# Patient Record
Sex: MALE | Race: WHITE | NOT HISPANIC OR LATINO | Employment: FULL TIME | URBAN - METROPOLITAN AREA
[De-identification: names, ages, dates, MRNs, and addresses within clinical notes are randomized per-mention and may not be internally consistent; named-entity substitution may affect disease eponyms.]

---

## 2017-02-14 ENCOUNTER — HOSPITAL ENCOUNTER (OUTPATIENT)
Dept: LAB | Facility: MEDICAL CENTER | Age: 52
End: 2017-02-14
Attending: INTERNAL MEDICINE
Payer: COMMERCIAL

## 2017-02-14 LAB
BUN SERPL-MCNC: 20 MG/DL (ref 8–22)
CREAT SERPL-MCNC: 1.06 MG/DL (ref 0.5–1.4)

## 2017-02-14 PROCEDURE — 36415 COLL VENOUS BLD VENIPUNCTURE: CPT

## 2017-02-14 PROCEDURE — 82565 ASSAY OF CREATININE: CPT

## 2017-02-14 PROCEDURE — 84520 ASSAY OF UREA NITROGEN: CPT

## 2017-02-15 ENCOUNTER — HOSPITAL ENCOUNTER (OUTPATIENT)
Dept: RADIOLOGY | Facility: MEDICAL CENTER | Age: 52
End: 2017-02-15
Attending: INTERNAL MEDICINE
Payer: COMMERCIAL

## 2017-02-15 DIAGNOSIS — C64.9 MALIGNANT NEOPLASM OF KIDNEY, EXCEPT PELVIS: ICD-10-CM

## 2017-02-15 PROCEDURE — 71260 CT THORAX DX C+: CPT

## 2017-02-15 PROCEDURE — 700117 HCHG RX CONTRAST REV CODE 255: Performed by: INTERNAL MEDICINE

## 2017-02-15 RX ADMIN — IOHEXOL 100 ML: 350 INJECTION, SOLUTION INTRAVENOUS at 10:27

## 2017-06-19 ENCOUNTER — OFFICE VISIT (OUTPATIENT)
Dept: URGENT CARE | Facility: PHYSICIAN GROUP | Age: 52
End: 2017-06-19
Payer: COMMERCIAL

## 2017-06-19 VITALS
SYSTOLIC BLOOD PRESSURE: 130 MMHG | HEART RATE: 68 BPM | OXYGEN SATURATION: 99 % | WEIGHT: 225 LBS | TEMPERATURE: 98.2 F | BODY MASS INDEX: 30.51 KG/M2 | RESPIRATION RATE: 16 BRPM | DIASTOLIC BLOOD PRESSURE: 72 MMHG

## 2017-06-19 DIAGNOSIS — R07.9 CHEST PAIN, UNSPECIFIED TYPE: ICD-10-CM

## 2017-06-19 PROCEDURE — 99204 OFFICE O/P NEW MOD 45 MIN: CPT | Performed by: PHYSICIAN ASSISTANT

## 2017-06-19 RX ORDER — ASPIRIN 81 MG/1
243 TABLET, CHEWABLE ORAL ONCE
Status: COMPLETED | OUTPATIENT
Start: 2017-06-19 | End: 2017-06-19

## 2017-06-19 RX ADMIN — ASPIRIN 243 MG: 81 TABLET, CHEWABLE ORAL at 11:54

## 2017-06-19 ASSESSMENT — ENCOUNTER SYMPTOMS
SPUTUM PRODUCTION: 0
BACK PAIN: 0
HEADACHES: 1
ORTHOPNEA: 0
LEG PAIN: 0
HEMOPTYSIS: 0
COUGH: 0
SYNCOPE: 0
ABDOMINAL PAIN: 1
SORE THROAT: 0
IRREGULAR HEARTBEAT: 0
NAUSEA: 0
DIZZINESS: 1
EXERTIONAL CHEST PRESSURE: 0
NEAR-SYNCOPE: 0
DIAPHORESIS: 0
PALPITATIONS: 0
NUMBNESS: 0
LOWER EXTREMITY EDEMA: 0
SHORTNESS OF BREATH: 1
PND: 0
CLAUDICATION: 0
FEVER: 0

## 2017-06-19 NOTE — MR AVS SNAPSHOT
Panchito Moura   2017 11:00 AM   Office Visit   MRN: [id number]    Department:  [facility]   Dept Phone:  [phone]    Description:  Male : 1965   Provider:  Evelia Carmen PA-C           Reason for Visit     Chest Pressure chest pressure started at 1am ,has migraine      Allergies as of 2017     No Known Allergies      You were diagnosed with     Chest pain, unspecified type   [[id number]]         Vital Signs     Blood Pressure Pulse Temperature Respirations Weight Oxygen Saturation    130/72 mmHg 68 36.8 °C (98.2 °F) 16 102.059 kg (225 lb) 99%    Smoking Status                   Current Some Day Smoker           Basic Information     Date Of Birth Sex Race Ethnicity Preferred Language    1965 Male White Non- English      Health Maintenance        Date Due Completion Dates    COLONOSCOPY 2014 ---    IMM DTaP/Tdap/Td Vaccine (2 - Td) 2026            Current Immunizations     Tdap Vaccine 2016  7:18 AM      Below and/or attached are the medications your provider expects you to take. Review all of your home medications and newly ordered medications with your provider and/or pharmacist. Follow medication instructions as directed by your provider and/or pharmacist. Please keep your medication list with you and share with your provider. Update the information when medications are discontinued, doses are changed, or new medications (including over-the-counter products) are added; and carry medication information at all times in the event of emergency situations     Allergies:  No Known Allergies          Medications  Valid as of: 2017 -  6:31 PM    Generic Name Brand Name Tablet Size Instructions for use    Hydrocodone-Acetaminophen (Tab) NORCO 5-325 MG Take 1-2 Tabs by mouth every 6 hours as needed.        Pravastatin Sodium   Take  by mouth.        .                 Medicines prescribed today were sent to:     [facility] [id number] -  [place], [place] - [address] [facility] [address] [facility] [address]    [address] [place] [address]    Phone: [phone] Fax: [phone]    Open 24 Hours?: No      Medication refill instructions:       If your prescription bottle indicates you have medication refills left, it is not necessary to call your provider’s office. Please contact your pharmacy and they will refill your medication.    If your prescription bottle indicates you do not have any refills left, you may request refills at any time through one of the following ways: The online iWOPI system (except Urgent Care), by calling your provider’s office, or by asking your pharmacy to contact your provider’s office with a refill request. Medication refills are processed only during regular business hours and may not be available until the next business day. Your provider may request additional information or to have a follow-up visit with you prior to refilling your medication.   *Please Note: Medication refills are assigned a new Rx number when refilled electronically. Your pharmacy may indicate that no refills were authorized even though a new prescription for the same medication is available at the pharmacy. Please request the medicine by name with the pharmacy before contacting your provider for a refill.           iWOPI Access Code: [email or web address]  Expires: 6/25/2017  4:15 AM    iWOPI  A secure, online tool to manage your health information     [employer]’s iWOPI® is a secure, online tool that connects you to your personalized health information from the privacy of your home -- day or night - making it very easy for you to manage your healthcare. Once the activation process is completed, you can even access your medical information using the iWOPI mireille, which is available for free in the Apple Mireille store or Google Play store.     iWOPI provides the following levels of access (as shown below):   My Chart Features   [facility] Primary Care  Doctor [facility]  Specialists [facility]  Urgent  Care Non-[facility]  Primary Care  Doctor   Email your healthcare team securely and privately 24/7 X X X    Manage appointments: schedule your next appointment; view details of past/upcoming appointments X      Request prescription refills. X      View recent personal medical records, including lab and immunizations X X X X   View health record, including health history, allergies, medications X X X X   Read reports about your outpatient visits, procedures, consult and ER notes X X X X   See your discharge summary, which is a recap of your hospital and/or ER visit that includes your diagnosis, lab results, and care plan. X X       How to register for Enecsys:  1. Go to  [email or web address].  2. Click on the Sign Up Now box, which takes you to the New Member Sign Up page. You will need to provide the following information:  a. Enter your Enecsys Access Code exactly as it appears at the top of this page. (You will not need to use this code after you’ve completed the sign-up process. If you do not sign up before the expiration date, you must request a new code.)   b. Enter your date of birth.   c. Enter your home email address.   d. Click Submit, and follow the next screen’s instructions.  3. Create a Enecsys ID. This will be your Enecsys login ID and cannot be changed, so think of one that is secure and easy to remember.  4. Create a Enecsys password. You can change your password at any time.  5. Enter your Password Reset Question and Answer. This can be used at a later time if you forget your password.   6. Enter your e-mail address. This allows you to receive e-mail notifications when new information is available in Enecsys.  7. Click Sign Up. You can now view your health information.    For assistance activating your Enecsys account, call [phone]        Quit Tobacco Information     Do you want to quit using tobacco?    Quitting tobacco decreases risks of cancer,  heart and lung disease, increases life expectancy, improves sense of taste and smell, and increases spending money, among other benefits.    If you are thinking about quitting, we can help.  • [facility] Quit Tobacco Program: [phone]  o Program occurs weekly for four weeks and includes pharmacist consultation on products to support quitting smoking or chewing tobacco. A provider referral is needed for pharmacist consultation.  • Tobacco Users Help Hotline: 6-800-QUIT-NOW (330-9727) or https://[place].quitlogix.org/  o Free, confidential telephone and online coaching for [place] residents. Sessions are designed on a schedule that is convenient for you. Eligible clients receive free nicotine replacement therapy.  • Nationally: www.smokefree.gov  o Information and professional assistance to support both immediate and long-term needs as you become, and remain, a non-smoker. Smokefree.gov allows you to choose the help that best fits your needs.

## 2017-06-19 NOTE — PROGRESS NOTES
Subjective:      Panchito Moura is a 51 y.o. male who presents with Chest Pressure    PMH:  has a past medical history of Cancer (CMS-HCC) and Cancer (CMS-HCC).  MEDS:   Current outpatient prescriptions:   •  hydrocodone-acetaminophen (NORCO) 5-325 MG Tab per tablet, Take 1-2 Tabs by mouth every 6 hours as needed., Disp: 15 Tab, Rfl: 0  •  Pravastatin Sodium (PRAVACHOL PO), Take  by mouth., Disp: , Rfl:     Current facility-administered medications:   •  aspirin (ASA) chewable tab 243 mg, 243 mg, Oral, Once, Evelia Carmen PA-C  ALLERGIES: No Known Allergies  SURGHX:   Past Surgical History   Procedure Laterality Date   • Nephrectomy partial       right   • Nerve repair  6/18/08     Performed by SKYE RAY at [facility]   • Laceration repair  6/18/08     Performed by SKYE RAY at [facility] ORS   • Other       tonsillectomy   • Nephrectomy partial Right      SOCHX:  reports that he has been smoking.  He does not have any smokeless tobacco history on file. He reports that he does not drink alcohol or use illicit drugs.  FH: family history is not on file. Reviewed with patient/family. Not pertinent to this complaint.            HPI Comments: Patient presents with:  Chest Pressure: chest pressure started at 1am ,has migraine,dizzy and mildly sob on exertion.   PT states he woke up with chest pain/pressure and indigestion.  Drank some milk without relief.         Chest Pain   This is a new problem. The current episode started today. The onset quality is gradual. The problem occurs constantly. The problem has been unchanged. The pain is present in the substernal region and epigastric region. The pain is at a severity of 5/10. The quality of the pain is described as dull, heavy and pressure. The pain does not radiate. Associated symptoms include abdominal pain, dizziness, headaches and shortness of breath. Pertinent negatives include no back pain, claudication, cough, diaphoresis,  exertional chest pressure, fever, hemoptysis, irregular heartbeat, leg pain, lower extremity edema, malaise/fatigue, nausea, near-syncope, numbness, orthopnea, palpitations, PND, sputum production or syncope. Treatments tried: milk. The treatment provided no relief. Risk factors include smoking/tobacco exposure, male gender, obesity and lack of exercise.   His past medical history is significant for cancer and hyperlipidemia.   Pertinent negatives for past medical history include no MI and no PE.       Review of Systems   Constitutional: Negative for fever, malaise/fatigue and diaphoresis.   HENT: Negative for sore throat.    Respiratory: Positive for shortness of breath. Negative for cough, hemoptysis and sputum production.    Cardiovascular: Positive for chest pain. Negative for palpitations, orthopnea, claudication, leg swelling, syncope, PND and near-syncope.   Gastrointestinal: Positive for abdominal pain. Negative for nausea.   Musculoskeletal: Negative for back pain.   Neurological: Positive for dizziness and headaches. Negative for numbness.   All other systems reviewed and are negative.         Objective:     /72 mmHg  Pulse 68  Temp(Src) 36.8 °C (98.2 °F)  Resp 16  Wt 102.059 kg (225 lb)  SpO2 99%     Physical Exam   Constitutional: He is oriented to person, place, and time. He appears well-developed and well-nourished. No distress.   HENT:   Head: Normocephalic.   Nose: Nose normal.   Mouth/Throat: Oropharynx is clear and moist.   Eyes: Conjunctivae and EOM are normal. Pupils are equal, round, and reactive to light.   Neck: Normal range of motion. Neck supple. No JVD present.   Cardiovascular: Normal rate, regular rhythm, normal heart sounds and intact distal pulses.    Pulmonary/Chest: Effort normal and breath sounds normal.   Abdominal: Soft.   Musculoskeletal: Normal range of motion.   Neurological: He is alert and oriented to person, place, and time. He has normal reflexes.   Skin: Skin is  "warm and dry. There is pallor.   Psychiatric: He has a normal mood and affect.   Nursing note and vitals reviewed.         EKG Interpretation   Interpreted by me   Rhythm: normal sinus   Rate: normal   Axis: normal   Ectopy: none   Conduction: normal   ST Segments: no acute change , possible ST elevation vs early repol  V2-V3   T Waves: no acute change   Q Waves: none      Assessment/Plan:     1. Chest pain, unspecified type  UC AMA/Refusal of Treatment    aspirin (ASA) chewable tab 243 mg    EKG - Clinic Performed   PT requires evaluation and treatment at a facility that can provide a higher level of care due to acuity of illness/complaint.   I spoke with AMANDA at [facility] who is aware of pt CC, HPI, VS, EKG, ASA , [facility] refusal  and transport mode (POV).      PT refused [facility] transport stating \"Im not paying $1000.00 for an ambulance ride up the hill.\"        "

## 2018-12-12 ENCOUNTER — HOSPITAL ENCOUNTER (OUTPATIENT)
Dept: RADIOLOGY | Facility: MEDICAL CENTER | Age: 53
End: 2018-12-12
Attending: PHYSICIAN ASSISTANT
Payer: COMMERCIAL

## 2018-12-12 ENCOUNTER — OFFICE VISIT (OUTPATIENT)
Dept: URGENT CARE | Facility: PHYSICIAN GROUP | Age: 53
End: 2018-12-12
Payer: COMMERCIAL

## 2018-12-12 VITALS
SYSTOLIC BLOOD PRESSURE: 130 MMHG | OXYGEN SATURATION: 94 % | TEMPERATURE: 97.5 F | BODY MASS INDEX: 31.15 KG/M2 | HEART RATE: 64 BPM | HEIGHT: 72 IN | WEIGHT: 230 LBS | RESPIRATION RATE: 20 BRPM | DIASTOLIC BLOOD PRESSURE: 62 MMHG

## 2018-12-12 DIAGNOSIS — R10.9 FLANK PAIN: ICD-10-CM

## 2018-12-12 DIAGNOSIS — R05.9 COUGH: ICD-10-CM

## 2018-12-12 DIAGNOSIS — R07.1 INSPIRATORY PAIN: ICD-10-CM

## 2018-12-12 DIAGNOSIS — R31.9 HEMATURIA, UNSPECIFIED TYPE: ICD-10-CM

## 2018-12-12 DIAGNOSIS — R07.81 RIB PAIN ON RIGHT SIDE: ICD-10-CM

## 2018-12-12 DIAGNOSIS — Z85.528 H/O RENAL CELL CANCER: ICD-10-CM

## 2018-12-12 PROCEDURE — 71101 X-RAY EXAM UNILAT RIBS/CHEST: CPT | Mod: RT

## 2018-12-12 PROCEDURE — 99214 OFFICE O/P EST MOD 30 MIN: CPT | Performed by: PHYSICIAN ASSISTANT

## 2018-12-12 ASSESSMENT — ENCOUNTER SYMPTOMS
BLOOD IN STOOL: 0
VOMITING: 0
COUGH: 1
FLANK PAIN: 0
SPUTUM PRODUCTION: 0
DIARRHEA: 0
NAUSEA: 1
BACK PAIN: 0
PALPITATIONS: 0
WHEEZING: 0
FEVER: 0
SHORTNESS OF BREATH: 0
ABDOMINAL PAIN: 0
CHILLS: 0
MYALGIAS: 0
NECK PAIN: 0
CONSTIPATION: 0

## 2018-12-12 ASSESSMENT — PAIN SCALES - GENERAL: PAINLEVEL: 8=MODERATE-SEVERE PAIN

## 2018-12-12 NOTE — PROGRESS NOTES
Subjective:   Panchito Moura is a 53 y.o. male who presents for Rib Pain (x3days sob)        Notes last one week of cough, bad coughing, notes last two days of pain to right anterior lower rib 2/2 coughing, denies crepitus, denies noting moment of injury, denies fever/chills, c/o mild ST, denies ear pain, denies bronchospasm, notes palpable rib pain to anterior rib, denies nausea/vomiting/abdpain/diarrhea/rash, denies PMH of asthma/pneumonia, denies PMH of bronchitis, does have seasonal allerg, tried using theraflu/nyquil. Denies left sided chest pain, denies palpitations. Denies swelling to legs.  Pain with inspiration as well as pain line back improved leaning forward. Pt admits to PMH of renal CA w/ partial right nephrectomy.       Review of Systems   Constitutional: Negative for chills and fever.   Respiratory: Positive for cough. Negative for sputum production, shortness of breath and wheezing.    Cardiovascular: Negative for chest pain, palpitations and leg swelling.        C/o right rib pain   Gastrointestinal: Positive for nausea. Negative for abdominal pain, blood in stool, constipation, diarrhea, melena and vomiting.   Genitourinary: Negative for dysuria, flank pain, frequency, hematuria and urgency.   Musculoskeletal: Negative for back pain, myalgias and neck pain.        POS for 'right rib pain'   Skin: Negative for rash.     No Known Allergies   Objective:   /62   Pulse 64   Temp 36.4 °C (97.5 °F) (Temporal)   Resp 20   Ht 1.829 m (6')   Wt 104.3 kg (230 lb)   SpO2 94%   BMI 31.19 kg/m²   Physical Exam   Constitutional: He is oriented to person, place, and time. He appears well-developed and well-nourished. No distress.   HENT:   Head: Normocephalic and atraumatic.   Right Ear: External ear normal.   Left Ear: External ear normal.   Nose: Nose normal.   Eyes: Conjunctivae are normal. Right eye exhibits no discharge. Left eye exhibits no discharge. No scleral icterus.   Neck: Neck supple.    Pulmonary/Chest: Effort normal. No respiratory distress. He has no wheezes. He has no rales. He exhibits no tenderness ( unable to reproduce pain w/ palp).   Pain w/ inspiration   Abdominal: Soft. Bowel sounds are normal. He exhibits no distension. There is no tenderness. There is no guarding.   Barely able to lie back 2/2 pain   Musculoskeletal: Normal range of motion.   Neurological: He is alert and oriented to person, place, and time. Coordination normal.   Skin: Skin is warm and dry. He is not diaphoretic. No pallor.   Psychiatric: He has a normal mood and affect.   Nursing note and vitals reviewed.  POCT UA - hematuria  Dx rib/CXR - Impression       1.  No acute fracture or bone erosion identified.    2.  Increased in linear and interstitial opacifications in each lung could be due to inflammation or edema.   Reading Provider Reading Date   Vaughn Thomas M.D. Dec 12, 2018   Signing Provider Signing Date Signing Time   Vaughn Thomas M.D. Dec 12, 2018      CT renal colic - CANCELLED      Assessment/Plan:   1. Inspiratory pain  - UC AMA/Refusal of Treatment    2. Flank pain  - POCT Urinalysis  - [id number] (WITH 1-VIEW CXR) RIGHT; Future  - UC AMA/Refusal of Treatment    3. Cough  - POCT Urinalysis  - [id number] (WITH 1-VIEW CXR) RIGHT; Future    4. Hematuria, unspecified type  - UC AMA/Refusal of Treatment    5. H/O renal cell cancer  - UC AMA/Refusal of Treatment    Based on degree of pain, PMH of renal CA and similar s/sx then, as well as now suspected fluid seen on CXR - pt to ER    Patient has been directed to [facility] ER for further management/work up, I have reiterated to patient that although a provider to provider transfer was made this will not necessarily expedite the ER process, I have spoken to [facility] regarding patient en route by private vehicle    Wife driving pt to ER; completes AMA declining EMS transport  Differential diagnosis, natural history, supportive care, and  indications for immediate follow-up discussed.

## 2020-07-21 ENCOUNTER — OFFICE VISIT (OUTPATIENT)
Dept: URGENT CARE | Facility: PHYSICIAN GROUP | Age: 55
End: 2020-07-21
Payer: COMMERCIAL

## 2020-07-21 ENCOUNTER — HOSPITAL ENCOUNTER (OUTPATIENT)
Facility: MEDICAL CENTER | Age: 55
End: 2020-07-21
Attending: PHYSICIAN ASSISTANT
Payer: COMMERCIAL

## 2020-07-21 VITALS
RESPIRATION RATE: 16 BRPM | WEIGHT: 230 LBS | TEMPERATURE: 97.6 F | DIASTOLIC BLOOD PRESSURE: 82 MMHG | OXYGEN SATURATION: 96 % | SYSTOLIC BLOOD PRESSURE: 126 MMHG | BODY MASS INDEX: 31.19 KG/M2 | HEART RATE: 74 BPM

## 2020-07-21 DIAGNOSIS — S81.802A OPEN WOUND OF LEFT LOWER EXTREMITY, INITIAL ENCOUNTER: Primary | ICD-10-CM

## 2020-07-21 DIAGNOSIS — L03.116 CELLULITIS OF LEFT LOWER EXTREMITY: ICD-10-CM

## 2020-07-21 PROCEDURE — 87205 SMEAR GRAM STAIN: CPT

## 2020-07-21 PROCEDURE — 99214 OFFICE O/P EST MOD 30 MIN: CPT | Performed by: PHYSICIAN ASSISTANT

## 2020-07-21 PROCEDURE — 87075 CULTR BACTERIA EXCEPT BLOOD: CPT

## 2020-07-21 PROCEDURE — 87070 CULTURE OTHR SPECIMN AEROBIC: CPT

## 2020-07-21 RX ORDER — ALBUTEROL SULFATE 90 UG/1
AEROSOL, METERED RESPIRATORY (INHALATION)
COMMUNITY
Start: 2020-06-06 | End: 2020-10-07

## 2020-07-21 RX ORDER — DOXYCYCLINE 100 MG/1
100 CAPSULE ORAL 2 TIMES DAILY
Qty: 20 CAP | Refills: 0 | Status: SHIPPED | OUTPATIENT
Start: 2020-07-21 | End: 2020-07-30

## 2020-07-21 RX ORDER — RIVAROXABAN 20 MG/1
TABLET, FILM COATED ORAL
Status: ON HOLD | COMMUNITY
Start: 2020-07-05 | End: 2020-10-27

## 2020-07-21 RX ORDER — ZOLPIDEM TARTRATE 10 MG/1
TABLET ORAL
COMMUNITY
Start: 2020-06-09 | End: 2020-12-30

## 2020-07-21 ASSESSMENT — ENCOUNTER SYMPTOMS
VOMITING: 0
FALLS: 0
HEADACHES: 0
FEVER: 0

## 2020-07-22 LAB
GRAM STN SPEC: NORMAL
SIGNIFICANT IND 70042: NORMAL
SITE SITE: NORMAL
SOURCE SOURCE: NORMAL

## 2020-07-22 NOTE — PROGRESS NOTES
"Subjective:      Panchito Moura is a 54 y.o. male who presents with Wound Infection (L leg open wound infection )            Patient is a 54-year-old male who presents to urgent care with left lower extremity \"wound \"that is not healing well.  He reports he was recently hospitalized earlier this month (hospitalized for pneumonia )and discharged on the 10th of which he reports he noticed the swelling and crusting shortly thereafter.  Over the last few days he reports worsening pain and believes that it may have grown in size.  He readily admits that he is unable to see the area but reports that the \"scab \"is getting larger.  He denies notable drainage that he is aware of.  He does report history of Xarelto usage as he has history of DVT with bilateral PE.  He denies any fevers, chills, streaking that he is aware of.  He also denies history of MRSA.    Wound Infection   This is a new problem. The current episode started 1 to 4 weeks ago. The problem occurs constantly. The problem has been gradually worsening. Pertinent negatives include no fever, headaches, rash or vomiting. Exacerbated by: Pressure over the area.  Treatments tried: Neosporin.        Review of Systems   Constitutional: Negative for fever.   Gastrointestinal: Negative for vomiting.   Musculoskeletal: Negative for falls and joint pain.        Left lower extremity wound   Skin: Negative for rash.        He does report prior itching to the region however is uncertain if it was caused by him scratching or not.   Neurological: Negative for headaches.   All other systems reviewed and are negative.         Objective:     /82   Pulse 74   Temp 36.4 °C (97.6 °F) (Temporal)   Resp 16   Wt 104.3 kg (230 lb)   SpO2 96%   BMI 31.19 kg/m²      Physical Exam  Vitals signs reviewed.   Constitutional:       General: He is not in acute distress.     Appearance: He is well-developed.   HENT:      Head: Normocephalic and atraumatic.   Eyes:      " Conjunctiva/sclera: Conjunctivae normal.      Pupils: Pupils are equal, round, and reactive to light.   Neck:      Musculoskeletal: Normal range of motion and neck supple.      Trachea: No tracheal deviation.   Cardiovascular:      Rate and Rhythm: Normal rate.   Pulmonary:      Effort: Pulmonary effort is normal.   Skin:     General: Skin is warm.      Findings: Erythema present.             Comments: Left posterior lower extremity: Large scab approximately 6 cm in diameter noted with surrounding erythema.  Scab was removed with sterile Q-tip to note superficial open wound with purulent mucoid and bloody discharge-wound was debrided.  Wound also was irrigated with copious amount of sterile saline.  Area of erythema was marked with marker.  Xeroform, nonstick, and further dressing was applied.   Neurological:      Mental Status: He is alert and oriented to person, place, and time.      Coordination: Coordination normal.   Psychiatric:         Behavior: Behavior normal.         Thought Content: Thought content normal.         Judgment: Judgment normal.                 Assessment/Plan:       1. Open wound of left lower extremity, initial encounter  - ANAEROBIC/AEROBIC/GRAM STAIN  - doxycycline (MONODOX) 100 MG capsule; Take 1 Cap by mouth 2 times a day for 10 days.  Dispense: 20 Cap; Refill: 0    2. Cellulitis of left lower extremity  - doxycycline (MONODOX) 100 MG capsule; Take 1 Cap by mouth 2 times a day for 10 days.  Dispense: 20 Cap; Refill: 0    Wound culture further sent.  Patient does have history of renal cell carcinoma and is status post nephrectomy-per history patient reports that his kidney function is stable.  Will utilize doxycycline at this time as I do not need to renally dose.  Patient is to return to clinic in 2 days for dressing change-she was given supplies to change his dressing tomorrow as well.  If minimal improvement over wound care patient may need to follow-up with wound clinic in the  future-he prefers to avoid that at this time.  Patient and/or guardian given precautionary s/sx that mandate immediate follow up and evaluation in the ED. Advised of risks of not doing so.  Side effects of the above medications discussed.   DDX, Supportive care, and indications for immediate follow-up discussed with patient.    Instructed to return to clinic or nearest emergency department if we are not available for any change in condition, further concerns, or worsening of symptoms.    The patient and/or guardian demonstrated a good understanding and agreed with the treatment plan.    Please note that this dictation was created using voice recognition software. I have made every reasonable attempt to correct obvious errors, but I expect that there are errors of grammar and possibly content that I did not discover before finalizing the note.

## 2020-07-23 ENCOUNTER — HOSPITAL ENCOUNTER (OUTPATIENT)
Dept: RADIOLOGY | Facility: MEDICAL CENTER | Age: 55
End: 2020-07-23
Attending: PHYSICIAN ASSISTANT | Admitting: PHYSICIAN ASSISTANT
Payer: COMMERCIAL

## 2020-07-23 ENCOUNTER — OFFICE VISIT (OUTPATIENT)
Dept: URGENT CARE | Facility: PHYSICIAN GROUP | Age: 55
End: 2020-07-23
Payer: COMMERCIAL

## 2020-07-23 VITALS
BODY MASS INDEX: 31.15 KG/M2 | SYSTOLIC BLOOD PRESSURE: 140 MMHG | RESPIRATION RATE: 20 BRPM | HEART RATE: 75 BPM | OXYGEN SATURATION: 96 % | DIASTOLIC BLOOD PRESSURE: 80 MMHG | HEIGHT: 72 IN | WEIGHT: 230 LBS | TEMPERATURE: 97.9 F

## 2020-07-23 DIAGNOSIS — M79.89 PAIN AND SWELLING OF LEFT LOWER LEG: ICD-10-CM

## 2020-07-23 DIAGNOSIS — S81.802A OPEN WOUND OF LEFT LOWER LEG, INITIAL ENCOUNTER: ICD-10-CM

## 2020-07-23 DIAGNOSIS — M79.662 PAIN AND SWELLING OF LEFT LOWER LEG: ICD-10-CM

## 2020-07-23 PROCEDURE — 99213 OFFICE O/P EST LOW 20 MIN: CPT | Performed by: PHYSICIAN ASSISTANT

## 2020-07-23 PROCEDURE — 93971 EXTREMITY STUDY: CPT | Mod: LT

## 2020-07-23 ASSESSMENT — ENCOUNTER SYMPTOMS
SORE THROAT: 0
NAUSEA: 0
FEVER: 0
SENSORY CHANGE: 0
VOMITING: 0
CHILLS: 0
PALPITATIONS: 0
BLURRED VISION: 0
SHORTNESS OF BREATH: 0
TINGLING: 0

## 2020-07-23 ASSESSMENT — PAIN SCALES - GENERAL: PAINLEVEL: 8=MODERATE-SEVERE PAIN

## 2020-07-24 NOTE — PROGRESS NOTES
Subjective:      Panchito Moura is a 54 y.o. male who presents with Wound Check (back of L of leg, states its still very painful)    HPI:  Patient was seen in the urgent care on 7/21/2020 for evaluation of an open wound in his left lower extremity.  States that he developed a wound approximately 1 month prior after he was hospitalized for pneumonia.  At previous visit wound was debrided and cleaned.  The area of erythema was outlined with marker.  Wound cultures were obtained which are still pending.  Patient was started on a 10-day course of doxycycline.  He has had 4 doses of the doxycycline.  Comes in today for reevaluation.  States that the pain actually has increased a little bit.  Has no fever or chills.  No numbness or tingling.  Has not noticed any discharge from the wound but he does admit that it is difficult to see.      Review of Systems   Constitutional: Negative for chills and fever.   HENT: Negative for sore throat.    Eyes: Negative for blurred vision.   Respiratory: Negative for shortness of breath.    Cardiovascular: Positive for leg swelling (Left lower leg). Negative for chest pain and palpitations.   Gastrointestinal: Negative for nausea and vomiting.   Musculoskeletal: Negative for joint pain.   Skin:        wound of left lower extremity   Neurological: Negative for tingling and sensory change.       PMH:  has a past medical history of Cancer (HCC) and Cancer (HCC).  MEDS:   Current Outpatient Medications:   •  XARELTO 20 MG Tab tablet, TK 1 T PO QPM, Disp: , Rfl:   •  albuterol 108 (90 Base) MCG/ACT Aero Soln inhalation aerosol, INL 1 PUFF PO Q 4 H PRF WHZ, Disp: , Rfl:   •  zolpidem (AMBIEN) 10 MG Tab, TK 1 T PO HS PRF SLP  GM 47.00, Disp: , Rfl:   •  doxycycline (MONODOX) 100 MG capsule, Take 1 Cap by mouth 2 times a day for 10 days., Disp: 20 Cap, Rfl: 0  •  Pravastatin Sodium (PRAVACHOL PO), Take  by mouth., Disp: , Rfl:   ALLERGIES: No Known Allergies  SURGHX:   Past Surgical History:    Procedure Laterality Date   • NERVE REPAIR  6/18/08    Performed by SKYE RAY at SURGERY [facility] ORS   • LACERATION REPAIR  6/18/08    Performed by SKYE RAY at [facility] ORS   • NEPHRECTOMY PARTIAL      right   • NEPHRECTOMY PARTIAL Right    • OTHER      tonsillectomy     SOCHX:  reports that he has been smoking cigarettes. He has never used smokeless tobacco. He reports that he does not drink alcohol or use drugs.  FH: Family history was reviewed, no pertinent findings to report     Objective:     /80   Pulse 75   Temp 36.6 °C (97.9 °F) (Temporal)   Resp 20   Ht 1.829 m (6')   Wt 104.3 kg (230 lb)   SpO2 96%   BMI 31.19 kg/m²      Physical Exam  Constitutional:       Appearance: He is well-developed.   HENT:      Head: Normocephalic and atraumatic.      Right Ear: External ear normal.      Left Ear: External ear normal.   Eyes:      Conjunctiva/sclera: Conjunctivae normal.      Pupils: Pupils are equal, round, and reactive to light.   Cardiovascular:      Rate and Rhythm: Normal rate and regular rhythm.      Heart sounds: Normal heart sounds. No murmur.   Pulmonary:      Effort: Pulmonary effort is normal.      Breath sounds: Normal breath sounds. No wheezing.   Skin:     General: Skin is warm and dry.      Capillary Refill: Capillary refill takes less than 2 seconds.      Comments: Left calf exhibits a large scab approximately 6 x 4 cm in diameter with surrounding erythema.  The erythema is approximately 1 cm inside of the line demarcated with marker from previous visit.  There is very mild clear/yellow discharge noted from the central portion of the wound but no purulent drainage.  The entire left lower extremity from the knee down exhibits swelling in the entirety of the calf is tender to palpation.  No fluctuance noted.  There is mild induration in the area just adjacent to the wound.  No streaking.  Distal N/V intact.   Neurological:      Mental Status: He is  alert and oriented to person, place, and time.   Psychiatric:         Behavior: Behavior normal.         Judgment: Judgment normal.         US-EXTREMITY VENOUS LOWER UNILAT LEFT   IMPRESSION:     No evidence of left lower extremity deep venous thrombosis.  Assessment/Plan:       1. Open wound of left lower leg, initial encounter  - US-EXTREMITY VENOUS LOWER UNILAT LEFT; Future    2. Pain and swelling of left lower leg  - US-EXTREMITY VENOUS LOWER UNILAT LEFT; Future      Due to the extensive amount of swelling in the left lower extremity and patient's history of DVT ultrasound was ordered.  There is no evidence of DVT seen.  Patient is only had 4 doses of doxycycline.  Wound culture still pending.  He was advised to keep the extremity elevated as much as possible above the level of his heart and to apply ice to the affected extremity.  Continue antibiotics as prescribed.  If the swelling/pain does not improve at all by tomorrow afternoon he should go to the emergency department.  ER precautions also discussed for worsening of the swelling or redness, streaking from the wound, or development of fever.  If patient does not end up going to the emergency department for any of the previously mentioned items he should return to urgent care on Saturday morning for wound check.

## 2020-07-25 ENCOUNTER — OFFICE VISIT (OUTPATIENT)
Dept: URGENT CARE | Facility: PHYSICIAN GROUP | Age: 55
End: 2020-07-25
Payer: COMMERCIAL

## 2020-07-25 VITALS
SYSTOLIC BLOOD PRESSURE: 120 MMHG | RESPIRATION RATE: 12 BRPM | HEART RATE: 57 BPM | DIASTOLIC BLOOD PRESSURE: 70 MMHG | HEIGHT: 72 IN | WEIGHT: 230 LBS | OXYGEN SATURATION: 97 % | BODY MASS INDEX: 31.15 KG/M2 | TEMPERATURE: 97 F

## 2020-07-25 DIAGNOSIS — S81.802D OPEN WOUND OF LEFT LOWER LEG, SUBSEQUENT ENCOUNTER: ICD-10-CM

## 2020-07-25 LAB
BACTERIA WND AEROBE CULT: NORMAL
GRAM STN SPEC: NORMAL
SIGNIFICANT IND 70042: NORMAL
SITE SITE: NORMAL
SOURCE SOURCE: NORMAL

## 2020-07-25 PROCEDURE — 99213 OFFICE O/P EST LOW 20 MIN: CPT | Performed by: NURSE PRACTITIONER

## 2020-07-25 ASSESSMENT — ENCOUNTER SYMPTOMS
TINGLING: 0
FEVER: 0
CHILLS: 0
MYALGIAS: 0
SENSORY CHANGE: 0

## 2020-07-25 NOTE — PROGRESS NOTES
Subjective:      Panchito Moura is a 54 y.o. male who presents with Follow-Up (L leg, wound)            HPI Recurrent. 54 year old male with open wound with subsequent infection to posterior calf of left leg. He is here for follow up. Has been on doxycycline and tolerating this. He denies fever, chills, myalgia, nausea. He notices significant improvement today. Swelling is down as is erythema.  Patient has no known allergies.  Current Outpatient Medications on File Prior to Visit   Medication Sig Dispense Refill   • XARELTO 20 MG Tab tablet TK 1 T PO QPM     • albuterol 108 (90 Base) MCG/ACT Aero Soln inhalation aerosol INL 1 PUFF PO Q 4 H PRF WHZ     • zolpidem (AMBIEN) 10 MG Tab TK 1 T PO HS PRF SLP  GM 47.00     • doxycycline (MONODOX) 100 MG capsule Take 1 Cap by mouth 2 times a day for 10 days. 20 Cap 0   • Pravastatin Sodium (PRAVACHOL PO) Take  by mouth.       No current facility-administered medications on file prior to visit.      Social History     Socioeconomic History   • Marital status: Single     Spouse name: Not on file   • Number of children: Not on file   • Years of education: Not on file   • Highest education level: Not on file   Occupational History   • Not on file   Social Needs   • Financial resource strain: Not on file   • Food insecurity     Worry: Not on file     Inability: Not on file   • Transportation needs     Medical: Not on file     Non-medical: Not on file   Tobacco Use   • Smoking status: Current Some Day Smoker     Types: Cigarettes   • Smokeless tobacco: Never Used   Substance and Sexual Activity   • Alcohol use: No   • Drug use: No   • Sexual activity: Not on file   Lifestyle   • Physical activity     Days per week: Not on file     Minutes per session: Not on file   • Stress: Not on file   Relationships   • Social connections     Talks on phone: Not on file     Gets together: Not on file     Attends Shinto service: Not on file     Active member of club or organization: Not on  file     Attends meetings of clubs or organizations: Not on file     Relationship status: Not on file   • Intimate partner violence     Fear of current or ex partner: Not on file     Emotionally abused: Not on file     Physically abused: Not on file     Forced sexual activity: Not on file   Other Topics Concern   • Not on file   Social History Narrative    ** Merged History Encounter **          Breast Cancer-related family history is not on file.      Review of Systems   Constitutional: Negative for chills and fever.   Musculoskeletal: Negative for myalgias.   Skin:        +open wound.   Neurological: Negative for tingling and sensory change.          Objective:     /70   Pulse (!) 57   Temp 36.1 °C (97 °F) (Temporal)   Resp 12   Ht 1.829 m (6')   Wt 104.3 kg (230 lb)   SpO2 97%   BMI 31.19 kg/m²      Physical Exam  Vitals signs and nursing note reviewed.   Constitutional:       Appearance: Normal appearance. He is not ill-appearing.   Musculoskeletal: Normal range of motion.   Skin:     General: Skin is warm and dry.      Findings: Erythema present.      Comments: Large scabbed wound to posterior left lower extremity. No induration palpated, minimal swelling noted and decrease in erythema.    Neurological:      General: No focal deficit present.      Mental Status: He is alert and oriented to person, place, and time.                 Assessment/Plan:       1. Open wound of left lower leg, subsequent encounter       Doing much better. I would like him to follow up Tuesday or Wednesday. Again he is given strict ED precautions. I have also gone over home care as far as twice daily cleaning of area with warm water and soap.

## 2020-07-26 ENCOUNTER — APPOINTMENT (OUTPATIENT)
Dept: RADIOLOGY | Facility: MEDICAL CENTER | Age: 55
End: 2020-07-26
Attending: EMERGENCY MEDICINE
Payer: COMMERCIAL

## 2020-07-26 ENCOUNTER — HOSPITAL ENCOUNTER (EMERGENCY)
Facility: MEDICAL CENTER | Age: 55
End: 2020-07-26
Attending: EMERGENCY MEDICINE
Payer: COMMERCIAL

## 2020-07-26 VITALS
SYSTOLIC BLOOD PRESSURE: 130 MMHG | WEIGHT: 237.44 LBS | HEART RATE: 65 BPM | OXYGEN SATURATION: 95 % | DIASTOLIC BLOOD PRESSURE: 70 MMHG | RESPIRATION RATE: 18 BRPM | TEMPERATURE: 97.6 F | HEIGHT: 72 IN | BODY MASS INDEX: 32.16 KG/M2

## 2020-07-26 DIAGNOSIS — N50.819 TESTICLE PAIN: ICD-10-CM

## 2020-07-26 LAB
ANION GAP SERPL CALC-SCNC: 13 MMOL/L (ref 7–16)
APPEARANCE UR: CLEAR
BACTERIA SPEC ANAEROBE CULT: NORMAL
BASOPHILS # BLD AUTO: 0.5 % (ref 0–1.8)
BASOPHILS # BLD: 0.06 K/UL (ref 0–0.12)
BILIRUB UR QL STRIP.AUTO: NEGATIVE
BUN SERPL-MCNC: 24 MG/DL (ref 8–22)
CALCIUM SERPL-MCNC: 9.4 MG/DL (ref 8.5–10.5)
CHLORIDE SERPL-SCNC: 101 MMOL/L (ref 96–112)
CO2 SERPL-SCNC: 21 MMOL/L (ref 20–33)
COLOR UR: YELLOW
CREAT SERPL-MCNC: 1.07 MG/DL (ref 0.5–1.4)
EOSINOPHIL # BLD AUTO: 0.09 K/UL (ref 0–0.51)
EOSINOPHIL NFR BLD: 0.7 % (ref 0–6.9)
ERYTHROCYTE [DISTWIDTH] IN BLOOD BY AUTOMATED COUNT: 46.6 FL (ref 35.9–50)
GLUCOSE SERPL-MCNC: 110 MG/DL (ref 65–99)
GLUCOSE UR STRIP.AUTO-MCNC: NEGATIVE MG/DL
HCT VFR BLD AUTO: 43.5 % (ref 42–52)
HGB BLD-MCNC: 14.4 G/DL (ref 14–18)
IMM GRANULOCYTES # BLD AUTO: 0.06 K/UL (ref 0–0.11)
IMM GRANULOCYTES NFR BLD AUTO: 0.5 % (ref 0–0.9)
KETONES UR STRIP.AUTO-MCNC: NEGATIVE MG/DL
LEUKOCYTE ESTERASE UR QL STRIP.AUTO: NEGATIVE
LYMPHOCYTES # BLD AUTO: 0.94 K/UL (ref 1–4.8)
LYMPHOCYTES NFR BLD: 7.7 % (ref 22–41)
MCH RBC QN AUTO: 30.1 PG (ref 27–33)
MCHC RBC AUTO-ENTMCNC: 33.1 G/DL (ref 33.7–35.3)
MCV RBC AUTO: 91 FL (ref 81.4–97.8)
MICRO URNS: NORMAL
MONOCYTES # BLD AUTO: 0.81 K/UL (ref 0–0.85)
MONOCYTES NFR BLD AUTO: 6.6 % (ref 0–13.4)
NEUTROPHILS # BLD AUTO: 10.31 K/UL (ref 1.82–7.42)
NEUTROPHILS NFR BLD: 84 % (ref 44–72)
NITRITE UR QL STRIP.AUTO: NEGATIVE
NRBC # BLD AUTO: 0 K/UL
NRBC BLD-RTO: 0 /100 WBC
PH UR STRIP.AUTO: 5.5 [PH] (ref 5–8)
PLATELET # BLD AUTO: 124 K/UL (ref 164–446)
PMV BLD AUTO: 9.9 FL (ref 9–12.9)
POTASSIUM SERPL-SCNC: 4.3 MMOL/L (ref 3.6–5.5)
PROT UR QL STRIP: NEGATIVE MG/DL
RBC # BLD AUTO: 4.78 M/UL (ref 4.7–6.1)
RBC UR QL AUTO: NEGATIVE
SIGNIFICANT IND 70042: NORMAL
SITE SITE: NORMAL
SODIUM SERPL-SCNC: 135 MMOL/L (ref 135–145)
SOURCE SOURCE: NORMAL
SP GR UR STRIP.AUTO: 1.02
UROBILINOGEN UR STRIP.AUTO-MCNC: 0.2 MG/DL
WBC # BLD AUTO: 12.3 K/UL (ref 4.8–10.8)

## 2020-07-26 PROCEDURE — A9270 NON-COVERED ITEM OR SERVICE: HCPCS | Performed by: EMERGENCY MEDICINE

## 2020-07-26 PROCEDURE — 700101 HCHG RX REV CODE 250: Performed by: EMERGENCY MEDICINE

## 2020-07-26 PROCEDURE — 700102 HCHG RX REV CODE 250 W/ 637 OVERRIDE(OP): Performed by: EMERGENCY MEDICINE

## 2020-07-26 PROCEDURE — 36415 COLL VENOUS BLD VENIPUNCTURE: CPT

## 2020-07-26 PROCEDURE — 96372 THER/PROPH/DIAG INJ SC/IM: CPT

## 2020-07-26 PROCEDURE — 700111 HCHG RX REV CODE 636 W/ 250 OVERRIDE (IP): Performed by: EMERGENCY MEDICINE

## 2020-07-26 PROCEDURE — 80048 BASIC METABOLIC PNL TOTAL CA: CPT

## 2020-07-26 PROCEDURE — 81003 URINALYSIS AUTO W/O SCOPE: CPT

## 2020-07-26 PROCEDURE — 85025 COMPLETE CBC W/AUTO DIFF WBC: CPT

## 2020-07-26 PROCEDURE — 99284 EMERGENCY DEPT VISIT MOD MDM: CPT

## 2020-07-26 PROCEDURE — 87591 N.GONORRHOEAE DNA AMP PROB: CPT

## 2020-07-26 PROCEDURE — 76870 US EXAM SCROTUM: CPT

## 2020-07-26 PROCEDURE — 87491 CHLMYD TRACH DNA AMP PROBE: CPT

## 2020-07-26 RX ORDER — DOXYCYCLINE 100 MG/1
100 CAPSULE ORAL 2 TIMES DAILY
Qty: 28 CAP | Refills: 0 | Status: SHIPPED | OUTPATIENT
Start: 2020-07-26 | End: 2020-08-09

## 2020-07-26 RX ORDER — HYDROCODONE BITARTRATE AND ACETAMINOPHEN 5; 325 MG/1; MG/1
1 TABLET ORAL ONCE
Status: COMPLETED | OUTPATIENT
Start: 2020-07-26 | End: 2020-07-26

## 2020-07-26 RX ORDER — NAPROXEN 500 MG/1
500 TABLET ORAL 2 TIMES DAILY PRN
Qty: 20 TAB | Refills: 0 | Status: SHIPPED | OUTPATIENT
Start: 2020-07-26 | End: 2020-10-07

## 2020-07-26 RX ORDER — CEFTRIAXONE SODIUM 250 MG/1
250 INJECTION, POWDER, FOR SOLUTION INTRAMUSCULAR; INTRAVENOUS ONCE
Status: COMPLETED | OUTPATIENT
Start: 2020-07-26 | End: 2020-07-26

## 2020-07-26 RX ADMIN — HYDROCODONE BITARTRATE AND ACETAMINOPHEN 1 TABLET: 5; 325 TABLET ORAL at 09:37

## 2020-07-26 RX ADMIN — CEFTRIAXONE SODIUM 250 MG: 250 INJECTION, POWDER, FOR SOLUTION INTRAMUSCULAR; INTRAVENOUS at 11:11

## 2020-07-26 RX ADMIN — LIDOCAINE HYDROCHLORIDE 0.9 ML: 10 INJECTION, SOLUTION INFILTRATION; PERINEURAL at 11:11

## 2020-07-26 ASSESSMENT — LIFESTYLE VARIABLES: DO YOU DRINK ALCOHOL: NO

## 2020-07-26 NOTE — ED TRIAGE NOTES
.  Chief Complaint   Patient presents with   • Testicle Pain     left testicle pain since yesterday   Pt reports no heavy lifting, no trauma.  Pt currently be treated at urgent care Tuesday, Thursday and Saturday for LLE open wound.  + minor erythema, discharge noted.  New dressing applied.  No fever/chills.  Right testicle pain gradually increasing since yesterday.  No dysuria or hematuria reported.  Pt taking doxycycline.

## 2020-07-26 NOTE — ED PROVIDER NOTES
CHIEF COMPLAINT  Chief Complaint   Patient presents with   • Testicle Pain     left testicle pain since yesterday       HPI  Panchito Moura is a 54 y.o. male who presents with pain in his right testicle that started yesterday.  It was not sudden in onset.  He has had no redness or rash in the area.  He denies any history of inguinal hernia.  He states that when he was younger he had a cyst on that side.  Note that in the triage note it states his testicle pain is on the left.  Otherwise he notes no recent sexual exposures, no discharge from his urethral meatus, no dysuria or hematuria.  No fevers.  Additionally, the patient appears to have a subacute wound to his left leg that is being seen 3 days a week-currently he is on doxycycline for this.    REVIEW OF SYSTEMS  No fevers body aches sore throat or cough    PAST MEDICAL HISTORY  Past Medical History:   Diagnosis Date   • Cancer (HCC)     kidney   • Cancer (HCC)     renal       FAMILY HISTORY  No family history on file.    SOCIAL HISTORY  Social History     Socioeconomic History   • Marital status: Single     Spouse name: Not on file   • Number of children: Not on file   • Years of education: Not on file   • Highest education level: Not on file   Occupational History   • Not on file   Social Needs   • Financial resource strain: Not on file   • Food insecurity     Worry: Not on file     Inability: Not on file   • Transportation needs     Medical: Not on file     Non-medical: Not on file   Tobacco Use   • Smoking status: Current Some Day Smoker     Types: Cigarettes   • Smokeless tobacco: Never Used   Substance and Sexual Activity   • Alcohol use: No   • Drug use: No   • Sexual activity: Not on file   Lifestyle   • Physical activity     Days per week: Not on file     Minutes per session: Not on file   • Stress: Not on file   Relationships   • Social connections     Talks on phone: Not on file     Gets together: Not on file     Attends Episcopal service: Not on file      Active member of club or organization: Not on file     Attends meetings of clubs or organizations: Not on file     Relationship status: Not on file   • Intimate partner violence     Fear of current or ex partner: Not on file     Emotionally abused: Not on file     Physically abused: Not on file     Forced sexual activity: Not on file   Other Topics Concern   • Not on file   Social History Narrative    ** Merged History Encounter **            SURGICAL HISTORY  Past Surgical History:   Procedure Laterality Date   • NERVE REPAIR  6/18/08    Performed by SKYE RAY at [facility] ORS   • LACERATION REPAIR  6/18/08    Performed by SKYE RAY at [facility] ORS   • NEPHRECTOMY PARTIAL      right   • NEPHRECTOMY PARTIAL Right    • OTHER      tonsillectomy       CURRENT MEDICATIONS  Home Medications    **Home medications have not yet been reviewed for this encounter**         ALLERGIES  No Known Allergies    PHYSICAL EXAM  VITAL SIGNS: /78   Pulse 73   Temp 36.6 °C (97.9 °F) (Oral)   Resp 20   Ht 1.829 m (6')   Wt 107.7 kg (237 lb 7 oz)   SpO2 92%   BMI 32.20 kg/m²      Constitutional: Well developed, Well nourished, No acute distress, Non-toxic appearance.   HENT: Normocephalic, Atraumatic  Cardiovascular: Regular pulse  Lungs: No respiratory distress  Abdomen: Nontender throughout,  : The right testicle is asymmetrically enlarged compared to the left, cremasterics appear to be intact bilaterally, the epididymis is tender as well as the testicle on the right, there is no significant erythema and no rash, there is no discharge from the urethral meatus and there is no swelling or redness to the glans or the foreskin  Skin: Warm, Dry, no rash  Extremities: No edema, the patient has a wound that is about a couple inches in diameter to the left posterior leg that has some mild surrounding erythema, no active discharge  Neurologic: Alert, appropriate, follows  commands  Psychiatric: Affect normal    RADIOLOGY/PROCEDURES  [id number]   Final Result      1.  Elongated echogenic structure within the LEFT testicle, scar versus calcification.   2.  No intratesticular mass or evidence for torsion.   3.  Minimal bilateral hydrocele.   4.  Probable small RIGHT varicocele.           Results for orders placed or performed during the hospital encounter of 07/26/20   CBC WITH DIFFERENTIAL   Result Value Ref Range    WBC 12.3 (H) 4.8 - 10.8 K/uL    RBC 4.78 4.70 - 6.10 M/uL    Hemoglobin 14.4 14.0 - 18.0 g/dL    Hematocrit 43.5 42.0 - 52.0 %    MCV 91.0 81.4 - 97.8 fL    MCH 30.1 27.0 - 33.0 pg    MCHC 33.1 (L) 33.7 - 35.3 g/dL    RDW 46.6 35.9 - 50.0 fL    Platelet Count 124 (L) 164 - 446 K/uL    MPV 9.9 9.0 - 12.9 fL    Neutrophils-Polys 84.00 (H) 44.00 - 72.00 %    Lymphocytes 7.70 (L) 22.00 - 41.00 %    Monocytes 6.60 0.00 - 13.40 %    Eosinophils 0.70 0.00 - 6.90 %    Basophils 0.50 0.00 - 1.80 %    Immature Granulocytes 0.50 0.00 - 0.90 %    Nucleated RBC 0.00 /100 WBC    Neutrophils (Absolute) 10.31 (H) 1.82 - 7.42 K/uL    Lymphs (Absolute) 0.94 (L) 1.00 - 4.80 K/uL    Monos (Absolute) 0.81 0.00 - 0.85 K/uL    Eos (Absolute) 0.09 0.00 - 0.51 K/uL    Baso (Absolute) 0.06 0.00 - 0.12 K/uL    Immature Granulocytes (abs) 0.06 0.00 - 0.11 K/uL    NRBC (Absolute) 0.00 K/uL   BASIC METABOLIC PANEL   Result Value Ref Range    Sodium 135 135 - 145 mmol/L    Potassium 4.3 3.6 - 5.5 mmol/L    Chloride 101 96 - 112 mmol/L    Co2 21 20 - 33 mmol/L    Glucose 110 (H) 65 - 99 mg/dL    Bun 24 (H) 8 - 22 mg/dL    Creatinine 1.07 0.50 - 1.40 mg/dL    Calcium 9.4 8.5 - 10.5 mg/dL    Anion Gap 13.0 7.0 - 16.0   CHLAMYDIA & GC BY PCR    Specimen: Genital; Urine   Result Value Ref Range    Source Urine    URINALYSIS (UA)    Specimen: Urine   Result Value Ref Range    Color Yellow     Character Clear     Specific Gravity 1.021 <1.035    Ph 5.5 5.0 - 8.0    Glucose Negative Negative mg/dL     Ketones Negative Negative mg/dL    Protein Negative Negative mg/dL    Bilirubin Negative Negative    Urobilinogen, Urine 0.2 Negative    Nitrite Negative Negative    Leukocyte Esterase Negative Negative    Occult Blood Negative Negative    Micro Urine Req see below    ESTIMATED GFR   Result Value Ref Range    GFR If African American >60 >60 mL/min/1.73 m 2    GFR If Non African American >60 >60 mL/min/1.73 m 2         COURSE & MEDICAL DECISION MAKING  Pertinent Labs & Imaging studies reviewed. (See chart for details)  This is a 54-year-old male who presents with testicular pain-he has no evidence of torsion or definite epididymitis orchitis.  There is no evidence of inguinal hernia.  Patient's labs are for the most part reassuring.  White count is minimally elevated.  The patient will be covered for epididymitis/orchitis pending GC and Chlamydia studies.  At this point he has no intra-abdominal discomfort or tenderness to palpation to suggest appendicitis.    10:50 AM-patient's abdomen reexamined and there is no tenderness throughout the abdominal exam including none at McBurney's point    FINAL IMPRESSION  1.  Testicle pain  2.   3.         Electronically signed by: Tr Jules M.D., 7/26/2020 8:10 AM

## 2020-07-26 NOTE — ED TRIAGE NOTES
Ambulates to triage  Chief Complaint   Patient presents with   • Testicle Pain     left testicle pain since yesterday     Pt said the pain started yesterday, denies any swelling.

## 2020-07-26 NOTE — DISCHARGE INSTRUCTIONS
You have what appears to be calcification and scarring in the right testicle.  However at this time there is no definite evidence of inflammation/infection or torsion of your testicle.  Please follow-up with urology.  Your gonorrhea and Chlamydia are pending.  Return for any abdominal pain any vomiting fever or other concerns.

## 2020-07-26 NOTE — ED NOTES
All lines and monitors disconnected.  Discharge instructions were reviewed, questions answered.  Pt provided with prescriptions X 2.  Pt instructed not to drive after taking pain meds - Pt verbalizes understanding.  Pt states all belongings in possession.  Pt transported to the lobby via wheelchair, escorted by ED tech.

## 2020-07-27 LAB
C TRACH DNA SPEC QL NAA+PROBE: NEGATIVE
N GONORRHOEA DNA SPEC QL NAA+PROBE: NEGATIVE
SPECIMEN SOURCE: NORMAL

## 2020-07-30 ENCOUNTER — OFFICE VISIT (OUTPATIENT)
Dept: URGENT CARE | Facility: PHYSICIAN GROUP | Age: 55
End: 2020-07-30
Payer: COMMERCIAL

## 2020-07-30 VITALS
WEIGHT: 230 LBS | HEIGHT: 72 IN | RESPIRATION RATE: 16 BRPM | HEART RATE: 63 BPM | DIASTOLIC BLOOD PRESSURE: 88 MMHG | BODY MASS INDEX: 31.15 KG/M2 | TEMPERATURE: 97.6 F | OXYGEN SATURATION: 96 % | SYSTOLIC BLOOD PRESSURE: 128 MMHG

## 2020-07-30 DIAGNOSIS — T14.8XXA WOUND OF SKIN: ICD-10-CM

## 2020-07-30 PROCEDURE — 99213 OFFICE O/P EST LOW 20 MIN: CPT | Performed by: FAMILY MEDICINE

## 2020-07-30 NOTE — PROGRESS NOTES
Subjective:      Panchito Moura is a 54 y.o. male who presents with Wound Check (L calf )      - This is a pleasant and non toxic appearing 54 y.o. male with c/o here for recheck of LLE wound. Feels it is improving.             ALLERGIES:  Patient has no known allergies.     PMH:  Past Medical History:   Diagnosis Date   • Cancer (HCC)     kidney   • Cancer (HCC)     renal        PSH:  Past Surgical History:   Procedure Laterality Date   • NERVE REPAIR  6/18/08    Performed by SKYE RAY at SURGERY [facility] ORS   • LACERATION REPAIR  6/18/08    Performed by SKYE RAY at [facility] ORS   • NEPHRECTOMY PARTIAL      right   • NEPHRECTOMY PARTIAL Right    • OTHER      tonsillectomy       MEDS:    Current Outpatient Medications:   •  mupirocin (BACTROBAN) 2 % Ointment, Apply 1 Application to affected area(s) 2 times a day for 7 days., Disp: 30 g, Rfl: 1  •  naproxen (NAPROSYN) 500 MG Tab, Take 1 Tab by mouth 2 times a day as needed (pain)., Disp: 20 Tab, Rfl: 0  •  doxycycline (MONODOX) 100 MG capsule, Take 1 Cap by mouth 2 times a day for 14 days., Disp: 28 Cap, Rfl: 0  •  XARELTO 20 MG Tab tablet, TK 1 T PO QPM, Disp: , Rfl:   •  albuterol 108 (90 Base) MCG/ACT Aero Soln inhalation aerosol, INL 1 PUFF PO Q 4 H PRF WHZ, Disp: , Rfl:   •  zolpidem (AMBIEN) 10 MG Tab, TK 1 T PO HS PRF SLP  GM 47.00, Disp: , Rfl:   •  Pravastatin Sodium (PRAVACHOL PO), Take  by mouth., Disp: , Rfl:     ** I have documented what I find to be significant in regards to past medical, social, family and surgical history  in my HPI or under PMH/PSH/FH review section, otherwise it is contributory **           HPI    Review of Systems   Skin:        Recheck wound   All other systems reviewed and are negative.         Objective:     /88 (BP Location: Left arm, Patient Position: Sitting, BP Cuff Size: Adult)   Pulse 63   Temp 36.4 °C (97.6 °F) (Temporal)   Resp 16   Ht 1.829 m (6')   Wt 104.3 kg (230 lb)    SpO2 96%   BMI 31.19 kg/m²      Physical Exam  Vitals signs and nursing note reviewed.   Constitutional:       General: He is not in acute distress.     Appearance: He is well-developed. He is not diaphoretic.   HENT:      Head: Normocephalic and atraumatic.   Eyes:      General: No scleral icterus.     Conjunctiva/sclera: Conjunctivae normal.   Cardiovascular:      Heart sounds: Normal heart sounds. No murmur.   Pulmonary:      Effort: Pulmonary effort is normal. No respiratory distress.   Skin:     Coloration: Skin is not pale.      Findings: No rash.      Comments: Well healing wound LLE   Neurological:      Mental Status: He is alert.      Motor: No abnormal muscle tone.   Psychiatric:         Mood and Affect: Mood normal.         Behavior: Behavior normal.         Judgment: Judgment normal.                 Assessment/Plan:           1. Wound of skin  mupirocin (BACTROBAN) 2 % Ointment       - dressing change done, wound care discussed  - f/u prn  - E.R. precautions discussed

## 2020-09-14 ENCOUNTER — OFFICE VISIT (OUTPATIENT)
Dept: URGENT CARE | Facility: PHYSICIAN GROUP | Age: 55
End: 2020-09-14
Payer: COMMERCIAL

## 2020-09-14 ENCOUNTER — HOSPITAL ENCOUNTER (OUTPATIENT)
Facility: MEDICAL CENTER | Age: 55
End: 2020-09-14
Attending: PHYSICIAN ASSISTANT
Payer: COMMERCIAL

## 2020-09-14 VITALS
HEIGHT: 72 IN | RESPIRATION RATE: 18 BRPM | TEMPERATURE: 97.4 F | SYSTOLIC BLOOD PRESSURE: 142 MMHG | HEART RATE: 78 BPM | DIASTOLIC BLOOD PRESSURE: 86 MMHG | OXYGEN SATURATION: 96 % | BODY MASS INDEX: 30.48 KG/M2 | WEIGHT: 225 LBS

## 2020-09-14 DIAGNOSIS — S81.802A OPEN WOUND OF LEFT LOWER LEG, INITIAL ENCOUNTER: ICD-10-CM

## 2020-09-14 DIAGNOSIS — L03.116 CELLULITIS OF LEFT LOWER EXTREMITY: ICD-10-CM

## 2020-09-14 PROCEDURE — 87205 SMEAR GRAM STAIN: CPT

## 2020-09-14 PROCEDURE — 99214 OFFICE O/P EST MOD 30 MIN: CPT | Performed by: PHYSICIAN ASSISTANT

## 2020-09-14 PROCEDURE — 87070 CULTURE OTHR SPECIMN AEROBIC: CPT

## 2020-09-14 RX ORDER — DOXYCYCLINE 100 MG/1
100 CAPSULE ORAL 2 TIMES DAILY
Qty: 14 CAP | Refills: 0 | Status: SHIPPED | OUTPATIENT
Start: 2020-09-14 | End: 2020-09-21

## 2020-09-14 RX ORDER — AMOXICILLIN 500 MG/1
500 CAPSULE ORAL 3 TIMES DAILY
Qty: 21 CAP | Refills: 0 | Status: SHIPPED | OUTPATIENT
Start: 2020-09-14 | End: 2020-09-21

## 2020-09-14 ASSESSMENT — ENCOUNTER SYMPTOMS
DIAPHORESIS: 0
NAUSEA: 0
DIARRHEA: 0
WEIGHT LOSS: 0
PALPITATIONS: 0
SINUS PAIN: 0
SORE THROAT: 0
SHORTNESS OF BREATH: 0
FEVER: 0
ABDOMINAL PAIN: 0
COUGH: 0
MYALGIAS: 0
HEADACHES: 0
WHEEZING: 0
CHILLS: 0
VOMITING: 0
BLURRED VISION: 0
TINGLING: 0
DIZZINESS: 0

## 2020-09-14 NOTE — PROGRESS NOTES
Subjective:   Panchito Moura is a 55 y.o. male who presents for Leg Pain (L lower [id number] )      HPI:  This is a very pleasant 55-year-old male presenting to the clinic with recurrent left lower leg pain and open wound.  Patient states his pain restarted approximately 3 days ago.  He was seen back in July for the same complaint.  At that time he had an open wound with surrounding cellulitis to the left calf.  He was treated with doxycycline and followed in clinic for subsequent visits.  Culture revealed gram-positive cocci.  He informs me his wound never fully closed from his initial visits.  Currently his wound is becoming increasingly more painful.  He has redness that has grown over the last 3 days.  He has also had some drainage which is purulent and bloody.  He denies any fevers, chills, paresthesias of the limbs, nausea or vomiting.    Review of Systems   Constitutional: Negative for chills, diaphoresis, fever, malaise/fatigue and weight loss.   HENT: Negative for congestion, sinus pain and sore throat.    Eyes: Negative for blurred vision.   Respiratory: Negative for cough, shortness of breath and wheezing.    Cardiovascular: Negative for chest pain and palpitations.   Gastrointestinal: Negative for abdominal pain, diarrhea, nausea and vomiting.   Musculoskeletal: Negative for myalgias.   Skin:        Open painful wound on the left calf.  Has some purulent and bloody drainage.  He was seen for the same complaint in July.  He was treated with doxycycline for 7 days.  His wound never fully healed and has become increasingly more painful over the last 3 days.   Neurological: Negative for dizziness, tingling and headaches.       Medications:    • albuterol Aers  • naproxen Tabs  • PRAVACHOL PO  • Xarelto Tabs  • zolpidem Tabs    Allergies: Patient has no known allergies.    Problem List: Panchito Moura does not have a problem list on file.    Surgical History:  Past Surgical History:   Procedure Laterality  Date   • NERVE REPAIR  6/18/08    Performed by SKYE RAY at SURGERY [facility] ORS   • LACERATION REPAIR  6/18/08    Performed by SKYE RAY at SURGERY [facility] ORS   • NEPHRECTOMY PARTIAL      right   • NEPHRECTOMY PARTIAL Right    • OTHER      tonsillectomy       Past Social Hx: Panchito Moura  reports that he has been smoking cigarettes. He has been smoking about 0.50 packs per day. He has never used smokeless tobacco. He reports that he does not drink alcohol or use drugs.     Past Family Hx:  Panchito Moura family history is not on file.     Problem list, medications, and allergies reviewed by myself today in Epic.     Objective:     /86   Pulse 78   Temp 36.3 °C (97.4 °F) (Temporal)   Resp 18   Ht 1.829 m (6')   Wt 102.1 kg (225 lb)   SpO2 96%   BMI 30.52 kg/m²     Physical Exam  Constitutional:       General: He is not in acute distress.     Appearance: Normal appearance. He is not ill-appearing, toxic-appearing or diaphoretic.   HENT:      Head: Normocephalic and atraumatic.      Right Ear: Tympanic membrane, ear canal and external ear normal.      Left Ear: Tympanic membrane, ear canal and external ear normal.      Nose: Nose normal. No congestion or rhinorrhea.      Mouth/Throat:      Mouth: Mucous membranes are moist.      Pharynx: No oropharyngeal exudate or posterior oropharyngeal erythema.   Eyes:      Conjunctiva/sclera: Conjunctivae normal.   Neck:      Musculoskeletal: Normal range of motion. No muscular tenderness.   Cardiovascular:      Rate and Rhythm: Normal rate and regular rhythm.      Pulses: Normal pulses.      Heart sounds: Normal heart sounds.   Pulmonary:      Effort: Pulmonary effort is normal.      Breath sounds: Normal breath sounds. No wheezing.   Abdominal:      Palpations: Abdomen is soft.      Tenderness: There is no abdominal tenderness.   Lymphadenopathy:      Cervical: No cervical adenopathy.   Skin:     Capillary Refill: Capillary refill  takes less than 2 seconds.      Comments: See scanned in image.   Neurological:      Mental Status: He is alert.   Psychiatric:         Mood and Affect: Mood normal.         Thought Content: Thought content normal.         Consent was obtained prior to taking this image.    Assessment/Plan:     Diagnosis and associated orders:   1. Open wound of left lower leg, initial encounter    - amoxicillin (AMOXIL) 500 MG Cap; Take 1 Cap by mouth 3 times a day for 7 days.  Dispense: 21 Cap; Refill: 0  - doxycycline (MONODOX) 100 MG capsule; Take 1 Cap by mouth 2 times a day for 7 days.  Dispense: 14 Cap; Refill: 0  - REFERRAL TO WOUND CLINIC  - CULTURE WOUND W/ GRAM STAIN; Future    2. Cellulitis of left lower extremity    - amoxicillin (AMOXIL) 500 MG Cap; Take 1 Cap by mouth 3 times a day for 7 days.  Dispense: 21 Cap; Refill: 0  - doxycycline (MONODOX) 100 MG capsule; Take 1 Cap by mouth 2 times a day for 7 days.  Dispense: 14 Cap; Refill: 0  - REFERRAL TO WOUND CLINIC       Comments/MDM:       • Patient was seen and treated in clinic in July.  He was treated with a 7-day course of doxycycline.  He states his symptoms improved while he was on antibiotic treatment however his wound never fully healed.  Over the last 3 days his wound has been progressively more painful and had increasing redness.  He has had some purulent and bloody drainage.  A wound culture was obtained during this visit.  He was started on doxycycline and amoxicillin.  He was also referred to the wound clinic at this time.  I gave the patient strict ER precautions for any red flag symptoms in which we discussed in clinic.           Differential diagnosis, natural history, supportive care, and indications for immediate follow-up discussed.    Advised the patient to follow-up with the primary care physician for recheck, reevaluation, and consideration of further management.    Please note that this dictation was created using voice recognition software. I  have made reasonable attempt to correct obvious errors, but I expect that there are errors of grammar and possibly content that I did not discover before finalizing the note.    This note was electronically signed by ROLAND Yoo PA-C

## 2020-09-15 DIAGNOSIS — S81.802A OPEN WOUND OF LEFT LOWER LEG, INITIAL ENCOUNTER: ICD-10-CM

## 2020-09-16 LAB
GRAM STN SPEC: NORMAL
SIGNIFICANT IND 70042: NORMAL
SITE SITE: NORMAL
SOURCE SOURCE: NORMAL

## 2020-10-07 ENCOUNTER — NON-PROVIDER VISIT (OUTPATIENT)
Dept: WOUND CARE | Facility: MEDICAL CENTER | Age: 55
End: 2020-10-07
Attending: PHYSICIAN ASSISTANT
Payer: COMMERCIAL

## 2020-10-07 PROCEDURE — 99211 OFF/OP EST MAY X REQ PHY/QHP: CPT

## 2020-10-07 PROCEDURE — 97598 DBRDMT OPN WND ADDL 20CM/<: CPT

## 2020-10-07 PROCEDURE — 97597 DBRDMT OPN WND 1ST 20 CM/<: CPT

## 2020-10-07 NOTE — PROGRESS NOTES
Wound supply order faxed to [facility] at fax #[phone].    Wound 10/07/20 Full Thickness Wound Leg Posterior Left -Left Posterior LE (Active)   Wound Image   10/07/20 1410   Site Assessment Red;Yellow;Black 10/07/20 1410   Periwound Assessment Painful;Edema;Blanchable erythema 10/07/20 1410   Margins Attached edges 10/07/20 1410   Drainage Amount Moderate 10/07/20 1410   Drainage Description Serosanguineous 10/07/20 1410   Treatments Cleansed;Topical Lidocaine;CSWD - Conservative Sharp Wound Debridement 10/07/20 1410   Wound Cleansing Normal Saline Irrigation 10/07/20 1410   Periwound Protectant Skin Protectant Wipes to Periwound 10/07/20 1410   Dressing Options Honey Colloid;Other (Comments);Tubigrip 10/07/20 1410   Non-staged Wound Description Full thickness 10/07/20 1410   Post-Procedure Length (cm) 6.4 cm 10/07/20 1410   Post-Procedure Width (cm) 7 cm 10/07/20 1410   Post-Procedure Depth (cm) 0.3 cm 10/07/20 1410   Post-Procedure Surface Area (cm^2) 44.8 cm^2 10/07/20 1410   Post-Procedure Volume (cm^3) 13.44 cm^3 10/07/20 1410   Wound Odor None 10/07/20 1410   Pulses DP;PT;2+;Doppler 10/07/20 1410   Exposed Structures KRISTEN 10/07/20 1410

## 2020-10-07 NOTE — CERTIFICATION
Non Provider Encounter- Full Thickness wound    HISTORY OF PRESENT ILLNESS  Wound History:  START OF CARE IN CLINIC: 10/7/2020  REFERRING PROVIDER: Caleb Yoo P.A.-C.  WOUND- Full Thickness Wound  LOCATION: Left Posterior Lower Extremity    HISTORY:  Patient is a 55 year old male with a left posterior leg wound. He currently smokes 1/2 pack per day. He has a history of DVT and is on Xarelto. He states that in June or July 2020, he noticed blood on his pants, and that is when he noticed the wound. He is not sure what caused the wound. He states that he has been to [facility] and ER multiple times for wound treatment. He states that he is usually given antibiotics and sent home. He was most recently seen at [facility] on 9/14/2020. He was prescribed Amoxicillin and Doxycycline and referred to [facility] clinic. Patient has been dressing the wound with Neosporin and bandages. He lives alone and changes his own bandages.    Pertinent Labs and Diagnostics:    Labs:     A1c: No results found for: HBA1C       IMAGING:     VASCULAR STUDIES:   venous ultrasound 7/23/2020--  FINDINGS:     REAL-TIME GRAY-SCALE IMAGING:  Real-time gray-scale imaging reveals no evidence of focal wall thickening.     COLOR AND DUPLEX DOPPLER IMAGING:  There is no evidence of luminal thrombus.  There is normal augmentation to flow and respiratory variation.     IMPRESSION:     No evidence of left lower extremity deep venous thrombosis.    LAST  WOUND CULTURE:    DATE : 9/14/2020--   Significant Indicator NEG    Source WND    Site Left Lower Leg    Culture Result Heavy growth mixed skin yesenia.    Gram Stain Result Many Gram positive cocci.               FALL RISK ASSESSMENT: Completed 10/7/2020, not a fall risk.   65 years or older     Fall within the last 2 years   Uses ambulatory devices  Loss of protective sensation in feet   Use of prostethic/orthotic    Presence of lower extremity/foot/toe  amputation   Taking medication that increases risk (per facility policy)        PAST MEDICAL HISTORY:   Past Medical History:   Diagnosis Date   • Cancer (HCC)     kidney   • Cancer (HCC)     renal       PAST SURGICAL HISTORY:   Past Surgical History:   Procedure Laterality Date   • NERVE REPAIR  6/18/08    Performed by SKYE RAY at SURGERY [facility] ORS   • LACERATION REPAIR  6/18/08    Performed by SKYE RAY at [facility] ORS   • NEPHRECTOMY PARTIAL      right   • NEPHRECTOMY PARTIAL Right    • OTHER      tonsillectomy        MEDICATIONS:   Current Outpatient Medications   Medication   • ALBUTEROL INH   • XARELTO 20 MG Tab tablet   • zolpidem (AMBIEN) 10 MG Tab     No current facility-administered medications for this visit.        ALLERGIES:  No Known Allergies      SOCIAL HISTORY:   Social History     Socioeconomic History   • Marital status: Single     Spouse name: Not on file   • Number of children: Not on file   • Years of education: Not on file   • Highest education level: Not on file   Occupational History   • Not on file   Social Needs   • Financial resource strain: Not on file   • Food insecurity     Worry: Not on file     Inability: Not on file   • Transportation needs     Medical: Not on file     Non-medical: Not on file   Tobacco Use   • Smoking status: Current Every Day Smoker     Packs/day: 0.50     Types: Cigarettes   • Smokeless tobacco: Never Used   Substance and Sexual Activity   • Alcohol use: No   • Drug use: No   • Sexual activity: Not on file   Lifestyle   • Physical activity     Days per week: Not on file     Minutes per session: Not on file   • Stress: Not on file   Relationships   • Social connections     Talks on phone: Not on file     Gets together: Not on file     Attends [employer] service: Not on file     Active member of club or organization: Not on file     Attends meetings of clubs or organizations: Not on file     Relationship status: Not on file   •  Intimate partner violence     Fear of current or ex partner: Not on file     Emotionally abused: Not on file     Physically abused: Not on file     Forced sexual activity: Not on file   Other Topics Concern   • Not on file   Social History Narrative    ** Merged History Encounter **            FAMILY HISTORY: No family history on file.    Wound Assessment:  Wound 10/07/20 Full Thickness Wound Leg Posterior Left -Left Posterior LE (Active)   Wound Image   10/07/20 1410   Site Assessment Red;Yellow;Black 10/07/20 1410   Periwound Assessment Painful;Edema;Blanchable erythema 10/07/20 1410   Margins Attached edges 10/07/20 1410   Drainage Amount Moderate 10/07/20 1410   Drainage Description Serosanguineous 10/07/20 1410   Treatments Cleansed;Topical Lidocaine;CSWD - Conservative Sharp Wound Debridement 10/07/20 1410   Wound Cleansing Normal Saline Irrigation 10/07/20 1410   Periwound Protectant Skin Protectant Wipes to Periwound 10/07/20 1410   Dressing Options Honey Colloid;Other (Comments);Tubigrip 10/07/20 1410   Non-staged Wound Description Full thickness 10/07/20 1410   Post-Procedure Length (cm) 6.4 cm 10/07/20 1410   Post-Procedure Width (cm) 7 cm 10/07/20 1410   Post-Procedure Depth (cm) 0.3 cm 10/07/20 1410   Post-Procedure Surface Area (cm^2) 44.8 cm^2 10/07/20 1410   Post-Procedure Volume (cm^3) 13.44 cm^3 10/07/20 1410   Wound Odor None 10/07/20 1410   Pulses DP;PT;2+;Doppler 10/07/20 1410   Exposed Structures KRISTEN 10/07/20 1410       Procedures:    -2% viscous lidocaine applied topically to wound bed for approximately 5 minutes prior to debridement  -Curette used to debride wound bed and periwound callus. Entire surface of wound, ~44cm2 debrided.    -Refer to flowsheet for wound care details.       Post-debridement Photo          PATIENT EDUCATION  -Patient states that he has been unable to sleep due to pain from the wound. He is taking prescribed Ambien. He is requesting medication to help him sleep.  Advised patient to discuss this request with his primary care provider.  -Patient unable to tolerate extensive debridement today due to wound pain. Discussed use of injectible lidocaine next week to facilitate more thorough wound debridement.  -Advised to go to ER for any increased redness, swelling, drainage or odor, or if patient develops fever, chills, nausea or vomiting.  -Importance of adequate nutrition for wound healing  -Increase protein intake (unless contraindicated by renal status)

## 2020-10-07 NOTE — PATIENT INSTRUCTIONS
-Keep dressings clean, dry and covered while bathing. Only change dressings if they become over saturated, soiled or fall off.     -Avoid prolonged standing or sitting without elevating your legs.    -Remove your compression garments if you have severe pain, severe swelling, numbness, color change, or temperature change in your toes. If you need to remove your compression garments, do so by unrolling them. Do not cut the compression garments off, this is to prevent cutting yourself on accident.    -Should you experience any significant changes in your wound(s), such as infection (redness, swelling, localized heat, increased pain, fever > 101 F, chills) or have any questions regarding your home care instructions, please contact the wound center at [phone]. If after hours, contact your primary care physician or go to the hospital emergency room.

## 2020-10-11 ENCOUNTER — APPOINTMENT (OUTPATIENT)
Dept: RADIOLOGY | Facility: MEDICAL CENTER | Age: 55
End: 2020-10-11
Attending: EMERGENCY MEDICINE
Payer: COMMERCIAL

## 2020-10-11 ENCOUNTER — HOSPITAL ENCOUNTER (EMERGENCY)
Facility: MEDICAL CENTER | Age: 55
End: 2020-10-11
Attending: EMERGENCY MEDICINE
Payer: COMMERCIAL

## 2020-10-11 VITALS
OXYGEN SATURATION: 97 % | DIASTOLIC BLOOD PRESSURE: 79 MMHG | WEIGHT: 238.1 LBS | SYSTOLIC BLOOD PRESSURE: 143 MMHG | RESPIRATION RATE: 20 BRPM | BODY MASS INDEX: 31.56 KG/M2 | HEIGHT: 73 IN | TEMPERATURE: 97.8 F | HEART RATE: 50 BPM

## 2020-10-11 DIAGNOSIS — L97.921 SKIN ULCER OF LEFT LOWER LEG, LIMITED TO BREAKDOWN OF SKIN (HCC): ICD-10-CM

## 2020-10-11 PROCEDURE — 93971 EXTREMITY STUDY: CPT | Mod: LT

## 2020-10-11 PROCEDURE — A9270 NON-COVERED ITEM OR SERVICE: HCPCS | Performed by: EMERGENCY MEDICINE

## 2020-10-11 PROCEDURE — 700102 HCHG RX REV CODE 250 W/ 637 OVERRIDE(OP): Performed by: EMERGENCY MEDICINE

## 2020-10-11 PROCEDURE — 99284 EMERGENCY DEPT VISIT MOD MDM: CPT

## 2020-10-11 PROCEDURE — 700111 HCHG RX REV CODE 636 W/ 250 OVERRIDE (IP): Performed by: EMERGENCY MEDICINE

## 2020-10-11 RX ORDER — TRAMADOL HYDROCHLORIDE 50 MG/1
50 TABLET ORAL EVERY 6 HOURS PRN
Qty: 12 TAB | Refills: 0 | Status: SHIPPED | OUTPATIENT
Start: 2020-10-11 | End: 2020-10-16

## 2020-10-11 RX ORDER — CLINDAMYCIN HYDROCHLORIDE 150 MG/1
150 CAPSULE ORAL 3 TIMES DAILY
Qty: 30 CAP | Refills: 0 | Status: SHIPPED
Start: 2020-10-11 | End: 2020-10-20

## 2020-10-11 RX ORDER — OXYCODONE HYDROCHLORIDE AND ACETAMINOPHEN 5; 325 MG/1; MG/1
2 TABLET ORAL ONCE
Status: COMPLETED | OUTPATIENT
Start: 2020-10-11 | End: 2020-10-11

## 2020-10-11 RX ORDER — ONDANSETRON 4 MG/1
4 TABLET, ORALLY DISINTEGRATING ORAL ONCE
Status: COMPLETED | OUTPATIENT
Start: 2020-10-11 | End: 2020-10-11

## 2020-10-11 RX ADMIN — OXYCODONE HYDROCHLORIDE AND ACETAMINOPHEN 2 TABLET: 5; 325 TABLET ORAL at 09:39

## 2020-10-11 RX ADMIN — ONDANSETRON 4 MG: 4 TABLET, ORALLY DISINTEGRATING ORAL at 09:40

## 2020-10-11 ASSESSMENT — LIFESTYLE VARIABLES
DOES PATIENT WANT TO STOP DRINKING: NO
DO YOU DRINK ALCOHOL: NO

## 2020-10-11 NOTE — ED NOTES
Pt given discharge Rx and instructions will follow up with wound care for tx of his LLE. Pt ambulatory upon discharge.

## 2020-10-11 NOTE — ED PROVIDER NOTES
"ED Provider Note    CHIEF COMPLAINT  Chief Complaint   Patient presents with   • Wound Check     ambulates in triage c/o wound in posterior left lower extremity x 6 months. worse last night. noted moderate redness/swelling around the wound and draining w/red discharge.    • Leg Pain     in left leg       HPI  Panchito Moura is a 55 y.o. male here for evaluation of left lower leg wound.  Patient states he has had this wound for a few months, has been seen and evaluated, and set up with wound care.  He has his first wound care appointment last week.  Patient had some increasing pain around the wound last night, and states that he \"always slept 1 hour.\"  He states that he is compliant with his medications which include Xarelto, but states he does have a lot of pain in the calf that is not normal for him.  He has no chest pain, shortness breath, or vomiting.  He has no fever or chills.  Patient has no other medical concerns at this time.  He states that the wound looks about the same as it always does, he is not on any current antibiotics.  He does have some pain with walking, and some alleviation of this pain when remaining still.  Describes the pain is aching and sharp in the area of the ulcer.    ROS;  Please see HPI  O/W negative     PAST MEDICAL HISTORY   has a past medical history of Cancer (HCC) and Cancer (HCC).    SOCIAL HISTORY  Social History     Tobacco Use   • Smoking status: Current Every Day Smoker     Packs/day: 0.50     Types: Cigarettes   • Smokeless tobacco: Never Used   Substance and Sexual Activity   • Alcohol use: No   • Drug use: No   • Sexual activity: Not on file       SURGICAL HISTORY   has a past surgical history that includes nephrectomy partial; nerve repair (6/18/08); laceration repair (6/18/08); other; and nephrectomy partial (Right).    CURRENT MEDICATIONS  Home Medications     Reviewed by Vicky Delgado R.N. (Registered Nurse) on 10/11/20 at 0819  Med List Status: Complete   Medication " "Last Dose Status   ALBUTEROL INH not taking Active   XARELTO 20 MG Tab tablet taking Active   zolpidem (AMBIEN) 10 MG Tab taking Active                ALLERGIES  No Known Allergies    REVIEW OF SYSTEMS  See HPI for further details. Review of systems as above, otherwise all other systems are negative.     PHYSICAL EXAM  VITAL SIGNS: /79   Pulse 67   Temp 36.8 °C (98.2 °F) (Temporal)   Resp 20   Ht 1.854 m (6' 1\")   Wt 108 kg (238 lb 1.6 oz)   SpO2 99%   BMI 31.41 kg/m²     Constitutional: Well developed, well nourished. No acute distress.  HEENT: Normocephalic, atraumatic. MMM  Neck: Supple, Full range of motion   Chest/Pulmonary:  No respiratory distress.  Equal expansion   Musculoskeletal: No deformity, no edema, neurovascular intact.  Left lower extremity; 6 x 5 area of ulceration, grade 1, very mild surrounding erythema, no purulence.  Mild tenderness palpation of the proximal and distal areas of the calf.  Neurovascular tact distally.  Neuro: Clear speech, appropriate, cooperative, cranial nerves II-XII grossly intact.  Psych: Normal mood and affect        US-EXTREMITY VENOUS LOWER UNILAT LEFT         negative for DVT/ rad        PROCEDURES     MEDICAL RECORD  I have reviewed patient's medical record and pertinent results are listed.    COURSE & MEDICAL DECISION MAKING  I have reviewed any medical record information, laboratory studies and radiographic results as noted above.    10:44 AM  The pt has a negative acute u/s for dvt.  I will order some abx for his mild surrounding cellulitis, and provide pain medications for his discomfort. He will keep his wound care follow up, and will return here for any other issues or concerns.     I you have had any blood pressure issues while here in the emergency department, please see your doctor for a further evaluation or work up.    Differential diagnoses include but not limited to: cellulitis, dvt,    This patient presents with left leg ulcercation .  At " this time, I have counseled the patient/family regarding their medications, pain control, and follow up.  They will continue their medications, if any, as prescribed.  They will return immediately for any worsening symptoms and/or any other medical concerns.  They will see their doctor, or contact the doctor provided, in 1-2 days for follow up.       FINAL IMPRESSION  Left leg ulceration       Electronically signed by: Claudio Li D.O., 10/11/2020 9:44 AM

## 2020-10-11 NOTE — ED TRIAGE NOTES
Chief Complaint   Patient presents with   • Wound Check     ambulates in triage c/o wound in posterior left lower extremity x 6 months. worse last night. noted moderate redness/swelling around the wound and draining w/red discharge.      Noted in obvious discomfort. Started going to wound clinic last week but no improvement on his wound, worse last night. Denies hx of diabetes/denies trauma. Educated on triage process. Instructed to notify staff for any worsening symptoms. Denies any recent travel. Denies exposure to known covid positive patients. Denies any respiratory symptoms.

## 2020-10-11 NOTE — DISCHARGE INSTRUCTIONS
Please keep your appointment with wound care.  Take all medications as prescribed.  Return here for any other issues or concerns.

## 2020-10-13 ENCOUNTER — OFFICE VISIT (OUTPATIENT)
Dept: WOUND CARE | Facility: MEDICAL CENTER | Age: 55
End: 2020-10-13
Attending: PHYSICIAN ASSISTANT
Payer: COMMERCIAL

## 2020-10-13 VITALS
OXYGEN SATURATION: 93 % | DIASTOLIC BLOOD PRESSURE: 86 MMHG | SYSTOLIC BLOOD PRESSURE: 130 MMHG | RESPIRATION RATE: 16 BRPM | TEMPERATURE: 98.1 F | HEART RATE: 70 BPM

## 2020-10-13 DIAGNOSIS — T14.8XXA NON-HEALING NON-SURGICAL WOUND: ICD-10-CM

## 2020-10-13 PROCEDURE — 99214 OFFICE O/P EST MOD 30 MIN: CPT | Mod: 25 | Performed by: NURSE PRACTITIONER

## 2020-10-13 PROCEDURE — 11104 PUNCH BX SKIN SINGLE LESION: CPT | Performed by: NURSE PRACTITIONER

## 2020-10-13 PROCEDURE — 11105 PUNCH BX SKIN EA SEP/ADDL: CPT | Performed by: NURSE PRACTITIONER

## 2020-10-13 PROCEDURE — 11104 PUNCH BX SKIN SINGLE LESION: CPT

## 2020-10-13 PROCEDURE — 99214 OFFICE O/P EST MOD 30 MIN: CPT

## 2020-10-13 PROCEDURE — 11105 PUNCH BX SKIN EA SEP/ADDL: CPT

## 2020-10-13 ASSESSMENT — PAIN SCALES - GENERAL: PAINLEVEL: 4=SLIGHT-MODERATE PAIN

## 2020-10-13 ASSESSMENT — ENCOUNTER SYMPTOMS
SHORTNESS OF BREATH: 0
DIZZINESS: 0
HEADACHES: 0
DEPRESSION: 0
NAUSEA: 0
COUGH: 0
FEVER: 0
ABDOMINAL PAIN: 0
MYALGIAS: 0
CHILLS: 0
VOMITING: 0

## 2020-10-13 NOTE — PROGRESS NOTES
I was asked to see patient at the request of BART Richey.  Patient present today for  evaluation of a left posterior lower leg wound which is worsened despite several courses of antibiotics, and advanced wound care.  Patient states this wound started approximately 6 months ago and has deteriorated progressively since then.      DESCRIPTION OF PROCEDURE:  Punch biopsy x2 using local anesthesia    The procedure, rationale for doing so, and the possible risks- including infection, pain and bleeding- have been discussed with the patient.  The patient  verbalized understanding and is agreeable with proceeding.  Consent signed    ANESTHESIA:  12 ml 2% lidocaine with epinephrine    PROCEDURE:   -A formal timeout was performed to confirm patient's correct name, correct site, correct procedure and correct laterality.  -Skin prepped with Chlorahexidine.    -Lidocaine with epinephrine injected subcutaneously into the lateral/superior margin of wound, and also to the medial/inferior margin of wound  -After allowing time for lidocaine to infiltrate.  A 5 mm biopsy punch was used to obtain specimen from lateral/superior margin  -A second 5 mm biopsy punch was then used to obtain specimen from medial/distal margin of wound  -Bleeding controlled with manual pressure.    -Wound care completed by wound RN, refer to flowsheet.    -Patient tolerated the procedure well, without c/o pain or discomfort.

## 2020-10-14 ENCOUNTER — HOSPITAL ENCOUNTER (OUTPATIENT)
Facility: MEDICAL CENTER | Age: 55
End: 2020-10-14
Attending: NURSE PRACTITIONER
Payer: COMMERCIAL

## 2020-10-14 LAB — PATHOLOGY CONSULT NOTE: NORMAL

## 2020-10-14 PROCEDURE — 88305 TISSUE EXAM BY PATHOLOGIST: CPT

## 2020-10-14 PROCEDURE — 88313 SPECIAL STAINS GROUP 2: CPT

## 2020-10-14 PROCEDURE — 88342 IMHCHEM/IMCYTCHM 1ST ANTB: CPT

## 2020-10-14 NOTE — PROGRESS NOTES
Provider Encounter- Full Thickness wound    HISTORY OF PRESENT ILLNESS  Wound History:    START OF CARE IN CLINIC: 10/7/2020   REFERRING PROVIDER: Caleb Yoo P.A.-C.   WOUND- Full Thickness Wound   LOCATION: Left Posterior Lower Extremity     HISTORY:  Patient is a 55 year old male with a left posterior leg wound. He currently smokes 1/2 pack per day. He has a history of DVT and is on Xarelto. He states that in June or July 2020, he noticed blood on his pants, and that is when he noticed the wound. He is not sure what caused the wound, but thinks it was a bug bite. He states that he has been to [facility] and ER multiple times for wound treatment and has been treated with numerous antibiotics. He was  recently seen at [facility] on 9/14/2020. He was prescribed Amoxicillin and Doxycycline and referred to [facility] clinic. He continued to report worsening wound appearance, pain, and swelling and presented to the ER on 10/11/20 and started on Clindamycin.  Patient has been dressing the wound with Neosporin and bandages. He lives alone and changes his own bandages.    Pertinent Medical History: DVT on Xarelto, Renal Cancer s/p partial nephrectomy 18 years ago.    TOBACCO USE: + tobacco use 1/2 PPD    Patient's problem list, allergies, and current medications reviewed and updated in Epic    Interval History:  10/13/2020: Initial provider clinic visit with BART Richey, YUMIKO-BC. Patient reports feeling well. Denies wound drainage, odor. Patient reports significant erythema, swelling, pain.  He reports taking the clindamycin as prescribed without adverse effect.  He does mention that despite recent amoxicillin, doxycycline, clindamycin he does not feel he is having any significant improvement in the pain redness and swelling and in fact feels like the wound appearance and size is worsening.  He is not a diabetic.  He is on Xarelto for history of DVT     REVIEW OF SYSTEMS:   Review  of Systems   Constitutional: Negative for chills and fever.   Respiratory: Negative for cough and shortness of breath.    Cardiovascular: Positive for leg swelling. Negative for chest pain.        Left lower extremity   Gastrointestinal: Negative for abdominal pain, nausea and vomiting.   Musculoskeletal: Negative for joint pain and myalgias.   Skin: Negative for itching and rash.   Neurological: Negative for dizziness and headaches.   Psychiatric/Behavioral: Negative for depression.       PHYSICAL EXAMINATION:   /86   Pulse 70   Temp 36.7 °C (98.1 °F)   Resp 16   SpO2 93%     Physical Exam   Constitutional: He is oriented to person, place, and time and well-developed, well-nourished, and in no distress. No distress.   HENT:   Head: Normocephalic and atraumatic.   Eyes: Pupils are equal, round, and reactive to light. Conjunctivae and EOM are normal.   Neck: Normal range of motion. Neck supple.   Cardiovascular: Normal rate and intact distal pulses.   2+ pedal and post-tibial pulses   Pulmonary/Chest: Effort normal. No respiratory distress.   Musculoskeletal: Normal range of motion.   Neurological: He is alert and oriented to person, place, and time.   Skin: Skin is warm and dry. There is erythema.   Left lower extremity posterior erythema surrounding wound   Psychiatric: Affect and judgment normal.       WOUND ASSESSMENT       Wound 10/07/20 Full Thickness Wound Leg Posterior Left -Left Posterior LE (Active)   Wound Image    10/13/20 1400   Site Assessment Red;Yellow;Black 10/13/20 1400   Periwound Assessment Painful;Edema;Blanchable erythema 10/13/20 1400   Margins Attached edges 10/13/20 1400   Drainage Amount Moderate 10/13/20 1400   Drainage Description Serosanguineous 10/13/20 1400   Treatments Cleansed;Topical Lidocaine;CSWD - Conservative Sharp Wound Debridement 10/07/20 1410   Wound Cleansing Normal Saline Irrigation 10/13/20 1400   Periwound Protectant Skin Protectant Wipes to Periwound 10/13/20  1400   Dressing Options Hydrofiber Silver;Hypafix Tape;Nonadhesive Foam 10/13/20 1400   Non-staged Wound Description Full thickness 10/13/20 1400   Wound Length (cm) 8.7 cm 10/13/20 1400   Wound Width (cm) 8 cm 10/13/20 1400   Wound Surface Area (cm^2) 69.6 cm^2 10/13/20 1400   Post-Procedure Length (cm) 8.7 cm 10/13/20 1400   Post-Procedure Width (cm) 8 cm 10/13/20 1400   Post-Procedure Depth (cm) 0.3 cm 10/07/20 1410   Post-Procedure Surface Area (cm^2) 69.6 cm^2 10/13/20 1400   Post-Procedure Volume (cm^3) 13.44 cm^3 10/07/20 1410   Wound Bed Eschar (%) 70 % 10/13/20 1400   Wound Odor None 10/13/20 1400   Pulses Left;2+;DP 10/13/20 1400   Exposed Structures KRISTEN 10/07/20 1410       PROCEDURE:   2 concerning history and patient presentation punch biopsy performed in clinic today. please see received procedure note written by BART Lorenzo      Pertinent Labs and Diagnostics:      VASCULAR STUDIES: Left lower extremity venous duplex 10/11/2020   PROCEDURES:   Left lower extremity venous duplex imaging.    The following venous structures were evaluated: common femoral, profunda    femoral,  femoral, popliteal , peroneal and posterior tibial veins.    Serial compression, augmentation maneuvers,  color and spectral Doppler    flow evaluations were performed.   CONCLUSIONS   No evidence of  venous thrombosis.     LAST  WOUND CULTURE:  DATE : 9/14/2020 negative          ASSESSMENT AND PLAN:     1. Non-healing non-surgical wound  Comments: Patient stating that this wound started in June or July 2020 following what he felt was an insect bite however he is not sure.  He reports that it is progressively getting worse since that time despite multiple antibiotics.  He has been evaluated various times since the summer and both the urgent care and emergency department.  He has a history of a DVT and is on Xarelto.  Ultrasound was completed on 10/11/2020 to rule out a DVT    Due to concerning history and appearance of wound  punch biopsies done today in clinic.  No debridement at this time until pathology results reviewed    Wound care: Silver Hydrofiber to manage exudate and bioburden, foam cover dressing, Hypafix tape      PATIENT EDUCATION  - Importance of adequate nutrition for wound healing  -Advised to go to ER for any increased redness, swelling, drainage, or odor, or if patient develops fever, chills, nausea or vomiting.     25 min spent face to face with patient, >50% of time spent counseling, coordinating care, reviewing records, discussing POC, educating patient regarding wound healing and progression.  This time was spent in excess to procedure time.       Please note that this note may have been created using voice recognition software. I have worked with technical experts from [facility] to optimize the interface.  I have made every reasonable attempt to correct obvious errors, but there may be errors of grammar and possibly content that I did not discover before finalizing the note.    Pathology specimen checked 10/15/2020 1800 prior to closing encounter and results are still pending.  BART Lorenzo will follow up on this pathology report  N

## 2020-10-19 PROCEDURE — 80053 COMPREHEN METABOLIC PANEL: CPT

## 2020-10-19 PROCEDURE — 85652 RBC SED RATE AUTOMATED: CPT

## 2020-10-19 PROCEDURE — 87040 BLOOD CULTURE FOR BACTERIA: CPT

## 2020-10-19 PROCEDURE — 86140 C-REACTIVE PROTEIN: CPT

## 2020-10-19 PROCEDURE — 85025 COMPLETE CBC W/AUTO DIFF WBC: CPT

## 2020-10-19 PROCEDURE — 99285 EMERGENCY DEPT VISIT HI MDM: CPT

## 2020-10-20 ENCOUNTER — APPOINTMENT (OUTPATIENT)
Dept: RADIOLOGY | Facility: MEDICAL CENTER | Age: 55
DRG: 571 | End: 2020-10-20
Attending: INTERNAL MEDICINE
Payer: COMMERCIAL

## 2020-10-20 ENCOUNTER — HOSPITAL ENCOUNTER (INPATIENT)
Facility: MEDICAL CENTER | Age: 55
LOS: 11 days | DRG: 571 | End: 2020-10-31
Attending: EMERGENCY MEDICINE | Admitting: STUDENT IN AN ORGANIZED HEALTH CARE EDUCATION/TRAINING PROGRAM
Payer: COMMERCIAL

## 2020-10-20 ENCOUNTER — APPOINTMENT (OUTPATIENT)
Dept: RADIOLOGY | Facility: MEDICAL CENTER | Age: 55
DRG: 571 | End: 2020-10-20
Attending: EMERGENCY MEDICINE
Payer: COMMERCIAL

## 2020-10-20 DIAGNOSIS — R60.1 GENERALIZED EDEMA: ICD-10-CM

## 2020-10-20 DIAGNOSIS — N50.89 TESTICULAR MASS: ICD-10-CM

## 2020-10-20 DIAGNOSIS — L03.116 LEFT LEG CELLULITIS: ICD-10-CM

## 2020-10-20 DIAGNOSIS — L03.116 CELLULITIS OF LEFT LOWER EXTREMITY: ICD-10-CM

## 2020-10-20 DIAGNOSIS — Z86.711 HISTORY OF PULMONARY EMBOLUS (PE): Chronic | ICD-10-CM

## 2020-10-20 DIAGNOSIS — Z86.718 HISTORY OF DVT (DEEP VEIN THROMBOSIS): ICD-10-CM

## 2020-10-20 DIAGNOSIS — G47.00 INSOMNIA, UNSPECIFIED TYPE: ICD-10-CM

## 2020-10-20 DIAGNOSIS — L08.9 WOUND INFECTION: ICD-10-CM

## 2020-10-20 DIAGNOSIS — F17.200 SMOKER: Chronic | ICD-10-CM

## 2020-10-20 DIAGNOSIS — T14.8XXA WOUND INFECTION: ICD-10-CM

## 2020-10-20 PROBLEM — L03.90 CELLULITIS: Status: ACTIVE | Noted: 2020-10-20

## 2020-10-20 LAB
ALBUMIN SERPL BCP-MCNC: 4.1 G/DL (ref 3.2–4.9)
ALBUMIN/GLOB SERPL: 1.4 G/DL
ALP SERPL-CCNC: 138 U/L (ref 30–99)
ALT SERPL-CCNC: 13 U/L (ref 2–50)
ANION GAP SERPL CALC-SCNC: 11 MMOL/L (ref 7–16)
AST SERPL-CCNC: 14 U/L (ref 12–45)
BASOPHILS # BLD AUTO: 0.6 % (ref 0–1.8)
BASOPHILS # BLD: 0.05 K/UL (ref 0–0.12)
BILIRUB SERPL-MCNC: 0.3 MG/DL (ref 0.1–1.5)
BUN SERPL-MCNC: 17 MG/DL (ref 8–22)
CALCIUM SERPL-MCNC: 9.5 MG/DL (ref 8.5–10.5)
CHLORIDE SERPL-SCNC: 105 MMOL/L (ref 96–112)
CO2 SERPL-SCNC: 24 MMOL/L (ref 20–33)
CREAT SERPL-MCNC: 0.98 MG/DL (ref 0.5–1.4)
CRP SERPL HS-MCNC: 5.52 MG/DL (ref 0–0.75)
EOSINOPHIL # BLD AUTO: 0.18 K/UL (ref 0–0.51)
EOSINOPHIL NFR BLD: 2.1 % (ref 0–6.9)
ERYTHROCYTE [DISTWIDTH] IN BLOOD BY AUTOMATED COUNT: 46.5 FL (ref 35.9–50)
ERYTHROCYTE [SEDIMENTATION RATE] IN BLOOD BY WESTERGREN METHOD: 20 MM/HOUR (ref 0–20)
GLOBULIN SER CALC-MCNC: 2.9 G/DL (ref 1.9–3.5)
GLUCOSE SERPL-MCNC: 82 MG/DL (ref 65–99)
GRAM STN SPEC: NORMAL
HCT VFR BLD AUTO: 42.2 % (ref 42–52)
HGB BLD-MCNC: 13.7 G/DL (ref 14–18)
IMM GRANULOCYTES # BLD AUTO: 0.05 K/UL (ref 0–0.11)
IMM GRANULOCYTES NFR BLD AUTO: 0.6 % (ref 0–0.9)
LACTATE BLD-SCNC: 0.8 MMOL/L (ref 0.5–2)
LACTATE BLD-SCNC: 0.9 MMOL/L (ref 0.5–2)
LYMPHOCYTES # BLD AUTO: 1.89 K/UL (ref 1–4.8)
LYMPHOCYTES NFR BLD: 21.5 % (ref 22–41)
MCH RBC QN AUTO: 29.8 PG (ref 27–33)
MCHC RBC AUTO-ENTMCNC: 32.5 G/DL (ref 33.7–35.3)
MCV RBC AUTO: 91.7 FL (ref 81.4–97.8)
MONOCYTES # BLD AUTO: 0.58 K/UL (ref 0–0.85)
MONOCYTES NFR BLD AUTO: 6.6 % (ref 0–13.4)
NEUTROPHILS # BLD AUTO: 6.03 K/UL (ref 1.82–7.42)
NEUTROPHILS NFR BLD: 68.6 % (ref 44–72)
NRBC # BLD AUTO: 0 K/UL
NRBC BLD-RTO: 0 /100 WBC
PLATELET # BLD AUTO: 142 K/UL (ref 164–446)
PMV BLD AUTO: 10.1 FL (ref 9–12.9)
POTASSIUM SERPL-SCNC: 4.2 MMOL/L (ref 3.6–5.5)
PROT SERPL-MCNC: 7 G/DL (ref 6–8.2)
RBC # BLD AUTO: 4.6 M/UL (ref 4.7–6.1)
SIGNIFICANT IND 70042: NORMAL
SITE SITE: NORMAL
SODIUM SERPL-SCNC: 140 MMOL/L (ref 135–145)
SOURCE SOURCE: NORMAL
WBC # BLD AUTO: 8.8 K/UL (ref 4.8–10.8)

## 2020-10-20 PROCEDURE — 700101 HCHG RX REV CODE 250: Performed by: EMERGENCY MEDICINE

## 2020-10-20 PROCEDURE — 71045 X-RAY EXAM CHEST 1 VIEW: CPT

## 2020-10-20 PROCEDURE — 700111 HCHG RX REV CODE 636 W/ 250 OVERRIDE (IP): Performed by: INTERNAL MEDICINE

## 2020-10-20 PROCEDURE — 87070 CULTURE OTHR SPECIMN AEROBIC: CPT

## 2020-10-20 PROCEDURE — 83605 ASSAY OF LACTIC ACID: CPT

## 2020-10-20 PROCEDURE — 700111 HCHG RX REV CODE 636 W/ 250 OVERRIDE (IP): Performed by: EMERGENCY MEDICINE

## 2020-10-20 PROCEDURE — 73701 CT LOWER EXTREMITY W/DYE: CPT | Mod: LT

## 2020-10-20 PROCEDURE — 700105 HCHG RX REV CODE 258: Performed by: STUDENT IN AN ORGANIZED HEALTH CARE EDUCATION/TRAINING PROGRAM

## 2020-10-20 PROCEDURE — 770006 HCHG ROOM/CARE - MED/SURG/GYN SEMI*

## 2020-10-20 PROCEDURE — 36415 COLL VENOUS BLD VENIPUNCTURE: CPT

## 2020-10-20 PROCEDURE — 700105 HCHG RX REV CODE 258: Performed by: INTERNAL MEDICINE

## 2020-10-20 PROCEDURE — 700111 HCHG RX REV CODE 636 W/ 250 OVERRIDE (IP): Performed by: STUDENT IN AN ORGANIZED HEALTH CARE EDUCATION/TRAINING PROGRAM

## 2020-10-20 PROCEDURE — 94760 N-INVAS EAR/PLS OXIMETRY 1: CPT

## 2020-10-20 PROCEDURE — 96365 THER/PROPH/DIAG IV INF INIT: CPT

## 2020-10-20 PROCEDURE — 700117 HCHG RX CONTRAST REV CODE 255: Performed by: INTERNAL MEDICINE

## 2020-10-20 PROCEDURE — C9803 HOPD COVID-19 SPEC COLLECT: HCPCS | Performed by: STUDENT IN AN ORGANIZED HEALTH CARE EDUCATION/TRAINING PROGRAM

## 2020-10-20 PROCEDURE — 87205 SMEAR GRAM STAIN: CPT

## 2020-10-20 PROCEDURE — 99221 1ST HOSP IP/OBS SF/LOW 40: CPT | Performed by: STUDENT IN AN ORGANIZED HEALTH CARE EDUCATION/TRAINING PROGRAM

## 2020-10-20 PROCEDURE — 96375 TX/PRO/DX INJ NEW DRUG ADDON: CPT

## 2020-10-20 RX ORDER — MORPHINE SULFATE 4 MG/ML
2 INJECTION, SOLUTION INTRAMUSCULAR; INTRAVENOUS EVERY 4 HOURS PRN
Status: DISCONTINUED | OUTPATIENT
Start: 2020-10-20 | End: 2020-10-21

## 2020-10-20 RX ORDER — CLINDAMYCIN PHOSPHATE 900 MG/50ML
900 INJECTION, SOLUTION INTRAVENOUS ONCE
Status: COMPLETED | OUTPATIENT
Start: 2020-10-20 | End: 2020-10-20

## 2020-10-20 RX ORDER — ONDANSETRON 2 MG/ML
4 INJECTION INTRAMUSCULAR; INTRAVENOUS ONCE
Status: COMPLETED | OUTPATIENT
Start: 2020-10-20 | End: 2020-10-20

## 2020-10-20 RX ORDER — MORPHINE SULFATE 4 MG/ML
4 INJECTION, SOLUTION INTRAMUSCULAR; INTRAVENOUS ONCE
Status: COMPLETED | OUTPATIENT
Start: 2020-10-20 | End: 2020-10-20

## 2020-10-20 RX ADMIN — IOHEXOL 100 ML: 350 INJECTION, SOLUTION INTRAVENOUS at 12:45

## 2020-10-20 RX ADMIN — SODIUM CHLORIDE 3 G: 900 INJECTION INTRAVENOUS at 17:31

## 2020-10-20 RX ADMIN — MORPHINE SULFATE 2 MG: 4 INJECTION INTRAVENOUS at 05:49

## 2020-10-20 RX ADMIN — SODIUM CHLORIDE 3 G: 900 INJECTION INTRAVENOUS at 06:35

## 2020-10-20 RX ADMIN — VANCOMYCIN HYDROCHLORIDE 2500 MG: 500 INJECTION, POWDER, LYOPHILIZED, FOR SOLUTION INTRAVENOUS at 13:21

## 2020-10-20 RX ADMIN — MORPHINE SULFATE 2 MG: 4 INJECTION INTRAVENOUS at 09:58

## 2020-10-20 RX ADMIN — ONDANSETRON 4 MG: 2 INJECTION INTRAMUSCULAR; INTRAVENOUS at 03:13

## 2020-10-20 RX ADMIN — CLINDAMYCIN IN 5 PERCENT DEXTROSE 900 MG: 18 INJECTION, SOLUTION INTRAVENOUS at 03:18

## 2020-10-20 RX ADMIN — MORPHINE SULFATE 4 MG: 4 INJECTION INTRAVENOUS at 03:13

## 2020-10-20 RX ADMIN — SODIUM CHLORIDE 3 G: 900 INJECTION INTRAVENOUS at 12:23

## 2020-10-20 RX ADMIN — MORPHINE SULFATE 2 MG: 4 INJECTION INTRAVENOUS at 19:52

## 2020-10-20 RX ADMIN — MORPHINE SULFATE 2 MG: 4 INJECTION INTRAVENOUS at 15:15

## 2020-10-20 SDOH — HEALTH STABILITY: MENTAL HEALTH: HOW MANY STANDARD DRINKS CONTAINING ALCOHOL DO YOU HAVE ON A TYPICAL DAY?: 1 OR 2

## 2020-10-20 SDOH — HEALTH STABILITY: MENTAL HEALTH: HOW OFTEN DO YOU HAVE A DRINK CONTAINING ALCOHOL?: NEVER

## 2020-10-20 SDOH — ECONOMIC STABILITY: FOOD INSECURITY: WITHIN THE PAST 12 MONTHS, YOU WORRIED THAT YOUR FOOD WOULD RUN OUT BEFORE YOU GOT MONEY TO BUY MORE.: NEVER TRUE

## 2020-10-20 SDOH — HEALTH STABILITY: MENTAL HEALTH: HOW OFTEN DO YOU HAVE 6 OR MORE DRINKS ON ONE OCCASION?: NEVER

## 2020-10-20 SDOH — ECONOMIC STABILITY: FOOD INSECURITY: WITHIN THE PAST 12 MONTHS, THE FOOD YOU BOUGHT JUST DIDN'T LAST AND YOU DIDN'T HAVE MONEY TO GET MORE.: NEVER TRUE

## 2020-10-20 SDOH — ECONOMIC STABILITY: TRANSPORTATION INSECURITY
IN THE PAST 12 MONTHS, HAS THE LACK OF TRANSPORTATION KEPT YOU FROM MEDICAL APPOINTMENTS OR FROM GETTING MEDICATIONS?: NO

## 2020-10-20 SDOH — ECONOMIC STABILITY: TRANSPORTATION INSECURITY
IN THE PAST 12 MONTHS, HAS LACK OF TRANSPORTATION KEPT YOU FROM MEETINGS, WORK, OR FROM GETTING THINGS NEEDED FOR DAILY LIVING?: NO

## 2020-10-20 ASSESSMENT — LIFESTYLE VARIABLES
HAVE PEOPLE ANNOYED YOU BY CRITICIZING YOUR DRINKING: NO
HOW MANY TIMES IN THE PAST YEAR HAVE YOU HAD 5 OR MORE DRINKS IN A DAY: 0
TOTAL SCORE: 0
AVERAGE NUMBER OF DAYS PER WEEK YOU HAVE A DRINK CONTAINING ALCOHOL: 0
EVER HAD A DRINK FIRST THING IN THE MORNING TO STEADY YOUR NERVES TO GET RID OF A HANGOVER: NO
DOES PATIENT WANT TO STOP DRINKING: NO
EVER FELT BAD OR GUILTY ABOUT YOUR DRINKING: NO
TOTAL SCORE: 0
HAVE YOU EVER FELT YOU SHOULD CUT DOWN ON YOUR DRINKING: NO
TOTAL SCORE: 0
ALCOHOL_USE: NO
CONSUMPTION TOTAL: NEGATIVE
ON A TYPICAL DAY WHEN YOU DRINK ALCOHOL HOW MANY DRINKS DO YOU HAVE: 0

## 2020-10-20 ASSESSMENT — ENCOUNTER SYMPTOMS
CONSTITUTIONAL NEGATIVE: 1
RESPIRATORY NEGATIVE: 1
NEUROLOGICAL NEGATIVE: 1
CARDIOVASCULAR NEGATIVE: 1
MUSCULOSKELETAL NEGATIVE: 1
PSYCHIATRIC NEGATIVE: 1
EYES NEGATIVE: 1
GASTROINTESTINAL NEGATIVE: 1

## 2020-10-20 ASSESSMENT — COGNITIVE AND FUNCTIONAL STATUS - GENERAL
SUGGESTED CMS G CODE MODIFIER MOBILITY: CH
DAILY ACTIVITIY SCORE: 24
SUGGESTED CMS G CODE MODIFIER MOBILITY: CH
MOBILITY SCORE: 24
SUGGESTED CMS G CODE MODIFIER DAILY ACTIVITY: CH
MOBILITY SCORE: 24
SUGGESTED CMS G CODE MODIFIER DAILY ACTIVITY: CH
DAILY ACTIVITIY SCORE: 24

## 2020-10-20 ASSESSMENT — PAIN DESCRIPTION - PAIN TYPE
TYPE: ACUTE PAIN

## 2020-10-20 ASSESSMENT — PATIENT HEALTH QUESTIONNAIRE - PHQ9
2. FEELING DOWN, DEPRESSED, IRRITABLE, OR HOPELESS: NOT AT ALL
1. LITTLE INTEREST OR PLEASURE IN DOING THINGS: NOT AT ALL
SUM OF ALL RESPONSES TO PHQ9 QUESTIONS 1 AND 2: 0

## 2020-10-20 NOTE — WOUND TEAM
[facility]  Inpatient Services  Initial Wound and Skin Care Evaluation    Admission Date: 10/20/2020     Consult Date: 10/20/20  HPI, PMH, SH: Reviewed    Unit where seen by Wound Team: [id number]     WOUND CONSULT RELATED TO:  Left calf wound     Self Report / Pain Level:  9/10       OBJECTIVE:  Patient cannot lie in bed. Left leg feels better when patient is sitting or standing/walking.     WOUND TYPE, LOCATION, CHARACTERISTICS (Pressure Injuries: location, stage, POA or date identified)       Wound 10/07/20 Full Thickness Wound Leg Posterior Left -Left Posterior LE (Active)   Site Assessment Black;Painful 10/20/20 1100   Periwound Assessment Hot;Edema;Red;Painful 10/20/20 1100   Margins Attached edges;Undefined edges 10/20/20 1100   Closure None 10/20/20 1100   Drainage Amount None 10/20/20 1100   Treatments Site care 10/20/20 1100   Wound Cleansing Not Applicable 10/20/20 1100   Periwound Protectant Not Applicable 10/20/20 1100   Dressing Cleansing/Solutions Not Applicable 10/20/20 1100   Dressing Options Adaptic;Absorbent Abdominal Pad;Dry Roll Gauze 10/20/20 1100   Dressing Changed New 10/20/20 1100   Dressing Status Clean;Dry;Intact 10/20/20 1100   Dressing Change/Treatment Frequency Daily, and As Needed 10/20/20 1100   NEXT Dressing Change/Treatment Date 10/21/20 10/20/20 1100   NEXT Weekly Photo (Inpatient Only) 10/27/20 10/20/20 1100   Non-staged Wound Description Full thickness 10/20/20 1100   Wound Length (cm) 9.5 cm 10/20/20 1100   Wound Width (cm) 10 cm 10/20/20 1100   Wound Depth (cm) 0 cm 10/20/20 1100   Wound Surface Area (cm^2) 95 cm^2 10/20/20 1100   Wound Volume (cm^3) 0 cm^3 10/20/20 1100   Wound Bed Granulation (%) 0 % 10/20/20 1100   Wound Bed Epithelium (%) 0 % 10/20/20 1100   Wound Bed Slough (%) 0 % 10/20/20 1100   Wound Bed Eschar (%) 100 % 10/20/20 1100   Tunneling (cm) 0 cm 10/20/20 1100   Undermining (cm) 0 cm 10/20/20 1100   Shape irregular 10/20/20 1100   Wound Odor None  10/20/20 1100   Exposed Structures None 10/20/20 1100       Vascular:    Dorsal Pedal pulses:  could not palpate due to edema.  Posterior tib pulses:   did not palpate    CARLA:     NA    Lab Values:    Lab Results   Component Value Date/Time    WBC 8.8 10/19/2020 11:59 PM    RBC 4.60 (L) 10/19/2020 11:59 PM    HEMOGLOBIN 13.7 (L) 10/19/2020 11:59 PM    HEMATOCRIT 42.2 10/19/2020 11:59 PM        Results from last 7 days   Lab Units 10/19/20  2359   C REACTIVE PROTEIN 4596 mg/dL 5.52*       Results from last 7 days   Lab Units 10/19/20  2359   SED RATE WESTERGREN 1526 mm/hour 20       No results found for: HBA1C        Culture:   Obtained No because wound covered with eschar, Results show NA    INTERVENTIONS BY WOUND TEAM:  Dressing to left calf had fallen down around ankle. Patient tried laying on his stomach on the bed for wound care but this cause too much pain to wound area so he had to sit at side of bed. Removed dressing. Eschar dry and stable at this time. This wound needs a surgical consult. CT ordered, pending. Spoke with Dr. Cueto about surgical consult and he is going to call orthopedic surgeon. Covered eschar with Adaptic, then abdominal pad, secured with roll gauze.     Interdisciplinary consultation: Patient, Bedside RN, Dr. Cueto    EVALUATION: wound needs surgical debridment    Goals: Steady decrease in wound area and depth weekly.    NURSING PLAN OF CARE ORDERS (X):  Dressing changes: See Dressing Care orders: X  Skin care: See Skin Care orders: NA  Rectal tube care: See Rectal Tube Care orders:        Other orders:                           RSKIN:   CURRENTLY IN PLACE (X), APPLIED THIS VISIT (A), ORDERED (O):   Q shift John:  X  Q shift pressure point assessments:  X  Pressure redistribution mattress   X                          Low Airloss                        Bariatric FEDERICO                     Bariatric foam                        Heel float boots                     Heel Silicone dressing                          Float Heels off Bed with Pillows               Barrier wipes         Barrier Cream         Barrier paste          Sacral silicone dressing         Silicone O2 tubing         Anchorfast         Cannula fixation Device (Tender )          Gray Foam Ear protectors           Trach with Optifoam split foam                 Waffle cushion        Waffle Overlay         Rectal tube or BMS    Purwick/Condom Cath          Antifungal tx      Interdry          Reposition q 2 hours     Patient up self, turns self   Up to chair        Ambulate      PT/OT        Dietician        Diabetes Education      PO     TF     TPN     NPO  X # days   Other        WOUND TEAM PLAN OF CARE (X):   Dressing changes by wound team:          Follow up 1-2 times weekly:               Follow up 3 times weekly:                NPWT change 3 times weekly:     Follow up as needed:   X please consult Wound Team after surgery if surgeon desires Wound Team to manage wound care    Other (explain):     Anticipated discharge plans (X):   LTACH:        SNF/Rehab:                  Home Care:           Outpatient Wound Center:            Self Care:            Other:      To Be determined

## 2020-10-20 NOTE — ED NOTES
Updated patient on plan of care, verbalized understanding. IV started, medications for pain and nausea given. IV antibiotics started. Wound culture sent

## 2020-10-20 NOTE — H&P
Hospital Medicine History & Physical Note    Date of Service  10/20/2020    Primary Care Physician  Gio Wang M.D.    Consultants  None    Code Status  Full Code    Chief Complaint  Chief Complaint   Patient presents with   • Wound Infection     L calf       History of Presenting Illness  55 y.o. male who w/ DVT and PE  on Xarelto and chronic left lower extremity wound for past 4 months presented 10/20/2020 with worsening left lower extremity wound pain.  Per patient, is progressively getting worse and more swollen prompting patient to come in.  In addition to that, he also endorses continuous oozing.  He endorses that it is warm to the touch and erythematous.    In ED patient found to have normal vital signs.  Remarkable labs include increased alkaline phosphatase, chest xray shows elevated hemidiaphragm and prominent lung markings, no consolidation/effusion noted.     Patient had a DVT study of left lower extremity on 10/11/2020 which was negative for DVT.  Patient had punch biopsy done on 10/14/2020 which was negative for marjolin's ulcer    Review of Systems  Review of Systems   Constitutional: Negative.    HENT: Negative.    Eyes: Negative.    Respiratory: Negative.    Cardiovascular: Negative.    Gastrointestinal: Negative.    Genitourinary: Negative.    Musculoskeletal: Negative.    Skin: Negative.    Neurological: Negative.    Endo/Heme/Allergies: Negative.    Psychiatric/Behavioral: Negative.           Past Medical History   has a past medical history of Cancer (HCC), Cancer (HCC), DVT (deep venous thrombosis) (HCC), Pulmonary embolism (HCC), and Smoker.  Reviewed    Surgical History   has a past surgical history that includes nephrectomy partial; nerve repair (6/18/08); laceration repair (6/18/08); other; and nephrectomy partial (Right).   Reviewed    Family History  family history is not on file.   Reviewed    Social History   reports that he has been smoking cigarettes. He has been smoking  about 1.00 pack per day. He has never used smokeless tobacco. He reports that he does not drink alcohol or use drugs.  Reviewed    Allergies  No Known Allergies    Medications  Prior to Admission Medications   Prescriptions Last Dose Informant Patient Reported? Taking?   ALBUTEROL INH   Yes No   Sig: Inhale 1 Puff by mouth every four hours as needed.   XARELTO 20 MG Tab tablet   Yes No   Sig: TK 1 T PO QPM   zolpidem (AMBIEN) 10 MG Tab   Yes No   Sig: TK 1 T PO HS PRF SLP  GM 47.00      Facility-Administered Medications: None       Physical Exam  Temp:  [35.9 °C (96.7 °F)] 35.9 °C (96.7 °F)  Pulse:  [63-69] 67  Resp:  [20] 20  BP: (118-149)/(67-83) 147/67  SpO2:  [91 %-100 %] 91 %    Physical Exam  Constitutional:       Appearance: Normal appearance. He is obese.   Cardiovascular:      Rate and Rhythm: Normal rate and regular rhythm.      Pulses: Normal pulses.   Abdominal:      General: Abdomen is flat.   Musculoskeletal:      Comments: Stands during physical exam due to pain in the left lower extremity  About 5 x 5 nonhealing wound with oozing and erythema noted in the posterior aspect of left lower extremity warm to touch and tender upon palpation   Skin:     General: Skin is warm.   Neurological:      Mental Status: He is alert.         Laboratory:  Recent Labs     10/19/20  2359   WBC 8.8   RBC 4.60*   HEMOGLOBIN 13.7*   HEMATOCRIT 42.2   MCV 91.7   MCH 29.8   MCHC 32.5*   RDW 46.5   PLATELETCT 142*   MPV 10.1     Recent Labs     10/19/20  2359   SODIUM 140   POTASSIUM 4.2   CHLORIDE 105   CO2 24   GLUCOSE 82   BUN 17   CREATININE 0.98   CALCIUM 9.5     Recent Labs     10/19/20  2359   ALTSGPT 13   ASTSGOT 14   ALKPHOSPHAT 138*   TBILIRUBIN 0.3   GLUCOSE 82         No results for input(s): NTPROBNP in the last 72 hours.      No results for input(s): TROPONINT in the last 72 hours.    Imaging:  DX-CHEST-LIMITED (1 VIEW)    (Results Pending)         Assessment/Plan:  I anticipate this patient will require at  least two midnights for appropriate medical management, necessitating inpatient admission.    * Cellulitis  Assessment & Plan  Nonhealing wound on left lower extremity for last 4 months status post biopsy 6 days ago  Given 1 dose of clindamycin in ED  Unasyn IV  ESR CRP  Wound care in a.m.  Lactic acid  Blood cultures in process  Consider ID consult  Morphine 2 mg every 4h    History of DVT (deep vein thrombosis)  Assessment & Plan  On Xarelto 20 mg nightly, took Xarelto last night.   Hold Xarelto for now as patient may need procedure during hospital stay

## 2020-10-20 NOTE — ED TRIAGE NOTES
Panchito Moura  55 y.o.  Chief Complaint   Patient presents with   • Wound Infection     L calf     Ambulatory to triage with limping gait for above. A & O x 4, GCS 15. Mask in place.    States that he has been following with wound care who recommended him to present to the ED for worsening symptoms. LLE wound red and hot with periwound edema and 10/10- pain to site.    Denies fevers at home. Recently finished a course of PO Clindamycin at home.    Blood rainbow, lactic acid, and blood cultures x 2 drawn per protocol and sent to lab.    Triage process explained to patient, apologized for wait time, and returned to lobby.

## 2020-10-20 NOTE — ED NOTES
Updated patient on plan of care, verbalized understanding. Patient transported with belongings off the floor

## 2020-10-20 NOTE — PROGRESS NOTES
2 RN skin check complete HANNAH Gomes.   Devices in place PIV.  Skin assessed under devices yes.  Confirmed pressure ulcers found on n/a.  New potential pressure ulcers noted on n/a.   Wound consult placed yes.    Skin intact  Lt calf wound: black, cleansed, picture taken, wound consult placed

## 2020-10-20 NOTE — PROGRESS NOTES
Pharmacy Kinetics 55 y.o. male on vancomycin day # 1 10/20/2020    New start vancomycin - 2500 mg IV loading dose currently being given  Provider specified end date: TBD    Indication for Treatment: L calf SSTI    Pertinent history per medical record: Admitted on 10/20/2020 for LLE cellulitis. Pt has had chronic wound for at least 4 months (past wound cx only positive for mixed skin yesenia), but it has recently gotten more painful, with swelling and erythema, and started oozing. Of note, a punch biopsy was done on the wound on 10/14 and is negative for Majolin's ulcer. CT of LLE reveals cellulitis and no abscess, gas, or evidence of OM. Pt is being admitted for possible I&D and empiric IV abx. Ortho has been consulted and recommends a plastic surgery consult - awaiting this. He has no prior hx of vanco dosing found in Epic.    Other antibiotics: Unasyn 3 g IV q6h    Allergies: Patient has no known allergies.     Concerns for renal function: IV contrast on 10/20    Pertinent cultures to date:   10/20 L leg wound: in process  10/19 Blood x2: in process      Recent Labs     10/19/20  2359   WBC 8.8   NEUTSPOLYS 68.60     Recent Labs     10/19/20  2359   BUN 17   CREATININE 0.98   ALBUMIN 4.1       Intake/Output Summary (Last 24 hours) at 10/20/2020 1424  Last data filed at 10/20/2020 0419  Gross per 24 hour   Intake 100 ml   Output --   Net 100 ml      /76   Pulse 68   Temp 36.1 °C (97 °F) (Temporal)   Resp 18   Ht 1.829 m (6')   Wt 104.3 kg (230 lb)   SpO2 92%  Temp (24hrs), Av.2 °C (97.1 °F), Min:35.9 °C (96.7 °F), Max:36.4 °C (97.5 °F)      A/P   1. Vancomycin dose change: Start 1750 mg IV q12h (@ 0100 & 1300 - first dose tomorrow)  2. Next vancomycin level: In ~2 days (not ordered)  3. Goal trough: 10-15 mcg/mL  4. Comments: Pt arrives afebrile, hemodynamically stable, and with no leukocytosis. His SCr is at baseline. Await plastics consult at this time, along with wound cx. Will initiate vancomycin  and check a trough as pt approaches steady state    Sophie Gallo, PharmD, BCPS

## 2020-10-20 NOTE — PROGRESS NOTES
Pt seen and examined, afebrile, no overnight events, no nausea or vomiting, still with lower extremity pain.  Pt states is wound on his lower extremity is going on for couple of months.  Consulted ortho regarding his wound and  Dr. Lino recommended to discuss case with plastic surgery.  I have consulted plastic surgery Dr. Mayer who will evaluate patient.  Appreciate rec.

## 2020-10-20 NOTE — ASSESSMENT & PLAN NOTE
Cellulitis of the left posterior calf with Nonhealing wound on left lower extremity for last 4 months status post biopsy 6 days ago  Biopsy shows ischemic/granulaiton tissues, no malignancy identified   Continue with Unasyn, ID consulted, appreciate recommendation   S/P I&D by plastic surgery 10/22, appreciate recommendations and help with management   Cont on wound care/ wound vac    Patient needs a Vera Sly VAC system as per wound care, H/H will not provide  Placed Referral for LTAC and SNF

## 2020-10-20 NOTE — ED PROVIDER NOTES
CHIEF COMPLAINT  Chief Complaint   Patient presents with   • Wound Infection     L calf       HPI  Panchito Moura is a 55 y.o. male who presents tonight with a chief complaint of worsening left calf pain to the point where he has not slept for the last 2 nights.  The wound in his calf has been present for approximately 6 months and has increased in size over that time.  He has been going to outpatient wound care with no success.  He has been on antibiotics, he has had 2 ultrasounds to rule out DVT both of which were negative.  He recently had biopsies performed last Wednesday and does not have the results back yet.  He denies any shaking fever chills or night sweats.  He denies any chest pain or shortness of breath.    REVIEW OF SYSTEMS  See HPI for further details. All other system reviews are negative.    PAST MEDICAL HISTORY  Past Medical History:   Diagnosis Date   • Cancer (HCC)     kidney   • Cancer (HCC)     renal   • DVT (deep venous thrombosis) (HCC)    • Pulmonary embolism (HCC)    • Smoker        FAMILY HISTORY  History reviewed. No pertinent family history.    SOCIAL HISTORY  Social History     Socioeconomic History   • Marital status: Single     Spouse name: Not on file   • Number of children: Not on file   • Years of education: Not on file   • Highest education level: Not on file   Occupational History   • Not on file   Social Needs   • Financial resource strain: Not on file   • Food insecurity     Worry: Not on file     Inability: Not on file   • Transportation needs     Medical: Not on file     Non-medical: Not on file   Tobacco Use   • Smoking status: Current Every Day Smoker     Packs/day: 1.00     Types: Cigarettes   • Smokeless tobacco: Never Used   Substance and Sexual Activity   • Alcohol use: No   • Drug use: No   • Sexual activity: Not on file   Lifestyle   • Physical activity     Days per week: Not on file     Minutes per session: Not on file   • Stress: Not on file   Relationships   •  Social connections     Talks on phone: Not on file     Gets together: Not on file     Attends Worship service: Not on file     Active member of club or organization: Not on file     Attends meetings of clubs or organizations: Not on file     Relationship status: Not on file   • Intimate partner violence     Fear of current or ex partner: Not on file     Emotionally abused: Not on file     Physically abused: Not on file     Forced sexual activity: Not on file   Other Topics Concern   • Not on file   Social History Narrative    ** Merged History Encounter **            SURGICAL HISTORY  Past Surgical History:   Procedure Laterality Date   • NERVE REPAIR  6/18/08    Performed by SKYE RAY at [facility]S   • LACERATION REPAIR  6/18/08    Performed by SKYE RAY at [facility] ORS   • NEPHRECTOMY PARTIAL      right   • NEPHRECTOMY PARTIAL Right    • OTHER      tonsillectomy       CURRENT MEDICATIONS  See nurses notes    ALLERGIES  No Known Allergies    PHYSICAL EXAM  VITAL SIGNS: /67   Pulse 67   Temp 35.9 °C (96.7 °F) (Oral)   Resp 20   Ht 1.829 m (6')   Wt 104.3 kg (230 lb)   SpO2 91%   BMI 31.19 kg/m²     Constitutional: Patient is well developed, well nourished in moderate distress secondary to the pain in his leg.  Patient is extremely uncomfortable, pacing the room requesting pain meds stating he has not slept for the last 2 nights.  HENT: Normocephalic, Oropharynx moist.  Eyes: PERRL, EOMI, Conjunctiva without erythema.  Neck: Supple with Normal range of motion in flexion, extension and lateral rotation. No tenderness along the bony prominences .  Lymphatic: No lymphadenopathy noted.   Cardiovascular: Normal heart rate and rhythm. No murmur  Thorax & Lungs: Clear and equal breath sounds with good excursion. No respiratory distress, no rhonchi, wheezing or rales.  Abdomen: Bowel sounds normal in all four quadrants. Soft,nontender.  Skin: Warm, Dry, No erythema, No  rashes.   Back: No cervical, thoracic, or lumbosacral tenderness.   Extremities: Peripheral pulses 4/4  , patient has an 8 x 8 cm blackened deep nonhealing wound in the left mid calf.  There is surrounding edema and erythema noted.  He has good pedal and posterior tibial pulses good capillary refill good sensation.  There are no other lesions noted to the patient's extremities.  Musculoskeletal: Normal range of motion in all major joints.   Neurologic: Alert & oriented x 3, Normal motor function, Normal sensory function,  DTR's 4/4 bilaterally.  Psychiatric: Affect normal, Judgment normal, Mood normal.     Results for orders placed or performed during the hospital encounter of 10/20/20   CBC WITH DIFFERENTIAL   Result Value Ref Range    WBC 8.8 4.8 - 10.8 K/uL    RBC 4.60 (L) 4.70 - 6.10 M/uL    Hemoglobin 13.7 (L) 14.0 - 18.0 g/dL    Hematocrit 42.2 42.0 - 52.0 %    MCV 91.7 81.4 - 97.8 fL    MCH 29.8 27.0 - 33.0 pg    MCHC 32.5 (L) 33.7 - 35.3 g/dL    RDW 46.5 35.9 - 50.0 fL    Platelet Count 142 (L) 164 - 446 K/uL    MPV 10.1 9.0 - 12.9 fL    Neutrophils-Polys 68.60 44.00 - 72.00 %    Lymphocytes 21.50 (L) 22.00 - 41.00 %    Monocytes 6.60 0.00 - 13.40 %    Eosinophils 2.10 0.00 - 6.90 %    Basophils 0.60 0.00 - 1.80 %    Immature Granulocytes 0.60 0.00 - 0.90 %    Nucleated RBC 0.00 /100 WBC    Neutrophils (Absolute) 6.03 1.82 - 7.42 K/uL    Lymphs (Absolute) 1.89 1.00 - 4.80 K/uL    Monos (Absolute) 0.58 0.00 - 0.85 K/uL    Eos (Absolute) 0.18 0.00 - 0.51 K/uL    Baso (Absolute) 0.05 0.00 - 0.12 K/uL    Immature Granulocytes (abs) 0.05 0.00 - 0.11 K/uL    NRBC (Absolute) 0.00 K/uL   COMP METABOLIC PANEL   Result Value Ref Range    Sodium 140 135 - 145 mmol/L    Potassium 4.2 3.6 - 5.5 mmol/L    Chloride 105 96 - 112 mmol/L    Co2 24 20 - 33 mmol/L    Anion Gap 11.0 7.0 - 16.0    Glucose 82 65 - 99 mg/dL    Bun 17 8 - 22 mg/dL    Creatinine 0.98 0.50 - 1.40 mg/dL    Calcium 9.5 8.5 - 10.5 mg/dL    AST(SGOT) 14  12 - 45 U/L    ALT(SGPT) 13 2 - 50 U/L    Alkaline Phosphatase 138 (H) 30 - 99 U/L    Total Bilirubin 0.3 0.1 - 1.5 mg/dL    Albumin 4.1 3.2 - 4.9 g/dL    Total Protein 7.0 6.0 - 8.2 g/dL    Globulin 2.9 1.9 - 3.5 g/dL    A-G Ratio 1.4 g/dL   ESTIMATED GFR   Result Value Ref Range    GFR If African American >60 >60 mL/min/1.73 m 2    GFR If Non African American >60 >60 mL/min/1.73 m 2         RADIOLOGY/PROCEDURES  DX-CHEST-LIMITED (1 VIEW)    (Results Pending)         COURSE & MEDICAL DECISION MAKING  Pertinent Labs & Imaging studies reviewed. (See chart for details)  Patient received an IV with clindamycin 900 mg IV piggyback.  Laboratories were drawn including blood cultures, lactic acid and wound cultures.  His overall white blood cell count was normal with a stable H&H.  His CMP is unremarkable.  I gave him morphine, Toradol and Zofran for his leg pain.  I reviewed his previous laboratories to see if I could find the path report and there is nothing reported as of yet.  The patient will require hospital admission for further treatment for this extensive nonhealing wound and cellulitis.  He will be admitted to the hospitalist service for further care.  He is currently in guarded condition.    FINAL IMPRESSION  1.  Left leg wound infection  2.  Left leg cellulitis  3.  Intractable left leg pain         Electronically signed by: Eliza Otero D.O., 10/20/2020 3:48 [facility] Provider Note

## 2020-10-21 ENCOUNTER — APPOINTMENT (OUTPATIENT)
Dept: WOUND CARE | Facility: MEDICAL CENTER | Age: 55
End: 2020-10-21
Attending: PHYSICIAN ASSISTANT
Payer: COMMERCIAL

## 2020-10-21 ENCOUNTER — ANESTHESIA EVENT (OUTPATIENT)
Dept: SURGERY | Facility: MEDICAL CENTER | Age: 55
DRG: 571 | End: 2020-10-21
Payer: COMMERCIAL

## 2020-10-21 PROBLEM — F17.200 SMOKER: Chronic | Status: ACTIVE | Noted: 2020-10-21

## 2020-10-21 LAB
ANION GAP SERPL CALC-SCNC: 8 MMOL/L (ref 7–16)
BUN SERPL-MCNC: 19 MG/DL (ref 8–22)
CALCIUM SERPL-MCNC: 9.3 MG/DL (ref 8.5–10.5)
CHLORIDE SERPL-SCNC: 99 MMOL/L (ref 96–112)
CO2 SERPL-SCNC: 28 MMOL/L (ref 20–33)
CREAT SERPL-MCNC: 1.09 MG/DL (ref 0.5–1.4)
ERYTHROCYTE [DISTWIDTH] IN BLOOD BY AUTOMATED COUNT: 46.1 FL (ref 35.9–50)
GLUCOSE SERPL-MCNC: 94 MG/DL (ref 65–99)
HCT VFR BLD AUTO: 42.6 % (ref 42–52)
HGB BLD-MCNC: 13.6 G/DL (ref 14–18)
MCH RBC QN AUTO: 29.6 PG (ref 27–33)
MCHC RBC AUTO-ENTMCNC: 31.9 G/DL (ref 33.7–35.3)
MCV RBC AUTO: 92.6 FL (ref 81.4–97.8)
PLATELET # BLD AUTO: 143 K/UL (ref 164–446)
PMV BLD AUTO: 9.6 FL (ref 9–12.9)
POTASSIUM SERPL-SCNC: 4.2 MMOL/L (ref 3.6–5.5)
RBC # BLD AUTO: 4.6 M/UL (ref 4.7–6.1)
SODIUM SERPL-SCNC: 135 MMOL/L (ref 135–145)
WBC # BLD AUTO: 7.4 K/UL (ref 4.8–10.8)

## 2020-10-21 PROCEDURE — 85027 COMPLETE CBC AUTOMATED: CPT

## 2020-10-21 PROCEDURE — 700102 HCHG RX REV CODE 250 W/ 637 OVERRIDE(OP): Performed by: INTERNAL MEDICINE

## 2020-10-21 PROCEDURE — 700111 HCHG RX REV CODE 636 W/ 250 OVERRIDE (IP): Performed by: INTERNAL MEDICINE

## 2020-10-21 PROCEDURE — 700105 HCHG RX REV CODE 258: Performed by: STUDENT IN AN ORGANIZED HEALTH CARE EDUCATION/TRAINING PROGRAM

## 2020-10-21 PROCEDURE — 36415 COLL VENOUS BLD VENIPUNCTURE: CPT

## 2020-10-21 PROCEDURE — 80048 BASIC METABOLIC PNL TOTAL CA: CPT

## 2020-10-21 PROCEDURE — 770006 HCHG ROOM/CARE - MED/SURG/GYN SEMI*

## 2020-10-21 PROCEDURE — A9270 NON-COVERED ITEM OR SERVICE: HCPCS | Performed by: INTERNAL MEDICINE

## 2020-10-21 PROCEDURE — 700111 HCHG RX REV CODE 636 W/ 250 OVERRIDE (IP): Performed by: STUDENT IN AN ORGANIZED HEALTH CARE EDUCATION/TRAINING PROGRAM

## 2020-10-21 PROCEDURE — 700105 HCHG RX REV CODE 258: Performed by: INTERNAL MEDICINE

## 2020-10-21 PROCEDURE — 99232 SBSQ HOSP IP/OBS MODERATE 35: CPT | Performed by: INTERNAL MEDICINE

## 2020-10-21 RX ORDER — ZOLPIDEM TARTRATE 5 MG/1
5 TABLET ORAL NIGHTLY PRN
Status: DISCONTINUED | OUTPATIENT
Start: 2020-10-21 | End: 2020-10-31 | Stop reason: HOSPADM

## 2020-10-21 RX ORDER — OXYCODONE HYDROCHLORIDE 5 MG/1
5-10 TABLET ORAL EVERY 4 HOURS PRN
Status: DISCONTINUED | OUTPATIENT
Start: 2020-10-21 | End: 2020-10-27

## 2020-10-21 RX ADMIN — MORPHINE SULFATE 2 MG: 4 INJECTION INTRAVENOUS at 00:37

## 2020-10-21 RX ADMIN — ZOLPIDEM TARTRATE 5 MG: 5 TABLET ORAL at 21:43

## 2020-10-21 RX ADMIN — MORPHINE SULFATE 2 MG: 4 INJECTION INTRAVENOUS at 09:13

## 2020-10-21 RX ADMIN — OXYCODONE 10 MG: 5 TABLET ORAL at 17:50

## 2020-10-21 RX ADMIN — SODIUM CHLORIDE 3 G: 900 INJECTION INTRAVENOUS at 12:32

## 2020-10-21 RX ADMIN — VANCOMYCIN HYDROCHLORIDE 1750 MG: 500 INJECTION, POWDER, LYOPHILIZED, FOR SOLUTION INTRAVENOUS at 01:30

## 2020-10-21 RX ADMIN — MORPHINE SULFATE 2 MG: 4 INJECTION INTRAVENOUS at 05:12

## 2020-10-21 RX ADMIN — ZOLPIDEM TARTRATE 5 MG: 5 TABLET ORAL at 00:37

## 2020-10-21 RX ADMIN — OXYCODONE 10 MG: 5 TABLET ORAL at 21:44

## 2020-10-21 RX ADMIN — OXYCODONE 10 MG: 5 TABLET ORAL at 11:48

## 2020-10-21 RX ADMIN — SODIUM CHLORIDE 3 G: 900 INJECTION INTRAVENOUS at 00:38

## 2020-10-21 RX ADMIN — SODIUM CHLORIDE 3 G: 900 INJECTION INTRAVENOUS at 05:12

## 2020-10-21 RX ADMIN — SODIUM CHLORIDE 3 G: 900 INJECTION INTRAVENOUS at 17:59

## 2020-10-21 RX ADMIN — VANCOMYCIN HYDROCHLORIDE 1750 MG: 500 INJECTION, POWDER, LYOPHILIZED, FOR SOLUTION INTRAVENOUS at 13:48

## 2020-10-21 ASSESSMENT — ENCOUNTER SYMPTOMS
EYE DISCHARGE: 0
MYALGIAS: 1
FEVER: 0
ABDOMINAL PAIN: 0
EYE PAIN: 0
HEARTBURN: 0
COUGH: 0
VOMITING: 0
SHORTNESS OF BREATH: 0
NAUSEA: 0
CHILLS: 0
PALPITATIONS: 0

## 2020-10-21 ASSESSMENT — PAIN DESCRIPTION - PAIN TYPE
TYPE: ACUTE PAIN

## 2020-10-21 NOTE — PROGRESS NOTES
A&Ox4, VSS on RA. Complaints of pain, meds given per MAR. Wound to left calf- dressing change done. Pt then removed dressing due to pain. Call light in reach, bed locked/lowest position. WCTM.

## 2020-10-21 NOTE — CARE PLAN
Problem: Communication  Goal: The ability to communicate needs accurately and effectively will improve  Outcome: PROGRESSING AS EXPECTED  Note: Patient is alert and oriented, able to make his needs known and uses the call light appropriately      Problem: Safety  Goal: Will remain free from injury  Outcome: PROGRESSING AS EXPECTED  Goal: Will remain free from falls  Outcome: PROGRESSING AS EXPECTED     Problem: Pain Management  Goal: Pain level will decrease to patient's comfort goal  Outcome: PROGRESSING AS EXPECTED  Note: PRN pain medications given this shift with relief.

## 2020-10-21 NOTE — PROGRESS NOTES
[facility] Medicine Daily Progress Note    Date of Service  10/21/2020    Chief Complaint  55 y.o. male admitted 10/20/2020 with left calf wound and cellulitis     Hospital Course    This is a 54 y/o M with PMHX of DVT and PE who was admitted on 10/20/20 after presenting to ER complaining of worsening left calf wound. Per patient, is progressively getting worse and more swollen prompting patient to come in. Pt started on abx and case was discussed with ortho who recommended tp consult plastic surgery.  Plastic surgery has been consulted and planing for I&D.       Interval Problem Update  Pt seen and examined, no acute events overnight, afebrile, still with some lower extremity pain.  Plan for I&D with plastic surgery tomorrow appreciate rec.     Consultants/Specialty  Plastic surgery     Code Status  Full Code    Disposition  tbd     Review of Systems  Review of Systems   Constitutional: Negative for chills and fever.   HENT: Negative for hearing loss and tinnitus.    Eyes: Negative for pain and discharge.   Respiratory: Negative for cough and shortness of breath.    Cardiovascular: Negative for chest pain and palpitations.   Gastrointestinal: Negative for abdominal pain, heartburn, nausea and vomiting.   Musculoskeletal: Positive for joint pain and myalgias.   All other systems reviewed and are negative.       Physical Exam  Temp:  [36.3 °C (97.3 °F)-37 °C (98.6 °F)] 36.7 °C (98 °F)  Pulse:  [60-65] 65  Resp:  [17-18] 18  BP: (136-140)/(65-83) 140/78  SpO2:  [92 %-96 %] 96 %    Physical Exam  Vitals signs and nursing note reviewed.   Constitutional:       Appearance: Normal appearance. He is obese.   Eyes:      General: No scleral icterus.  Cardiovascular:      Rate and Rhythm: Normal rate and regular rhythm.      Pulses: Normal pulses.   Abdominal:      General: Abdomen is flat. There is no distension.      Tenderness: There is no abdominal tenderness. There is no guarding or rebound.   Musculoskeletal:          General: Swelling present.      Comments: Nonhealing wound with oozing and erythema noted in the posterior aspect of left lower extremity warm to touch and tender upon palpation   Skin:     General: Skin is warm.      Findings: Erythema present.   Neurological:      General: No focal deficit present.      Mental Status: He is alert.         Fluids    Intake/Output Summary (Last 24 hours) at 10/21/2020 1500  Last data filed at 10/20/2020 2000  Gross per 24 hour   Intake 300 ml   Output --   Net 300 ml       Laboratory  Recent Labs     10/19/20  2359 10/21/20  0712   WBC 8.8 7.4   RBC 4.60* 4.60*   HEMOGLOBIN 13.7* 13.6*   HEMATOCRIT 42.2 42.6   MCV 91.7 92.6   MCH 29.8 29.6   MCHC 32.5* 31.9*   RDW 46.5 46.1   PLATELETCT 142* 143*   MPV 10.1 9.6     Recent Labs     10/19/20  2359 10/21/20  0712   SODIUM 140 135   POTASSIUM 4.2 4.2   CHLORIDE 105 99   CO2 24 28   GLUCOSE 82 94   BUN 17 19   CREATININE 0.98 1.09   CALCIUM 9.5 9.3                   Imaging  CT-EXTREMITY, LOWER WITH LEFT   Final Result      Left lower extremity cellulitis, with a superficial wound on the posterior calf. No drainable abscess. No evidence of osteomyelitis. No soft tissue gas to suggest necrotizing fasciitis.      DX-CHEST-LIMITED (1 VIEW)   Final Result         1.  Right basilar atelectasis or early infiltrate.           Assessment/Plan  * Cellulitis  Assessment & Plan  Nonhealing wound on left lower extremity for last 4 months status post biopsy 6 days ago  Cont unasyn and vanco  Plastic surgery consulted  and plan for I&D tomorrow  Appreciate rec,     History of DVT (deep vein thrombosis)  Assessment & Plan  On Xarelto 20 mg nightly, took Xarelto last night.   Holding Xarelto for now as patient will have an I&D tomorrow        VTE prophylaxis: holding xarelto for now due to surgery tomorrow

## 2020-10-21 NOTE — CARE PLAN
Problem: Safety  Goal: Will remain free from injury  Outcome: PROGRESSING AS EXPECTED     Problem: Safety  Goal: Will remain free from falls  Outcome: PROGRESSING AS EXPECTED     Problem: Discharge Barriers/Planning  Goal: Patient's continuum of care needs will be met  Outcome: PROGRESSING AS EXPECTED     Problem: Pain Management  Goal: Pain level will decrease to patient's comfort goal  Outcome: PROGRESSING AS EXPECTED

## 2020-10-21 NOTE — CARE PLAN
Problem: Safety  Goal: Will remain free from injury  Outcome: PROGRESSING AS EXPECTED  Goal: Will remain free from falls  Outcome: PROGRESSING AS EXPECTED   Educated on fall risk, precautions. Use of call light.   Problem: Discharge Barriers/Planning  Goal: Patient's continuum of care needs will be met  Outcome: PROGRESSING AS EXPECTED  Pending discharged, still not medically clear per MD.      Problem: Pain Management  Goal: Pain level will decrease to patient's comfort goal  Outcome: PROGRESSING AS EXPECTED   Medicated prn with morphine per MAR. Spoke to MD about PO pain medications in addition as well.

## 2020-10-21 NOTE — CONSULTS
DATE OF SERVICE:  10/20/2020    REASON FOR CONSULTATION:  Left posterior calf wound.      HISTORY OF PRESENT ILLNESS:  Patient is a 55-year-old gentleman with a history   of a DVT and PE, on Xarelto, who has had a chronic left lower posterior calf   wound for the past month.  The patient states it initially started off size of   a dime and this progressively gotten larger.  He had developed a DVT and a PE   about a year ago and has been on Xarelto since that time.  Due to the   worsening of the wound and the progressive pain in his lower leg, they   prompted him to come to the emergency room.  He was admitted to the hospital   service and plastic surgery was consulted for evaluation of this wound.  He   denies having any significant care.  He was seen in the wound care center   within the last week and he had biopsies of the wound and this did not show   any evidence of a squamous cell carcinoma or Marjolin's ulcer.  He denies any   trauma to the leg other than the DVT.      PAST MEDICAL HISTORY:  Significant for renal cancer, DVT, pulmonary embolus   and tobacco use.      PAST SURGICAL HISTORY:  Partial nephrectomy and an appendectomy.      FAMILY HISTORY:  Noncontributory.      SOCIAL HISTORY:  He smokes a pack of cigarettes per day.  Does drink alcohol   or use any other drugs.  He is a .      ALLERGIES:  He has no known drug allergies.      MEDICATIONS:  On admission were albuterol, Xarelto, and Ambien.      CURRENT PHYSICAL EXAMINATION:    VITAL SIGNS:  Reveals a temperature of 36.3, pulse 60, respirations of 17,   blood pressure 136/65.  He is 92% on room air.    GENERAL:  He is alert and appropriate.    HEAD AND NECK:  Reveals to be normocephalic and atraumatic.  He does have very   poor dentition overall.    RESPIRATORY:  Unlabored.    CARDIAC:  Regular rate and rhythm.    ABDOMEN:  Obese, but soft and nontender.  His perineal and buttock exam were   unremarkable.    EXTREMITIES:  Reveals  significant bilateral lower extremity with the left   being worse than the right.  He does have a large what looks like necrotic   venous stasis ulcer on his posterior calf that measures about 15 cm in   greatest diameter.  There is some surrounding erythema.  Distal pulses are   difficult to palpate due to the edema and I could not physically palpate the   dorsalis pedis or posterior tibialis pulse.  His lower extremity movement is   limited secondary to the edema and pain.  His right lower extremity does not   show any open wounds or ulcers.  His knee range of motion is somewhat limited.    Compartments of his thigh are soft.    NEUROLOGIC:  He does have normal distal sensation and otherwise no gross   deficits.    PSYCHIATRIC:  He has normal mood and affect.    SKIN:  Positive for the wound on his left posterior calf.      LABORATORY DATA:  His white blood cell count is 8.8, platelets of 142.  His   creatinine is 0.98.      DIAGNOSTIC DATA:  CT scan of his left lower leg showed lower extremity   cellulitis with superficial wound on the posterior calf.  No drainable   abscess.  No evidence of osteomyelitis.  No soft tissue gas suggestive of   necrotizing fasciitis.      A biopsy from the 14th did not show any evidence of a Marjolin's ulcer or   squamous cell carcinoma.      ASSESSMENT:  A 55-year-old gentleman with a history of deep venous thrombosis   and pulmonary embolus, who has most likely a large venous stasis ulcer on his   posterior calf.      RECOMMENDATIONS:  I did spend a great deal of time discussing the patient's   pathophysiology.  I explained that venous stasis ulcer is likely related to   his DVT with disruption of the valves and then subsequently edema and   development of this wound.  I do feel the patient would benefit from surgical   debridement and wound VAC placement.  I did discuss the difficult nature in   treating these wounds and explained that he would likely have the wound for   many  months and may need a skin graft in the future.  The patient will also   need to commit to lymphedema program long-term using compression stockings,   possible Lympha Press and treatment from the lymphedema clinic.  The patient   is currently admitted on IV antibiotics.  He has been on Xarelto and so I want   this held and we will likely plan to do surgery on Thursday.  The risks,   benefits and alternatives of operative intervention were discussed and   include, but was not limited to bleeding, infection, damage to surrounding   structures, need for further surgery, reaction to anesthetic agents, scarring,   need for repeat debridements, persistent pain, persistent nonhealing wound,   deep venous thrombosis, pulmonary embolus, myocardial infarction, stroke,   unsatisfactory result and/or death.  Patient appeared to understand these   risks.  The patient should be made n.p.o. after midnight on Wednesday.  He did   have a number of questions regarding time off work and the anticipated length   of treatment.  All these questions were answered.  I also discussed these   recommendations with the hospitalist.       ____________________________________     MD ALEX GOMEZ / SHAHNAZ    DD:  10/20/2020 17:51:39  DT:  10/20/2020 18:08:39    D#:  [id number]  Job#:  [id number]

## 2020-10-21 NOTE — PROGRESS NOTES
Assumed care of patient this shift. Patient alert and oriented x4, very pleasant. Up walking in hallway much of the morning, states it helps with his leg pain to be up and moving rather than sedentary. Per hospitalist Rom plan for OR tomorrow for I/D of left leg, will be NPO at midnight. Patient aware of plan. Oxycodone per MAR for pain with good effect. POC discussed with patient, call bell and belongings within reach.

## 2020-10-21 NOTE — PROGRESS NOTES
Assumed care of patient at 0700. Patient is alert and oriented x 4. Up independently in room and to the bathroom. PRN pain medications given this shift; See MAR. Scheduled IV abx given. Bed locked and in lowest position and call light within reach.     Dorothy Kumar R.N.

## 2020-10-21 NOTE — PROGRESS NOTES
Pharmacy Kinetics 55 y.o. male on vancomycin day # 2 10/21/2020    Currently on Vancomycin 1750 mg IV q12h  Provider specified end date: TBD    Indication for Treatment: L calf SSTI     Pertinent history per medical record: Admitted on 10/20/2020 for LLE cellulitis. Pt has had chronic wound for at least 4 months (past wound cx only positive for mixed skin yesenia), but it has recently gotten more painful, with swelling and erythema, and started oozing. Of note, a punch biopsy was done on the wound on 10/14 and is negative for Majolin's ulcer. CT of LLE reveals cellulitis and no abscess, gas, or evidence of OM. Pt is being admitted for possible I&D and empiric IV abx. Plastic surgery consulted and plans on taking pt for I&D on 10/22. He has no prior hx of vanco dosing found in Epic.     Other antibiotics: Unasyn 3 g IV q6h     Allergies: Patient has no known allergies.      Concerns for renal function: IV contrast on 10/20     Pertinent cultures to date:   10/20 L leg wound: in process, Gram stain with rare GPC  10/19 Blood x2: NGTD    Recent Labs     10/19/20  2359 10/21/20  0712   WBC 8.8 7.4   NEUTSPOLYS 68.60  --      Recent Labs     10/19/20  2359 10/21/20  0712   BUN 17 19   CREATININE 0.98 1.09   ALBUMIN 4.1  --        Intake/Output Summary (Last 24 hours) at 10/21/2020 1514  Last data filed at 10/20/2020 2000  Gross per 24 hour   Intake 300 ml   Output --   Net 300 ml      /78   Pulse 65   Temp 36.7 °C (98 °F) (Temporal)   Resp 18   Ht 1.829 m (6')   Wt 104.3 kg (230 lb)   SpO2 96%  Temp (24hrs), Av.7 °C (98.1 °F), Min:36.3 °C (97.3 °F), Max:37 °C (98.6 °F)      A/P   1. Vancomycin dose change: Continue 1750 mg IV q12h (@ 0100 & 1300)  2. Next vancomycin level: Tomorrow at 0030, before the 4th total dose  3. Goal trough: 10-15 mcg/mL  4. Comments: Pt clinically stable overnight. Planning on going to OR tomorrow for I&D. Per Dr. Cueto may consult ID after surgery but has not done so yet. Will  continue to follow cx results. Check a trough tomorrow as pt approaches steady state    Sophie Gallo, GertrudisD, BCPS

## 2020-10-22 ENCOUNTER — APPOINTMENT (OUTPATIENT)
Dept: RADIOLOGY | Facility: MEDICAL CENTER | Age: 55
DRG: 571 | End: 2020-10-22
Attending: INTERNAL MEDICINE
Payer: COMMERCIAL

## 2020-10-22 ENCOUNTER — ANESTHESIA (OUTPATIENT)
Dept: SURGERY | Facility: MEDICAL CENTER | Age: 55
DRG: 571 | End: 2020-10-22
Payer: COMMERCIAL

## 2020-10-22 PROBLEM — Z86.711 HISTORY OF PULMONARY EMBOLUS (PE): Chronic | Status: ACTIVE | Noted: 2020-10-22

## 2020-10-22 LAB
ANION GAP SERPL CALC-SCNC: 12 MMOL/L (ref 7–16)
BACTERIA WND AEROBE CULT: NORMAL
BUN SERPL-MCNC: 20 MG/DL (ref 8–22)
CALCIUM SERPL-MCNC: 8.9 MG/DL (ref 8.5–10.5)
CHLORIDE SERPL-SCNC: 101 MMOL/L (ref 96–112)
CO2 SERPL-SCNC: 20 MMOL/L (ref 20–33)
COVID ORDER STATUS COVID19: NORMAL
CREAT SERPL-MCNC: 1.17 MG/DL (ref 0.5–1.4)
ERYTHROCYTE [DISTWIDTH] IN BLOOD BY AUTOMATED COUNT: 44.7 FL (ref 35.9–50)
GLUCOSE SERPL-MCNC: 98 MG/DL (ref 65–99)
GRAM STN SPEC: NORMAL
HCT VFR BLD AUTO: 41.4 % (ref 42–52)
HGB BLD-MCNC: 13.3 G/DL (ref 14–18)
MCH RBC QN AUTO: 29.6 PG (ref 27–33)
MCHC RBC AUTO-ENTMCNC: 32.1 G/DL (ref 33.7–35.3)
MCV RBC AUTO: 92.2 FL (ref 81.4–97.8)
PLATELET # BLD AUTO: 120 K/UL (ref 164–446)
PMV BLD AUTO: 10 FL (ref 9–12.9)
POTASSIUM SERPL-SCNC: 4.3 MMOL/L (ref 3.6–5.5)
RBC # BLD AUTO: 4.49 M/UL (ref 4.7–6.1)
SARS-COV+SARS-COV-2 AG RESP QL IA.RAPID: NOTDETECTED
SIGNIFICANT IND 70042: NORMAL
SITE SITE: NORMAL
SODIUM SERPL-SCNC: 133 MMOL/L (ref 135–145)
SOURCE SOURCE: NORMAL
SPECIMEN SOURCE: NORMAL
VANCOMYCIN TROUGH SERPL-MCNC: 18.8 UG/ML (ref 10–20)
WBC # BLD AUTO: 7.5 K/UL (ref 4.8–10.8)

## 2020-10-22 PROCEDURE — 76870 US EXAM SCROTUM: CPT

## 2020-10-22 PROCEDURE — 0JBP0ZZ EXCISION OF LEFT LOWER LEG SUBCUTANEOUS TISSUE AND FASCIA, OPEN APPROACH: ICD-10-PCS | Performed by: PLASTIC SURGERY

## 2020-10-22 PROCEDURE — A9270 NON-COVERED ITEM OR SERVICE: HCPCS | Performed by: INTERNAL MEDICINE

## 2020-10-22 PROCEDURE — 160035 HCHG PACU - 1ST 60 MINS PHASE I: Performed by: PLASTIC SURGERY

## 2020-10-22 PROCEDURE — 700111 HCHG RX REV CODE 636 W/ 250 OVERRIDE (IP): Performed by: INTERNAL MEDICINE

## 2020-10-22 PROCEDURE — 36415 COLL VENOUS BLD VENIPUNCTURE: CPT

## 2020-10-22 PROCEDURE — 160009 HCHG ANES TIME/MIN: Performed by: PLASTIC SURGERY

## 2020-10-22 PROCEDURE — 87426 SARSCOV CORONAVIRUS AG IA: CPT

## 2020-10-22 PROCEDURE — 85027 COMPLETE CBC AUTOMATED: CPT

## 2020-10-22 PROCEDURE — 160048 HCHG OR STATISTICAL LEVEL 1-5: Performed by: PLASTIC SURGERY

## 2020-10-22 PROCEDURE — A9270 NON-COVERED ITEM OR SERVICE: HCPCS | Performed by: ANESTHESIOLOGY

## 2020-10-22 PROCEDURE — 700111 HCHG RX REV CODE 636 W/ 250 OVERRIDE (IP): Performed by: ANESTHESIOLOGY

## 2020-10-22 PROCEDURE — 700105 HCHG RX REV CODE 258: Performed by: INTERNAL MEDICINE

## 2020-10-22 PROCEDURE — 0J8P0ZZ DIVISION OF LEFT LOWER LEG SUBCUTANEOUS TISSUE AND FASCIA, OPEN APPROACH: ICD-10-PCS | Performed by: PLASTIC SURGERY

## 2020-10-22 PROCEDURE — 700101 HCHG RX REV CODE 250: Performed by: PLASTIC SURGERY

## 2020-10-22 PROCEDURE — 160002 HCHG RECOVERY MINUTES (STAT): Performed by: PLASTIC SURGERY

## 2020-10-22 PROCEDURE — 700102 HCHG RX REV CODE 250 W/ 637 OVERRIDE(OP): Performed by: INTERNAL MEDICINE

## 2020-10-22 PROCEDURE — 700101 HCHG RX REV CODE 250: Performed by: ANESTHESIOLOGY

## 2020-10-22 PROCEDURE — 160036 HCHG PACU - EA ADDL 30 MINS PHASE I: Performed by: PLASTIC SURGERY

## 2020-10-22 PROCEDURE — 99407 BEHAV CHNG SMOKING > 10 MIN: CPT

## 2020-10-22 PROCEDURE — 770006 HCHG ROOM/CARE - MED/SURG/GYN SEMI*

## 2020-10-22 PROCEDURE — 99223 1ST HOSP IP/OBS HIGH 75: CPT | Mod: GC | Performed by: INTERNAL MEDICINE

## 2020-10-22 PROCEDURE — 99232 SBSQ HOSP IP/OBS MODERATE 35: CPT | Performed by: INTERNAL MEDICINE

## 2020-10-22 PROCEDURE — 160027 HCHG SURGERY MINUTES - 1ST 30 MINS LEVEL 2: Performed by: PLASTIC SURGERY

## 2020-10-22 PROCEDURE — 88304 TISSUE EXAM BY PATHOLOGIST: CPT

## 2020-10-22 PROCEDURE — 80202 ASSAY OF VANCOMYCIN: CPT

## 2020-10-22 PROCEDURE — 700102 HCHG RX REV CODE 250 W/ 637 OVERRIDE(OP): Performed by: ANESTHESIOLOGY

## 2020-10-22 PROCEDURE — 501329 HCHG SET, CYSTO IRRIG Y TUR: Performed by: PLASTIC SURGERY

## 2020-10-22 PROCEDURE — 160038 HCHG SURGERY MINUTES - EA ADDL 1 MIN LEVEL 2: Performed by: PLASTIC SURGERY

## 2020-10-22 PROCEDURE — C9803 HOPD COVID-19 SPEC COLLECT: HCPCS | Performed by: PLASTIC SURGERY

## 2020-10-22 PROCEDURE — 74176 CT ABD & PELVIS W/O CONTRAST: CPT

## 2020-10-22 PROCEDURE — 80048 BASIC METABOLIC PNL TOTAL CA: CPT

## 2020-10-22 RX ORDER — HYDROMORPHONE HYDROCHLORIDE 1 MG/ML
0.2 INJECTION, SOLUTION INTRAMUSCULAR; INTRAVENOUS; SUBCUTANEOUS
Status: DISCONTINUED | OUTPATIENT
Start: 2020-10-22 | End: 2020-10-22 | Stop reason: HOSPADM

## 2020-10-22 RX ORDER — MEPERIDINE HYDROCHLORIDE 25 MG/ML
6.25 INJECTION INTRAMUSCULAR; INTRAVENOUS; SUBCUTANEOUS
Status: DISCONTINUED | OUTPATIENT
Start: 2020-10-22 | End: 2020-10-22 | Stop reason: HOSPADM

## 2020-10-22 RX ORDER — CEFAZOLIN SODIUM 1 G/3ML
INJECTION, POWDER, FOR SOLUTION INTRAMUSCULAR; INTRAVENOUS PRN
Status: DISCONTINUED | OUTPATIENT
Start: 2020-10-22 | End: 2020-10-22 | Stop reason: SURG

## 2020-10-22 RX ORDER — HALOPERIDOL 5 MG/ML
1 INJECTION INTRAMUSCULAR
Status: DISCONTINUED | OUTPATIENT
Start: 2020-10-22 | End: 2020-10-22 | Stop reason: HOSPADM

## 2020-10-22 RX ORDER — MAGNESIUM SULFATE HEPTAHYDRATE 500 MG/ML
INJECTION, SOLUTION INTRAMUSCULAR; INTRAVENOUS PRN
Status: DISCONTINUED | OUTPATIENT
Start: 2020-10-22 | End: 2020-10-22 | Stop reason: SURG

## 2020-10-22 RX ORDER — HYDROMORPHONE HYDROCHLORIDE 1 MG/ML
0.1 INJECTION, SOLUTION INTRAMUSCULAR; INTRAVENOUS; SUBCUTANEOUS
Status: DISCONTINUED | OUTPATIENT
Start: 2020-10-22 | End: 2020-10-22 | Stop reason: HOSPADM

## 2020-10-22 RX ORDER — SUCCINYLCHOLINE/SOD CL,ISO/PF 200MG/10ML
SYRINGE (ML) INTRAVENOUS PRN
Status: DISCONTINUED | OUTPATIENT
Start: 2020-10-22 | End: 2020-10-22 | Stop reason: SURG

## 2020-10-22 RX ORDER — OXYCODONE HCL 5 MG/5 ML
5 SOLUTION, ORAL ORAL
Status: COMPLETED | OUTPATIENT
Start: 2020-10-22 | End: 2020-10-22

## 2020-10-22 RX ORDER — LABETALOL HYDROCHLORIDE 5 MG/ML
5 INJECTION, SOLUTION INTRAVENOUS
Status: DISCONTINUED | OUTPATIENT
Start: 2020-10-22 | End: 2020-10-22 | Stop reason: HOSPADM

## 2020-10-22 RX ORDER — DEXAMETHASONE SODIUM PHOSPHATE 4 MG/ML
INJECTION, SOLUTION INTRA-ARTICULAR; INTRALESIONAL; INTRAMUSCULAR; INTRAVENOUS; SOFT TISSUE PRN
Status: DISCONTINUED | OUTPATIENT
Start: 2020-10-22 | End: 2020-10-22 | Stop reason: SURG

## 2020-10-22 RX ORDER — ONDANSETRON 2 MG/ML
4 INJECTION INTRAMUSCULAR; INTRAVENOUS
Status: DISCONTINUED | OUTPATIENT
Start: 2020-10-22 | End: 2020-10-22 | Stop reason: HOSPADM

## 2020-10-22 RX ORDER — LIDOCAINE HYDROCHLORIDE 20 MG/ML
INJECTION, SOLUTION EPIDURAL; INFILTRATION; INTRACAUDAL; PERINEURAL PRN
Status: DISCONTINUED | OUTPATIENT
Start: 2020-10-22 | End: 2020-10-22 | Stop reason: SURG

## 2020-10-22 RX ORDER — BUPIVACAINE HYDROCHLORIDE AND EPINEPHRINE 5; 5 MG/ML; UG/ML
INJECTION, SOLUTION EPIDURAL; INTRACAUDAL; PERINEURAL
Status: DISCONTINUED | OUTPATIENT
Start: 2020-10-22 | End: 2020-10-22 | Stop reason: HOSPADM

## 2020-10-22 RX ORDER — HYDROMORPHONE HYDROCHLORIDE 2 MG/ML
INJECTION, SOLUTION INTRAMUSCULAR; INTRAVENOUS; SUBCUTANEOUS PRN
Status: DISCONTINUED | OUTPATIENT
Start: 2020-10-22 | End: 2020-10-22 | Stop reason: SURG

## 2020-10-22 RX ORDER — ONDANSETRON 2 MG/ML
INJECTION INTRAMUSCULAR; INTRAVENOUS PRN
Status: DISCONTINUED | OUTPATIENT
Start: 2020-10-22 | End: 2020-10-22 | Stop reason: SURG

## 2020-10-22 RX ORDER — OXYCODONE HCL 5 MG/5 ML
10 SOLUTION, ORAL ORAL
Status: COMPLETED | OUTPATIENT
Start: 2020-10-22 | End: 2020-10-22

## 2020-10-22 RX ORDER — HYDROMORPHONE HYDROCHLORIDE 1 MG/ML
0.4 INJECTION, SOLUTION INTRAMUSCULAR; INTRAVENOUS; SUBCUTANEOUS
Status: DISCONTINUED | OUTPATIENT
Start: 2020-10-22 | End: 2020-10-22 | Stop reason: HOSPADM

## 2020-10-22 RX ORDER — LIDOCAINE HYDROCHLORIDE 40 MG/ML
SOLUTION TOPICAL PRN
Status: DISCONTINUED | OUTPATIENT
Start: 2020-10-22 | End: 2020-10-23 | Stop reason: HOSPADM

## 2020-10-22 RX ORDER — SODIUM CHLORIDE, SODIUM LACTATE, POTASSIUM CHLORIDE, CALCIUM CHLORIDE 600; 310; 30; 20 MG/100ML; MG/100ML; MG/100ML; MG/100ML
INJECTION, SOLUTION INTRAVENOUS CONTINUOUS
Status: DISCONTINUED | OUTPATIENT
Start: 2020-10-22 | End: 2020-10-22 | Stop reason: HOSPADM

## 2020-10-22 RX ORDER — IPRATROPIUM BROMIDE AND ALBUTEROL SULFATE 2.5; .5 MG/3ML; MG/3ML
3 SOLUTION RESPIRATORY (INHALATION)
Status: DISCONTINUED | OUTPATIENT
Start: 2020-10-22 | End: 2020-10-22 | Stop reason: HOSPADM

## 2020-10-22 RX ADMIN — OXYCODONE HYDROCHLORIDE 10 MG: 5 SOLUTION ORAL at 19:09

## 2020-10-22 RX ADMIN — OXYCODONE 10 MG: 5 TABLET ORAL at 10:00

## 2020-10-22 RX ADMIN — OXYCODONE 10 MG: 5 TABLET ORAL at 01:45

## 2020-10-22 RX ADMIN — FENTANYL CITRATE 50 MCG: 50 INJECTION, SOLUTION INTRAMUSCULAR; INTRAVENOUS at 19:13

## 2020-10-22 RX ADMIN — FENTANYL CITRATE 100 MCG: 50 INJECTION, SOLUTION INTRAMUSCULAR; INTRAVENOUS at 17:47

## 2020-10-22 RX ADMIN — HYDROMORPHONE HYDROCHLORIDE 0.5 MG: 2 INJECTION, SOLUTION INTRAMUSCULAR; INTRAVENOUS; SUBCUTANEOUS at 18:07

## 2020-10-22 RX ADMIN — LIDOCAINE HYDROCHLORIDE 4 ML: 40 SOLUTION TOPICAL at 17:52

## 2020-10-22 RX ADMIN — SODIUM CHLORIDE 3 G: 900 INJECTION INTRAVENOUS at 00:03

## 2020-10-22 RX ADMIN — CEFAZOLIN 2 G: 330 INJECTION, POWDER, FOR SOLUTION INTRAMUSCULAR; INTRAVENOUS at 17:53

## 2020-10-22 RX ADMIN — LIDOCAINE HYDROCHLORIDE 100 MG: 20 INJECTION, SOLUTION EPIDURAL; INFILTRATION; INTRACAUDAL at 17:50

## 2020-10-22 RX ADMIN — SODIUM CHLORIDE 3 G: 900 INJECTION INTRAVENOUS at 11:50

## 2020-10-22 RX ADMIN — Medication 120 MG: at 17:50

## 2020-10-22 RX ADMIN — ONDANSETRON 4 MG: 2 INJECTION INTRAMUSCULAR; INTRAVENOUS at 18:17

## 2020-10-22 RX ADMIN — OXYCODONE 10 MG: 5 TABLET ORAL at 23:08

## 2020-10-22 RX ADMIN — VANCOMYCIN HYDROCHLORIDE 1750 MG: 500 INJECTION, POWDER, LYOPHILIZED, FOR SOLUTION INTRAVENOUS at 01:45

## 2020-10-22 RX ADMIN — OXYCODONE 10 MG: 5 TABLET ORAL at 14:17

## 2020-10-22 RX ADMIN — OXYCODONE 10 MG: 5 TABLET ORAL at 05:50

## 2020-10-22 RX ADMIN — DEXAMETHASONE SODIUM PHOSPHATE 4 MG: 4 INJECTION, SOLUTION INTRA-ARTICULAR; INTRALESIONAL; INTRAMUSCULAR; INTRAVENOUS; SOFT TISSUE at 18:00

## 2020-10-22 RX ADMIN — FENTANYL CITRATE 50 MCG: 50 INJECTION, SOLUTION INTRAMUSCULAR; INTRAVENOUS at 18:06

## 2020-10-22 RX ADMIN — SODIUM CHLORIDE 3 G: 900 INJECTION INTRAVENOUS at 05:51

## 2020-10-22 RX ADMIN — PROPOFOL 200 MG: 10 INJECTION, EMULSION INTRAVENOUS at 17:50

## 2020-10-22 RX ADMIN — FENTANYL CITRATE 100 MCG: 50 INJECTION, SOLUTION INTRAMUSCULAR; INTRAVENOUS at 18:04

## 2020-10-22 RX ADMIN — MAGNESIUM SULFATE HEPTAHYDRATE 2 G: 500 INJECTION, SOLUTION INTRAMUSCULAR; INTRAVENOUS at 18:08

## 2020-10-22 ASSESSMENT — ENCOUNTER SYMPTOMS
NAUSEA: 0
CHILLS: 0
SHORTNESS OF BREATH: 0
MYALGIAS: 1
EYE PAIN: 0
VOMITING: 0
FEVER: 0
COUGH: 0
PALPITATIONS: 0
HEARTBURN: 0
ABDOMINAL PAIN: 0
EYE DISCHARGE: 0

## 2020-10-22 ASSESSMENT — PAIN DESCRIPTION - PAIN TYPE
TYPE: ACUTE PAIN
TYPE: ACUTE PAIN;SURGICAL PAIN

## 2020-10-22 NOTE — RESPIRATORY CARE
" COPD EDUCATION by COPD CLINICAL EDUCATOR  10/22/2020 at 3:15 PM by Nohelia Whiteside, RRT     Smoking Cessation Intervention and education completed, 12 minutes spent on smoking cessation education with patient.    Provided smoking cessation packet with \"Tips to Quit\" and brochure for \"Free Smoking Cessation Classes\".     "

## 2020-10-22 NOTE — PROGRESS NOTES
A&Ox4, VSS on RA. Complaints of pain, meds given per MAR. NPO at midnight. Call light in reach, bed locked/lowest position/alarm set. WCTM.

## 2020-10-22 NOTE — CARE PLAN
Problem: Safety  Goal: Will remain free from injury  Outcome: PROGRESSING AS EXPECTED  Goal: Will remain free from falls  Outcome: PROGRESSING AS EXPECTED   Educated on fall risk, call bell within reach.     Problem: Knowledge Deficit  Goal: Knowledge of disease process/condition, treatment plan, diagnostic tests, and medications will improve  Outcome: PROGRESSING AS EXPECTED   Educated on surgical plan for today- I/D with possible wound vac application.    Problem: Pain Management  Goal: Pain level will decrease to patient's comfort goal  Outcome: PROGRESSING AS EXPECTED   Medicated with oxycodone prn per MAR for left leg pain

## 2020-10-22 NOTE — PROGRESS NOTES
Hospital Medicine Daily Progress Note    Date of Service  10/22/2020    Chief Complaint  55 y.o. male admitted 10/20/2020 with left calf wound and cellulitis     Hospital Course    This is a 54 y/o M with PMHX of DVT and PE who was admitted on 10/20/20 after presenting to ER complaining of worsening left calf wound. Per patient, is progressively getting worse and more swollen prompting patient to come in. Pt started on abx and case was discussed with ortho who recommended tp consult plastic surgery.  Plastic surgery has been consulted and planing for I&D.       Interval Problem Update  Pt seen and examined, no acute events overnight, afebrile, still with some lower extremity pain.  Plan for I&D with plastic surgery today appreciate rec.   ID also consulted in regards to abx course appreciate rec.     Consultants/Specialty  Plastic surgery     Code Status  Full Code    Disposition  tbd     Review of Systems  Review of Systems   Constitutional: Negative for chills and fever.   HENT: Negative for hearing loss and tinnitus.    Eyes: Negative for pain and discharge.   Respiratory: Negative for cough and shortness of breath.    Cardiovascular: Negative for chest pain and palpitations.   Gastrointestinal: Negative for abdominal pain, heartburn, nausea and vomiting.   Musculoskeletal: Positive for joint pain and myalgias.   All other systems reviewed and are negative.       Physical Exam  Temp:  [35.9 °C (96.7 °F)-36.5 °C (97.7 °F)] 36.2 °C (97.1 °F)  Pulse:  [61-76] 63  Resp:  [16-18] 17  BP: (116-137)/(57-89) 135/89  SpO2:  [92 %-99 %] 96 %    Physical Exam  Vitals signs and nursing note reviewed.   Constitutional:       Appearance: Normal appearance. He is obese.   Eyes:      General: No scleral icterus.  Cardiovascular:      Rate and Rhythm: Normal rate and regular rhythm.      Pulses: Normal pulses.   Abdominal:      General: Abdomen is flat. There is no distension.      Tenderness: There is no abdominal tenderness.  There is no guarding or rebound.   Musculoskeletal:         General: Swelling present.      Comments: Nonhealing wound with oozing and erythema noted in the posterior aspect of left lower extremity warm to touch and tender upon palpation   Skin:     General: Skin is warm.      Findings: Erythema present.   Neurological:      General: No focal deficit present.      Mental Status: He is alert.         Fluids    Intake/Output Summary (Last 24 hours) at 10/22/2020 1512  Last data filed at 10/21/2020 1752  Gross per 24 hour   Intake 400 ml   Output --   Net 400 ml       Laboratory  Recent Labs     10/19/20  2359 10/21/20  0712 10/22/20  0039   WBC 8.8 7.4 7.5   RBC 4.60* 4.60* 4.49*   HEMOGLOBIN 13.7* 13.6* 13.3*   HEMATOCRIT 42.2 42.6 41.4*   MCV 91.7 92.6 92.2   MCH 29.8 29.6 29.6   MCHC 32.5* 31.9* 32.1*   RDW 46.5 46.1 44.7   PLATELETCT 142* 143* 120*   MPV 10.1 9.6 10.0     Recent Labs     10/19/20  2359 10/21/20  0712 10/22/20  0039   SODIUM 140 135 133*   POTASSIUM 4.2 4.2 4.3   CHLORIDE 105 99 101   CO2 24 28 20   GLUCOSE 82 94 98   BUN 17 19 20   CREATININE 0.98 1.09 1.17   CALCIUM 9.5 9.3 8.9                   Imaging  CT-EXTREMITY, LOWER WITH LEFT   Final Result      Left lower extremity cellulitis, with a superficial wound on the posterior calf. No drainable abscess. No evidence of osteomyelitis. No soft tissue gas to suggest necrotizing fasciitis.      DX-CHEST-LIMITED (1 VIEW)   Final Result         1.  Right basilar atelectasis or early infiltrate.           Assessment/Plan  * Cellulitis  Assessment & Plan  Nonhealing wound on left lower extremity for last 4 months status post biopsy 6 days ago  Cont unasyn and vanco  Plastic surgery consulted  and plan for I&D today  ID also consulted appreciate rec.   Appreciate rec,     History of DVT (deep vein thrombosis)  Assessment & Plan  On Xarelto 20 mg nightly, took Xarelto last night.   Holding Xarelto for now as patient will have an I&D tomorrow        VTE  prophylaxis: holding xarelto for now due to surgery tomorrow

## 2020-10-22 NOTE — ANESTHESIA PREPROCEDURE EVALUATION
Relevant Problems   NEURO   (+) History of DVT (deep vein thrombosis)   (+) History of pulmonary embolus (PE)      Other   (+) Cellulitis   (+) Smoker       Physical Exam    Airway   Mallampati: II  TM distance: >3 FB  Neck ROM: full       Cardiovascular - normal exam  Rhythm: regular  Rate: normal  (-) murmur     Dental - normal exam  Comments: Multiple missing teeth, remaining in poor condition.  No loose per pt    Very poor dentition  Facial Hair   Pulmonary - normal exam  Breath sounds clear to auscultation     Abdominal    Neurological - normal exam               Anesthesia Plan    ASA 2       Plan - general       Airway plan will be ETT        Induction: intravenous    Postoperative Plan: Postoperative administration of opioids is intended.    Pertinent diagnostic labs and testing reviewed    Informed Consent:    Anesthetic plan and risks discussed with patient.

## 2020-10-22 NOTE — PROGRESS NOTES
Assumed care of patient this shift, patient alert and oriented x4. VSS. Medicated with oxycodone per MAR for left leg pain. Plan for OR today for I/D and possible wound vac placement. Report called and given to OR staff. NPO since midnight. CHG bath given. Up ambulating in hallway multiple times this morning. Dressing order present but patient refuses to have any covering over his leg as he states it is too much pain. Refuses to wear sock on left foot, educated on infection prevention, fall precautions.  POC discussed with patient, call bell and belongings within reach.

## 2020-10-22 NOTE — CONSULTS
[facility] INPATIENT CONSULT NOTE     Date of Service: 10/22/2020    Consult Requested By: Denisa Cueto M.D.    Reason for Consultation: Left lower extremity cellulitis    Chief Complaint: Left lower extremity pain    History of Present Illness:     Panchito Moura is a 55 y.o. male with a past medical history of unprovoked DVT (?right lower extremity) / PE on lifelong Xarelto, renal cell carcinoma s/p right nephrectomy 2003, tobacco use who presented with worsening left lower extremity posterior calf wound with superimposed cellulitis.  Infectious disease was consulted for further management of aforementioned cellulitis.    Patient reports that he first noticed left lower extremity posterior calf wound back in July 2020.  He describes that he first noticed a dime sized area of dry skin back then which was itchy, prompting him to scratch the area.  He otherwise cannot recall an exact inciting event, denies insect or animal bite, trauma.  He subsequently noticed progressive enlargement of this area, with bleeding and episodic serous drainage.  He was applying Neosporin to the area up until 1 month ago.  He also visited urgent care multiple times with recent visits to wound clinic as well and was prescribed multiple rounds of antibiotics without improvement: doxycycline in July 2020, amoxicillin and doxycycline in September 2020, clindamycin in October 2020.  Recent skin punch biopsy was performed October 2020 of the region, which was unrevealing, negative for Marjolin's ulcer.  He denies any plastic or metal in his body, also denies fever, chills, chest pain, shortness of breath, abdominal pain, nausea, vomiting, dysuria, hematuria, diarrhea.  Prior to aforementioned events, he denied any lower extremity claudication symptoms, as well as any past known peripheral vascular disease or diabetes.    Review of Systems:  All other systems reviewed and are negative expect as noted in HPI    Past Medical  History:   Diagnosis Date   • Cancer (HCC)     kidney   • Cancer (HCC)     renal   • DVT (deep venous thrombosis) (HCC)    • Pulmonary embolism (HCC)    • Smoker        Past Surgical History:   Procedure Laterality Date   • NERVE REPAIR  6/18/08    Performed by SKYE RAY at [facility] ORS   • LACERATION REPAIR  6/18/08    Performed by SKYE ARY at [facility] ORS   • NEPHRECTOMY PARTIAL      right   • NEPHRECTOMY PARTIAL Right    • OTHER      tonsillectomy       History reviewed. No pertinent family history.    Social History     Socioeconomic History   • Marital status: Single     Spouse name: Not on file   • Number of children: Not on file   • Years of education: Not on file   • Highest education level: Not on file   Occupational History   • Not on file   Social Needs   • Financial resource strain: Not on file   • Food insecurity     Worry: Never true     Inability: Never true   • Transportation needs     Medical: No     Non-medical: No   Tobacco Use   • Smoking status: Current Every Day Smoker     Packs/day: 1.50     Types: Cigarettes   • Smokeless tobacco: Never Used   Substance and Sexual Activity   • Alcohol use: No     Frequency: Never     Drinks per session: 1 or 2     Binge frequency: Never   • Drug use: No   • Sexual activity: Not on file   Lifestyle   • Physical activity     Days per week: Not on file     Minutes per session: Not on file   • Stress: Not on file   Relationships   • Social connections     Talks on phone: Not on file     Gets together: Not on file     Attends Christianity service: Not on file     Active member of club or organization: Not on file     Attends meetings of clubs or organizations: Not on file     Relationship status: Not on file   • Intimate partner violence     Fear of current or ex partner: Not on file     Emotionally abused: Not on file     Physically abused: Not on file     Forced sexual activity: Not on file   Other Topics Concern   • Not on  file   Social History Narrative    ** Merged History Encounter **            No Known Allergies    Medications:    Current Facility-Administered Medications:   •  zolpidem (AMBIEN) tablet 5 mg, 5 mg, Oral, HS PRN, Jam Bains M.D., 5 mg at 10/21/20 2143  •  oxyCODONE immediate-release (ROXICODONE) tablet 5-10 mg, 5-10 mg, Oral, Q4HRS PRN, Denisa Cueto M.D., 10 mg at 10/22/20 1417  •  ampicillin/sulbactam (UNASYN) 3 g in  mL IVPB, 3 g, Intravenous, Q6HRS, Denisa Cueto M.D., Stopped at 10/22/20 1220    Physical Exam:   Vital Signs: /89   Pulse 63   Temp 36.2 °C (97.1 °F) (Temporal)   Resp 17   Ht 1.829 m (6')   Wt 104.3 kg (230 lb)   SpO2 96%   BMI 31.19 kg/m²   Temp  Av.4 °C (97.5 °F)  Min: 35.9 °C (96.7 °F)  Max: 37 °C (98.6 °F)  Vital signs reviewed    Constitutional: Appears well-developed and well-nourished. No distress.  Head: Atraumatic, normocephalic.  Eyes: Conjunctivae normal, EOM intact. Pupils are equal, round, and reactive to light.   Mouth/Throat: Lips without lesions, poor dentition.  Neck: Neck supple. No limitations in active range of motion.  Cardiovascular: Normal rate, regular rhythm, S1, S2.  No murmur, gallop, or friction rub.  Pulmonary/Chest: No respiratory distress. Unlabored respiratory effort, lungs clear to auscultation. No wheezes or rales.   Abdominal: Soft, non tender.  Musculoskeletal: Freely moving all extremities, intact strength.  Neurological: Alert and oriented to person, place, and time. No focal neurological deficit noted.  Skin: Skin is warm and dry. Noted dark, dry chronic venous stasis changes in bilateral lower extremities.  ~5 cm ulcerated region in posterior left calf, with eschar covering, surrounding erythema, as well as edema extending up from foot to knee region.  Tenderness upon palpation.  No drainage apparent or expressed.  Psychiatric: Normal mood and affect. Behavior is normal.     LABS:  Recent Labs     10/19/20  0025 10/21/20  8596  10/22/20  0039   WBC 8.8 7.4 7.5      Recent Labs     10/19/20  2359 10/21/20  0712 10/22/20  0039   HEMOGLOBIN 13.7* 13.6* 13.3*   HEMATOCRIT 42.2 42.6 41.4*   MCV 91.7 92.6 92.2   MCH 29.8 29.6 29.6   PLATELETCT 142* 143* 120*       Recent Labs     10/19/20  2359 10/21/20  0712 10/22/20  0039   SODIUM 140 135 133*   POTASSIUM 4.2 4.2 4.3   CHLORIDE 105 99 101   CO2 24 28 20   CREATININE 0.98 1.09 1.17        Recent Labs     10/19/20  2359   ALBUMIN 4.1        MICRO:  Blood Culture   Date Value Ref Range Status   12/13/2010   Final    Blood culture testing and Gram stain, if indicated, are  performed at [facility], [address] [place] [address].  Positive blood cultures are  sent to [facility], [address] [place] [address], for organism identification and  susceptibility testing.  No growth after 5 days of incubation.   12/13/2010   Final    Blood culture testing and Gram stain, if indicated, are  performed at [facility], [address] [place] [address].  Positive blood cultures are  sent to [facility], [address] [place] [address], for organism identification and  susceptibility testing.  No growth after 5 days of incubation.        Latest pertinent labs were reviewed    IMAGING STUDIES:  CT-EXTREMITY, LOWER WITH LEFT   Final Result      Left lower extremity cellulitis, with a superficial wound on the posterior calf. No drainable abscess. No evidence of osteomyelitis. No soft tissue gas to suggest necrotizing fasciitis.      DX-CHEST-LIMITED (1 VIEW)   Final Result         1.  Right basilar atelectasis or early infiltrate.        10/11/2020 US venous Doppler left lower extremity:  FINDINGS:   Left lower extremity -.    No evidence of   deep venous thrombosis.     Assessment and Plan:   Panchito Moura is a 55 y.o. male with a past medical history of unprovoked DVT (?right lower extremity) / PE on lifelong  Xarelto, renal cell carcinoma s/p right nephrectomy 2003, tobacco use who presented with worsening left lower extremity posterior calf wound with superimposed cellulitis.  No clear traumatic event leading up to initial wound - possibly secondary to chronic venous insufficiency based on clinical findings, which subsequent associated poor healing and development of infection.  Prior October 2020 skin punch biopsy unrevealing, negative for Marjolin's ulcer.  Pending wound debridement by plastic surgery 10/22/2020.    Patient was treated with prior multiple rounds of oral antibiotics (doxycycline in July 2020, amoxicillin and doxycycline in September 2020, clindamycin in October 2020), but unclear whether actually had infection at that time.  New erythema, edema, and tenderness on this admission do suggest infection at this time.  Will treat with Unasyn, with anticipated total 7-day course given no evidence of deeper tissue, abscess, or bone involvement.  Will discontinue vancomycin at this time given lower suspicion for MRSA infection.    -Continue Unasyn, stop vancomycin.  May transition to Augmentin to finish total 7 days of antibiotics if discharged from hospital in interim.  -Follow up on anticipated 10/22/2020 wound culture results and adjust antibiotics if appropriate.  -Follow up on 10/19/2020 blood cultures - negative so far.  -Continue wound care.  -Infectious disease will sign off at this time.    Antibiotics course:  Clindamycin 10/20/2020  Vancomycin 10/20/2020-10/22/2020  Unasyn 10/20/2020-present

## 2020-10-22 NOTE — PROGRESS NOTES
Plastic Surgery    No changes.  Xarelto held.  Will plan for excisional debridement left posterior calf wound with wound VAC placement.  Risks, benefits, and alternatives of the operation discussed and all questions answered.  Patient is NPO.      Temo Mayer MD

## 2020-10-23 PROBLEM — R60.1 GENERALIZED EDEMA: Status: ACTIVE | Noted: 2020-10-23

## 2020-10-23 PROBLEM — N50.89 TESTICULAR MASS: Status: ACTIVE | Noted: 2020-10-23

## 2020-10-23 LAB
ANION GAP SERPL CALC-SCNC: 8 MMOL/L (ref 7–16)
B-HCG SERPL-ACNC: <1 MIU/ML (ref 0–5)
BUN SERPL-MCNC: 18 MG/DL (ref 8–22)
CALCIUM SERPL-MCNC: 8.6 MG/DL (ref 8.5–10.5)
CHLORIDE SERPL-SCNC: 101 MMOL/L (ref 96–112)
CO2 SERPL-SCNC: 26 MMOL/L (ref 20–33)
CREAT SERPL-MCNC: 1.12 MG/DL (ref 0.5–1.4)
ERYTHROCYTE [DISTWIDTH] IN BLOOD BY AUTOMATED COUNT: 42.3 FL (ref 35.9–50)
GLUCOSE SERPL-MCNC: 143 MG/DL (ref 65–99)
HCT VFR BLD AUTO: 40.6 % (ref 42–52)
HGB BLD-MCNC: 13.1 G/DL (ref 14–18)
LDH SERPL L TO P-CCNC: 156 U/L (ref 107–266)
MCH RBC QN AUTO: 29.1 PG (ref 27–33)
MCHC RBC AUTO-ENTMCNC: 32.3 G/DL (ref 33.7–35.3)
MCV RBC AUTO: 90.2 FL (ref 81.4–97.8)
PATHOLOGY CONSULT NOTE: NORMAL
PLATELET # BLD AUTO: 126 K/UL (ref 164–446)
PMV BLD AUTO: 10 FL (ref 9–12.9)
POTASSIUM SERPL-SCNC: 4.8 MMOL/L (ref 3.6–5.5)
RBC # BLD AUTO: 4.5 M/UL (ref 4.7–6.1)
SODIUM SERPL-SCNC: 135 MMOL/L (ref 135–145)
WBC # BLD AUTO: 5 K/UL (ref 4.8–10.8)

## 2020-10-23 PROCEDURE — 700105 HCHG RX REV CODE 258: Performed by: INTERNAL MEDICINE

## 2020-10-23 PROCEDURE — 84702 CHORIONIC GONADOTROPIN TEST: CPT

## 2020-10-23 PROCEDURE — 700102 HCHG RX REV CODE 250 W/ 637 OVERRIDE(OP): Performed by: INTERNAL MEDICINE

## 2020-10-23 PROCEDURE — 82105 ALPHA-FETOPROTEIN SERUM: CPT

## 2020-10-23 PROCEDURE — A9270 NON-COVERED ITEM OR SERVICE: HCPCS | Performed by: INTERNAL MEDICINE

## 2020-10-23 PROCEDURE — 770006 HCHG ROOM/CARE - MED/SURG/GYN SEMI*

## 2020-10-23 PROCEDURE — 700111 HCHG RX REV CODE 636 W/ 250 OVERRIDE (IP): Performed by: INTERNAL MEDICINE

## 2020-10-23 PROCEDURE — 80048 BASIC METABOLIC PNL TOTAL CA: CPT

## 2020-10-23 PROCEDURE — 83615 LACTATE (LD) (LDH) ENZYME: CPT

## 2020-10-23 PROCEDURE — 99233 SBSQ HOSP IP/OBS HIGH 50: CPT | Performed by: STUDENT IN AN ORGANIZED HEALTH CARE EDUCATION/TRAINING PROGRAM

## 2020-10-23 PROCEDURE — 36415 COLL VENOUS BLD VENIPUNCTURE: CPT

## 2020-10-23 PROCEDURE — 85027 COMPLETE CBC AUTOMATED: CPT

## 2020-10-23 RX ADMIN — SODIUM CHLORIDE 3 G: 900 INJECTION INTRAVENOUS at 05:15

## 2020-10-23 RX ADMIN — OXYCODONE 10 MG: 5 TABLET ORAL at 17:42

## 2020-10-23 RX ADMIN — OXYCODONE 10 MG: 5 TABLET ORAL at 11:02

## 2020-10-23 RX ADMIN — SODIUM CHLORIDE 3 G: 900 INJECTION INTRAVENOUS at 17:42

## 2020-10-23 RX ADMIN — OXYCODONE 10 MG: 5 TABLET ORAL at 21:37

## 2020-10-23 RX ADMIN — SODIUM CHLORIDE 3 G: 900 INJECTION INTRAVENOUS at 11:40

## 2020-10-23 RX ADMIN — SODIUM CHLORIDE 3 G: 900 INJECTION INTRAVENOUS at 23:43

## 2020-10-23 RX ADMIN — OXYCODONE 10 MG: 5 TABLET ORAL at 05:14

## 2020-10-23 RX ADMIN — SODIUM CHLORIDE 3 G: 900 INJECTION INTRAVENOUS at 00:19

## 2020-10-23 ASSESSMENT — COGNITIVE AND FUNCTIONAL STATUS - GENERAL
SUGGESTED CMS G CODE MODIFIER MOBILITY: CH
DAILY ACTIVITIY SCORE: 24
MOBILITY SCORE: 24
SUGGESTED CMS G CODE MODIFIER DAILY ACTIVITY: CH

## 2020-10-23 ASSESSMENT — ENCOUNTER SYMPTOMS
HEARTBURN: 0
COUGH: 0
PALPITATIONS: 0
CHILLS: 0
NAUSEA: 0
EYE PAIN: 0
FLANK PAIN: 0
EYE DISCHARGE: 0
SHORTNESS OF BREATH: 0
FEVER: 0
MYALGIAS: 1
ABDOMINAL PAIN: 0
VOMITING: 0

## 2020-10-23 ASSESSMENT — PAIN DESCRIPTION - PAIN TYPE
TYPE: ACUTE PAIN

## 2020-10-23 NOTE — ANESTHESIA QCDR
2019 [facility] Clinical Data Registry (for Quality Improvement)     Postoperative nausea/vomiting risk protocol (Adult = 18 yrs and Pediatric 3-17 yrs)- (430 and 463)  General inhalation anesthetic (NOT TIVA) with PONV risk factors: Yes  Provision of anti-emetic therapy with at least 2 different classes of agents: Yes   Patient DID NOT receive anti-emetic therapy and reason is documented in Medical Record:  N/A    Multimodal Pain Management- (477)  Non-emergent surgery AND patient age >= 18: Yes  Use of Multimodal Pain Management, two or more drugs and/or interventions, NOT including systemic opioids: Yes  Exception: Documented allergy to multiple classes of analgesics: N/A    Smoking Abstinence (404)  Patient is current smoker (cigarette, pipe, e-cig, marijuanna): Yes  Elective Surgery: No  Abstinence instructions provided prior to day of surgery:   Patient abstained from smoking on day of surgery:     Pre-Op Beta-Blocker in Isolated CABG (44)  Isolated CABG AND patient age >= 18: No  Beta-blocker admin within 24 hours of surgical incision:   Exception:of medical reason(s) for not administering beta blocker within 24 hours prior to surgical incision (e.g., not  indicated,other medical reason):     PACU assessment of acute postoperative pain prior to Anesthesia Care End- Applies to Patients Age = 18- (ABG7)  Initial PACU pain score is which of the following: < 7/10  Patient unable to report pain score: N/A    Post-anesthetic transfer of care checklist/protocol to PACU/ICU- (426 and 427)  Upon conclusion of case, patient transferred to which of the following locations: PACU/Non-ICU  Use of transfer checklist/protocol: Yes  Exclusion: Service Performed in Patient Hospital Room (and thus did not require transfer): N/A  Unplanned admission to ICU related to anesthesia service up through end of PACU care- (MD51)  Unplanned admission to ICU (not initially anticipated at anesthesia start time): No

## 2020-10-23 NOTE — PROGRESS NOTES
[facility] Medicine Daily Progress Note    Date of Service  10/23/2020    Chief Complaint  55 y.o. male admitted 10/20/2020 with left calf wound and cellulitis     Hospital Course    This is a 56 y/o M with PMHX of DVT and PE who was admitted on 10/20/20 after presenting to ER complaining of worsening left calf wound. Per patient, is progressively getting worse and more swollen prompting patient to come in. Pt started on abx and case was discussed with ortho who recommended to consult plastic surgery.  Plastic surgery has been consulted and completed I&D 10/22.       Interval Problem Update  Pt seen and examined, no acute events overnight, still with left leg pain and swelling but improved post-op.   Left calf wound vac in place, resume Xarelto when cleared by Sx   Wound Cx pending, path showing ischemic tissue w/ granulation, blood Cx NTD  Continue Unasyn, ID following, appreciate recommendations   Right testicular mass seen on Imaging, Urology consulted, appreciate recommendations   Still with relatively significant B/L LE edema, requiring O2 at night, question LIBAN or Rt heart issues- will get echocardiogram to evaluate this       Consultants/Specialty  Plastic surgery   ID   Urology     Code Status  Full Code    Disposition  tbd     Review of Systems  Review of Systems   Constitutional: Negative for chills and fever.   HENT: Negative for hearing loss and tinnitus.    Eyes: Negative for pain and discharge.   Respiratory: Negative for cough and shortness of breath.    Cardiovascular: Negative for chest pain and palpitations.   Gastrointestinal: Negative for abdominal pain, heartburn, nausea and vomiting.   Genitourinary: Negative for dysuria and urgency.        Denies scrotal pain   Musculoskeletal: Positive for joint pain and myalgias.        Left leg pain   All other systems reviewed and are negative.       Physical Exam  Temp:  [36.2 °C (97.1 °F)-36.6 °C (97.8 °F)] 36.6 °C (97.8 °F)  Pulse:  [56-72] 61  Resp:   [12-22] 16  BP: (107-154)/(58-89) 107/58  SpO2:  [93 %-99 %] 93 %    Physical Exam  Vitals signs and nursing note reviewed.   Constitutional:       General: He is not in acute distress.     Appearance: Normal appearance. He is obese. He is not ill-appearing.   HENT:      Head: Normocephalic and atraumatic.      Mouth/Throat:      Mouth: Mucous membranes are moist.      Comments: Poor dentition  Eyes:      General: No scleral icterus.     Extraocular Movements: Extraocular movements intact.      Pupils: Pupils are equal, round, and reactive to light.   Neck:      Musculoskeletal: Normal range of motion.   Cardiovascular:      Rate and Rhythm: Normal rate and regular rhythm.      Pulses: Normal pulses.      Heart sounds: No murmur.   Pulmonary:      Effort: Pulmonary effort is normal.      Breath sounds: No wheezing.      Comments: Diminished breath sounds bilaterally   Abdominal:      General: Abdomen is flat. There is no distension.      Tenderness: There is no abdominal tenderness. There is no guarding or rebound.   Genitourinary:     Comments: ~2.5 cm mass palpated in the right testes, no overlying skin changes or discoloration  Musculoskeletal:         General: Swelling and signs of injury present.      Right lower leg: Edema present.      Left lower leg: Edema present.      Comments: Nonhealing wound with oozing and erythema noted in the posterior aspect of left lower extremity warm to touch and tender upon palpation   Skin:     General: Skin is warm.      Findings: Erythema present.   Neurological:      General: No focal deficit present.      Mental Status: He is alert and oriented to person, place, and time.   Psychiatric:         Mood and Affect: Mood normal.         Behavior: Behavior normal.         Fluids    Intake/Output Summary (Last 24 hours) at 10/23/2020 1423  Last data filed at 10/23/2020 1329  Gross per 24 hour   Intake 1500 ml   Output --   Net 1500 ml       Laboratory  Recent Labs      10/21/20  0712 10/22/20  0039 10/23/20  0437   WBC 7.4 7.5 5.0   RBC 4.60* 4.49* 4.50*   HEMOGLOBIN 13.6* 13.3* 13.1*   HEMATOCRIT 42.6 41.4* 40.6*   MCV 92.6 92.2 90.2   MCH 29.6 29.6 29.1   MCHC 31.9* 32.1* 32.3*   RDW 46.1 44.7 42.3   PLATELETCT 143* 120* 126*   MPV 9.6 10.0 10.0     Recent Labs     10/21/20  0712 10/22/20  0039 10/23/20  0437   SODIUM 135 133* 135   POTASSIUM 4.2 4.3 4.8   CHLORIDE 99 101 101   CO2 28 20 26   GLUCOSE 94 98 143*   BUN 19 20 18   CREATININE 1.09 1.17 1.12   CALCIUM 9.3 8.9 8.6                   Imaging  CT-ABDOMEN-PELVIS W/O   Final Result         1.  No acute abnormality.   2.  Left apical lithiasis without visualized obstructive changes.   3.  Structure which appears to originate from the anterior margin of the spleen, stable since prior study and previously suspected of being exophytic hemangioma.   4.  Hepatomegaly with enlargement of the caudate lobe, similar to prior study   5.  Fat-containing bilateral inguinal hernias.   6.  Atherosclerosis      [id number]   Final Result         1.  Heterogeneous hypoechoic area within the right testis, appearance cannot exclude intratesticular mass. Recommend urologic evaluation for further management.   2.  Linear echogenic structure along the lateral aspect of the left testis, similar to prior study, appearance suggests fat, poorly dense calcification, or possible scarring.   3.  Trace bilateral hydroceles.   4.  Small bilateral varicoceles.      These findings were discussed with the patient's clinician, Dr. Rivas, on 10/23/2020 7:32 AM.      CT-EXTREMITY, LOWER WITH LEFT   Final Result      Left lower extremity cellulitis, with a superficial wound on the posterior calf. No drainable abscess. No evidence of osteomyelitis. No soft tissue gas to suggest necrotizing fasciitis.      DX-CHEST-LIMITED (1 VIEW)   Final Result         1.  Right basilar atelectasis or early infiltrate.      EC-ECHOCARDIOGRAM COMPLETE W/O CONT     (Results Pending)        Assessment/Plan  * Cellulitis  Assessment & Plan  Cellulitis of the left posterior calf with Nonhealing wound on left lower extremity for last 4 months status post biopsy 6 days ago  Biopsy shows ischemic/granulaiton tissues, no malignancy identified   Continue with Unasyn, ID consulted, appreciate recommendation   S/P I&D by plastic surgery 10/22, appreciate recommendations and help with management   Plastic surgery consulted  and plan for I&D today  Wound care     Testicular mass  Assessment & Plan  Ultrasound showing right  Heterogeneous hypoechoic area within the right testis, appearance cannot exclude intratesticular mass  - was seen in July 2020 for Left sided testicular pain, US showed no mass/lesions at that time   - urology consulted, tumor markers collected, appreciate recommendations     Generalized edema- (present on admission)  Assessment & Plan  Admitted for left leg wound, found to have bilateral lower extremity edema, R>L  CT abdomen/pelvis to rule out obstruction, no obstruction seen   Is requiring O2 at night, question possible LIBAN or Rt heart issues   Will order echocardiogram to evaluate   Compression stockings, leg elevation when at rest   Cont. To follow     History of DVT (deep vein thrombosis)  Assessment & Plan  States he had DVT and PE last year, On Xarelto 20 mg nightly, was held for surgery, resume when cleared by surgery   - pt denies inciting event for DVT, states he was told he needed to be on life long anticoagulation        VTE prophylaxis: Xarelto

## 2020-10-23 NOTE — OP REPORT
DATE OF SERVICE:  10/22/2020    PREOPERATIVE DIAGNOSIS:  Left posterior calf wound.    POSTOPERATIVE DIAGNOSIS:  Left posterior calf wound.    PROCEDURES:  1.  Excisional debridement of left posterior calf wound including skin,   subcutaneous tissue, muscle and fascia, total area debrided is 150 cm2.  2.  Release of the superficial posterior compartment with fasciotomies.  3.  Placement of a vacuum-assisted closure device, 150 cm2.    ATTENDING SURGEON:  Temo Mayer MD    ANESTHESIOLOGIST:  Henna Stern MD    ASSISTANT:  None.    ESTIMATED BLOOD LOSS:  Minimal.    COMPLICATIONS:  No apparent.    INDICATIONS FOR PROCEDURE:  The patient is a 55-year-old gentleman with   longstanding tobacco use and a history of deep venous thrombosis and pulmonary   embolus in his left lower leg.  He has had a progressively worsening wound on   his left posterior calf.  It looks like venous stasis ulcer.  The patient is   having significant pain and has necrotic tissue in the wound.  He has had   recommendations to have debridement.  The patient now presents for this.    DESCRIPTION OF PROCEDURE:  After the operative and nonoperative options were   discussed including the risks, benefits and alternatives, which included but   was not limited to bleeding, infection, damage to surrounding structures, need   for further surgery, reaction to anesthetic agent, scarring, need for repeat   debridements, persistent wound healing difficulties, need for future skin   grafting, need for amputation, worsening lymphedema, worsening venous stasis   ulcers, deep venous thrombosis, pulmonary embolus, myocardial infarction,   stroke, unsatisfactory result and/or death, informed consent was obtained.    Preoperatively, the patient was identified.  The left posterior calf wound was   marked.  Antibiotics given, sequential compression devices were placed.  The   patient brought to the operating room where general anesthesia was induced.     The patient was rotated into a prone position.  His left leg was then prepped   and draped in usual sterile fashion.  A cautery on a high setting was used to   excise down around the wound.  This debridement went through the skin and   subcutaneous tissue down to underlying muscle.  This was aggressively debrided   with Bovie electrocautery and Metzenbaum scissors.  There were a number of   vessels were then tied off with 0 Vicryl sutures.  Once this tissue was then   debrided, it was sent to pathology.  There was a full-thickness skin loss.    There is no underlying purulence.  At this point, I then opened up the   superficial posterior compartment where over both the medial and lateral   gastrocnemius muscles by releasing the fascia and doing fasciotomies.  The muscle   was viable, but was quite edematous.  I then dissected releasing the fascia, both proximally and distally.  Following   this then, the cavity was then copiously irrigated with 3 liters of   irrigation.  Hemostasis was obtained.  A vacuum-assisted closure device was   then placed on -125 mmHg.  The patient was rotated back into supine position.    He was then awakened, extubated, and transferred to the PACU in stable   condition.  At the end of procedure, all sponge, instrument and needle counts   were correct.       ____________________________________     MD ALEX GOMEZ / SHAHNAZ    DD:  10/22/2020 18:30:01  DT:  10/22/2020 20:15:32    D#:  [id number]  Job#:  [id number]

## 2020-10-23 NOTE — ANESTHESIA POSTPROCEDURE EVALUATION
Patient: Panchito Moura    Procedure Summary     Date: 10/22/20 Room / Location: [place] [address] / SURGERY [facility]    Anesthesia Start: 1737 Anesthesia Stop: 1840    Procedure: IRRIGATION AND DEBRIDEMENT, WOUND- CALF AND WOUND VAC PLACEMENT (Left Leg Lower) Diagnosis: (left posterior calf wound)    Surgeon: Temo Mayer M.D. Responsible Provider: Henna Stern M.D.    Anesthesia Type: general ASA Status: 2          Final Anesthesia Type: general  Last vitals  BP   Blood Pressure: 107/58    Temp   36.6 °C (97.8 °F)    Pulse   Pulse: 61   Resp   16    SpO2   93 %      Anesthesia Post Evaluation    Patient location during evaluation: PACU  Patient participation: complete - patient participated  Level of consciousness: awake and alert    Airway patency: patent  Anesthetic complications: no  Cardiovascular status: hemodynamically stable  Respiratory status: acceptable  Hydration status: euvolemic    PONV: none           Nurse Pain Score: 0 (NPRS)

## 2020-10-23 NOTE — OR SURGEON
Immediate Post OP Note    PreOp Diagnosis: left posterior calf wound    PostOp Diagnosis: same    Procedure(s):  IRRIGATION AND DEBRIDEMENT, WOUND- CALF AND WOUND VAC PLACEMENT    Surgeon(s):  Temo Mayer M.D.    Anesthesiologist/Type of Anesthesia:  Anesthesiologist: Henna Stern M.D./* No anesthesia type entered *    Surgical Staff:  * No surgical staff found *    Specimens removed if any:  * No specimens in log *    Estimated Blood Loss: minimal    Findings: see operative note    Complications: no apparent    #[id number]        10/22/2020 5:19 PM Temo Mayer M.D.

## 2020-10-23 NOTE — CONSULTS
[facility] [place] Consult/H&P Note    Primary Service:   Attending: Dorothy Rivas M.D.  Patient's Name/MRN: Panchito Moura, [id number]    Admit Date:10/20/2020  Today's Date: 10/23/2020   Length of stay:  LOS: 3 days   Room #: S624/02      Reason for consult/chief complaint: testicular mass  ID/HPI: Panchito Moura is a 55 y.o. male patient presented early this week for lower calf wound s/p I&D by Dr. Rincon.     Urology consulted for possible testicular mass. The patient is unsure why the ultrasound was performed. Was unaware of any mass. Notes a year ago he had right testicular pain for which an ultrasound was performed and reported to be normal. He has no history of testicular trauma, surgery or infections. Currently, denies any testicular pain. NO urinary problems.     Prior history of nephrectomy for renal cell carcinoma, states about 20 years ago and is unsure who managed this.        Past Medical History:   Past Medical History:   Diagnosis Date   • Cancer (HCC)     kidney   • Cancer (HCC)     renal   • DVT (deep venous thrombosis) (HCC)    • Pulmonary embolism (HCC)    • Smoker         Past Surgical History:   Past Surgical History:   Procedure Laterality Date   • IRRIGATION & DEBRIDEMENT GENERAL Left 10/22/2020    Procedure: IRRIGATION AND DEBRIDEMENT, WOUND- CALF AND WOUND VAC PLACEMENT;  Surgeon: Temo Mayer M.D.;  Location: SURGERY [facility];  Service: Plastics   • NERVE REPAIR  6/18/08    Performed by [facility] at [facility] ORS   • LACERATION REPAIR  6/18/08    Performed by [facility] at [facility] ORS   • NEPHRECTOMY PARTIAL      right   • NEPHRECTOMY PARTIAL Right    • OTHER      tonsillectomy        Family History:   History reviewed. No pertinent family history.      Social History:   Social History     Tobacco Use   • Smoking status: Current Every Day Smoker     Packs/day: 1.50     Types: Cigarettes   • Smokeless tobacco: Never Used   Substance Use Topics    • Alcohol use: No     Frequency: Never     Drinks per session: 1 or 2     Binge frequency: Never   • Drug use: No      Social History     Social History Narrative    ** Merged History Encounter **             Allergies: he Patient has no known allergies.    Medications:   Medications Prior to Admission   Medication Sig Dispense Refill Last Dose   • ALBUTEROL INH Inhale 1 Puff by mouth every four hours as needed.      • XARELTO 20 MG Tab tablet TK 1 T PO QPM      • zolpidem (AMBIEN) 10 MG Tab TK 1 T PO HS PRF SLP  GM 47.00            Review of Systems  Review of Systems   Constitutional: Negative for chills and fever.   Respiratory: Negative for cough and shortness of breath.    Cardiovascular: Negative for chest pain.   Gastrointestinal: Negative for nausea and vomiting.   Genitourinary: Negative for dysuria, flank pain, frequency, hematuria and urgency.        No Testicular pain        Physical Exam  VITAL SIGNS: /58   Pulse 61   Temp 36.6 °C (97.8 °F) (Temporal)   Resp 16   Ht 1.829 m (6')   Wt 104.3 kg (230 lb)   SpO2 93%   BMI 31.19 kg/m²   Physical Exam   Constitutional: He is oriented to person, place, and time and well-developed, well-nourished, and in no distress.   HENT:   Head: Normocephalic and atraumatic.   Eyes: EOM are normal.   Neck: Normal range of motion.   Pulmonary/Chest: Effort normal.   Abdominal: Soft.   Genitourinary:    Genitourinary Comments: Scrotum without evidence of infection, skin breakdown, edema. No fluctuance.  Left varicocele. Left testicle with small hydrocele, no masses appreciated.   Right varicocele, Right superior testicle with nonmobile mass, ~2.5cm, mild tenderness.      Musculoskeletal: Normal range of motion.      Comments: Left lower extremity with wound vac in place   Neurological: He is alert and oriented to person, place, and time.   Skin: Skin is warm and dry.   Psychiatric: Mood, memory, affect and judgment normal.   Nursing note and vitals reviewed.         Labs:  Recent Labs     10/21/20  0712 10/22/20  0039 10/23/20  0437   WBC 7.4 7.5 5.0   RBC 4.60* 4.49* 4.50*   HEMOGLOBIN 13.6* 13.3* 13.1*   HEMATOCRIT 42.6 41.4* 40.6*   MCV 92.6 92.2 90.2   MCH 29.6 29.6 29.1   MCHC 31.9* 32.1* 32.3*   RDW 46.1 44.7 42.3   PLATELETCT 143* 120* 126*   MPV 9.6 10.0 10.0     Recent Labs     10/21/20  0712 10/22/20  0039 10/23/20  0437   SODIUM 135 133* 135   POTASSIUM 4.2 4.3 4.8   CHLORIDE 99 101 101   CO2 28 20 26   GLUCOSE 94 98 143*   BUN 19 20 18   CREATININE 1.09 1.17 1.12   CALCIUM 9.3 8.9 8.6         Glucose:  Recent Labs     10/21/20  0712 10/22/20  0039 10/23/20  0437   GLUCOSE 94 98 143*     Coags:  No results for input(s): INR in the last 72 hours.      Urinalysis:   No results for input(s): COLORURINE, CLARITY, SPECGRAVITY, PHURINE, GLUCOSEUR, KETONES, NITRITE, OCCULTBLOOD, RBCURINE, BACTERIA, EPITHELCELL in the last 72 hours.    Invalid input(s): BILRUBINUR, LEUESTERASE    Imaging:                  CT-ABDOMEN-PELVIS W/O   Final Result         1.  No acute abnormality.   2.  Left apical lithiasis without visualized obstructive changes.   3.  Structure which appears to originate from the anterior margin of the spleen, stable since prior study and previously suspected of being exophytic hemangioma.   4.  Hepatomegaly with enlargement of the caudate lobe, similar to prior study   5.  Fat-containing bilateral inguinal hernias.   6.  Atherosclerosis      [id number]   Final Result         1.  Heterogeneous hypoechoic area within the right testis, appearance cannot exclude intratesticular mass. Recommend urologic evaluation for further management.   2.  Linear echogenic structure along the lateral aspect of the left testis, similar to prior study, appearance suggests fat, poorly dense calcification, or possible scarring.   3.  Trace bilateral hydroceles.   4.  Small bilateral varicoceles.      These findings were discussed with the patient's clinician,   Rob, on 10/23/2020 7:32 AM.      CT-EXTREMITY, LOWER WITH LEFT   Final Result      Left lower extremity cellulitis, with a superficial wound on the posterior calf. No drainable abscess. No evidence of osteomyelitis. No soft tissue gas to suggest necrotizing fasciitis.      DX-CHEST-LIMITED (1 VIEW)   Final Result         1.  Right basilar atelectasis or early infiltrate.      EC-ECHOCARDIOGRAM COMPLETE W/O CONT    (Results Pending)                            Assessment/Recommendation   55 y.o.male with right testicular mass    · Tumor markers ordered  · Currently with no testicular pain  · Dr. Salas to review     This case was discussed with Dr. Salas and he is in agreement with aforementioned plan.        Ankit Agrawal, PJordyA.-BLANCHE.   [id number]  [place] [id number]   [phone]

## 2020-10-23 NOTE — PROGRESS NOTES
Patient back on unit. Assessment completed, POC discussed. Pt is currently sitting up in bed, A&Ox4, on 4L O2, VSS. Bed is in lowest, locked position, call bell and belongings are in reach. No further needs at this time.

## 2020-10-23 NOTE — ANESTHESIA TIME REPORT
Anesthesia Start and Stop Event Times     Date Time Event    10/22/2020 1726 Ready for Procedure     1737 Anesthesia Start     1840 Anesthesia Stop        Responsible Staff  10/22/20    Name Role Begin End    Henna Stern M.D. Anesth 1737 1840        Preop Diagnosis (Free Text):  Pre-op Diagnosis     left posterior calf wound        Preop Diagnosis (Codes):    Post op Diagnosis  Cellulitis      Premium Reason  A. 3PM - 7AM    Comments:

## 2020-10-23 NOTE — CARE PLAN
Problem: Pain Management  Goal: Pain level will decrease to patient's comfort goal  10/22/2020 2319 by Katina Bullard R.N.  Outcome: PROGRESSING AS EXPECTED  Note: Patient assessed for pain regularly and medicated PRN per MAR.    10/22/2020 2318 by Katina Bullard R.N.  Outcome: PROGRESSING AS EXPECTED     Problem: Infection  Goal: Will remain free from infection  10/22/2020 2319 by Katina Bullard R.N.  Outcome: PROGRESSING AS EXPECTED  10/22/2020 2318 by Katina Bullard R.N.  Outcome: PROGRESSING AS EXPECTED

## 2020-10-23 NOTE — CARE PLAN
Problem: Infection  Goal: Will remain free from infection  Outcome: PROGRESSING AS EXPECTED  Intervention: Implement standard precautions and perform hand washing before and after patient contact  Note: Interventions: Pt receiving IV ABX for treatment. Site of wound vac and I&D monitored for redness, swelling, warmth.   Outcomes: Will continue to monitor throughout shift for signs of infection.      Problem: Bowel/Gastric:  Goal: Normal bowel function is maintained or improved  Outcome: PROGRESSING SLOWER THAN EXPECTED  Intervention: Educate patient and significant other/support system about diet, fluid intake, medications and activity to promote bowel function  Note: Intervention: Pt  educated on bowel motility, pt encouraged to drink fluids and increase activity to promote bowel function. Pt offered and educated on stool softeners and pharmacological options.   Outcome: pt verbalized wanting to give it another day to try and have a BM before using medications to assist with movement. Will continue to monitor.

## 2020-10-23 NOTE — PROGRESS NOTES
Pt is A&Ox4. Pt is resting in bed, no signs of labored breathing or pain. Pt on RA. Call light & personal belongings within reach, bed in lowest position & locked. Fall precautions in place and education provided on how to use call light. Pt updated on plan of care for the shift. Pt declines any additional needs at this time.

## 2020-10-23 NOTE — OR NURSING
Report to Katina YOUNG  I and D of left posterior calf. Wound vac. Intact.  Pt rec'd oxycodone 10 mg PO at 1909, fentanyl 50 mcg iv 1913. Pain tolerable.   VSS.   3-4 L NC o2 .sat 94-97%.

## 2020-10-23 NOTE — ASSESSMENT & PLAN NOTE
Ultrasound showing right  Heterogeneous hypoechoic area within the right testis, appearance cannot exclude intratesticular mass  - was seen in July 2020 for Left sided testicular pain, US showed no mass/lesions at that time   - urology consulted, tumor markers collected, follow up with Urology as an outpatient

## 2020-10-24 ENCOUNTER — APPOINTMENT (OUTPATIENT)
Dept: CARDIOLOGY | Facility: MEDICAL CENTER | Age: 55
DRG: 571 | End: 2020-10-24
Attending: STUDENT IN AN ORGANIZED HEALTH CARE EDUCATION/TRAINING PROGRAM
Payer: COMMERCIAL

## 2020-10-24 LAB
ANION GAP SERPL CALC-SCNC: 8 MMOL/L (ref 7–16)
BASOPHILS # BLD AUTO: 0.6 % (ref 0–1.8)
BASOPHILS # BLD: 0.04 K/UL (ref 0–0.12)
BUN SERPL-MCNC: 19 MG/DL (ref 8–22)
CALCIUM SERPL-MCNC: 8.5 MG/DL (ref 8.5–10.5)
CHLORIDE SERPL-SCNC: 102 MMOL/L (ref 96–112)
CO2 SERPL-SCNC: 28 MMOL/L (ref 20–33)
CREAT SERPL-MCNC: 1.29 MG/DL (ref 0.5–1.4)
EOSINOPHIL # BLD AUTO: 0.11 K/UL (ref 0–0.51)
EOSINOPHIL NFR BLD: 1.7 % (ref 0–6.9)
ERYTHROCYTE [DISTWIDTH] IN BLOOD BY AUTOMATED COUNT: 44.1 FL (ref 35.9–50)
GLUCOSE SERPL-MCNC: 92 MG/DL (ref 65–99)
HCT VFR BLD AUTO: 38.6 % (ref 42–52)
HGB BLD-MCNC: 12.4 G/DL (ref 14–18)
IMM GRANULOCYTES # BLD AUTO: 0.03 K/UL (ref 0–0.11)
IMM GRANULOCYTES NFR BLD AUTO: 0.5 % (ref 0–0.9)
LV EJECT FRACT MOD 2C 99903: 66.33
LV EJECT FRACT MOD 4C 99902: 71.24
LV EJECT FRACT MOD BP 99901: 67.03
LYMPHOCYTES # BLD AUTO: 1.39 K/UL (ref 1–4.8)
LYMPHOCYTES NFR BLD: 22 % (ref 22–41)
MCH RBC QN AUTO: 29.3 PG (ref 27–33)
MCHC RBC AUTO-ENTMCNC: 32.1 G/DL (ref 33.7–35.3)
MCV RBC AUTO: 91.3 FL (ref 81.4–97.8)
MONOCYTES # BLD AUTO: 0.55 K/UL (ref 0–0.85)
MONOCYTES NFR BLD AUTO: 8.7 % (ref 0–13.4)
NEUTROPHILS # BLD AUTO: 4.21 K/UL (ref 1.82–7.42)
NEUTROPHILS NFR BLD: 66.5 % (ref 44–72)
NRBC # BLD AUTO: 0 K/UL
NRBC BLD-RTO: 0 /100 WBC
PLATELET # BLD AUTO: 137 K/UL (ref 164–446)
PMV BLD AUTO: 9.7 FL (ref 9–12.9)
POTASSIUM SERPL-SCNC: 3.7 MMOL/L (ref 3.6–5.5)
RBC # BLD AUTO: 4.23 M/UL (ref 4.7–6.1)
SODIUM SERPL-SCNC: 138 MMOL/L (ref 135–145)
WBC # BLD AUTO: 6.3 K/UL (ref 4.8–10.8)

## 2020-10-24 PROCEDURE — A9270 NON-COVERED ITEM OR SERVICE: HCPCS | Performed by: INTERNAL MEDICINE

## 2020-10-24 PROCEDURE — 93306 TTE W/DOPPLER COMPLETE: CPT

## 2020-10-24 PROCEDURE — 97535 SELF CARE MNGMENT TRAINING: CPT

## 2020-10-24 PROCEDURE — 700111 HCHG RX REV CODE 636 W/ 250 OVERRIDE (IP): Performed by: INTERNAL MEDICINE

## 2020-10-24 PROCEDURE — 80048 BASIC METABOLIC PNL TOTAL CA: CPT

## 2020-10-24 PROCEDURE — 93306 TTE W/DOPPLER COMPLETE: CPT | Mod: 26 | Performed by: INTERNAL MEDICINE

## 2020-10-24 PROCEDURE — 99232 SBSQ HOSP IP/OBS MODERATE 35: CPT | Performed by: INTERNAL MEDICINE

## 2020-10-24 PROCEDURE — 770006 HCHG ROOM/CARE - MED/SURG/GYN SEMI*

## 2020-10-24 PROCEDURE — 700117 HCHG RX CONTRAST REV CODE 255: Performed by: STUDENT IN AN ORGANIZED HEALTH CARE EDUCATION/TRAINING PROGRAM

## 2020-10-24 PROCEDURE — 700102 HCHG RX REV CODE 250 W/ 637 OVERRIDE(OP): Performed by: INTERNAL MEDICINE

## 2020-10-24 PROCEDURE — 36415 COLL VENOUS BLD VENIPUNCTURE: CPT

## 2020-10-24 PROCEDURE — 97606 NEG PRS WND THER DME>50 SQCM: CPT

## 2020-10-24 PROCEDURE — 700105 HCHG RX REV CODE 258: Performed by: INTERNAL MEDICINE

## 2020-10-24 PROCEDURE — 85025 COMPLETE CBC W/AUTO DIFF WBC: CPT

## 2020-10-24 RX ORDER — LIDOCAINE HYDROCHLORIDE 40 MG/ML
SOLUTION TOPICAL
Status: DISCONTINUED | OUTPATIENT
Start: 2020-10-24 | End: 2020-10-26

## 2020-10-24 RX ORDER — MORPHINE SULFATE 4 MG/ML
4 INJECTION, SOLUTION INTRAMUSCULAR; INTRAVENOUS ONCE
Status: COMPLETED | OUTPATIENT
Start: 2020-10-24 | End: 2020-10-24

## 2020-10-24 RX ADMIN — HUMAN ALBUMIN MICROSPHERES AND PERFLUTREN 3 ML: 10; .22 INJECTION, SOLUTION INTRAVENOUS at 10:51

## 2020-10-24 RX ADMIN — OXYCODONE 10 MG: 5 TABLET ORAL at 11:46

## 2020-10-24 RX ADMIN — MORPHINE SULFATE 4 MG: 4 INJECTION INTRAVENOUS at 13:42

## 2020-10-24 RX ADMIN — OXYCODONE 10 MG: 5 TABLET ORAL at 01:37

## 2020-10-24 RX ADMIN — OXYCODONE 10 MG: 5 TABLET ORAL at 05:55

## 2020-10-24 RX ADMIN — SODIUM CHLORIDE 3 G: 900 INJECTION INTRAVENOUS at 11:46

## 2020-10-24 RX ADMIN — SODIUM CHLORIDE 3 G: 900 INJECTION INTRAVENOUS at 05:55

## 2020-10-24 RX ADMIN — OXYCODONE 10 MG: 5 TABLET ORAL at 17:41

## 2020-10-24 RX ADMIN — SODIUM CHLORIDE 3 G: 900 INJECTION INTRAVENOUS at 17:37

## 2020-10-24 RX ADMIN — OXYCODONE 10 MG: 5 TABLET ORAL at 21:43

## 2020-10-24 ASSESSMENT — PAIN DESCRIPTION - PAIN TYPE
TYPE: ACUTE PAIN

## 2020-10-24 ASSESSMENT — ENCOUNTER SYMPTOMS
COUGH: 0
MYALGIAS: 1
FEVER: 0
NAUSEA: 0
CHILLS: 0
HEARTBURN: 0
DIARRHEA: 0
VOMITING: 0
ABDOMINAL PAIN: 0
EYE DISCHARGE: 0
PALPITATIONS: 0
SHORTNESS OF BREATH: 0
EYE PAIN: 0

## 2020-10-24 NOTE — PROGRESS NOTES
Infectious Disease Progress Note    Author: Nura Singh M.D. Date & Time of service: 10/24/2020  1:53 PM    Chief Complaint:  Follow-up for cellulitis    Interval History:  10/24 patient afebrile, working 620, tolerating antibiotics, states pain at the wound VAC site, redness around the site and swelling has improved    Labs Reviewed and Medications Reviewed.    Review of Systems:  Review of Systems   Constitutional: Negative for chills and fever.   Gastrointestinal: Negative for abdominal pain, diarrhea, nausea and vomiting.   Musculoskeletal: Positive for myalgias. Negative for joint pain.   Skin: Negative for itching and rash.   All other systems reviewed and are negative.      Hemodynamics:  Temp (24hrs), Av.4 °C (97.5 °F), Min:36.1 °C (97 °F), Max:36.6 °C (97.8 °F)  Temperature: 36.4 °C (97.6 °F)  Pulse  Av.3  Min: 56  Max: 76   Blood Pressure: 141/72       Physical Exam:  Physical Exam  Vitals signs and nursing note reviewed.   Constitutional:       Appearance: Normal appearance.   HENT:      Head: Normocephalic and atraumatic.      Mouth/Throat:      Pharynx: No oropharyngeal exudate.   Eyes:      General:         Right eye: No discharge.         Left eye: No discharge.      Conjunctiva/sclera: Conjunctivae normal.   Neck:      Musculoskeletal: Neck supple.   Cardiovascular:      Rate and Rhythm: Normal rate and regular rhythm.      Heart sounds: No murmur.   Pulmonary:      Effort: Pulmonary effort is normal. No respiratory distress.      Breath sounds: No wheezing.   Abdominal:      General: There is no distension.      Tenderness: There is no abdominal tenderness.      Comments: Protuberant   Musculoskeletal:         General: Swelling and tenderness present.      Right lower leg: No edema.      Left lower leg: Edema present.      Comments: Swelling of left lower extremity with some improvement, wound VAC noted over posterior calf, some surrounding erythema but the extent of erythema has  improved significantly   Skin:     Findings: Erythema present.   Neurological:      General: No focal deficit present.      Mental Status: He is alert and oriented to person, place, and time.   Psychiatric:         Mood and Affect: Mood normal.         Behavior: Behavior normal.         Meds:    Current Facility-Administered Medications:   •  influenza vaccine quad  •  lidocaine  •  zolpidem  •  oxyCODONE immediate-release  •  ampicillin-sulbactam (UNASYN) IV    Labs:  Recent Labs     10/22/20  0039 10/23/20  0437 10/24/20  0320   WBC 7.5 5.0 6.3   RBC 4.49* 4.50* 4.23*   HEMOGLOBIN 13.3* 13.1* 12.4*   HEMATOCRIT 41.4* 40.6* 38.6*   MCV 92.2 90.2 91.3   MCH 29.6 29.1 29.3   RDW 44.7 42.3 44.1   PLATELETCT 120* 126* 137*   MPV 10.0 10.0 9.7   NEUTSPOLYS  --   --  66.50   LYMPHOCYTES  --   --  22.00   MONOCYTES  --   --  8.70   EOSINOPHILS  --   --  1.70   BASOPHILS  --   --  0.60     Recent Labs     10/22/20  0039 10/23/20  0437 10/24/20  0320   SODIUM 133* 135 138   POTASSIUM 4.3 4.8 3.7   CHLORIDE 101 101 102   CO2 20 26 28   GLUCOSE 98 143* 92   BUN 20 18 19     Recent Labs     10/22/20  0039 10/23/20  0437 10/24/20  0320   CREATININE 1.17 1.12 1.29       Imaging:  Ct-abdomen-pelvis W/o    Result Date: 10/23/2020    10/22/2020 9:51 PM HISTORY/REASON FOR EXAM: unusual presentation, DVT was on the right, now with significant tense swelling isolated to the left lower extremity all the way up to the hip r/o any proximal obstructive mass TECHNIQUE/EXAM DESCRIPTION:  CT abdomen and pelvis without IV contrast. Sequential axial CT images were obtained from lung bases to the proximal femurs without contrast. Low dose optimization technique was utilized for this CT exam including automated exposure control and adjustment of the mA and/or kV according to patient size. COMPARISON: February 15, 2017 CT ABDOMEN FINDINGS: Non contrast technique limits evaluation of the solid abdominal organs. Linear densities are seen within the  lung bases, appearance favors atelectasis. The liver is normal in contour. Hepatomegaly is observed, enlargement of the caudate lobe is seen. No intrahepatic biliary ductal dilatation is noted. The gallbladder appears within normal limits. There is structure identified which appears to project from the anterior margin of the spleen measuring 9.5 x 6.3 cm. The pancreas is grossly normal. Bilateral adrenal glands appear within normal limits. Arch right nephrectomy is seen. Left renal calculi are noted. The ureters are normal in caliber along their visualized course, the ureters are incompletely visualized. The colon appears within normal limits. The appendix appears within normal limits. The small bowel is unremarkable. The bony structures are age appropriate. Atherosclerotic calcifications are seen. CT PELVIS FINDINGS: The bladder is within normal limits. Fat-containing bilateral inguinal hernias are seen.     1.  No acute abnormality. 2.  Left apical lithiasis without visualized obstructive changes. 3.  Structure which appears to originate from the anterior margin of the spleen, stable since prior study and previously suspected of being exophytic hemangioma. 4.  Hepatomegaly with enlargement of the caudate lobe, similar to prior study 5.  Fat-containing bilateral inguinal hernias. 6.  Atherosclerosis    Ct-extremity, Lower With Left    Result Date: 10/20/2020  10/20/2020 11:53 AM HISTORY/REASON FOR EXAM:  left calf wound; evaluation for abscess or necrotising fascitis. TECHNIQUE/EXAM DESCRIPTION AND NUMBER OF VIEWS:  CT scan of the LEFT lower extremity with contrast, with reconstructions. Thin helical 3 mm sections were obtained from the distal femur through the proximal tibia/fibula. Sagittal and coronal multiplanar reconstructions were generated from the axial images. A total of 100 mL of Omnipaque 350 nonionic contrast was administered  IV without complication. Up to date radiation dose reduction adjustments have  been utilized to meet ALARA standards for radiation dose reduction. COMPARISON: None. FINDINGS: Skin thickening and subcutaneous fat stranding of the left lower leg. Superficial wound in the posterior calf. No subcutaneous or intramuscular abscess. No soft tissue gas to suggest necrotizing fasciitis. No CT evidence of osteomyelitis. Small intramuscular lipoma lateral to the fibular diaphysis. No acute fracture or dislocation. Visualized vascular structures are patent. Trace knee effusion.     Left lower extremity cellulitis, with a superficial wound on the posterior calf. No drainable abscess. No evidence of osteomyelitis. No soft tissue gas to suggest necrotizing fasciitis.    Dx-chest-limited (1 View)    Result Date: 10/20/2020    10/20/2020 2:59 AM HISTORY/REASON FOR EXAM: possible sepsis TECHNIQUE/EXAM DESCRIPTION:  Single AP view of the chest. COMPARISON: 2018 FINDINGS: The cardiac silhouette appears within normal limits. The mediastinal contour appears within normal limits.  The central pulmonary vasculature appears normal. The lungs appear well expanded bilaterally.  Hazy linear density in the right lung base is observed. No significant pleural effusions are identified. The bony structures appear age-appropriate.     1.  Right basilar atelectasis or early infiltrate.    Us-extremity Venous Lower Unilat Left    Result Date: 10/11/2020   Vascular Laboratory  CONCLUSIONS  No evidence of  venous thrombosis.  TANIA GUTIERREZ  Exam Date:     10/11/2020 09:50  Room #:     Inpatient  Priority:     Stat  Ht (in):     73      Wt (lb):     238  Ordering Physician:        TIMA FRENCH  Referring Physician:       [id number] Valdes  Sonographer:               Kirill Chang RDCS, SAGART  Study Type:                Complete Unilateral  Technical Quality:         Adequate  Age:    55    Gender:     M  MRN:    [id number]  :    1965      BSA:    2.32  Indications:     Pain  in Limb, Swelling of Limb  CPT Codes:       67720  ICD Codes:       729.5  729.81  History:         Left lower extremity swelling, Left lower extremity pain  Limitations:  PROCEDURES:  Left lower extremity venous duplex imaging.  The following venous structures were evaluated: common femoral, profunda  femoral,  femoral, popliteal , peroneal and posterior tibial veins.  Serial compression, augmentation maneuvers,  color and spectral Doppler  flow evaluations were performed.  FINDINGS:  Left lower extremity -.  No evidence of   deep venous thrombosis.  Temo Chaidez  (Electronically Signed)  Final Date:      11 October 2020                   10:53    [id number]    Result Date: 10/23/2020  10/22/2020 9:25 PM HISTORY/REASON FOR EXAM: previous  US on July  has shown an echogenic structure on the left testicle. TECHNIQUE/EXAM DESCRIPTION: Real-time sonography of the scrotum was performed with gray-scale, color and duplex Doppler imaging. COMPARISON: July 26, 2020 FINDINGS: The testes are homogeneous in echotexture and low-resistive waveforms are confirmed bilaterally. Heterogeneous hypoechoic area is seen in the right testicle.. Vascular flow is symmetric. The right testis measures 5.42 cm x 2.68 cm x 3.92 cm.  The left testis measures  5.11 cm x 2.90 cm x 3.47 cm. There is heterogeneous hypoechoic area seen within the right testis, on multiple planes and views. Linear echogenic structure within the lateral aspect of the left testis is stable. Appearance of the epididymides are within normal limits. Trace bilateral hydroceles are seen. Small bilateral varicoceles are noted. No varicocele is detected.     1.  Heterogeneous hypoechoic area within the right testis, appearance cannot exclude intratesticular mass. Recommend urologic evaluation for further management. 2.  Linear echogenic structure along the lateral aspect of the left testis, similar to prior study, appearance suggests fat, poorly dense  calcification, or possible scarring. 3.  Trace bilateral hydroceles. 4.  Small bilateral varicoceles. These findings were discussed with the patient's clinician, Dr. Rivas, on 10/23/2020 7:32 AM.    Ec-echocardiogram Complete W/o Cont    Result Date: 10/24/2020  Transthoracic Echo Report Echocardiography Laboratory CONCLUSIONS Normal left ventricular size, wall thickness, systolic, and diastolic function. Normal right ventricular size and systolic function. No significant valvular pathology. Normal pericardium without effusion. Ascending aorta is dilated with a diameter of 4.0  cm. No prior study is available for comparison. TANIA GUTIERREZ Exam Date:         10/24/2020                    08:16 Exam Location:     Inpatient Priority:          Routine Ordering Physician:        ELIZABETH RIVAS Referring Physician:       [id number] Kee Sonographer:               Jam Cobos RDCS Age:    55     Gender:    M MRN:    [id number] :    1965 BSA:    2.26   Ht (in):    72     Wt (lb):    230 Exam Type:     Complete Indications:     Edema ICD Codes:       782.3 CPT Codes:       82482 BP:   107    /   58     HR:   56 Technical Quality:       Good MEASUREMENTS  (Male / Female) Normal Values 2D ECHO LV Diastolic Diameter PLAX        4.8 cm                4.2 - 5.9 / 3.9 - 5.3 cm LV Systolic Diameter PLAX         3.1 cm                2.1 - 4.0 cm IVS Diastolic Thickness           0.85 cm               LVPW Diastolic Thickness          0.88 cm               LVOT Diameter                     2.2 cm                LV Ejection Fraction MOD BP       67 %                  >= 55  % LV Ejection Fraction MOD 4C       71.2 %                LV Ejection Fraction MOD 2C       66.3 %                DOPPLER AV Peak Velocity                  1.8 m/s               AV Peak Gradient                  13.3 mmHg             AV Mean Gradient                  7.4 mmHg              LVOT Peak Velocity                1.3 m/s               AV  Area Cont Eq vti               2.9 cm2               Mitral E Point Velocity           0.73 m/s              Mitral E to A Ratio               1.9                   Mitral A Duration                 100 ms                MV Pressure Half Time             48 ms                 MV Area PHT                       4.6 cm2               MV Deceleration Time              165 ms                PV Peak Velocity                  1.3 m/s               PV Peak Gradient                  6.6 mmHg              PV Mean Gradient                  3.5 mmHg              * Indicates values subject to auto-interpretation LV EF:        % FINDINGS Left Ventricle Normal left ventricular chamber size. Normal left ventricular wall thickness. Normal left ventricular systolic function. Left ventricular ejection fraction is visually estimated to be 65%. Normal regional wall motion. Normal diastolic function. Right Ventricle Normal right ventricular size and systolic function. Right Atrium Normal right atrial size. Normal inferior vena cava size and inspiratory collapse. Left Atrium Normal left atrial size. Left atrial volume index is 24  mL/sq m. Mitral Valve Structurally normal mitral valve without significant stenosis or regurgitation. Aortic Valve Tricuspid aortic valve. Aortic sclerosis without stenosis. No aortic insufficiency. Tricuspid Valve Structurally normal tricuspid valve. No tricuspid stenosis. Trace tricuspid regurgitation. Unable to estimate pulmonary artery pressure due to an inadequate tricuspid regurgitant jet. Pulmonic Valve Structurally normal pulmonic valve without significant stenosis or regurgitation. Pericardium Normal pericardium without effusion. Aorta Normal aortic root for body surface area. Ascending aorta is dilated with a diameter of 4.0  cm. Norbert Connell MD (Electronically Signed) Final Date:     24 October 2020                 11:08      Micro:  Results     Procedure Component Value Units Date/Time    CULTURE  "WOUND W/ GRAM STAIN [[id number]] Collected: 10/20/20 0326    Order Status: Completed Specimen: Wound from Left Leg Updated: 10/22/20 1007     Significant Indicator NEG     Source WND     Site LEFT LEG     Culture Result Moderate growth mixed skin yesenia.     Gram Stain Result Rare WBCs.  Rare Gram positive cocci.      COVID/SARS CoV-2 PCR [[id number]] Collected: 10/22/20 0842    Order Status: Completed Specimen: Respirate from Nasal Updated: 10/22/20 0850     COVID Order Status Received     Comment: The order for SARS CoV-2 testing has been received by the  Laboratory. This result is neither positive nor negative.  Final results of testing will report in 24-48 hours, separately.         Narrative:      Collected By:[id number] SHE BEAN  Preadmit COVID Request- PATIENT IS HAVING SURGERY TODAY-  PLEASE RUN TEST ASAP    BLOOD CULTURE [[id number]] Collected: 10/19/20 2359    Order Status: Completed Specimen: Blood from Peripheral Updated: 10/21/20 0712     Significant Indicator NEG     Source BLD     Site PERIPHERAL     Culture Result No Growth  Note: Blood cultures are incubated for 5 days and  are monitored continuously.Positive blood cultures  are called to the RN and reported as soon as  they are identified.      Narrative:      Per Hospital Policy: Only change Specimen Src: to \"Line\" if  specified by physician order.  No site indicated    BLOOD CULTURE [[id number]] Collected: 10/19/20 2359    Order Status: Completed Specimen: Blood from Peripheral Updated: 10/21/20 0712     Significant Indicator NEG     Source BLD     Site PERIPHERAL     Culture Result No Growth  Note: Blood cultures are incubated for 5 days and  are monitored continuously.Positive blood cultures  are called to the RN and reported as soon as  they are identified.      Narrative:      Per Hospital Policy: Only change Specimen Src: to \"Line\" if  specified by physician order.  No site indicated    GRAM STAIN [[id number]] Collected: 10/20/20 0326    " Order Status: Completed Specimen: Wound Updated: 10/20/20 1821     Significant Indicator .     Source WND     Site LEFT LEG     Gram Stain Result Rare WBCs.  Rare Gram positive cocci.      Routine (COVID/SARS COV-2 In-House PCR up to 24 hours) [[id number]] Collected: 10/20/20 0432    Order Status: Canceled Specimen: Respirate from Nasopharyngeal     URINALYSIS [[id number]] Collected: 10/20/20 0009    Order Status: Canceled Specimen: Urine     URINE CULTURE(NEW) [[id number]] Collected: 10/20/20 0009    Order Status: Canceled Specimen: Urine           Assessment:  55-year-old male with past medical history of unprovoked right lower extremity DVT, on lifelong Xarelto, renal cell carcinoma status post right nephrectomy in 2003, admitted with worsening left posterior calf wound with overlying eschar that first began in July 2020.  Patient with significant left lower extremity swelling.  Recent biopsies x2 - for malignancy.  Patient currently admitted with superimposed bacterial cellulitis on exam.  Patient underwent excisional debridement of the posterior calf wound down to fascia, release of superficial posterior compartment with fasciotomies, and placement of wound VAC on 10/22.  No cultures were obtained.     Plan:  -Extent of erythema improved, swelling improved.  Continue IV Unasyn 3 g every 6 hours while inpatient  -On discharge, recommend p.o. Augmentin 875 mg twice daily for total 10-day antibiotic course  -Continue wound VAC and wound care per surgical and wound teams  -CT abdomen and pelvis performed to rule out a proximal obstructing mass -none noted. Also noted scrotal ultrasound in July 2020 with an echogenic structure within the left testicle.  Similar possible mass noted right testicle on repeat scrotal ultrasound here but no concerns per urologic exam     Discussed with Dr. Sanchez.  ID will sign off.  Please call with questions.

## 2020-10-24 NOTE — WOUND TEAM
[facility]  Inpatient Services  Wound and Skin Care Progress Note    Admission Date: 10/20/2020     Consult Date: 10/20/20  HPI, PMH, SH: Reviewed    Unit where seen by Wound Team: [id number]     WOUND CONSULT RELATED TO:  VAC change    Subjective: states it is very painful, and pill is not helping    Self Report / Pain Level:  9/10, obtained order for IV pain med. Medicated IV by RN      OBJECTIVE:  Pressure redistribution mattress. L leg on pillow    WOUND TYPE, LOCATION, CHARACTERISTICS (Pressure Injuries: location, stage, POA or date identified)       Negative Pressure Wound Therapy 10/22/20 Leg Posterior (Active)   Dressing Type Gray Foam (Cleanse Choice);Medium 10/24/20 1430   Number of Foam Pieces Used 2 10/24/20 1430   Canister Changed No 10/24/20 1430   VAC VeraFlo Irrigant Normal Saline 10/24/20 1430   VAC VeraFlo Soak Time (mins) 10 10/24/20 1430   VAC VeraFlo Instill Volume (ml) 30 10/24/20 1430   VAC VeraFlo - Therapy Time (hrs) 2 10/24/20 1430   VAC VeraFlo Pressure (mm/Hg) 125 mmHg;Continuous 10/24/20 1430       Wound 10/07/20 Full Thickness Wound Leg Posterior Left (Active)   Wound Image   10/24/20   Site Assessment Red;Black;Dry    Periwound Assessment Intact    Margins Defined edges    Closure None    Drainage Amount Small    Drainage Description Serosanguineous    Treatments Cleansed    Wound Cleansing Normal Saline Irrigation    Periwound Protectant Skin Protectant Wipes to Periwound;Paste Ring    Dressing Options Wound Vac    Dressing Changed Changed    Dressing Change/Treatment Frequency Tuesday, Thursday, Saturday, and As Needed    NEXT Dressing Change/Treatment Date 10/27/20    NEXT Weekly Photo (Inpatient Only) 10/31/20    Non-staged Wound Description Full thickness    Wound Length (cm) 9.5 cm Measured 10/24/20   Wound Width (cm) 11 cm    Wound Depth (cm) 0.5 cm    Wound Surface Area (cm^2) 104.5 cm^2    Wound Volume (cm^3) 52.25 cm^3    Wound Bed Eschar (%) 50 %    Wound Odor  None    Exposed Structures Muscle         Vascular:  FINDINGS:   Left lower extremity -.    No evidence of   deep venous thrombosis.       Temo Chaidez   (Electronically Signed)   Final Date:      11 October 2020                     10:53    Lab Values:    Lab Results   Component Value Date/Time    WBC 6.3 10/24/2020 03:20 AM    RBC 4.23 (L) 10/24/2020 03:20 AM    HEMOGLOBIN 12.4 (L) 10/24/2020 03:20 AM    HEMATOCRIT 38.6 (L) 10/24/2020 03:20 AM        Results from last 7 days   Lab Units 10/19/20  2359   C REACTIVE PROTEIN 4596 mg/dL 5.52*       Results from last 7 days   Lab Units 10/19/20  2359   SED RATE WESTERGREN 1526 mm/hour 20       No results found for: HBA1C        Culture:   Specimen Information: Left Leg; Wound        Component 4d ago   Significant Indicator NEG    Source WND    Site LEFT LEG    Culture Result Moderate growth mixed skin yesenia.    Gram Stain Result Rare WBCs.   Rare Gram positive cocci.    Resulting Agency M         Specimen Collected: 10/20/20 03:26 Last Resulted: 10/22/20 10:07 Lab Flowsheet Order Details View Encounter Lab and Collection                INTERVENTIONS BY WOUND TEAM: Obtained order for IV pain med for dressing change. Injected 4% lidocaine into black foam and soaked for 10 min. Removed dressing. irrigated wound with NS. Measured and photographed. Applied No sting skin prep to periwound skin, then flat paste ring. Applied Veraflo grey waffle foam to wound bed, then solid thin grey foam. Sealed with drape, then placed TRAC pad. Started Veraflo at 30 ml ns soak, for 10 min, every 2 hours.    Interdisciplinary consultation: RN, patient, hospitalist    EVALUATION: debrided wound, but has black areas remaining. Very painful for patient, so required IV pain med in addition to oral. The Veraflo was placed in order to assist in removing black nonviable tissue, and to promote granulation.    Factors affecting wound healing: smoker, chronic wound, hx DVT    Goals: Steady  decrease in wound area and depth weekly.    NURSING PLAN OF CARE ORDERS (X):  Dressing changes: See Dressing Care orders: X  Skin care: See Skin Care orders: NA  Rectal tube care: See Rectal Tube Care orders:        Other orders:                           WOUND TEAM PLAN OF CARE (X):   Dressing changes by wound team:          Follow up 1-2 times weekly:               Follow up 3 times weekly:                NPWT change 3 times weekly:     Follow up as needed:   X please consult Wound Team after surgery if surgeon desires Wound Team to manage wound care    Other (explain):     Anticipated discharge plans (X):   LTACH:        SNF/Rehab:                  Home Care:           Outpatient Wound Center:            Self Care:            Other:      To Be determined

## 2020-10-24 NOTE — THERAPY
Physical Therapy Contact Note    PT consult received, having wound vac change upon attempt; will return when able;    Delmi Guzman, PT,  [phone]

## 2020-10-24 NOTE — PROGRESS NOTES
No change in scrotal exam. No tenderness.   Plan  Will sign off for now. Will schedule follow up with Dr Salas in 6 weeks.

## 2020-10-24 NOTE — CARE PLAN
Problem: Infection  Goal: Will remain free from infection  Outcome: PROGRESSING AS EXPECTED     Problem: Pain Management  Goal: Pain level will decrease to patient's comfort goal  Outcome: PROGRESSING AS EXPECTED  Note: Patient assessed for pain regularly and medicated PRN per MAR.

## 2020-10-24 NOTE — PROGRESS NOTES
[facility] Medicine Daily Progress Note    Date of Service  10/24/2020    Chief Complaint  55 y.o. male admitted 10/20/2020 with left calf wound and cellulitis     Hospital Course    This is a 56 y/o M with PMHX of DVT and PE who was admitted on 10/20/20 after presenting to ER complaining of worsening left calf wound. Per patient, is progressively getting worse and more swollen prompting patient to come in. Pt started on abx and case was discussed with ortho who recommended to consult plastic surgery.  Plastic surgery has been consulted and completed I&D 10/22.       Interval Problem Update  Pt seen and examined, no acute events overnight, still with left leg pain and swelling but improved post-op.   Left calf wound vac in place, resume Xarelto when cleared by Sx   Wound Cx pending, path showing ischemic tissue w/ granulation, blood Cx NTD  Continue Unasyn, ID following, appreciate recommendations   Right testicular mass seen on Imaging, Urology consulted, appreciate recommendations   Still with relatively significant B/L LE edema, requiring O2 at night, question LIBAN or Rt heart issues- will get echocardiogram to evaluate this     10/24 Reports pain at left calf wound site with walking. Oxycodone PRN. Ordered wound care. Urology will follow up with pt regarding testicular mass in clinic.     Consultants/Specialty  Plastic surgery   ID   Urology     Code Status  Full Code    Disposition  tbd     Review of Systems  Review of Systems   Constitutional: Negative for chills and fever.   HENT: Negative for hearing loss and tinnitus.    Eyes: Negative for pain and discharge.   Respiratory: Negative for cough and shortness of breath.    Cardiovascular: Negative for chest pain and palpitations.   Gastrointestinal: Negative for abdominal pain, heartburn, nausea and vomiting.   Genitourinary: Negative for dysuria and urgency.        Denies scrotal pain   Musculoskeletal: Positive for joint pain and myalgias.        Left leg pain    All other systems reviewed and are negative.       Physical Exam  Temp:  [36.1 °C (97 °F)-36.6 °C (97.8 °F)] 36.4 °C (97.6 °F)  Pulse:  [60-68] 66  Resp:  [17-18] 18  BP: (105-141)/(60-72) 141/72  SpO2:  [95 %-98 %] 98 %    Physical Exam  Vitals signs and nursing note reviewed.   Constitutional:       General: He is not in acute distress.     Appearance: Normal appearance. He is obese. He is not ill-appearing.   HENT:      Head: Normocephalic and atraumatic.      Mouth/Throat:      Mouth: Mucous membranes are moist.      Comments: Poor dentition  Eyes:      General: No scleral icterus.     Extraocular Movements: Extraocular movements intact.      Pupils: Pupils are equal, round, and reactive to light.   Neck:      Musculoskeletal: Normal range of motion.   Cardiovascular:      Rate and Rhythm: Normal rate and regular rhythm.      Pulses: Normal pulses.      Heart sounds: No murmur.   Pulmonary:      Effort: Pulmonary effort is normal.      Breath sounds: No wheezing.      Comments: Diminished breath sounds bilaterally   Abdominal:      General: Abdomen is flat. There is no distension.      Tenderness: There is no abdominal tenderness. There is no guarding or rebound.   Genitourinary:     Comments: ~2.5 cm mass palpated in the right testes, no overlying skin changes or discoloration  Musculoskeletal:         General: Swelling and signs of injury present.      Comments: Left calf wound with wound vac in place   Skin:     General: Skin is warm.      Findings: Erythema present.   Neurological:      General: No focal deficit present.      Mental Status: He is alert and oriented to person, place, and time.   Psychiatric:         Mood and Affect: Mood normal.         Behavior: Behavior normal.         Fluids    Intake/Output Summary (Last 24 hours) at 10/24/2020 1045  Last data filed at 10/24/2020 0900  Gross per 24 hour   Intake 1160 ml   Output --   Net 1160 ml       Laboratory  Recent Labs     10/22/20  0039  10/23/20  0437 10/24/20  0320   WBC 7.5 5.0 6.3   RBC 4.49* 4.50* 4.23*   HEMOGLOBIN 13.3* 13.1* 12.4*   HEMATOCRIT 41.4* 40.6* 38.6*   MCV 92.2 90.2 91.3   MCH 29.6 29.1 29.3   MCHC 32.1* 32.3* 32.1*   RDW 44.7 42.3 44.1   PLATELETCT 120* 126* 137*   MPV 10.0 10.0 9.7     Recent Labs     10/22/20  0039 10/23/20  0437 10/24/20  0320   SODIUM 133* 135 138   POTASSIUM 4.3 4.8 3.7   CHLORIDE 101 101 102   CO2 20 26 28   GLUCOSE 98 143* 92   BUN 20 18 19   CREATININE 1.17 1.12 1.29   CALCIUM 8.9 8.6 8.5                   Imaging  EC-ECHOCARDIOGRAM COMPLETE W/O CONT         CT-ABDOMEN-PELVIS W/O   Final Result         1.  No acute abnormality.   2.  Left apical lithiasis without visualized obstructive changes.   3.  Structure which appears to originate from the anterior margin of the spleen, stable since prior study and previously suspected of being exophytic hemangioma.   4.  Hepatomegaly with enlargement of the caudate lobe, similar to prior study   5.  Fat-containing bilateral inguinal hernias.   6.  Atherosclerosis      [id number]   Final Result         1.  Heterogeneous hypoechoic area within the right testis, appearance cannot exclude intratesticular mass. Recommend urologic evaluation for further management.   2.  Linear echogenic structure along the lateral aspect of the left testis, similar to prior study, appearance suggests fat, poorly dense calcification, or possible scarring.   3.  Trace bilateral hydroceles.   4.  Small bilateral varicoceles.      These findings were discussed with the patient's clinician, Dr. Rivas, on 10/23/2020 7:32 AM.      CT-EXTREMITY, LOWER WITH LEFT   Final Result      Left lower extremity cellulitis, with a superficial wound on the posterior calf. No drainable abscess. No evidence of osteomyelitis. No soft tissue gas to suggest necrotizing fasciitis.      DX-CHEST-LIMITED (1 VIEW)   Final Result         1.  Right basilar atelectasis or early infiltrate.            Assessment/Plan  * Cellulitis- (present on admission)  Assessment & Plan  Cellulitis of the left posterior calf with Nonhealing wound on left lower extremity for last 4 months status post biopsy 6 days ago  Biopsy shows ischemic/granulaiton tissues, no malignancy identified   Continue with Unasyn, ID consulted, appreciate recommendation   S/P I&D by plastic surgery 10/22, appreciate recommendations and help with management   Wound care     Testicular mass  Assessment & Plan  Ultrasound showing right  Heterogeneous hypoechoic area within the right testis, appearance cannot exclude intratesticular mass  - was seen in July 2020 for Left sided testicular pain, US showed no mass/lesions at that time   - urology consulted, tumor markers collected, follow up with Urology in clinic    Generalized edema- (present on admission)  Assessment & Plan  Admitted for left leg wound, found to have bilateral lower extremity edema, R>L  CT abdomen/pelvis to rule out obstruction, no obstruction seen   Is requiring O2 at night, question possible LIBAN or Rt heart issues   2D echo sows normal left ventricular function and normal right ventricular pressures.  Compression stockings, leg elevation when at rest       History of DVT (deep vein thrombosis)  Assessment & Plan  States he had DVT and PE last year, On Xarelto 20 mg nightly, was held for surgery, resume when cleared by surgery   - pt denies inciting event for DVT, states he was told he needed to be on life long anticoagulation        VTE prophylaxis: Xarelto

## 2020-10-24 NOTE — PROGRESS NOTES
Received report from day shift RN and assumed care of patient. Assessment completed, POC discussed. Pt is currently sitting up in bed, A&Ox4, on RA, VSS. Denies pain or discomfort. Bed is in lowest, locked position, call bell and belongings are in reach. No further needs at this time.

## 2020-10-24 NOTE — CARE PLAN
Problem: Infection  Goal: Will remain free from infection  Outcome: PROGRESSING AS EXPECTED  Intervention: Assess signs and symptoms of infection  Note: Interventions: Pt receiving IV ABX, site assessed for signs and symptoms of infection  Outcome: will continue to monitor throughout this shift.      Problem: Bowel/Gastric:  Goal: Normal bowel function is maintained or improved  Outcome: PROGRESSING AS EXPECTED  Note: Interventions: Pt encouraged fluid intake and ambulation to increase bowel motility  Outcome: will continue to monitor for bowel movement during this shift.

## 2020-10-25 LAB
BACTERIA BLD CULT: NORMAL
BACTERIA BLD CULT: NORMAL
SIGNIFICANT IND 70042: NORMAL
SIGNIFICANT IND 70042: NORMAL
SITE SITE: NORMAL
SITE SITE: NORMAL
SOURCE SOURCE: NORMAL
SOURCE SOURCE: NORMAL

## 2020-10-25 PROCEDURE — 700111 HCHG RX REV CODE 636 W/ 250 OVERRIDE (IP): Performed by: INTERNAL MEDICINE

## 2020-10-25 PROCEDURE — 90686 IIV4 VACC NO PRSV 0.5 ML IM: CPT | Performed by: STUDENT IN AN ORGANIZED HEALTH CARE EDUCATION/TRAINING PROGRAM

## 2020-10-25 PROCEDURE — A9270 NON-COVERED ITEM OR SERVICE: HCPCS | Performed by: INTERNAL MEDICINE

## 2020-10-25 PROCEDURE — 770006 HCHG ROOM/CARE - MED/SURG/GYN SEMI*

## 2020-10-25 PROCEDURE — 90471 IMMUNIZATION ADMIN: CPT

## 2020-10-25 PROCEDURE — 3E02340 INTRODUCTION OF INFLUENZA VACCINE INTO MUSCLE, PERCUTANEOUS APPROACH: ICD-10-PCS | Performed by: STUDENT IN AN ORGANIZED HEALTH CARE EDUCATION/TRAINING PROGRAM

## 2020-10-25 PROCEDURE — 700102 HCHG RX REV CODE 250 W/ 637 OVERRIDE(OP): Performed by: INTERNAL MEDICINE

## 2020-10-25 PROCEDURE — 700105 HCHG RX REV CODE 258: Performed by: INTERNAL MEDICINE

## 2020-10-25 PROCEDURE — 97161 PT EVAL LOW COMPLEX 20 MIN: CPT

## 2020-10-25 PROCEDURE — 99232 SBSQ HOSP IP/OBS MODERATE 35: CPT | Performed by: INTERNAL MEDICINE

## 2020-10-25 PROCEDURE — 700111 HCHG RX REV CODE 636 W/ 250 OVERRIDE (IP): Performed by: STUDENT IN AN ORGANIZED HEALTH CARE EDUCATION/TRAINING PROGRAM

## 2020-10-25 RX ADMIN — ZOLPIDEM TARTRATE 5 MG: 5 TABLET ORAL at 20:17

## 2020-10-25 RX ADMIN — OXYCODONE 10 MG: 5 TABLET ORAL at 15:35

## 2020-10-25 RX ADMIN — OXYCODONE 10 MG: 5 TABLET ORAL at 10:22

## 2020-10-25 RX ADMIN — SODIUM CHLORIDE 3 G: 900 INJECTION INTRAVENOUS at 12:03

## 2020-10-25 RX ADMIN — SODIUM CHLORIDE 3 G: 900 INJECTION INTRAVENOUS at 00:33

## 2020-10-25 RX ADMIN — OXYCODONE 10 MG: 5 TABLET ORAL at 20:17

## 2020-10-25 RX ADMIN — SODIUM CHLORIDE 3 G: 900 INJECTION INTRAVENOUS at 17:26

## 2020-10-25 RX ADMIN — OXYCODONE 10 MG: 5 TABLET ORAL at 01:45

## 2020-10-25 RX ADMIN — SODIUM CHLORIDE 3 G: 900 INJECTION INTRAVENOUS at 05:47

## 2020-10-25 RX ADMIN — INFLUENZA A VIRUS A/GUANGDONG-MAONAN/SWL1536/2019 CNIC-1909 (H1N1) ANTIGEN (FORMALDEHYDE INACTIVATED), INFLUENZA A VIRUS A/HONG KONG/2671/2019 (H3N2) ANTIGEN (FORMALDEHYDE INACTIVATED), INFLUENZA B VIRUS B/PHUKET/3073/2013 ANTIGEN (FORMALDEHYDE INACTIVATED), AND INFLUENZA B VIRUS B/WASHINGTON/02/2019 ANTIGEN (FORMALDEHYDE INACTIVATED) 0.5 ML: 15; 15; 15; 15 INJECTION, SUSPENSION INTRAMUSCULAR at 20:17

## 2020-10-25 RX ADMIN — OXYCODONE 10 MG: 5 TABLET ORAL at 05:47

## 2020-10-25 ASSESSMENT — COGNITIVE AND FUNCTIONAL STATUS - GENERAL
MOBILITY SCORE: 20
TURNING FROM BACK TO SIDE WHILE IN FLAT BAD: A LITTLE
MOVING FROM LYING ON BACK TO SITTING ON SIDE OF FLAT BED: A LITTLE
CLIMB 3 TO 5 STEPS WITH RAILING: A LITTLE
MOVING TO AND FROM BED TO CHAIR: A LITTLE
SUGGESTED CMS G CODE MODIFIER MOBILITY: CJ

## 2020-10-25 ASSESSMENT — ENCOUNTER SYMPTOMS
CHILLS: 0
VOMITING: 0
SHORTNESS OF BREATH: 0
MYALGIAS: 1
FEVER: 0
NAUSEA: 0
ABDOMINAL PAIN: 0
COUGH: 0
PALPITATIONS: 0
HEARTBURN: 0
EYE PAIN: 0
EYE DISCHARGE: 0

## 2020-10-25 ASSESSMENT — PAIN DESCRIPTION - PAIN TYPE
TYPE: ACUTE PAIN

## 2020-10-25 ASSESSMENT — GAIT ASSESSMENTS
ASSISTIVE DEVICE: FRONT WHEEL WALKER
GAIT LEVEL OF ASSIST: SUPERVISED
DEVIATION: ANTALGIC;STEP TO
DISTANCE (FEET): 250

## 2020-10-25 NOTE — THERAPY
"Physical Therapy   Initial Evaluation     Patient Name: Panchito Moura  Age:  55 y.o., Sex:  male  Medical Record #: [id number]  Today's Date: 10/24/2020          Assessment  Patient presents with recent Wound vac change to L leg wound, and d/t increased pain was unable to participate in full evaluation session. Social history obtained and education given for supine exercises, pt has been observed up-self ambulating prior to PT consult visit. Will re-visit for full evaluation when able.        Objective       10/24/20 1636   Precautions   Comments L gastroc Wound Vac   Vitals   O2 (LPM) 0   O2 Delivery Device None - Room Air   Pain 0 - 10 Group   Therapist Pain Assessment Post Activity Pain Same as Prior to Activity;Nurse Notified  (able to obtain social hx)   Prior Living Situation   Prior Services None   Housing / Facility 1 Story Apartment / Condo   Steps Into Home 2   Rail Left Rail  (Steps into Home)   Equipment Owned None   Lives with - Patient's Self Care Capacity Alone and Able to Care For Self   Comments reports he has no one able to help him if needed, I prior to admission   Prior Level of Functional Mobility   Bed Mobility Independent   Transfer Status Independent   Ambulation Independent   Distance Ambulation (Feet)   (to tolerance)   Assistive Devices Used None   Stairs Independent   Comments employed at machine shop, standing all day/lifting heavy objects \"I lift big things not small things\"   History of Falls   History of Falls Yes  (at work tripping over wood pallets)   Cognition    Cognition / Consciousness WDL   Level of Consciousness Alert   Comments pleasant and coopertive   Passive ROM Lower Body   Passive ROM Lower Body X   Comments not directly tested, however note L hip in open pack position, and pt has difficulty with l knee extension/dorsiflexion d/t pain in his calf. pt educated about positioning and states he has been trying to get his knee straight when he can but it is hard with the wound " vac   Strength Lower Body   Lower Body Strength  WDL   Comments not directly tested, however observed up-self walking prior to consult. Pt states he has been walking constantly   Sensation Lower Body   Lower Extremity Sensation   X   Comments reports being numb from the knee down on his L, but this is an old issue   Gait Analysis   Comments observed up-self walking multiple laps prior to PT consult received   Education Group   Role of Physical Therapist Patient Response Patient;Acceptance;Explanation;Verbal Demonstration   Exercises - Supine Patient Response Patient;Acceptance;Explanation;Handout;Verbal Demonstration;Action Demonstration   Additional Comments supine exercises handout: heel slides, ankle pumps, SAQ (L side), SLR (R side)

## 2020-10-25 NOTE — CARE PLAN
Problem: Safety  Goal: Will remain free from injury  Note: Ambulated in hallway with RN, steady gait  Bed in low, locked position       Problem: Pain Management  Goal: Pain level will decrease to patient's comfort goal  Note: Medicated per MAR  Offload L leg with pillows to reduce swelling/pain

## 2020-10-25 NOTE — PROGRESS NOTES
[facility] Medicine Daily Progress Note    Date of Service  10/25/2020    Chief Complaint  55 y.o. male admitted 10/20/2020 with left calf wound and cellulitis     Hospital Course    This is a 54 y/o M with PMHX of DVT and PE who was admitted on 10/20/20 after presenting to ER complaining of worsening left calf wound. Per patient, is progressively getting worse and more swollen prompting patient to come in. Pt started on abx and case was discussed with ortho who recommended to consult plastic surgery.  Plastic surgery has been consulted and completed I&D 10/22.       Interval Problem Update  Pt seen and examined, no acute events overnight, still with left leg pain and swelling but improved post-op.   Left calf wound vac in place, resume Xarelto when cleared by Sx   Wound Cx pending, path showing ischemic tissue w/ granulation, blood Cx NTD  Continue Unasyn, ID following, appreciate recommendations   Right testicular mass seen on Imaging, Urology consulted, appreciate recommendations   Still with relatively significant B/L LE edema, requiring O2 at night, question LIBAN or Rt heart issues- will get echocardiogram to evaluate this     10/24 Reports pain at left calf wound site with walking. Oxycodone PRN. Ordered wound care. Urology will follow up with pt regarding testicular mass in clinic.     10/25 Pt had wound vac and dressings changed yesterday/ Pt reprorts improvement in pain. Will place referral for wound clinic. PT OT ramin.    Consultants/Specialty  Plastic surgery   ID   Urology     Code Status  Full Code    Disposition  Likely home with outpatient wound clinic    Review of Systems  Review of Systems   Constitutional: Negative for chills and fever.   HENT: Negative for hearing loss and tinnitus.    Eyes: Negative for pain and discharge.   Respiratory: Negative for cough and shortness of breath.    Cardiovascular: Negative for chest pain and palpitations.   Gastrointestinal: Negative for abdominal pain, heartburn,  nausea and vomiting.   Genitourinary: Negative for dysuria and urgency.        Denies scrotal pain   Musculoskeletal: Positive for joint pain and myalgias.        Left leg pain   All other systems reviewed and are negative.       Physical Exam  Temp:  [36.1 °C (96.9 °F)-36.7 °C (98 °F)] 36.4 °C (97.5 °F)  Pulse:  [46-55] 46  Resp:  [17-18] 18  BP: (125-137)/(70-89) 125/89  SpO2:  [87 %-97 %] 87 %    Physical Exam  Vitals signs and nursing note reviewed.   Constitutional:       General: He is not in acute distress.     Appearance: Normal appearance. He is obese. He is not ill-appearing.   HENT:      Head: Normocephalic and atraumatic.      Mouth/Throat:      Mouth: Mucous membranes are moist.      Comments: Poor dentition  Eyes:      General: No scleral icterus.     Extraocular Movements: Extraocular movements intact.      Pupils: Pupils are equal, round, and reactive to light.   Neck:      Musculoskeletal: Normal range of motion.   Cardiovascular:      Rate and Rhythm: Normal rate and regular rhythm.      Pulses: Normal pulses.      Heart sounds: No murmur.   Pulmonary:      Effort: Pulmonary effort is normal.      Breath sounds: No wheezing.      Comments: Diminished breath sounds bilaterally   Abdominal:      General: Abdomen is flat. There is no distension.      Tenderness: There is no abdominal tenderness. There is no guarding or rebound.   Genitourinary:     Comments: ~2.5 cm mass palpated in the right testes, no overlying skin changes or discoloration  Musculoskeletal:         General: Swelling and signs of injury present.      Comments: Left calf wound with wound vac in place   Skin:     General: Skin is warm.      Findings: Erythema present.   Neurological:      General: No focal deficit present.      Mental Status: He is alert and oriented to person, place, and time.   Psychiatric:         Mood and Affect: Mood normal.         Behavior: Behavior normal.         Fluids    Intake/Output Summary (Last 24  hours) at 10/25/2020 1105  Last data filed at 10/24/2020 1800  Gross per 24 hour   Intake 320 ml   Output --   Net 320 ml       Laboratory  Recent Labs     10/23/20  0437 10/24/20  0320   WBC 5.0 6.3   RBC 4.50* 4.23*   HEMOGLOBIN 13.1* 12.4*   HEMATOCRIT 40.6* 38.6*   MCV 90.2 91.3   MCH 29.1 29.3   MCHC 32.3* 32.1*   RDW 42.3 44.1   PLATELETCT 126* 137*   MPV 10.0 9.7     Recent Labs     10/23/20  0437 10/24/20  0320   SODIUM 135 138   POTASSIUM 4.8 3.7   CHLORIDE 101 102   CO2 26 28   GLUCOSE 143* 92   BUN 18 19   CREATININE 1.12 1.29   CALCIUM 8.6 8.5                   Imaging  EC-ECHOCARDIOGRAM COMPLETE W/O CONT   Final Result      CT-ABDOMEN-PELVIS W/O   Final Result         1.  No acute abnormality.   2.  Left apical lithiasis without visualized obstructive changes.   3.  Structure which appears to originate from the anterior margin of the spleen, stable since prior study and previously suspected of being exophytic hemangioma.   4.  Hepatomegaly with enlargement of the caudate lobe, similar to prior study   5.  Fat-containing bilateral inguinal hernias.   6.  Atherosclerosis      [id number]   Final Result         1.  Heterogeneous hypoechoic area within the right testis, appearance cannot exclude intratesticular mass. Recommend urologic evaluation for further management.   2.  Linear echogenic structure along the lateral aspect of the left testis, similar to prior study, appearance suggests fat, poorly dense calcification, or possible scarring.   3.  Trace bilateral hydroceles.   4.  Small bilateral varicoceles.      These findings were discussed with the patient's clinician, Dr. Rivas, on 10/23/2020 7:32 AM.      CT-EXTREMITY, LOWER WITH LEFT   Final Result      Left lower extremity cellulitis, with a superficial wound on the posterior calf. No drainable abscess. No evidence of osteomyelitis. No soft tissue gas to suggest necrotizing fasciitis.      DX-CHEST-LIMITED (1 VIEW)   Final Result          1.  Right basilar atelectasis or early infiltrate.           Assessment/Plan  * Cellulitis- (present on admission)  Assessment & Plan  Cellulitis of the left posterior calf with Nonhealing wound on left lower extremity for last 4 months status post biopsy 6 days ago  Biopsy shows ischemic/granulaiton tissues, no malignancy identified   Continue with Unasyn, ID consulted, appreciate recommendation   S/P I&D by plastic surgery 10/22, appreciate recommendations and help with management   Wound care     Testicular mass  Assessment & Plan  Ultrasound showing right  Heterogeneous hypoechoic area within the right testis, appearance cannot exclude intratesticular mass  - was seen in July 2020 for Left sided testicular pain, US showed no mass/lesions at that time   - urology consulted, tumor markers collected, follow up with Urology in clinic    Generalized edema- (present on admission)  Assessment & Plan  Admitted for left leg wound, found to have bilateral lower extremity edema, R>L  CT abdomen/pelvis to rule out obstruction, no obstruction seen   Is requiring O2 at night, question possible LIBAN or Rt heart issues   2D echo sows normal left ventricular function and normal right ventricular pressures.  Compression stockings, leg elevation when at rest       History of DVT (deep vein thrombosis)  Assessment & Plan  States he had DVT and PE last year, On Xarelto 20 mg nightly, was held for surgery, resume when cleared by surgery   - pt denies inciting event for DVT, states he was told he needed to be on life long anticoagulation        VTE prophylaxis: Xarelto

## 2020-10-25 NOTE — PROGRESS NOTES
Received report from day shift RN and assumed care of patient. Assessment completed, POC discussed. Pt is currently sitting up in bed with son at bedside, A&Ox4, on RA, VSS. Reports pain 9/10 in lower left extremity where wound vac in placed. PRN pain medication given per MAR. Bed is in lowest, locked position, call bell and belongings are in reach. No further needs at this time.

## 2020-10-25 NOTE — PROGRESS NOTES
Pt AOX4, able to make needs known  Wound VAC in place to L calf, good seal noted, irrigation in place  Complaints of pain when ambulating, medicated per MAR  Pt ambulated around floor today with RN, steady gait, using FWW  IV abx continued, no adverse reaction noted  All needs met at this time  Anticipate possible d/c tomorrow 10/26

## 2020-10-25 NOTE — THERAPY
Physical Therapy   Initial Evaluation     Patient Name: Panchito Moura  Age:  55 y.o., Sex:  male  Medical Record #: [id number]  Today's Date: 10/25/2020     Precautions: Other (See Comments)  Comments: L gastroc wound vac    Assessment  Patient is 55 y.o. male with a diagnosis of L calf wound and cellulitis.  Pt is s/o I&D on 10/22 now with wound vac.  PMH includes DVT and PE.  Pt agreeable to work with therapy. Pt ambulated around the unit with FWW and SPV for line and wound vac management. No LOB noted, pt ambulating with antalgic gait and step to pattern due to 8/10 pain in L calf. Pt encouraged to ambulate hallway 3x/day and elevate LE in bed between walks. Patient will not be actively followed for physical therapy services at this time, however may be seen if requested by physician for 1 more visit within 30 days to address any discharge or equipment needs    Plan    Recommend Physical Therapy for Evaluation only for the following treatments:  none    DC Equipment Recommendations: Front-Wheel Walker  Discharge Recommendations: Recommend home health for continued physical therapy services          10/25/20 7385   Prior Living Situation   Prior Services None   Housing / Facility 1 Story Apartment / Condo   Steps Into Home 2   Steps In Home 0   Equipment Owned None   Lives with - Patient's Self Care Capacity Alone and Able to Care For Self   Comments independent with no social support   Prior Level of Functional Mobility   Bed Mobility Independent   Transfer Status Independent   Ambulation Independent   Distance Ambulation (Feet)   (community)   Assistive Devices Used None   Stairs Independent   Comments physical job   Cognition    Level of Consciousness Alert   Comments receptive to education and mobility   Active ROM Lower Body    Active ROM Lower Body  X   Comments functional assessment, limited AROM due to wound   Strength Lower Body   Lower Body Strength  WDL   Comments L DF/PF not assessed due to pain    Sensation Lower Body   Lower Extremity Sensation   X   Comments L shank/foot numb   Gait Analysis   Gait Level Of Assist Supervised   Assistive Device Front Wheel Walker   Distance (Feet) 250   # of Times Distance was Traveled 1   Deviation Antalgic;Step To   # of Stairs Climbed 0   Weight Bearing Status WBAT   Comments as pt ambulated, pt started demonstrating reciprocal gait   Bed Mobility    Supine to Sit Modified Independent   Sit to Supine Modified Independent   Scooting Modified Independent   Functional Mobility   Sit to Stand Supervised   Mobility in room and hallway with FWW   Comments assistance needed for wound vac and line management   Anticipated Discharge Equipment and Recommendations   DC Equipment Recommendations Front-Wheel Walker   Discharge Recommendations Recommend home health for continued physical therapy services

## 2020-10-26 PROCEDURE — 302098 PASTE RING (FLAT): Performed by: INTERNAL MEDICINE

## 2020-10-26 PROCEDURE — 700102 HCHG RX REV CODE 250 W/ 637 OVERRIDE(OP): Performed by: INTERNAL MEDICINE

## 2020-10-26 PROCEDURE — A9270 NON-COVERED ITEM OR SERVICE: HCPCS | Performed by: INTERNAL MEDICINE

## 2020-10-26 PROCEDURE — 97605 NEG PRS WND THER DME<=50SQCM: CPT

## 2020-10-26 PROCEDURE — 770006 HCHG ROOM/CARE - MED/SURG/GYN SEMI*

## 2020-10-26 PROCEDURE — 700111 HCHG RX REV CODE 636 W/ 250 OVERRIDE (IP): Performed by: INTERNAL MEDICINE

## 2020-10-26 PROCEDURE — 700105 HCHG RX REV CODE 258: Performed by: INTERNAL MEDICINE

## 2020-10-26 PROCEDURE — 99232 SBSQ HOSP IP/OBS MODERATE 35: CPT | Performed by: INTERNAL MEDICINE

## 2020-10-26 RX ORDER — LIDOCAINE HYDROCHLORIDE 40 MG/ML
SOLUTION TOPICAL
Status: DISCONTINUED | OUTPATIENT
Start: 2020-10-26 | End: 2020-10-29

## 2020-10-26 RX ORDER — MORPHINE SULFATE 4 MG/ML
2 INJECTION, SOLUTION INTRAMUSCULAR; INTRAVENOUS ONCE
Status: COMPLETED | OUTPATIENT
Start: 2020-10-26 | End: 2020-10-26

## 2020-10-26 RX ADMIN — ZOLPIDEM TARTRATE 5 MG: 5 TABLET ORAL at 22:01

## 2020-10-26 RX ADMIN — SODIUM CHLORIDE 3 G: 900 INJECTION INTRAVENOUS at 17:56

## 2020-10-26 RX ADMIN — OXYCODONE 10 MG: 5 TABLET ORAL at 22:01

## 2020-10-26 RX ADMIN — OXYCODONE 10 MG: 5 TABLET ORAL at 07:44

## 2020-10-26 RX ADMIN — SODIUM CHLORIDE 3 G: 900 INJECTION INTRAVENOUS at 00:27

## 2020-10-26 RX ADMIN — SODIUM CHLORIDE 3 G: 900 INJECTION INTRAVENOUS at 05:34

## 2020-10-26 RX ADMIN — RIVAROXABAN 20 MG: 20 TABLET, FILM COATED ORAL at 17:55

## 2020-10-26 RX ADMIN — OXYCODONE 10 MG: 5 TABLET ORAL at 18:03

## 2020-10-26 RX ADMIN — MORPHINE SULFATE 2 MG: 4 INJECTION INTRAVENOUS at 13:00

## 2020-10-26 RX ADMIN — SODIUM CHLORIDE 3 G: 900 INJECTION INTRAVENOUS at 12:15

## 2020-10-26 ASSESSMENT — ENCOUNTER SYMPTOMS
EYE DISCHARGE: 0
CHILLS: 0
HEARTBURN: 0
PALPITATIONS: 0
EYE PAIN: 0
NAUSEA: 0
VOMITING: 0
COUGH: 0
MYALGIAS: 1
ABDOMINAL PAIN: 0
FEVER: 0
SHORTNESS OF BREATH: 0

## 2020-10-26 ASSESSMENT — PAIN DESCRIPTION - PAIN TYPE
TYPE: ACUTE PAIN
TYPE: ACUTE PAIN

## 2020-10-26 NOTE — PROGRESS NOTES
Assumed care of patient this shift, patient alert and oriented x4. Wound vac changed today by wound care nurse. Medicated with IV morphine x1 prior to dressing change. PRN oxycodone per MAR. Up independently. IV antibiotics. POC discussed with patient. Educated on fall risk/precautions. Call bell within reach.

## 2020-10-26 NOTE — CARE PLAN
Problem: Safety  Goal: Will remain free from injury  Outcome: PROGRESSING AS EXPECTED   Educated on fall risk, bed in locked and lowest position, call bell within reach.   Problem: Infection  Goal: Will remain free from infection  Outcome: PROGRESSING AS EXPECTED   IV antibiotics, wound vac in place to left leg. Afebrile. Educated on infection prevention, handwashing.

## 2020-10-26 NOTE — PROGRESS NOTES
[facility] Medicine Daily Progress Note    Date of Service  10/26/2020    Chief Complaint  55 y.o. male admitted 10/20/2020 with left calf wound and cellulitis     Hospital Course    This is a 54 y/o M with PMHX of DVT and PE who was admitted on 10/20/20 after presenting to ER complaining of worsening left calf wound. Per patient, is progressively getting worse and more swollen prompting patient to come in. Pt started on abx and case was discussed with ortho who recommended to consult plastic surgery.  Plastic surgery has been consulted and completed I&D 10/22.       Interval Problem Update  Pt seen and examined, no acute events overnight, still with left leg pain and swelling but improved post-op.   Left calf wound vac in place, resume Xarelto when cleared by Sx   Wound Cx pending, path showing ischemic tissue w/ granulation, blood Cx NTD  Continue Unasyn, ID following, appreciate recommendations   Right testicular mass seen on Imaging, Urology consulted, appreciate recommendations   Still with relatively significant B/L LE edema, requiring O2 at night, question LIBAN or Rt heart issues- will get echocardiogram to evaluate this     10/24 Reports pain at left calf wound site with walking. Oxycodone PRN. Ordered wound care. Urology will follow up with pt regarding testicular mass in clinic.     10/25 Pt had wound vac and dressings changed yesterday/ Pt reprorts improvement in pain. Will place referral for wound clinic. PT OT eval.    10/26: pt see and examined, afebrile, no overnight events, on wound vac. pain controlled  NO nausea or vomiting, afebrile  On abx as per ID rec.     Consultants/Specialty  Plastic surgery   ID   Urology     Code Status  Full Code    Disposition  Likely home with outpatient wound clinic    Review of Systems  Review of Systems   Constitutional: Negative for chills and fever.   HENT: Negative for hearing loss and tinnitus.    Eyes: Negative for pain and discharge.   Respiratory: Negative for  cough and shortness of breath.    Cardiovascular: Negative for chest pain and palpitations.   Gastrointestinal: Negative for abdominal pain, heartburn, nausea and vomiting.   Genitourinary: Negative for dysuria and urgency.        Denies scrotal pain   Musculoskeletal: Positive for joint pain and myalgias.        Left leg pain   All other systems reviewed and are negative.       Physical Exam  Temp:  [36.4 °C (97.6 °F)-36.6 °C (97.9 °F)] 36.4 °C (97.6 °F)  Pulse:  [52-54] 52  Resp:  [16-18] 18  BP: (118-146)/(60-77) 118/60  SpO2:  [89 %-93 %] 91 %    Physical Exam  Vitals signs and nursing note reviewed.   Constitutional:       General: He is not in acute distress.     Appearance: Normal appearance. He is obese. He is not ill-appearing.   HENT:      Head: Normocephalic and atraumatic.      Mouth/Throat:      Mouth: Mucous membranes are moist.      Comments: Poor dentition  Eyes:      General: No scleral icterus.     Extraocular Movements: Extraocular movements intact.      Pupils: Pupils are equal, round, and reactive to light.   Neck:      Musculoskeletal: Normal range of motion.   Cardiovascular:      Rate and Rhythm: Normal rate and regular rhythm.      Pulses: Normal pulses.      Heart sounds: No murmur.   Pulmonary:      Effort: Pulmonary effort is normal.      Breath sounds: No wheezing.      Comments: Diminished breath sounds bilaterally   Abdominal:      General: Abdomen is flat. There is no distension.      Tenderness: There is no abdominal tenderness. There is no guarding or rebound.   Genitourinary:     Comments: ~2.5 cm mass palpated in the right testes, no overlying skin changes or discoloration  Musculoskeletal:         General: Swelling and signs of injury present.      Comments: Left calf wound with wound vac in place   Skin:     General: Skin is warm.      Findings: Erythema present.   Neurological:      General: No focal deficit present.      Mental Status: He is alert and oriented to person,  place, and time.   Psychiatric:         Mood and Affect: Mood normal.         Behavior: Behavior normal.         Fluids    Intake/Output Summary (Last 24 hours) at 10/26/2020 1021  Last data filed at 10/26/2020 0830  Gross per 24 hour   Intake 280 ml   Output --   Net 280 ml       Laboratory  Recent Labs     10/24/20  0320   WBC 6.3   RBC 4.23*   HEMOGLOBIN 12.4*   HEMATOCRIT 38.6*   MCV 91.3   MCH 29.3   MCHC 32.1*   RDW 44.1   PLATELETCT 137*   MPV 9.7     Recent Labs     10/24/20  0320   SODIUM 138   POTASSIUM 3.7   CHLORIDE 102   CO2 28   GLUCOSE 92   BUN 19   CREATININE 1.29   CALCIUM 8.5                   Imaging  EC-ECHOCARDIOGRAM COMPLETE W/O CONT   Final Result      CT-ABDOMEN-PELVIS W/O   Final Result         1.  No acute abnormality.   2.  Left apical lithiasis without visualized obstructive changes.   3.  Structure which appears to originate from the anterior margin of the spleen, stable since prior study and previously suspected of being exophytic hemangioma.   4.  Hepatomegaly with enlargement of the caudate lobe, similar to prior study   5.  Fat-containing bilateral inguinal hernias.   6.  Atherosclerosis      [id number]   Final Result         1.  Heterogeneous hypoechoic area within the right testis, appearance cannot exclude intratesticular mass. Recommend urologic evaluation for further management.   2.  Linear echogenic structure along the lateral aspect of the left testis, similar to prior study, appearance suggests fat, poorly dense calcification, or possible scarring.   3.  Trace bilateral hydroceles.   4.  Small bilateral varicoceles.      These findings were discussed with the patient's clinician, Dr. Rivas, on 10/23/2020 7:32 AM.      CT-EXTREMITY, LOWER WITH LEFT   Final Result      Left lower extremity cellulitis, with a superficial wound on the posterior calf. No drainable abscess. No evidence of osteomyelitis. No soft tissue gas to suggest necrotizing fasciitis.       DX-CHEST-LIMITED (1 VIEW)   Final Result         1.  Right basilar atelectasis or early infiltrate.           Assessment/Plan  * Cellulitis- (present on admission)  Assessment & Plan  Cellulitis of the left posterior calf with Nonhealing wound on left lower extremity for last 4 months status post biopsy 6 days ago  Biopsy shows ischemic/granulaiton tissues, no malignancy identified   Continue with Unasyn, ID consulted, appreciate recommendation   S/P I&D by plastic surgery 10/22, appreciate recommendations and help with management   Cont on wound care/ wound vac     Testicular mass  Assessment & Plan  Ultrasound showing right  Heterogeneous hypoechoic area within the right testis, appearance cannot exclude intratesticular mass  - was seen in July 2020 for Left sided testicular pain, US showed no mass/lesions at that time   - urology consulted, tumor markers collected, follow up with Urology in clinic    Generalized edema- (present on admission)  Assessment & Plan  Admitted for left leg wound, found to have bilateral lower extremity edema, R>L  CT abdomen/pelvis to rule out obstruction, no obstruction seen   Is requiring O2 at night, question possible LIBAN or Rt heart issues   2D echo sows normal left ventricular function and normal right ventricular pressures.  Compression stockings, leg elevation when at rest       History of DVT (deep vein thrombosis)  Assessment & Plan  States he had DVT and PE last year, On Xarelto 20 mg nightly, was held for surgery, resume when cleared by surgery   - pt denies inciting event for DVT, states he was told he needed to be on life long anticoagulation        VTE prophylaxis: Xarelto

## 2020-10-26 NOTE — PROGRESS NOTES
Pt AOx4, medicated for LLE pain. Wound vac patent. Pt sitting up in bed watching television. Call light within reach, personal belongings available, bed in lowest position, treaded socks on, and hourly rounding in place.

## 2020-10-26 NOTE — CARE PLAN
Problem: Infection  Goal: Will remain free from infection  Note: IV Unsyn q6hrs. Wound vac on LLE.     Problem: Pain Management  Goal: Pain level will decrease to patient's comfort goal  Note: Medicated with oxycodone 10 mg for LLE pain.

## 2020-10-26 NOTE — WOUND TEAM
[facility]  Inpatient Services  Wound and Skin Care Progress Note    Admission Date: 10/20/2020     Consult Date: 10/20/20  HPI, PMH, SH: Reviewed    Unit where seen by Wound Team: [id number]     WOUND CONSULT RELATED TO:  VAC change    Subjective: Pt states the wound began as a dark purple spot that evolved over time.  The dark purple spot was similar to the dark purple spots peripheral to the wound    Self Report / Pain Level:  9/10, intermittent with removal of waffle layer and with pressure on the medial aspect of wound and leg marcelino wound.  Pt received IV morphine.  The sponge was saturated with liquid lidocaine 4% and dwelled for 15 minutes.  The sponge was soaked with saline just prior to removal     OBJECTIVE:  Pressure redistribution mattress. L leg on pillow    WOUND TYPE, LOCATION, CHARACTERISTICS (Pressure Injuries: location, stage, POA or date identified)           Negative Pressure Wound Therapy 10/22/20 Leg Posterior (Active)   NPWT Pump Mode / Pressure Setting 125 mmHg 10/26/20 0750   Dressing Type Gray Foam (Cleanse Choice);Medium 10/25/20 2017   Number of Foam Pieces Used 2 10/24/20 1430   Canister Changed No 10/25/20 1327   NEXT Dressing Change/Treatment Date 10/27/20 10/26/20 0750   VAC VeraFlo Irrigant Normal Saline 10/25/20 2017   VAC VeraFlo Soak Time (mins) 10 10/25/20 1327   VAC VeraFlo Instill Volume (ml) 30 10/25/20 1327   VAC VeraFlo - Therapy Time (hrs) 2 10/25/20 1327   VAC VeraFlo Pressure (mm/Hg) 125 mmHg;Continuous 10/25/20 2017           Wound 10/07/20 Full Thickness Wound Leg Posterior Left (Active)   Wound Image   10/26/20 1327   Site Assessment Black;Red;Edema;Painful 10/26/20 1400   Periwound Assessment Purple;Red;Edema;Painful 10/26/20 1400   Margins Defined edges 10/25/20 2017   Closure Secondary intention 10/26/20 1400   Drainage Amount Small 10/26/20 1400   Drainage Description Serosanguineous 10/26/20 1400   Treatments Site care;Cleansed 10/26/20 1400   Wound  Cleansing Normal Saline Irrigation 10/26/20 1400   Periwound Protectant Paste Ring;Skin Protectant Wipes to Periwound 10/26/20 1400   Dressing Cleansing/Solutions Normal Saline 10/26/20 1400   Dressing Options Wound Vac 10/26/20 1400   Dressing Changed Changed 10/26/20 1400   Dressing Status Clean;Dry;Intact 10/26/20 0750   Dressing Change/Treatment Frequency Monday, Wednesday, Friday, and As Needed 10/26/20 1400   NEXT Dressing Change/Treatment Date 10/28/20 10/26/20 1400   NEXT Weekly Photo (Inpatient Only) 10/30/20 10/26/20 1400   Non-staged Wound Description Full thickness 10/26/20 1400   Wound Length (cm) 9.5 cm 10/24/20 1430   Wound Width (cm) 11 cm 10/24/20 1430   Wound Depth (cm) 0.5 cm 10/24/20 1430   Wound Surface Area (cm^2) 104.5 cm^2 10/24/20 1430   Wound Volume (cm^3) 52.25 cm^3 10/24/20 1430   Wound Bed Granulation (%) 0 % 10/20/20 1100   Wound Bed Epithelium (%) 0 % 10/20/20 1100   Wound Bed Slough (%) 0 % 10/20/20 1100   Wound Bed Eschar (%) 50 % 10/24/20 1430   Tunneling (cm) 0 cm 10/20/20 1100   Undermining (cm) 0 cm 10/20/20 1100   Shape circular 10/26/20 1400   Wound Odor None 10/26/20 1400   Exposed Structures None 10/26/20 1400   WOUND NURSE ONLY - Time Spent with Patient (mins) 75 10/26/20 1400             Vascular:  FINDINGS:   Left lower extremity -.    No evidence of   deep venous thrombosis.       Temo Chaidez   (Electronically Signed)   Final Date:      11 October 2020                     10:53    Lab Values:    Lab Results   Component Value Date/Time    WBC 6.3 10/24/2020 03:20 AM    RBC 4.23 (L) 10/24/2020 03:20 AM    HEMOGLOBIN 12.4 (L) 10/24/2020 03:20 AM    HEMATOCRIT 38.6 (L) 10/24/2020 03:20 AM        Results from last 7 days   Lab Units 10/19/20  3079   C REACTIVE PROTEIN 4596 mg/dL 5.52*       Results from last 7 days   Lab Units 10/19/20  2327   SED RATE WESTERGREN 1526 mm/hour 20       No results found for: HBA1C        Culture:   Specimen Information: Left Leg; Wound         Component 4d ago   Significant Indicator NEG    Source WND    Site LEFT LEG    Culture Result Moderate growth mixed skin yesenia.    Gram Stain Result Rare WBCs.   Rare Gram positive cocci.    Resulting Agency M         Specimen Collected: 10/20/20 03:26 Last Resulted: 10/22/20 10:07 Lab Flowsheet Order Details View Encounter Lab and Collection                INTERVENTIONS BY WOUND TEAM: Pt recieved IV pain med for dressing change. Injected 4% lidocaine into black foam and soaked for 15 min. Soaked dressing in saline just prior to removal.  Removed dressing. irrigated wound with NS.  photographed. Applied No sting skin prep to periwound skin, then flat paste ring. Applied Veraflo thin foam to wound bed, then solid thin grey button under TRAC pad (TRAC pad placed at the upper posterior aspect of wound but when pt positioned himself after treatment it seem that the opposite side of the wound may be a better location for the TRAC pad.) Sealed with drape, . Started Veraflo at 30 ml ns soak, for 10 min, every 2 hours.    Interdisciplinary consultation: RN, patient, hospitalist    EVALUATION: The Veraflo was placed in order to assist in removing black nonviable tissue, and to promote granulation.  Trial of solid gray foam due to edematous appearing wound bed with the goal of creating some counter pressure on the edematous tissue (vs. Drawing tissue up throught the holes of the waffle)      Factors affecting wound healing: smoker, chronic wound, hx DVT    Goals: Steady decrease in wound area and depth weekly.    NURSING PLAN OF CARE ORDERS (X):  Dressing changes: See Dressing Care orders: X  Skin care: See Skin Care orders: NA  Rectal tube care: See Rectal Tube Care orders:        Other orders:                           WOUND TEAM PLAN OF CARE (X):   Dressing changes by wound team:          Follow up 1-2 times weekly:               Follow up 3 times weekly:                NPWT change 3 times weekly:   X  Follow up as  needed:     Other (explain):     Anticipated discharge plans (X):   LTACH:        SNF/Rehab:                  Home Care:           Outpatient Wound Center:    X pt will need ongoing wound care for VAC changes to leg wound        Self Care:            Other:

## 2020-10-27 ENCOUNTER — APPOINTMENT (OUTPATIENT)
Dept: WOUND CARE | Facility: MEDICAL CENTER | Age: 55
End: 2020-10-27
Attending: PHYSICIAN ASSISTANT
Payer: COMMERCIAL

## 2020-10-27 ENCOUNTER — PHARMACY VISIT (OUTPATIENT)
Dept: PHARMACY | Facility: MEDICAL CENTER | Age: 55
End: 2020-10-27
Payer: COMMERCIAL

## 2020-10-27 LAB
AFP-TM SERPL-MCNC: 2 NG/ML (ref 0–9)
ANION GAP SERPL CALC-SCNC: 10 MMOL/L (ref 7–16)
BUN SERPL-MCNC: 18 MG/DL (ref 8–22)
CALCIUM SERPL-MCNC: 9 MG/DL (ref 8.5–10.5)
CHLORIDE SERPL-SCNC: 101 MMOL/L (ref 96–112)
CO2 SERPL-SCNC: 25 MMOL/L (ref 20–33)
CREAT SERPL-MCNC: 1.2 MG/DL (ref 0.5–1.4)
ERYTHROCYTE [DISTWIDTH] IN BLOOD BY AUTOMATED COUNT: 42.4 FL (ref 35.9–50)
GLUCOSE SERPL-MCNC: 91 MG/DL (ref 65–99)
HCT VFR BLD AUTO: 42.5 % (ref 42–52)
HGB BLD-MCNC: 14 G/DL (ref 14–18)
MCH RBC QN AUTO: 29.4 PG (ref 27–33)
MCHC RBC AUTO-ENTMCNC: 32.9 G/DL (ref 33.7–35.3)
MCV RBC AUTO: 89.1 FL (ref 81.4–97.8)
PLATELET # BLD AUTO: 107 K/UL (ref 164–446)
PMV BLD AUTO: 10 FL (ref 9–12.9)
POTASSIUM SERPL-SCNC: 4.1 MMOL/L (ref 3.6–5.5)
RBC # BLD AUTO: 4.77 M/UL (ref 4.7–6.1)
SODIUM SERPL-SCNC: 136 MMOL/L (ref 135–145)
WBC # BLD AUTO: 7 K/UL (ref 4.8–10.8)

## 2020-10-27 PROCEDURE — 700105 HCHG RX REV CODE 258: Performed by: INTERNAL MEDICINE

## 2020-10-27 PROCEDURE — 80048 BASIC METABOLIC PNL TOTAL CA: CPT

## 2020-10-27 PROCEDURE — A9270 NON-COVERED ITEM OR SERVICE: HCPCS | Performed by: STUDENT IN AN ORGANIZED HEALTH CARE EDUCATION/TRAINING PROGRAM

## 2020-10-27 PROCEDURE — 770006 HCHG ROOM/CARE - MED/SURG/GYN SEMI*

## 2020-10-27 PROCEDURE — 700111 HCHG RX REV CODE 636 W/ 250 OVERRIDE (IP): Performed by: INTERNAL MEDICINE

## 2020-10-27 PROCEDURE — 99232 SBSQ HOSP IP/OBS MODERATE 35: CPT | Performed by: STUDENT IN AN ORGANIZED HEALTH CARE EDUCATION/TRAINING PROGRAM

## 2020-10-27 PROCEDURE — 36415 COLL VENOUS BLD VENIPUNCTURE: CPT

## 2020-10-27 PROCEDURE — 700102 HCHG RX REV CODE 250 W/ 637 OVERRIDE(OP): Performed by: STUDENT IN AN ORGANIZED HEALTH CARE EDUCATION/TRAINING PROGRAM

## 2020-10-27 PROCEDURE — RXMED WILLOW AMBULATORY MEDICATION CHARGE: Performed by: STUDENT IN AN ORGANIZED HEALTH CARE EDUCATION/TRAINING PROGRAM

## 2020-10-27 PROCEDURE — 97165 OT EVAL LOW COMPLEX 30 MIN: CPT

## 2020-10-27 PROCEDURE — 700102 HCHG RX REV CODE 250 W/ 637 OVERRIDE(OP): Performed by: INTERNAL MEDICINE

## 2020-10-27 PROCEDURE — 85027 COMPLETE CBC AUTOMATED: CPT

## 2020-10-27 PROCEDURE — A9270 NON-COVERED ITEM OR SERVICE: HCPCS | Performed by: INTERNAL MEDICINE

## 2020-10-27 RX ORDER — OXYCODONE HYDROCHLORIDE 5 MG/1
5 TABLET ORAL EVERY 6 HOURS PRN
Status: DISCONTINUED | OUTPATIENT
Start: 2020-10-27 | End: 2020-10-31 | Stop reason: HOSPADM

## 2020-10-27 RX ORDER — LIDOCAINE HYDROCHLORIDE 40 MG/ML
1 SOLUTION TOPICAL
Qty: 100 ML | Refills: 0 | Status: SHIPPED | OUTPATIENT
Start: 2020-10-27 | End: 2020-10-31

## 2020-10-27 RX ORDER — AMOXICILLIN AND CLAVULANATE POTASSIUM 875; 125 MG/1; MG/1
1 TABLET, FILM COATED ORAL 2 TIMES DAILY
Qty: 4 TAB | Refills: 0 | Status: SHIPPED | OUTPATIENT
Start: 2020-10-27 | End: 2020-10-31

## 2020-10-27 RX ORDER — OXYCODONE HYDROCHLORIDE 5 MG/1
5 TABLET ORAL EVERY 6 HOURS PRN
Qty: 28 TAB | Refills: 0 | Status: SHIPPED | OUTPATIENT
Start: 2020-10-27 | End: 2020-10-31

## 2020-10-27 RX ADMIN — RIVAROXABAN 20 MG: 20 TABLET, FILM COATED ORAL at 17:59

## 2020-10-27 RX ADMIN — SODIUM CHLORIDE 3 G: 900 INJECTION INTRAVENOUS at 00:00

## 2020-10-27 RX ADMIN — OXYCODONE 5 MG: 5 TABLET ORAL at 17:59

## 2020-10-27 RX ADMIN — SODIUM CHLORIDE 3 G: 900 INJECTION INTRAVENOUS at 12:15

## 2020-10-27 RX ADMIN — SODIUM CHLORIDE 3 G: 900 INJECTION INTRAVENOUS at 05:50

## 2020-10-27 RX ADMIN — SODIUM CHLORIDE 3 G: 900 INJECTION INTRAVENOUS at 18:00

## 2020-10-27 ASSESSMENT — ACTIVITIES OF DAILY LIVING (ADL): TOILETING: INDEPENDENT

## 2020-10-27 ASSESSMENT — ENCOUNTER SYMPTOMS
VOMITING: 0
NAUSEA: 0
CHILLS: 0
EYE PAIN: 0
EYE DISCHARGE: 0
SHORTNESS OF BREATH: 0
ABDOMINAL PAIN: 0
PALPITATIONS: 0
COUGH: 0
FEVER: 0
HEARTBURN: 0
MYALGIAS: 1

## 2020-10-27 ASSESSMENT — COGNITIVE AND FUNCTIONAL STATUS - GENERAL
DAILY ACTIVITIY SCORE: 24
SUGGESTED CMS G CODE MODIFIER DAILY ACTIVITY: CH

## 2020-10-27 ASSESSMENT — PAIN DESCRIPTION - PAIN TYPE
TYPE: ACUTE PAIN

## 2020-10-27 NOTE — DISCHARGE PLANNING
Care Transition Team Assessment    Per Md's Notes:  55 y.o. male who w/ DVT and PE  on Xarelto and chronic left lower extremity wound for past 4 months presented 10/20/2020 with worsening left lower extremity wound pain.  Per patient, is progressively getting worse and more swollen prompting patient to come in.  In addition to that, he also endorses continuous oozing.  He endorses that it is warm to the touch and erythematous.    Anticipated Discharge Disposition: Home, patient recently has  from his spouse. His new address is: [address] [place] [address], [address] [place] [address], [place]. Patient will have a home wound vac from [facility], fww from [facility], and home health services, PT/OT with [facility].     Action: This RN,  completed an initial  with patient at bedside. Choice documents for DME, (fww, [facility]), Wound Vac, ([facility]) and home health, (PT/OT, [facility]) were all faxed to [facility], Macarena Avila at [phone].     Barriers to Discharge: Accepting Home Health, DME, and [facility] agencies for therapies.     Plan: This RN,  to follow up with [facility] for accepting referrals.     Waiting for [facility] liaison, Daryl to fax documents back to this RN, , Provider Dr. Yoan Hernandez will need to sign then will be faxed back to [facility] at [phone].     Patient will discharge on oral antibiotics, he has his brother as support. Patient recently  from his spouse, his son Adi is 18 years old and lives with his estranged spouse.         Information Source  Orientation : Oriented x 4  Information Given By: Patient  Informant's Name: Panchito Cunningham  Who is responsible for making decisions for patient? : Patient    Readmission Evaluation  Is this a readmission?: No    Elopement Risk  Legal Hold: No  Ambulatory or Self Mobile in Wheelchair: Yes  Disoriented: No  Psychiatric Symptoms: None  History of Wandering: No  Elopement this Admit:  No  Vocalizing Wanting to Leave: No  Displays Behaviors, Body Language Wanting to Leave: No-Not at Risk for Elopement  Elopement Risk: Not at Risk for Elopement    Interdisciplinary Discharge Planning  Lives with - Patient's Self Care Capacity: Alone and Able to Care For Self  Patient or legal guardian wants to designate a caregiver: No  Support Systems: Family Member(s)  Housing / Facility: [address] / Condo  Prior Services: None    Discharge Preparedness  What is your plan after discharge?: Home with help, Group home  What are your discharge supports?: Sibling  Prior Functional Level: Ambulatory, Independent with Activities of Daily Living, Independent with Medication Management  Difficulity with ADLs: Bathing, Walking  Difficulty with ADLs Comment: (patient has a wound vac on left calf)  Difficulity with IADLs: Driving, Shopping    Functional Assesment  Prior Functional Level: Ambulatory, Independent with Activities of Daily Living, Independent with Medication Management    Finances  Financial Barriers to Discharge: No  Prescription Coverage: Yes    Vision / Hearing Impairment  Vision Impairment : Yes  Right Eye Vision: Impaired, Wears Glasses  Left Eye Vision: Impaired, Wears Glasses  Hearing Impairment : No         Advance Directive  Advance Directive?: None  Advance Directive offered?: AD Booklet refused    Domestic Abuse  Have you ever been the victim of abuse or violence?: No  Physical Abuse or Sexual Abuse: No  Verbal Abuse or Emotional Abuse: No  Possible Abuse/Neglect Reported to:: Not Applicable         Discharge Risks or Barriers  Discharge risks or barriers?: Post-acute placement / services, Complex medical needs  Patient risk factors: Complex medical needs, Lives alone and no community support    Anticipated Discharge Information  Discharge Disposition: Discharged to home/self care (01)  Discharge Address: [address] [place] [address] [address], [address] [place], [place]   Discharge Contact Phone Number:  [phone]    Heather Hernandez RN,

## 2020-10-27 NOTE — DISCHARGE PLANNING
Received Choice form at [phone]  Agency/Facility Name: [facility]  Referral sent per Choice form @ [phone]

## 2020-10-27 NOTE — PROGRESS NOTES
Hospital Medicine Daily Progress Note    Date of Service  10/27/2020    Chief Complaint  55 y.o. male admitted 10/20/2020 with left calf wound and cellulitis     Hospital Course    This is a 54 y/o M with PMHX of DVT and PE who was admitted on 10/20/20 after presenting to ER complaining of worsening left calf wound. Per patient, is progressively getting worse and more swollen prompting patient to come in. Pt started on abx and case was discussed with ortho who recommended to consult plastic surgery.  Plastic surgery has been consulted and completed I&D 10/22.       Interval Problem Update  Pt seen and examined, no acute events overnight, still with left leg pain and swelling but improved post-op.   Left calf wound vac in place, resume Xarelto when cleared by Sx   Wound Cx pending, path showing ischemic tissue w/ granulation, blood Cx NTD  Continue Unasyn, ID following, appreciate recommendations   Right testicular mass seen on Imaging, Urology consulted, appreciate recommendations   Still with relatively significant B/L LE edema, requiring O2 at night, question LIBAN or Rt heart issues- will get echocardiogram to evaluate this     10/24 Reports pain at left calf wound site with walking. Oxycodone PRN. Ordered wound care. Urology will follow up with pt regarding testicular mass in clinic.     10/25 Pt had wound vac and dressings changed yesterday/ Pt reprorts improvement in pain. Will place referral for wound clinic. PT OT eval.    10/26: pt see and examined, afebrile, no overnight events, on wound vac. pain controlled. No nausea or vomiting, afebrile. On abx as per ID rec.     10/27: Patient evaluated bedside and found afebrile in no acute distress.  No leukocytosis today and patient reporting lower extremity pain improvement.  Patient to be transition to p.o. antibiotics on discharge.  Pending home health set up for PT/OT and wound care    Consultants/Specialty  Plastic surgery   ID   Urology     Code Status  Full  Code    Disposition  Home health set up    Review of Systems  Review of Systems   Constitutional: Negative for chills and fever.   HENT: Negative for hearing loss and tinnitus.    Eyes: Negative for pain and discharge.   Respiratory: Negative for cough and shortness of breath.    Cardiovascular: Negative for chest pain and palpitations.   Gastrointestinal: Negative for abdominal pain, heartburn, nausea and vomiting.   Genitourinary: Negative for dysuria and urgency.        Denies scrotal pain   Musculoskeletal: Positive for joint pain and myalgias.        Left leg pain   All other systems reviewed and are negative.       Physical Exam  Temp:  [36.2 °C (97.1 °F)-36.4 °C (97.6 °F)] 36.2 °C (97.1 °F)  Pulse:  [52-57] 57  Resp:  [16-18] 18  BP: (112-133)/(57-73) 129/57  SpO2:  [91 %-94 %] 93 %    Physical Exam  Vitals signs and nursing note reviewed.   Constitutional:       General: He is not in acute distress.     Appearance: Normal appearance. He is obese. He is not ill-appearing.   HENT:      Head: Normocephalic and atraumatic.      Mouth/Throat:      Mouth: Mucous membranes are moist.      Comments: Poor dentition  Eyes:      General: No scleral icterus.     Extraocular Movements: Extraocular movements intact.      Pupils: Pupils are equal, round, and reactive to light.   Neck:      Musculoskeletal: Normal range of motion.   Cardiovascular:      Rate and Rhythm: Normal rate and regular rhythm.      Pulses: Normal pulses.      Heart sounds: No murmur.   Pulmonary:      Effort: Pulmonary effort is normal.      Breath sounds: No wheezing.      Comments: Diminished breath sounds bilaterally   Abdominal:      General: Abdomen is flat. There is no distension.      Tenderness: There is no abdominal tenderness. There is no guarding or rebound.   Genitourinary:     Comments: ~2.5 cm mass palpated in the right testes, no overlying skin changes or discoloration  Musculoskeletal:         General: Swelling and signs of injury  present.      Comments: Left calf wound with wound vac in place   Skin:     General: Skin is warm.      Findings: Erythema present.   Neurological:      General: No focal deficit present.      Mental Status: He is alert and oriented to person, place, and time.   Psychiatric:         Mood and Affect: Mood normal.         Behavior: Behavior normal.         Fluids    Intake/Output Summary (Last 24 hours) at 10/27/2020 0727  Last data filed at 10/26/2020 1747  Gross per 24 hour   Intake 880 ml   Output --   Net 880 ml       Laboratory  Recent Labs     10/27/20  0441   WBC 7.0   RBC 4.77   HEMOGLOBIN 14.0   HEMATOCRIT 42.5   MCV 89.1   MCH 29.4   MCHC 32.9*   RDW 42.4   PLATELETCT 107*   MPV 10.0     Recent Labs     10/27/20  0441   SODIUM 136   POTASSIUM 4.1   CHLORIDE 101   CO2 25   GLUCOSE 91   BUN 18   CREATININE 1.20   CALCIUM 9.0                   Imaging  EC-ECHOCARDIOGRAM COMPLETE W/O CONT   Final Result      CT-ABDOMEN-PELVIS W/O   Final Result         1.  No acute abnormality.   2.  Left apical lithiasis without visualized obstructive changes.   3.  Structure which appears to originate from the anterior margin of the spleen, stable since prior study and previously suspected of being exophytic hemangioma.   4.  Hepatomegaly with enlargement of the caudate lobe, similar to prior study   5.  Fat-containing bilateral inguinal hernias.   6.  Atherosclerosis      [id number]   Final Result         1.  Heterogeneous hypoechoic area within the right testis, appearance cannot exclude intratesticular mass. Recommend urologic evaluation for further management.   2.  Linear echogenic structure along the lateral aspect of the left testis, similar to prior study, appearance suggests fat, poorly dense calcification, or possible scarring.   3.  Trace bilateral hydroceles.   4.  Small bilateral varicoceles.      These findings were discussed with the patient's clinician, Dr. Rivas, on 10/23/2020 7:32 AM.       CT-EXTREMITY, LOWER WITH LEFT   Final Result      Left lower extremity cellulitis, with a superficial wound on the posterior calf. No drainable abscess. No evidence of osteomyelitis. No soft tissue gas to suggest necrotizing fasciitis.      DX-CHEST-LIMITED (1 VIEW)   Final Result         1.  Right basilar atelectasis or early infiltrate.           Assessment/Plan  * Cellulitis- (present on admission)  Assessment & Plan  Cellulitis of the left posterior calf with Nonhealing wound on left lower extremity for last 4 months status post biopsy 6 days ago  Biopsy shows ischemic/granulaiton tissues, no malignancy identified   Continue with Unasyn, ID consulted, appreciate recommendation   S/P I&D by plastic surgery 10/22, appreciate recommendations and help with management   Cont on wound care/ wound vac   Pending home health set up for PT/OT and wound VAC    Testicular mass  Assessment & Plan  Ultrasound showing right  Heterogeneous hypoechoic area within the right testis, appearance cannot exclude intratesticular mass  - was seen in July 2020 for Left sided testicular pain, US showed no mass/lesions at that time   - urology consulted, tumor markers collected, follow up with Urology as an outpatient    Generalized edema- (present on admission)  Assessment & Plan  Trace lower extremity edema on today's evaluation  Continue to monitor    Smoker  Assessment & Plan  Counseling on discharge    History of DVT (deep vein thrombosis)  Assessment & Plan  Continue home Xarelto       VTE prophylaxis: Xarelto

## 2020-10-27 NOTE — CARE PLAN
Problem: Venous Thromboembolism (VTW)/Deep Vein Thrombosis (DVT) Prevention:  Goal: Patient will participate in Venous Thrombosis (VTE)/Deep Vein Thrombosis (DVT)Prevention Measures  Note: History of PE/DVT. Pt taking xarelto daily.     Problem: Pain Management  Goal: Pain level will decrease to patient's comfort goal  Note: Oxycodone 10 mg given for left leg pain per MAR. Wound vac in place.

## 2020-10-27 NOTE — DISCHARGE PLANNING
Agency/Facility Name: [facility]  Spoke To: Danielle  Outcome: Referral received, running insurance auth.

## 2020-10-27 NOTE — DISCHARGE PLANNING
Agency/Facility Name: [facility]   Outcome: Left a message for Danielle in admissions.  Awaiting a call back.    [facility]ency[facility]Facility Name[facility]  Spoke To: Janice  Outcome: Referral received.  Will be processed shortly.

## 2020-10-27 NOTE — PROGRESS NOTES
Pt AOx4, medicated for left leg pain (8/10). IV abx administered, no adverse rxn noted at this time. Wound vac patent, no issues. Pt fatigued, requested to take ambien for sleep. Call light within reach, personal belongings available, bed in lowest position, treaded socks on, and hourly rounding in place.

## 2020-10-27 NOTE — PROGRESS NOTES
Assumed care of patient this shift, alert and oriented x4. PRN oxycodone per MAR. Up independently. IV antibiotics. POC discussed with patient. Educated on fall risk/precautions. Call bell within reach. Plan for discharge today pending home health referral.

## 2020-10-27 NOTE — CARE PLAN
Problem: Infection  Goal: Will remain free from infection  Outcome: PROGRESSING AS EXPECTED   IV antibiotics- planning to switch to oral medications prior to discharge     Problem: Pain Management  Goal: Pain level will decrease to patient's comfort goal  Outcome: PROGRESSING AS EXPECTED   Medicated with oxycodone per mar

## 2020-10-27 NOTE — DISCHARGE PLANNING
Received Choice form at 7614  Agency/Facility Name: [facility]  Referral sent per Choice form @ 0138

## 2020-10-27 NOTE — FACE TO FACE
Face to Face Note  -  Durable Medical Equipment    Yoan Hernandez M.D. - NPI: [id number]  I certify that this patient is under my care and that they have had a durable medical equipment(DME)face to face encounter by myself that meets the physician DME face-to-face encounter requirements with this patient on:    Date of encounter:   Patient:                    MRN:                       YOB: 2021  Panchito Moura  [id number]  1965     The encounter with the patient was in whole, or in part, for the following medical condition, which is the primary reason for durable medical equipment:  Wound Care    I certify that, based on my findings, the following durable medical equipment is medically necessary:  Wound Vac.    Wound infection, Wound/Incision        ------------------------------------------------------------------------------------------------------------------    Face to Face Supporting Documentation - Home Health    The encounter with this patient was in whole or in part the primary reason for home health admission.    Date of encounter:   Patient:                    MRN:                       YOB: 2021  Panchito Moura  [id number]  1965     Home health to see patient for:  Skilled Nursing care for assessment, interventions & education, Wound Care, Physical Therapy evaluation and treatment and Occupational therapy evaluation and treatment    Skilled need for:  Medication Management wound vac, PT/OT    Skilled nursing interventions to include:  Wound Care    Homebound evidenced status by:  Need the aid of supportive devices such as crutches, canes, wheelchairs or walkers. Leaving home must require a considerable and taxing effort. There must exist a normal inability to leave the home.    Community Physician to provide follow up care: Gio Wang M.D.     Optional Interventions    Wound information & treatment:    Home Infusion Therapy  orders:    Line/Drain/Airway:    I certify the face to face encounter for this home care referral meets the CMS requirements and the encounter/clinical assessment with the patient was, in whole, or in part, for the medical condition(s) listed above, which is the primary reason for home health care. Based on my clinical findings: the service(s) are medically necessary, support the need for home health care, and the homebound criteria are met.  I certify that this patient has had a face to face encounter by myself.  Yoan Hernandez M.D. - NPI: [id number]    *Debility, frailty and advanced age in the absence of an acute deterioration or exacerbation of a condition do not qualify a patient for home health.

## 2020-10-27 NOTE — THERAPY
"Occupational Therapy   Initial Evaluation     Patient Name: Panchito Moura  Age:  55 y.o., Sex:  male  Medical Record #: [id number]  Today's Date: 10/27/2020    Precautions: Other (See Comments)  Comments: L gastroc wound vac     Assessment  Patient is 55 y.o. male admitted with L gastroc wound that has progressively worsened over last several months. Pt demonstrated basic ADLs and ADL transfers at SPV level despite wound vac to LLE. Pt reports FWW use for longer distances. Pt expressed his only concern for DC home would be tending to his wound, pt reassured that specialized RN's would continue to assist with his wound care. No additional acute OT needs at this time.     Plan    Recommend Occupational Therapy for Evaluation only  DC Equipment Recommendations: Tub / Shower Seat  Discharge Recommendations: Anticipate that the patient will have no further occupational therapy needs after discharge from the hospital     Subjective    \"I'm fine other than this thing, I can't take care of it.\"         10/27/20 1044   Prior Living Situation   Housing / Facility 1 Story Apartment / Condo   Steps Into Home 2   Bathroom Set up Bathtub / Shower Combination   Equipment Owned None   Lives with - Patient's Self Care Capacity Alone and Able to Care For Self   Comments brother lives nearby   Prior Level of ADL Function   Comments independent   Prior Level of IADL Function   Prior Level Of Mobility Independent Without Device in Community;Independent Without Device in Home   Driving / Transportation Driving Independent   Occupation (Pre-Hospital Vocational) Employed Full Time  ()   Precautions   Comments L gastroc wound vac    Pain 0 - 10 Group   Therapist Pain Assessment During Activity;Nurse Notified  (not rated, limping when WB)   Balance Assessment   Weight Shift Sitting Good   Weight Shift Standing Fair   Comments no AD   Bed Mobility    Supine to Sit Modified Independent   Sit to Supine Modified Independent   Scooting " Modified Independent   ADL Assessment   Eating Modified Independent   Grooming Supervision   Upper Body Dressing Modified Independent   Lower Body Dressing Modified Independent   Toileting Supervision   Functional Mobility   Sit to Stand Supervised   Bed, Chair, Wheelchair Transfer Supervised   Toilet Transfers Supervised   Transfer Method Stand Step   Mobility no AD for household spaces   Edema / Skin Assessment   Edema / Skin  Not Assessed   Comments LLE wound, edema   Activity Tolerance   Sitting in Chair functional   Sitting Edge of Bed functional   Standing functional   Anticipated Discharge Equipment and Recommendations   DC Equipment Recommendations Tub / Shower Seat   Discharge Recommendations Anticipate that the patient will have no further occupational therapy needs after discharge from the hospital

## 2020-10-28 PROCEDURE — 97606 NEG PRS WND THER DME>50 SQCM: CPT

## 2020-10-28 PROCEDURE — A9270 NON-COVERED ITEM OR SERVICE: HCPCS | Performed by: INTERNAL MEDICINE

## 2020-10-28 PROCEDURE — 302098 PASTE RING (FLAT): Performed by: STUDENT IN AN ORGANIZED HEALTH CARE EDUCATION/TRAINING PROGRAM

## 2020-10-28 PROCEDURE — 99232 SBSQ HOSP IP/OBS MODERATE 35: CPT | Performed by: STUDENT IN AN ORGANIZED HEALTH CARE EDUCATION/TRAINING PROGRAM

## 2020-10-28 PROCEDURE — 700102 HCHG RX REV CODE 250 W/ 637 OVERRIDE(OP): Performed by: INTERNAL MEDICINE

## 2020-10-28 PROCEDURE — A9270 NON-COVERED ITEM OR SERVICE: HCPCS | Performed by: STUDENT IN AN ORGANIZED HEALTH CARE EDUCATION/TRAINING PROGRAM

## 2020-10-28 PROCEDURE — 700102 HCHG RX REV CODE 250 W/ 637 OVERRIDE(OP): Performed by: STUDENT IN AN ORGANIZED HEALTH CARE EDUCATION/TRAINING PROGRAM

## 2020-10-28 PROCEDURE — 700111 HCHG RX REV CODE 636 W/ 250 OVERRIDE (IP): Performed by: INTERNAL MEDICINE

## 2020-10-28 PROCEDURE — 700105 HCHG RX REV CODE 258: Performed by: INTERNAL MEDICINE

## 2020-10-28 PROCEDURE — 770006 HCHG ROOM/CARE - MED/SURG/GYN SEMI*

## 2020-10-28 RX ADMIN — OXYCODONE 5 MG: 5 TABLET ORAL at 12:42

## 2020-10-28 RX ADMIN — ZOLPIDEM TARTRATE 5 MG: 5 TABLET ORAL at 00:30

## 2020-10-28 RX ADMIN — SODIUM CHLORIDE 3 G: 900 INJECTION INTRAVENOUS at 05:53

## 2020-10-28 RX ADMIN — OXYCODONE 5 MG: 5 TABLET ORAL at 00:30

## 2020-10-28 RX ADMIN — SODIUM CHLORIDE 3 G: 900 INJECTION INTRAVENOUS at 00:05

## 2020-10-28 RX ADMIN — SODIUM CHLORIDE 3 G: 900 INJECTION INTRAVENOUS at 19:57

## 2020-10-28 RX ADMIN — RIVAROXABAN 20 MG: 20 TABLET, FILM COATED ORAL at 19:55

## 2020-10-28 ASSESSMENT — ENCOUNTER SYMPTOMS
MYALGIAS: 1
EYE DISCHARGE: 0
EYE PAIN: 0
NAUSEA: 0
HEARTBURN: 0
VOMITING: 0
CHILLS: 0
ABDOMINAL PAIN: 0
SHORTNESS OF BREATH: 0
FEVER: 0
COUGH: 0
PALPITATIONS: 0

## 2020-10-28 ASSESSMENT — PAIN DESCRIPTION - PAIN TYPE
TYPE: ACUTE PAIN
TYPE: ACUTE PAIN

## 2020-10-28 NOTE — DISCHARGE PLANNING
Agency/Facility Name: [facility]  Outcome: Left a message for Danielle regarding referral.  Awaiting a call back.

## 2020-10-28 NOTE — ANESTHESIA PROCEDURE NOTES
Airway    Date/Time: 10/22/2020 5:52 PM  Performed by: Henna Stern M.D.  Authorized by: Henna Stern M.D.     Location:  OR  Urgency:  Elective  Difficult Airway: No    Indications for Airway Management:  Anesthesia      Spontaneous Ventilation: absent    Sedation Level:  Deep  Preoxygenated: Yes    Patient Position:  Sniffing  Mask Difficulty Assessment:  1 - vent by mask  Final Airway Type:  Endotracheal airway  Final Endotracheal Airway:  ETT  Cuffed: Yes    Technique Used for Successful ETT Placement:  Direct laryngoscopy  Devices/Methods Used in Placement:  Anterior pressure/BURP    Insertion Site:  Oral  Blade Type:  Jeanie  Laryngoscope Blade/Videolaryngoscope Blade Size:  3  ETT Size (mm):  7.5  Measured from:  Teeth  ETT to Teeth (cm):  23  Placement Verified by: auscultation and capnometry    Cormack-Lehane Classification:  Grade IIa - partial view of glottis  Number of Attempts at Approach:  1          
Peripheral IV    Date/Time: 10/22/2020 5:42 PM  Performed by: Henna Stern M.D.  Authorized by: Henna Stern M.D.     Size:  20 G  Laterality:  Right  Local Anesthetic:  None  Site Prep:  Alcohol  Technique:  Direct puncture  Attempts:  1   Existing PIV in AC infiltrated upon examination in OR        
+texas catheter/unable to perform

## 2020-10-28 NOTE — DISCHARGE PLANNING
Care Transition Team Discharge Planning    Anticipated Discharge Information  Discharge Disposition: Discharged to home/self care (01)  Discharge Address: [address], [address] [place], [place]   Discharge Contact Phone Number: [phone]       Discharge Plan:  Patient is discharging home today to a new living arrangement. Patient has rented an apartment in [place], recently  from his spouse. He will have home health services, referral has been sent to [facility], waiting for acceptance. This RN,  asked Macarena HUSTON to follow up with [facility] for acceptance. Rehana from [facility] called and stated that home wound vac will be delivered to patient's bedside before 1500 today. Patient's fww was already delivered last night to bedside. Bedside RN notified of discharge plans today. Patient's brother will pick him up today about 1500.     Heather Hernandez RN,

## 2020-10-28 NOTE — PROGRESS NOTES
Pt AOx4, reported LLE pain 6/10, just received pain med @1750. Pt in a good mood and joking with staff. Pt sitting up in bed watching television. Wound vac patent. Call light within reach, personal belongings available, bed in lowest position, treaded socks on, and hourly rounding in place.

## 2020-10-28 NOTE — PROGRESS NOTES
Bedside report received. Pt is A&Ox4. Pt is complaining of pain 6/10 on LLE. Pt assessed for pain regularly and medicated PRN per MAR. Wound team was here today to assess pt, pt is not going to go home today with home health.  POC discussed with pt; all questions answered at this time.

## 2020-10-28 NOTE — PROGRESS NOTES
[facility] Medicine Daily Progress Note    Date of Service  10/28/2020    Chief Complaint  55 y.o. male admitted 10/20/2020 with left calf wound and cellulitis     Hospital Course    This is a 54 y/o M with PMHX of DVT and PE who was admitted on 10/20/20 after presenting to ER complaining of worsening left calf wound. Per patient, is progressively getting worse and more swollen prompting patient to come in. Pt started on abx and case was discussed with ortho who recommended to consult plastic surgery.  Plastic surgery has been consulted and completed I&D 10/22.       Interval Problem Update  Pt seen and examined, no acute events overnight, still with left leg pain and swelling but improved post-op.   Left calf wound vac in place, resume Xarelto when cleared by Sx   Wound Cx pending, path showing ischemic tissue w/ granulation, blood Cx NTD  Continue Unasyn, ID following, appreciate recommendations   Right testicular mass seen on Imaging, Urology consulted, appreciate recommendations   Still with relatively significant B/L LE edema, requiring O2 at night, question LIBAN or Rt heart issues- will get echocardiogram to evaluate this     10/24 Reports pain at left calf wound site with walking. Oxycodone PRN. Ordered wound care. Urology will follow up with pt regarding testicular mass in clinic.     10/25 Pt had wound vac and dressings changed yesterday/ Pt reprorts improvement in pain. Will place referral for wound clinic. PT OT eval.    10/26: pt see and examined, afebrile, no overnight events, on wound vac. pain controlled. No nausea or vomiting, afebrile. On abx as per ID rec.     10/27: Patient evaluated bedside and found afebrile in no acute distress.  No leukocytosis today and patient reporting lower extremity pain improvement.  Patient to be transition to p.o. antibiotics on discharge.  Pending home health set up for PT/OT and wound care    10/28: Patient will have a bedside for no acute distress.  Vitals and  saturating well on room air.  Pending home health set up for PT/OT and wound care (wound VAC)    Consultants/Specialty  Plastic surgery   ID   Urology     Code Status  Full Code    Disposition  Home health set up    Review of Systems  Review of Systems   Constitutional: Negative for chills and fever.   HENT: Negative for hearing loss and tinnitus.    Eyes: Negative for pain and discharge.   Respiratory: Negative for cough and shortness of breath.    Cardiovascular: Negative for chest pain and palpitations.   Gastrointestinal: Negative for abdominal pain, heartburn, nausea and vomiting.   Genitourinary: Negative for dysuria and urgency.        Denies scrotal pain   Musculoskeletal: Positive for joint pain and myalgias.        Left leg pain   All other systems reviewed and are negative.       Physical Exam  Temp:  [36.3 °C (97.4 °F)-36.6 °C (97.8 °F)] 36.3 °C (97.4 °F)  Pulse:  [50-62] 59  Resp:  [12-18] 12  BP: (101-131)/(51-85) 110/70  SpO2:  [92 %-95 %] 92 %    Physical Exam  Vitals signs and nursing note reviewed.   Constitutional:       General: He is not in acute distress.     Appearance: Normal appearance. He is obese. He is not ill-appearing.   HENT:      Head: Normocephalic and atraumatic.      Mouth/Throat:      Mouth: Mucous membranes are moist.      Comments: Poor dentition  Eyes:      General: No scleral icterus.     Extraocular Movements: Extraocular movements intact.      Pupils: Pupils are equal, round, and reactive to light.   Neck:      Musculoskeletal: Normal range of motion.   Cardiovascular:      Rate and Rhythm: Normal rate and regular rhythm.      Pulses: Normal pulses.      Heart sounds: No murmur.   Pulmonary:      Effort: Pulmonary effort is normal.      Breath sounds: No wheezing.      Comments: Diminished breath sounds bilaterally   Abdominal:      General: Abdomen is flat. There is no distension.      Tenderness: There is no abdominal tenderness. There is no guarding or rebound.    Genitourinary:     Comments: ~2.5 cm mass palpated in the right testes, no overlying skin changes or discoloration  Musculoskeletal:         General: Swelling and signs of injury present.      Comments: Left calf wound with wound vac in place   Skin:     General: Skin is warm.      Findings: Erythema present.   Neurological:      General: No focal deficit present.      Mental Status: He is alert and oriented to person, place, and time.   Psychiatric:         Mood and Affect: Mood normal.         Behavior: Behavior normal.         Fluids    Intake/Output Summary (Last 24 hours) at 10/28/2020 1534  Last data filed at 10/28/2020 1220  Gross per 24 hour   Intake 480 ml   Output --   Net 480 ml       Laboratory  Recent Labs     10/27/20  0441   WBC 7.0   RBC 4.77   HEMOGLOBIN 14.0   HEMATOCRIT 42.5   MCV 89.1   MCH 29.4   MCHC 32.9*   RDW 42.4   PLATELETCT 107*   MPV 10.0     Recent Labs     10/27/20  0441   SODIUM 136   POTASSIUM 4.1   CHLORIDE 101   CO2 25   GLUCOSE 91   BUN 18   CREATININE 1.20   CALCIUM 9.0                   Imaging  EC-ECHOCARDIOGRAM COMPLETE W/O CONT   Final Result      CT-ABDOMEN-PELVIS W/O   Final Result         1.  No acute abnormality.   2.  Left apical lithiasis without visualized obstructive changes.   3.  Structure which appears to originate from the anterior margin of the spleen, stable since prior study and previously suspected of being exophytic hemangioma.   4.  Hepatomegaly with enlargement of the caudate lobe, similar to prior study   5.  Fat-containing bilateral inguinal hernias.   6.  Atherosclerosis      [id number]   Final Result         1.  Heterogeneous hypoechoic area within the right testis, appearance cannot exclude intratesticular mass. Recommend urologic evaluation for further management.   2.  Linear echogenic structure along the lateral aspect of the left testis, similar to prior study, appearance suggests fat, poorly dense calcification, or possible scarring.    3.  Trace bilateral hydroceles.   4.  Small bilateral varicoceles.      These findings were discussed with the patient's clinician, Dr. Rivas, on 10/23/2020 7:32 AM.      CT-EXTREMITY, LOWER WITH LEFT   Final Result      Left lower extremity cellulitis, with a superficial wound on the posterior calf. No drainable abscess. No evidence of osteomyelitis. No soft tissue gas to suggest necrotizing fasciitis.      DX-CHEST-LIMITED (1 VIEW)   Final Result         1.  Right basilar atelectasis or early infiltrate.           Assessment/Plan  * Cellulitis- (present on admission)  Assessment & Plan  Cellulitis of the left posterior calf with Nonhealing wound on left lower extremity for last 4 months status post biopsy 6 days ago  Biopsy shows ischemic/granulaiton tissues, no malignancy identified   Continue with Unasyn, ID consulted, appreciate recommendation   S/P I&D by plastic surgery 10/22, appreciate recommendations and help with management   Cont on wound care/ wound vac   Pending home health set up for PT/OT and wound VAC    Testicular mass  Assessment & Plan  Ultrasound showing right  Heterogeneous hypoechoic area within the right testis, appearance cannot exclude intratesticular mass  - was seen in July 2020 for Left sided testicular pain, US showed no mass/lesions at that time   - urology consulted, tumor markers collected, follow up with Urology as an outpatient    Generalized edema- (present on admission)  Assessment & Plan  Trace lower extremity edema on today's evaluation  Continue to monitor    Smoker  Assessment & Plan  Counseling on discharge    History of DVT (deep vein thrombosis)  Assessment & Plan  Continue home Xarelto       VTE prophylaxis: Xarelto

## 2020-10-28 NOTE — DISCHARGE PLANNING
Medication reconcilliation completed. Medications delivered to patient at bedside. Patient counseled.     Panchito Moura   Home Medication Instructions ALEIDA:[id number]    Printed on:10/27/20 4118   Medication Information                      amoxicillin-clavulanate (AUGMENTIN) 875-125 MG Tab  Take 1 Tab by mouth 2 times a day for 2 days.             rivaroxaban (XARELTO) 20 MG Tab tablet  Take 1 Tab by mouth with dinner.

## 2020-10-28 NOTE — DISCHARGE PLANNING
Agency/Facility Name: [facility]  Spoke To: Danielle  Outcome: Confirming they have the correct policy number.

## 2020-10-28 NOTE — WOUND TEAM
[facility]  Inpatient Services  Wound and Skin Care Evaluation    Admission Date: 10/20/2020     Last order of IP CONSULT TO WOUND CARE was found on 10/24/2020 from Hospital Encounter on 10/19/2020     HPI, PMH, SH: Reviewed    Unit where seen by Wound Team: [id number]     WOUND CONSULT/FOLLOW UP RELATED TO:  NPWT dressing change     Self Report / Pain Level:  Pre-med with PO and soaked foam for 10 minutes with Lidocaine. Tolerated well, removing was 8/10 pain.      OBJECTIVE:  NPWT intact. Regular redistribution mattress. Able to reposition self.     WOUND TYPE, LOCATION, CHARACTERISTICS (Pressure Injuries: location, stage, POA or date identified)       Negative Pressure Wound Therapy 10/22/20 Leg Posterior (Active)   NPWT Pump Mode / Pressure Setting Ulta;Continuous;125 mmHg    Dressing Type Gray Foam (Cleanse Choice);Medium    Number of Foam Pieces Used 2    Canister Changed No    NEXT Dressing Change/Treatment Date 10/27/20    VAC VeraFlo Irrigant Normal Saline    VAC VeraFlo Soak Time (mins) 10    VAC VeraFlo Instill Volume (ml) 30    VAC VeraFlo - Therapy Time (hrs) 2    VAC VeraFlo Pressure (mm/Hg) 125 mmHg;Continuous    WOUND NURSE ONLY - Time Spent with Patient (mins) 75            Wound 10/07/20 Full Thickness Wound Leg Posterior Left (Active)   Wound Image      Site Assessment Black;Red;Edema;Purple    Periwound Assessment Fragile;Edema    Margins Defined edges    Closure Secondary intention    Drainage Amount Small    Drainage Description Serosanguineous    Treatments Cleansed;Site care;Tape change    Wound Cleansing Approved Wound Cleanser    Periwound Protectant Paste Ring;Skin Protectant Wipes to Periwound    Dressing Cleansing/Solutions Normal Saline    Dressing Options Wound Vac    Dressing Changed Changed    Dressing Status Clean;Dry;Intact    Dressing Change/Treatment Frequency Monday, Wednesday, Friday, and As Needed    NEXT Dressing Change/Treatment Date 10/30/20    NEXT Weekly  Photo (Inpatient Only) 10/30/20    Non-staged Wound Description Full thickness            Vascular:    CARLA:   No results found.    Lab Values:    Lab Results   Component Value Date/Time    WBC 7.0 10/27/2020 04:41 AM    RBC 4.77 10/27/2020 04:41 AM    HEMOGLOBIN 14.0 10/27/2020 04:41 AM    HEMATOCRIT 42.5 10/27/2020 04:41 AM    CREACTPROT 5.52 (H) 10/19/2020 11:59 PM    SEDRATEWES 20 10/19/2020 11:59 PM        Culture Results show:  Recent Results (from the past 720 hour(s))   CULTURE WOUND W/ GRAM STAIN    Collection Time: 10/20/20  3:26 AM    Specimen: Left Leg; Wound   Result Value Ref Range    Significant Indicator NEG     Source WND     Site LEFT LEG     Culture Result Moderate growth mixed skin yesenia.     Gram Stain Result Rare WBCs.  Rare Gram positive cocci.          INTERVENTIONS BY WOUND TEAM:  Patient and chart reviewed. In to change NPWT to left calf. Soaked dressing with 4% Lidocaine and NS. Carefully removed, no retained foam. Cleansed with wound cleanser and gauze. Picture taken for MD. Updated NP on wound appearance. Wound still with non-viable black tissue present. Prepped periwound with no-sting skin prep and paste ring. Cut to fit waffle cleanse choice grey foam and applied to wound bed, cut to fit thin grey solid foam and placed over, secured with drape. Hole cut and trak pad applied, tubing going downward per patient request. Suction obtained at 125mmhg, no leaks present, test cycle completed with NS. Applied fenestrated mepilex under tubing and loosely secured in place with drape. Patient returned to comfortable position. Updated bedside RN Marcos.    Interdisciplinary consultation: Patient, Bedside RN Carleen), wound rn Mary Lou    EVALUATION / RATIONALE FOR TREATMENT: The Veraflo was placed in order to assist in removing black nonviable tissue, and to promote granulation. Placed waffle cleanse choice back on to wound bed to encourage debridement of slough since not much improvement with  regular grey foam. Edema improved per patient since last assessment. Discussed with attending NP who reached out to attending MD to update on wound bed and how it is not ready for regular NPWT yet due to non-viable tissue amount. Wound team to continue to follow and change 3x/week.      Goals: Steady decrease in wound area and depth weekly.    NURSING PLAN OF CARE ORDERS (X):    Dressing changes: See Dressing Care orders: X  Skin care: See Skin Care orders:   RN Prevention Protocol:   Rectal tube care: See Rectal Tube Care orders:   Other orders:     WOUND TEAM PLAN OF CARE:   Dressing changes by wound team:                   Follow up 3 times weekly:                NPWT change 3 times weekly:  X   Follow up 1-2 times weekly:      Follow up Bi-Monthly:                   Follow up as needed:       Other (explain):     Anticipated discharge plans: X patient in need of ongoing wound care for VAC changes, not ready to be discontinued from veraflo cleanse choice yet.   LTACH:        SNF/Rehab:                  Home Health Care:           Outpatient Wound Center:            Self Care:

## 2020-10-28 NOTE — CARE PLAN
Problem: Infection  Goal: Will remain free from infection  Note: Continues to be on IV unasyn Q6h. Afebrile. LLE wound vac in place.     Problem: Pain Management  Goal: Pain level will decrease to patient's comfort goal  Note: Pt reported 8/10 LLE pain.  Medicated with 10mg of oxycodone.

## 2020-10-28 NOTE — CARE PLAN
Problem: Knowledge Deficit  Goal: Knowledge of disease process/condition, treatment plan, diagnostic tests, and medications will improve  Outcome: PROGRESSING AS EXPECTED  Note: Pt educated regarding plan of care and medications. All questions answered.       Problem: Pain Management  Goal: Pain level will decrease to patient's comfort goal  Outcome: PROGRESSING AS EXPECTED  Note: Pt assessed for pain regularly and medicated PRN per MAR.

## 2020-10-29 PROCEDURE — A9270 NON-COVERED ITEM OR SERVICE: HCPCS | Performed by: INTERNAL MEDICINE

## 2020-10-29 PROCEDURE — 700102 HCHG RX REV CODE 250 W/ 637 OVERRIDE(OP): Performed by: STUDENT IN AN ORGANIZED HEALTH CARE EDUCATION/TRAINING PROGRAM

## 2020-10-29 PROCEDURE — 700102 HCHG RX REV CODE 250 W/ 637 OVERRIDE(OP): Performed by: INTERNAL MEDICINE

## 2020-10-29 PROCEDURE — 99232 SBSQ HOSP IP/OBS MODERATE 35: CPT | Performed by: STUDENT IN AN ORGANIZED HEALTH CARE EDUCATION/TRAINING PROGRAM

## 2020-10-29 PROCEDURE — 770006 HCHG ROOM/CARE - MED/SURG/GYN SEMI*

## 2020-10-29 PROCEDURE — A9270 NON-COVERED ITEM OR SERVICE: HCPCS | Performed by: STUDENT IN AN ORGANIZED HEALTH CARE EDUCATION/TRAINING PROGRAM

## 2020-10-29 PROCEDURE — 700111 HCHG RX REV CODE 636 W/ 250 OVERRIDE (IP): Performed by: INTERNAL MEDICINE

## 2020-10-29 PROCEDURE — 700105 HCHG RX REV CODE 258: Performed by: INTERNAL MEDICINE

## 2020-10-29 RX ADMIN — SODIUM CHLORIDE 3 G: 900 INJECTION INTRAVENOUS at 11:45

## 2020-10-29 RX ADMIN — OXYCODONE 5 MG: 5 TABLET ORAL at 00:04

## 2020-10-29 RX ADMIN — ZOLPIDEM TARTRATE 5 MG: 5 TABLET ORAL at 22:05

## 2020-10-29 RX ADMIN — SODIUM CHLORIDE 3 G: 900 INJECTION INTRAVENOUS at 05:57

## 2020-10-29 RX ADMIN — RIVAROXABAN 20 MG: 20 TABLET, FILM COATED ORAL at 17:39

## 2020-10-29 RX ADMIN — OXYCODONE 5 MG: 5 TABLET ORAL at 16:01

## 2020-10-29 RX ADMIN — SODIUM CHLORIDE 3 G: 900 INJECTION INTRAVENOUS at 17:40

## 2020-10-29 RX ADMIN — ZOLPIDEM TARTRATE 5 MG: 5 TABLET ORAL at 00:04

## 2020-10-29 RX ADMIN — SODIUM CHLORIDE 3 G: 900 INJECTION INTRAVENOUS at 00:04

## 2020-10-29 RX ADMIN — OXYCODONE 5 MG: 5 TABLET ORAL at 22:05

## 2020-10-29 ASSESSMENT — ENCOUNTER SYMPTOMS
VOMITING: 0
MYALGIAS: 1
EYE DISCHARGE: 0
CHILLS: 0
NAUSEA: 0
SHORTNESS OF BREATH: 0
ABDOMINAL PAIN: 0
EYE PAIN: 0
HEARTBURN: 0
COUGH: 0
PALPITATIONS: 0
FEVER: 0

## 2020-10-29 ASSESSMENT — PAIN DESCRIPTION - PAIN TYPE
TYPE: ACUTE PAIN

## 2020-10-29 NOTE — DISCHARGE PLANNING
Received Choice form at [phone]  Agency/Facility Name: [facility]  Referral sent per Choice form @ [phone]

## 2020-10-29 NOTE — PROGRESS NOTES
Pt AOx4, left calf pain reported, declined intervention at this time. Tolerating IV abx, no adverse rxn noted at this time. Pt watching television, needs met. Call light within reach, personal belongings available, bed in lowest position, treaded socks on, and hourly rounding in place.

## 2020-10-29 NOTE — DISCHARGE PLANNING
Anticipated Discharge Disposition: Patient was not discharged to home yesterday with home health services and [facility]. Wound RN rounded on patient and made the recommendation to keep the Vera Azalia Vac system on patient for the next several weeks, his wound on Left calf is complex. Patient cannot discharge home with a Vera Azalia Vac. Discharge was put on hold.     Action: This RN,  spoke with Daryl from [facility], she stated that Vera Azalia cannot be used at home, it is a wound vac that can only be used at an LTAC or SNF.     This RN,  sent a choice referral to Macarena [facility]UST[facility] for [facility].     Patient is aware he cannot discharge home with the Opal azalia wound vac.     Barriers to Discharge: Accepting LTAC or SNF. Referral has been placed. Waiting for acceptance.     Plan:  to follow up with [facility] for LTAC acceptance.     Heather Hernandez RN,

## 2020-10-29 NOTE — WOUND TEAM
Pt has a complex wound on the L calf that is benefiting from the Vera Sly VAC system.  Wound team recommends continuing this therapy for the next several weeks.

## 2020-10-29 NOTE — PROGRESS NOTES
"Bedside report received. Pt is A&Ox4, pt is resting in bed on room air. Pt has no complaints of pain at this time. Wound vac in place, cannister was changed today. Pt had 500 ml output for wound vac. Per wound care pt requires Vera Lou VAC, wound was not improving per wound care during dressing changed 10/28/20. Pt requires additional therapy on specific wound vac \"Vera LOU\" pt cannot go home with this type of wound vac, plan is for pt to go to SNF or LTAC for therapy. Pt was updated regarding changes to discharge plan. POC discussed with pt; all questions answered at this time.   "

## 2020-10-29 NOTE — PROGRESS NOTES
[facility] Medicine Daily Progress Note    Date of Service  10/29/2020    Chief Complaint  55 y.o. male admitted 10/20/2020 with left calf wound and cellulitis     Hospital Course    This is a 56 y/o M with PMHX of DVT and PE who was admitted on 10/20/20 after presenting to ER complaining of worsening left calf wound. Per patient, is progressively getting worse and more swollen prompting patient to come in. Pt started on abx and case was discussed with ortho who recommended to consult plastic surgery.  Plastic surgery has been consulted and completed I&D 10/22.       Interval Problem Update  Patient evaluated bedside and found afebrile in no acute distress  Patient needs a Vera Sly VAC system as per wound care, H/H will not provide  Placed Referral for LTAC and SNF    Consultants/Specialty  Plastic surgery   Infectious disease  Urology     Code Status  Full Code    Disposition  Pending placement    Review of Systems  Review of Systems   Constitutional: Negative for chills and fever.   HENT: Negative for hearing loss and tinnitus.    Eyes: Negative for pain and discharge.   Respiratory: Negative for cough and shortness of breath.    Cardiovascular: Negative for chest pain and palpitations.   Gastrointestinal: Negative for abdominal pain, heartburn, nausea and vomiting.   Genitourinary: Negative for dysuria and urgency.        Denies scrotal pain   Musculoskeletal: Positive for joint pain and myalgias.        Left leg pain   All other systems reviewed and are negative.       Physical Exam  Temp:  [36.3 °C (97.4 °F)-36.9 °C (98.5 °F)] 36.4 °C (97.5 °F)  Pulse:  [54-59] 55  Resp:  [12-18] 15  BP: (110-122)/(63-70) 122/63  SpO2:  [92 %-93 %] 93 %    Physical Exam  Vitals signs and nursing note reviewed.   Constitutional:       General: He is not in acute distress.     Appearance: Normal appearance. He is obese. He is not ill-appearing.   HENT:      Head: Normocephalic and atraumatic.      Mouth/Throat:      Mouth: Mucous  membranes are moist.      Comments: Poor dentition  Eyes:      General: No scleral icterus.     Extraocular Movements: Extraocular movements intact.      Pupils: Pupils are equal, round, and reactive to light.   Neck:      Musculoskeletal: Normal range of motion.   Cardiovascular:      Rate and Rhythm: Normal rate and regular rhythm.      Pulses: Normal pulses.      Heart sounds: No murmur.   Pulmonary:      Effort: Pulmonary effort is normal.      Breath sounds: No wheezing.      Comments: Diminished breath sounds bilaterally   Abdominal:      General: Abdomen is flat. There is no distension.      Tenderness: There is no abdominal tenderness. There is no guarding or rebound.   Genitourinary:     Comments: ~2.5 cm mass palpated in the right testes, no overlying skin changes or discoloration  Musculoskeletal:         General: Swelling and signs of injury present.      Comments: Left calf wound with wound vac in place   Skin:     General: Skin is warm.      Findings: Erythema present.   Neurological:      General: No focal deficit present.      Mental Status: He is alert and oriented to person, place, and time.   Psychiatric:         Mood and Affect: Mood normal.         Behavior: Behavior normal.         Fluids    Intake/Output Summary (Last 24 hours) at 10/29/2020 1312  Last data filed at 10/29/2020 0900  Gross per 24 hour   Intake 480 ml   Output --   Net 480 ml       Laboratory  Recent Labs     10/27/20  0441   WBC 7.0   RBC 4.77   HEMOGLOBIN 14.0   HEMATOCRIT 42.5   MCV 89.1   MCH 29.4   MCHC 32.9*   RDW 42.4   PLATELETCT 107*   MPV 10.0     Recent Labs     10/27/20  0441   SODIUM 136   POTASSIUM 4.1   CHLORIDE 101   CO2 25   GLUCOSE 91   BUN 18   CREATININE 1.20   CALCIUM 9.0                   Imaging  EC-ECHOCARDIOGRAM COMPLETE W/O CONT   Final Result      CT-ABDOMEN-PELVIS W/O   Final Result         1.  No acute abnormality.   2.  Left apical lithiasis without visualized obstructive changes.   3.  Structure  which appears to originate from the anterior margin of the spleen, stable since prior study and previously suspected of being exophytic hemangioma.   4.  Hepatomegaly with enlargement of the caudate lobe, similar to prior study   5.  Fat-containing bilateral inguinal hernias.   6.  Atherosclerosis      [id number]   Final Result         1.  Heterogeneous hypoechoic area within the right testis, appearance cannot exclude intratesticular mass. Recommend urologic evaluation for further management.   2.  Linear echogenic structure along the lateral aspect of the left testis, similar to prior study, appearance suggests fat, poorly dense calcification, or possible scarring.   3.  Trace bilateral hydroceles.   4.  Small bilateral varicoceles.      These findings were discussed with the patient's clinician, Dr. Rivas, on 10/23/2020 7:32 AM.      CT-EXTREMITY, LOWER WITH LEFT   Final Result      Left lower extremity cellulitis, with a superficial wound on the posterior calf. No drainable abscess. No evidence of osteomyelitis. No soft tissue gas to suggest necrotizing fasciitis.      DX-CHEST-LIMITED (1 VIEW)   Final Result         1.  Right basilar atelectasis or early infiltrate.           Assessment/Plan  * Cellulitis- (present on admission)  Assessment & Plan  Cellulitis of the left posterior calf with Nonhealing wound on left lower extremity for last 4 months status post biopsy 6 days ago  Biopsy shows ischemic/granulaiton tissues, no malignancy identified   Continue with Unasyn, ID consulted, appreciate recommendation   S/P I&D by plastic surgery 10/22, appreciate recommendations and help with management   Cont on wound care/ wound vac   Patient needs a Vera Sly VAC system as per wound care, H/H will not provide  Placed Referral for LTAC and SNF    Testicular mass  Assessment & Plan  Ultrasound showing right  Heterogeneous hypoechoic area within the right testis, appearance cannot exclude intratesticular  mass  - was seen in July 2020 for Left sided testicular pain, US showed no mass/lesions at that time   - urology consulted, tumor markers collected, follow up with Urology as an outpatient    Generalized edema- (present on admission)  Assessment & Plan  Trace lower extremity edema on today's evaluation  Continue to monitor    Smoker  Assessment & Plan  Counseling on discharge    History of DVT (deep vein thrombosis)  Assessment & Plan  Continue home Xarelto       VTE prophylaxis: Xarelto

## 2020-10-30 PROCEDURE — 302098 PASTE RING (FLAT): Performed by: STUDENT IN AN ORGANIZED HEALTH CARE EDUCATION/TRAINING PROGRAM

## 2020-10-30 PROCEDURE — A9270 NON-COVERED ITEM OR SERVICE: HCPCS | Performed by: INTERNAL MEDICINE

## 2020-10-30 PROCEDURE — 700102 HCHG RX REV CODE 250 W/ 637 OVERRIDE(OP): Performed by: STUDENT IN AN ORGANIZED HEALTH CARE EDUCATION/TRAINING PROGRAM

## 2020-10-30 PROCEDURE — 700102 HCHG RX REV CODE 250 W/ 637 OVERRIDE(OP): Performed by: INTERNAL MEDICINE

## 2020-10-30 PROCEDURE — 700111 HCHG RX REV CODE 636 W/ 250 OVERRIDE (IP): Performed by: STUDENT IN AN ORGANIZED HEALTH CARE EDUCATION/TRAINING PROGRAM

## 2020-10-30 PROCEDURE — 99232 SBSQ HOSP IP/OBS MODERATE 35: CPT | Performed by: STUDENT IN AN ORGANIZED HEALTH CARE EDUCATION/TRAINING PROGRAM

## 2020-10-30 PROCEDURE — 770006 HCHG ROOM/CARE - MED/SURG/GYN SEMI*

## 2020-10-30 PROCEDURE — A9270 NON-COVERED ITEM OR SERVICE: HCPCS | Performed by: STUDENT IN AN ORGANIZED HEALTH CARE EDUCATION/TRAINING PROGRAM

## 2020-10-30 PROCEDURE — 97606 NEG PRS WND THER DME>50 SQCM: CPT

## 2020-10-30 RX ORDER — MORPHINE SULFATE 4 MG/ML
2 INJECTION, SOLUTION INTRAMUSCULAR; INTRAVENOUS
Status: DISCONTINUED | OUTPATIENT
Start: 2020-10-30 | End: 2020-10-31 | Stop reason: HOSPADM

## 2020-10-30 RX ADMIN — MORPHINE SULFATE 2 MG: 4 INJECTION INTRAVENOUS at 12:31

## 2020-10-30 RX ADMIN — RIVAROXABAN 20 MG: 20 TABLET, FILM COATED ORAL at 18:31

## 2020-10-30 RX ADMIN — OXYCODONE 5 MG: 5 TABLET ORAL at 22:45

## 2020-10-30 RX ADMIN — ZOLPIDEM TARTRATE 5 MG: 5 TABLET ORAL at 22:45

## 2020-10-30 RX ADMIN — OXYCODONE 5 MG: 5 TABLET ORAL at 09:47

## 2020-10-30 ASSESSMENT — ENCOUNTER SYMPTOMS
CHILLS: 0
FEVER: 0
EYE DISCHARGE: 0
VOMITING: 0
NAUSEA: 0
HEARTBURN: 0
TINGLING: 1
MYALGIAS: 1
EYE PAIN: 0
SHORTNESS OF BREATH: 0
COUGH: 0
PALPITATIONS: 0
ABDOMINAL PAIN: 0

## 2020-10-30 ASSESSMENT — PAIN DESCRIPTION - PAIN TYPE
TYPE: ACUTE PAIN

## 2020-10-30 NOTE — PROGRESS NOTES
Pt complains of 10/10 pain in LLE post wound care for wound vac dressing changes. MD Gunter ordered 2 mg IV morphine 3 times per week prior to dressing changes with wound care. Information was read back verbally to verify correctness.

## 2020-10-30 NOTE — PROGRESS NOTES
Bedside report received. Pt is resting in bed with Left Lower calf wound vac in place. LLE is elevated with pillows. Pt complains of new  numbness on Left heel. Per pt this starts to feel numb post ambulation up to bathroom or putting pressure on left leg. Pt says this has been happening for a while and would like to talk to MD about this. Dr. Gunter was updated regarding concerns. Pt has R and Left heel float boots in place for support at this time. Pt was medicated per MAR with 5 mg oxycodone PO  per MAR for 6/10 pain in LLE.  POC discussed with pt; all questions answered at this time.

## 2020-10-30 NOTE — CARE PLAN
Problem: Infection  Goal: Will remain free from infection  Outcome: PROGRESSING AS EXPECTED  Pt had last dose of IV Unasyn last shift.      Problem: Bowel/Gastric:  Goal: Normal bowel function is maintained or improved  Outcome: PROGRESSING AS EXPECTED  Pt had bowel movement today, previous BM was 10/23.

## 2020-10-30 NOTE — PROGRESS NOTES
Bedside report received at change of shift. Pt resting in bed at this time. Pt report pain of 4/10 in his left leg, but states that is his normal level and declines intervention at this time. Assessment completed, call light within reach, hourly rounding in place.

## 2020-10-30 NOTE — WOUND TEAM
[facility]  Inpatient Services  Wound and Skin Care Encounter    Admission Date: 10/20/2020     Last order of IP CONSULT TO WOUND CARE was found on 10/24/2020 from Hospital Encounter on 10/19/2020     HPI, PMH, SH: Reviewed    Unit where seen by Wound Team: [id number]     WOUND CONSULT/FOLLOW UP RELATED TO:  Scheduled NPWT dressing change     Self Report / Pain Level:  Pre medicated with p.o., but pain with dressing removal 10/10 and sustained high.  Bedside RN contacted MD for IV pain med order for use with future dressing changes.    OBJECTIVE:  NPWT intact. Regular redistribution mattress. Able to reposition self.     WOUND TYPE, LOCATION, CHARACTERISTICS (Pressure Injuries: location, stage, POA or date identified)    Negative Pressure Wound Therapy 10/22/20 Leg Posterior (Active)   NPWT Pump Mode / Pressure Setting Ulta;Continuous;125 mmHg 10/30/20 0900   Dressing Type Gray Foam (Cleanse Choice) 10/30/20 1115   Number of Foam Pieces Used 2 10/30/20 1115   Canister Changed No 10/30/20 1115   NEXT Dressing Change/Treatment Date 10/27/20 10/26/20 0750   VAC VeraFlo Irrigant Normal Saline 10/30/20 1115   VAC VeraFlo Soak Time (mins) 10 10/28/20 1400   VAC VeraFlo Instill Volume (ml) 30 10/28/20 1400   VAC VeraFlo - Therapy Time (hrs) 2 10/28/20 1400   VAC VeraFlo Pressure (mm/Hg) Continuous;125 mmHg 10/30/20 1115   WOUND NURSE ONLY - Time Spent with Patient (mins) 500 10/29/20 1145        Wound 10/07/20 Full Thickness Wound Leg Posterior Left (Active)   Wound Image   10/30/20 1115   Site Assessment Black;Yellow;Red 10/30/20 1115   Periwound Assessment Edema;Red;Painful 10/30/20 1115   Margins Attached edges 10/30/20 1115   Closure Secondary intention 10/30/20 1115   Drainage Amount Moderate 10/30/20 1115   Drainage Description Serosanguineous 10/30/20 1115   Treatments Cleansed 10/30/20 1115   Wound Cleansing Approved Wound Cleanser 10/30/20 1115   Periwound Protectant Benzoin;Paste Ring 10/30/20 1115    Dressing Cleansing/Solutions Normal Saline 10/30/20 1115   Dressing Options Wound Vac;Cleanse Choice 10/30/20 1115   Dressing Changed Changed 10/30/20 1115   Dressing Status Clean;Dry;Intact 10/30/20 1115   Dressing Change/Treatment Frequency Monday, Wednesday, Friday, and As Needed 10/30/20 1115   NEXT Dressing Change/Treatment Date 11/02/20 10/30/20 1115   NEXT Weekly Photo (Inpatient Only) 11/06/20 10/30/20 1115   Non-staged Wound Description Full thickness 10/30/20 1115   Wound Length (cm) 10.5 cm 10/30/20 1115   Wound Width (cm) 10 cm 10/30/20 1115   Wound Depth (cm) 0.5 cm 10/30/20 1115   Wound Surface Area (cm^2) 105 cm^2 10/30/20 1115   Wound Volume (cm^3) 52.5 cm^3 10/30/20 1115   Wound Bed Granulation (%) 0 % 10/20/20 1100   Wound Bed Epithelium (%) 0 % 10/20/20 1100   Wound Bed Slough (%) 0 % 10/20/20 1100   Wound Bed Eschar (%) 50 % 10/24/20 1430   Tunneling (cm) 0 cm 10/30/20 1115   Undermining (cm) 0 cm 10/30/20 1115   Shape circular 10/26/20 1400   Wound Odor None 10/30/20 1115   Exposed Structures None 10/30/20 1115   WOUND NURSE ONLY - Time Spent with Patient (mins) 60 10/30/20 1115       Vascular: 10/30/2020: L DP palpable 1+.  Unable to palpate L PT.  Recommend handheld doppler use at next dressing change.  If result WNL, recommend initiation of compression therapy.  If result NOT WNL, recommend Arterial study with CARLA for further evaluation.    Lab Values:    Lab Results   Component Value Date/Time    WBC 7.0 10/27/2020 04:41 AM    RBC 4.77 10/27/2020 04:41 AM    HEMOGLOBIN 14.0 10/27/2020 04:41 AM    HEMATOCRIT 42.5 10/27/2020 04:41 AM    CREACTPROT 5.52 (H) 10/19/2020 11:59 PM    SEDRATEWES 20 10/19/2020 11:59 PM        Culture Results show:  Recent Results (from the past 720 hour(s))   CULTURE WOUND W/ GRAM STAIN    Collection Time: 10/20/20  3:26 AM    Specimen: Left Leg; Wound   Result Value Ref Range    Significant Indicator NEG     Source WND     Site LEFT LEG     Culture Result Moderate  growth mixed skin yesenia.     Gram Stain Result Rare WBCs.  Rare Gram positive cocci.          INTERVENTIONS BY WOUND TEAM:  Left calf dressing changed in the usual fashion.  See flowsheet for details.  1 piece grey waffle cleanse choice foam applied to entire wound bed, then 1 piece thin grey cover foam.  Over vac dressing, applied fenestrated mepilex under tubing and loosely secured in place with drape. Patient returned to comfortable position.    Interdisciplinary consultation: Patient, Bedside RN    EVALUATION / RATIONALE FOR TREATMENT: The Veraflo was placed in order to assist in removing black nonviable tissue, and to promote granulation. Placed waffle cleanse choice back on to wound bed to encourage debridement of slough since not much improvement with regular grey foam. Edema improved per patient since last assessment. Discussed with attending NP who reached out to attending MD to update on wound bed and how it is not ready for regular NPWT yet due to non-viable tissue amount. Wound team to continue to follow and change 3x/week.      Goals: Steady decrease in wound area and depth weekly.    NURSING PLAN OF CARE ORDERS (X):    Dressing changes: See Dressing Care orders: X  Skin care: See Skin Care orders:   RN Prevention Protocol:   Rectal tube care: See Rectal Tube Care orders:   Other orders:     WOUND TEAM PLAN OF CARE:   Dressing changes by wound team:                   Follow up 3 times weekly:                NPWT change 3 times weekly:  X   Follow up 1-2 times weekly:      Follow up Bi-Monthly:                   Follow up as needed:       Other (explain):     Anticipated discharge plans: X patient in need of ongoing wound care for VAC changes, not ready to be discontinued from veraflo cleanse choice yet.   LTACH:        SNF/Rehab:                  Home Health Care:           Outpatient Wound Center:            Self Care:

## 2020-10-30 NOTE — PROGRESS NOTES
[facility] Medicine Daily Progress Note    Date of Service  10/30/2020    Chief Complaint  55 y.o. male admitted 10/20/2020 with left calf wound and cellulitis     Hospital Course    This is a 56 y/o M with PMHX of DVT and PE who was admitted on 10/20/20 after presenting to ER complaining of worsening left calf wound. Per patient, is progressively getting worse and more swollen prompting patient to come in. Pt started on abx and case was discussed with ortho who recommended to consult plastic surgery.  Plastic surgery has been consulted and completed I&D 10/22.       Interval Problem Update  Patient evaluated bedside and found afebrile in no acute distress  Having some episodic LE numbness today  Patient needs a Vera Sly VAC system as per wound care, H/H will not provide  Placed Referral for LTAC and SNF    Consultants/Specialty  Plastic surgery   Infectious disease  Urology     Code Status  Full Code    Disposition  Pending placement    Review of Systems  Review of Systems   Constitutional: Negative for chills and fever.   HENT: Negative for hearing loss and tinnitus.    Eyes: Negative for pain and discharge.   Respiratory: Negative for cough and shortness of breath.    Cardiovascular: Negative for chest pain and palpitations.   Gastrointestinal: Negative for abdominal pain, heartburn, nausea and vomiting.   Genitourinary: Negative for dysuria and urgency.        Denies scrotal pain   Musculoskeletal: Positive for joint pain and myalgias.        Left leg pain   Neurological: Positive for tingling.   All other systems reviewed and are negative.       Physical Exam  Temp:  [36.1 °C (97 °F)-36.6 °C (97.8 °F)] 36.1 °C (97 °F)  Pulse:  [51-66] 53  Resp:  [16-20] 19  BP: (121-132)/(65-75) 121/75  SpO2:  [92 %-94 %] 94 %    Physical Exam  Vitals signs and nursing note reviewed.   Constitutional:       General: He is not in acute distress.     Appearance: Normal appearance. He is obese. He is not ill-appearing.   HENT:       Head: Normocephalic and atraumatic.      Mouth/Throat:      Mouth: Mucous membranes are moist.      Comments: Poor dentition  Eyes:      General: No scleral icterus.     Extraocular Movements: Extraocular movements intact.      Pupils: Pupils are equal, round, and reactive to light.   Neck:      Musculoskeletal: Normal range of motion.   Cardiovascular:      Rate and Rhythm: Normal rate and regular rhythm.      Pulses: Normal pulses.      Heart sounds: No murmur.   Pulmonary:      Effort: Pulmonary effort is normal.      Breath sounds: No wheezing.      Comments: Diminished breath sounds bilaterally   Abdominal:      General: Abdomen is flat. There is no distension.      Tenderness: There is no abdominal tenderness. There is no guarding or rebound.   Genitourinary:     Comments: ~2.5 cm mass palpated in the right testes, no overlying skin changes or discoloration  Musculoskeletal:         General: Swelling and signs of injury present.      Comments: Left calf wound with wound vac in place   Skin:     General: Skin is warm.      Findings: Erythema present.   Neurological:      General: No focal deficit present.      Mental Status: He is alert and oriented to person, place, and time.   Psychiatric:         Mood and Affect: Mood normal.         Behavior: Behavior normal.         Fluids    Intake/Output Summary (Last 24 hours) at 10/30/2020 1129  Last data filed at 10/29/2020 1700  Gross per 24 hour   Intake 660 ml   Output 500 ml   Net 160 ml       Laboratory                        Imaging  EC-ECHOCARDIOGRAM COMPLETE W/O CONT   Final Result      CT-ABDOMEN-PELVIS W/O   Final Result         1.  No acute abnormality.   2.  Left apical lithiasis without visualized obstructive changes.   3.  Structure which appears to originate from the anterior margin of the spleen, stable since prior study and previously suspected of being exophytic hemangioma.   4.  Hepatomegaly with enlargement of the caudate lobe, similar to prior  study   5.  Fat-containing bilateral inguinal hernias.   6.  Atherosclerosis      [id number]   Final Result         1.  Heterogeneous hypoechoic area within the right testis, appearance cannot exclude intratesticular mass. Recommend urologic evaluation for further management.   2.  Linear echogenic structure along the lateral aspect of the left testis, similar to prior study, appearance suggests fat, poorly dense calcification, or possible scarring.   3.  Trace bilateral hydroceles.   4.  Small bilateral varicoceles.      These findings were discussed with the patient's clinician, Dr. Rivas, on 10/23/2020 7:32 AM.      CT-EXTREMITY, LOWER WITH LEFT   Final Result      Left lower extremity cellulitis, with a superficial wound on the posterior calf. No drainable abscess. No evidence of osteomyelitis. No soft tissue gas to suggest necrotizing fasciitis.      DX-CHEST-LIMITED (1 VIEW)   Final Result         1.  Right basilar atelectasis or early infiltrate.           Assessment/Plan  * Cellulitis- (present on admission)  Assessment & Plan  Cellulitis of the left posterior calf with Nonhealing wound on left lower extremity for last 4 months status post biopsy 6 days ago  Biopsy shows ischemic/granulaiton tissues, no malignancy identified   Continue with Unasyn, ID consulted, appreciate recommendation   S/P I&D by plastic surgery 10/22, appreciate recommendations and help with management   Cont on wound care/ wound vac    Patient needs a Vera Sly VAC system as per wound care, H/H will not provide  Placed Referral for LTAC and SNF    Testicular mass  Assessment & Plan  Ultrasound showing right  Heterogeneous hypoechoic area within the right testis, appearance cannot exclude intratesticular mass  - was seen in July 2020 for Left sided testicular pain, US showed no mass/lesions at that time   - urology consulted, tumor markers collected, follow up with Urology as an outpatient     Generalized edema- (present on  admission)  Assessment & Plan  Trace lower extremity edema on today's evaluation  Continue to monitor     Smoker  Assessment & Plan  Counseling on discharge    History of DVT (deep vein thrombosis)  Assessment & Plan  Continue home Xarelto       VTE prophylaxis: Xarelto

## 2020-10-30 NOTE — DISCHARGE PLANNING
Agency/Facility Name: [facility]  Spoke To: Samantha  Outcome: Submitting for auth, will probably get it around Monday or Tuesday.

## 2020-10-31 VITALS
OXYGEN SATURATION: 92 % | DIASTOLIC BLOOD PRESSURE: 64 MMHG | SYSTOLIC BLOOD PRESSURE: 131 MMHG | RESPIRATION RATE: 17 BRPM | TEMPERATURE: 98.5 F | WEIGHT: 222 LBS | HEART RATE: 67 BPM | HEIGHT: 72 IN | BODY MASS INDEX: 30.07 KG/M2

## 2020-10-31 PROCEDURE — 99239 HOSP IP/OBS DSCHRG MGMT >30: CPT | Performed by: STUDENT IN AN ORGANIZED HEALTH CARE EDUCATION/TRAINING PROGRAM

## 2020-10-31 RX ORDER — MORPHINE SULFATE 4 MG/ML
2 INJECTION, SOLUTION INTRAMUSCULAR; INTRAVENOUS
Qty: 450 ML | Status: SHIPPED
Start: 2020-11-01 | End: 2020-11-06

## 2020-10-31 RX ORDER — LIDOCAINE HYDROCHLORIDE 40 MG/ML
1 SOLUTION TOPICAL
Qty: 100 ML | Refills: 0 | Status: ON HOLD
Start: 2020-10-31 | End: 2021-01-04

## 2020-10-31 RX ORDER — ZOLPIDEM TARTRATE 5 MG/1
5 TABLET ORAL NIGHTLY PRN
Qty: 30 TAB | Refills: 0 | Status: SHIPPED
Start: 2020-10-31 | End: 2020-11-30

## 2020-10-31 RX ORDER — OXYCODONE HYDROCHLORIDE 5 MG/1
5 TABLET ORAL EVERY 6 HOURS PRN
Qty: 28 TAB | Refills: 0 | Status: SHIPPED
Start: 2020-10-31 | End: 2020-11-07

## 2020-10-31 ASSESSMENT — ENCOUNTER SYMPTOMS
COUGH: 0
VOMITING: 0
CHILLS: 0
NAUSEA: 0
SHORTNESS OF BREATH: 0
EYE DISCHARGE: 0
ABDOMINAL PAIN: 0
HEARTBURN: 0
PALPITATIONS: 0
FEVER: 0
EYE PAIN: 0
TINGLING: 1
MYALGIAS: 1

## 2020-10-31 ASSESSMENT — FIBROSIS 4 INDEX: FIB4 SCORE: 2.03

## 2020-10-31 ASSESSMENT — PAIN DESCRIPTION - PAIN TYPE: TYPE: ACUTE PAIN

## 2020-10-31 NOTE — DISCHARGE PLANNING
,Received Transport Form @ 1225  Spoke to Rachel @ [facility]  Transport is scheduled for 10/31/20 @1632 going to [facility].    Notified Bedside RN Mary.

## 2020-10-31 NOTE — PROGRESS NOTES
[facility] Medicine Daily Progress Note    Date of Service  10/31/2020    Chief Complaint  55 y.o. male admitted 10/20/2020 with left calf wound and cellulitis     Hospital Course    This is a 56 y/o M with PMHX of DVT and PE who was admitted on 10/20/20 after presenting to ER complaining of worsening left calf wound. Per patient, is progressively getting worse and more swollen prompting patient to come in. Pt started on abx and case was discussed with ortho who recommended to consult plastic surgery.  Plastic surgery has been consulted and completed I&D 10/22.       Interval Problem Update  Patient evaluated bedside and found afebrile in no acute distress  Having some episodic LE numbness today  Patient needs a Vera Sly VAC system as per wound care, H/H will not provide  Placed Referral for LTAC and SNF    Consultants/Specialty  Plastic surgery   Infectious disease  Urology     Code Status  Full Code    Disposition  Pending placement    Review of Systems  Review of Systems   Constitutional: Negative for chills and fever.   HENT: Negative for hearing loss and tinnitus.    Eyes: Negative for pain and discharge.   Respiratory: Negative for cough and shortness of breath.    Cardiovascular: Negative for chest pain and palpitations.   Gastrointestinal: Negative for abdominal pain, heartburn, nausea and vomiting.   Genitourinary: Negative for dysuria and urgency.        Denies scrotal pain   Musculoskeletal: Positive for joint pain and myalgias.        Left leg pain   Neurological: Positive for tingling.   All other systems reviewed and are negative.       Physical Exam  Temp:  [36.1 °C (97 °F)-36.6 °C (97.8 °F)] 36.6 °C (97.8 °F)  Pulse:  [53-56] 54  Resp:  [16-19] 16  BP: (104-135)/(52-75) 104/52  SpO2:  [94 %-95 %] 94 %    Physical Exam  Vitals signs and nursing note reviewed.   Constitutional:       General: He is not in acute distress.     Appearance: Normal appearance. He is obese. He is not ill-appearing.   HENT:       Head: Normocephalic and atraumatic.      Mouth/Throat:      Mouth: Mucous membranes are moist.      Comments: Poor dentition  Eyes:      General: No scleral icterus.     Extraocular Movements: Extraocular movements intact.      Pupils: Pupils are equal, round, and reactive to light.   Neck:      Musculoskeletal: Normal range of motion.   Cardiovascular:      Rate and Rhythm: Normal rate and regular rhythm.      Pulses: Normal pulses.      Heart sounds: No murmur.   Pulmonary:      Effort: Pulmonary effort is normal.      Breath sounds: No wheezing.      Comments: Diminished breath sounds bilaterally   Abdominal:      General: Abdomen is flat. There is no distension.      Tenderness: There is no abdominal tenderness. There is no guarding or rebound.   Genitourinary:     Comments: ~2.5 cm mass palpated in the right testes, no overlying skin changes or discoloration  Musculoskeletal:         General: Swelling and signs of injury present.      Comments: Left calf wound with wound vac in place   Skin:     General: Skin is warm.      Findings: Erythema present.   Neurological:      General: No focal deficit present.      Mental Status: He is alert and oriented to person, place, and time.   Psychiatric:         Mood and Affect: Mood normal.         Behavior: Behavior normal.         Fluids    Intake/Output Summary (Last 24 hours) at 10/31/2020 0811  Last data filed at 10/31/2020 0356  Gross per 24 hour   Intake 720 ml   Output 400 ml   Net 320 ml       Laboratory                        Imaging  EC-ECHOCARDIOGRAM COMPLETE W/O CONT   Final Result      CT-ABDOMEN-PELVIS W/O   Final Result         1.  No acute abnormality.   2.  Left apical lithiasis without visualized obstructive changes.   3.  Structure which appears to originate from the anterior margin of the spleen, stable since prior study and previously suspected of being exophytic hemangioma.   4.  Hepatomegaly with enlargement of the caudate lobe, similar to prior  study   5.  Fat-containing bilateral inguinal hernias.   6.  Atherosclerosis      [id number]   Final Result         1.  Heterogeneous hypoechoic area within the right testis, appearance cannot exclude intratesticular mass. Recommend urologic evaluation for further management.   2.  Linear echogenic structure along the lateral aspect of the left testis, similar to prior study, appearance suggests fat, poorly dense calcification, or possible scarring.   3.  Trace bilateral hydroceles.   4.  Small bilateral varicoceles.      These findings were discussed with the patient's clinician, Dr. Rivas, on 10/23/2020 7:32 AM.      CT-EXTREMITY, LOWER WITH LEFT   Final Result      Left lower extremity cellulitis, with a superficial wound on the posterior calf. No drainable abscess. No evidence of osteomyelitis. No soft tissue gas to suggest necrotizing fasciitis.      DX-CHEST-LIMITED (1 VIEW)   Final Result         1.  Right basilar atelectasis or early infiltrate.           Assessment/Plan  * Cellulitis- (present on admission)  Assessment & Plan  Cellulitis of the left posterior calf with Nonhealing wound on left lower extremity for last 4 months status post biopsy 6 days ago  Biopsy shows ischemic/granulaiton tissues, no malignancy identified   Continue with Unasyn, ID consulted, appreciate recommendation   S/P I&D by plastic surgery 10/22, appreciate recommendations and help with management   Cont on wound care/ wound vac    Patient needs a Vera Sly VAC system as per wound care, H/H will not provide  Placed Referral for LTAC and SNF    Testicular mass  Assessment & Plan  Ultrasound showing right  Heterogeneous hypoechoic area within the right testis, appearance cannot exclude intratesticular mass  - was seen in July 2020 for Left sided testicular pain, US showed no mass/lesions at that time   - urology consulted, tumor markers collected, follow up with Urology as an outpatient     Generalized edema- (present on  admission)  Assessment & Plan  Trace lower extremity edema on today's evaluation  Continue to monitor     Smoker  Assessment & Plan  Counseling on discharge    History of DVT (deep vein thrombosis)  Assessment & Plan  Continue home Xarelto       VTE prophylaxis: Xarelto

## 2020-10-31 NOTE — DISCHARGE SUMMARY
Discharge Summary    CHIEF COMPLAINT ON ADMISSION  Chief Complaint   Patient presents with   • Wound Infection     L calf       Reason for Admission  Leg Pain     CODE STATUS  Full Code    HPI & HOSPITAL COURSE  55-year-old male with a past medical history of unprovoked deep venous thrombosis, pulmonary embolism on lifelong Xarelto, renal cell carcinoma status post right nephrectomy in 2003 and tobacco use was admitted on 10/20/20 for worsening left calf cellulitis.  Patient was initially started on vancomycin which was transition to a 7 day Unasyn antibiotic regimen as per infectious disease recommendations.  He completed her antibiotic regimen while inpatient. He underwent excisional debridement of left posterior calf wound, fasciotomies, and wound vacuum-assisted closure device by plastic surgery on 10/22/2020.  Postoperative wound changes per wound care team.  Stable patient with chronic condition is to be transition to an LTAC facility for continuity of wound care.      Therefore, he is discharged in good and stable condition to a long-term acute care hospital.    The patient met 2-midnight criteria for an inpatient stay at the time of discharge.      FOLLOW UP ITEMS POST DISCHARGE  Wound care team to continue wound VAC and wound changes    DISCHARGE DIAGNOSES  Principal Problem:    Cellulitis POA: Yes  Active Problems:    History of DVT (deep vein thrombosis) POA: Unknown    Smoker (Chronic) POA: Unknown    History of pulmonary embolus (PE) (Chronic) POA: Unknown    Generalized edema POA: Yes    Testicular mass POA: Clinically Undetermined  Resolved Problems:    * No resolved hospital problems. *      FOLLOW UP  No future appointments.  [facility]  [address]  [address] [facility] [id number] [phone]          MEDICATIONS ON DISCHARGE     Medication List      START taking these medications      Instructions   lidocaine 4 % Soln  Commonly known as: XYLOCAINE   Apply 1 mL to  affected area(s) 1 time daily as needed (Administer 10-15 minutes prior to procedure either topically or injected into dressing/wound vac if 2% lidocaine ineffective.).  Dose: 1 mL     morphine (pf) 4 mg/mL Soln  Start taking on: November 1, 2020   0.5 mL by Intravenous route 3 times a week for 5 days.  Dose: 2 mg     oxyCODONE immediate-release 5 MG Tabs  Commonly known as: ROXICODONE   Take 1 Tab by mouth every 6 hours as needed for Severe Pain for up to 7 days.  Dose: 5 mg        CHANGE how you take these medications      Instructions   rivaroxaban 20 MG Tabs tablet  What changed: See the new instructions.  Commonly known as: XARELTO   Take 1 Tab by mouth with dinner.  Dose: 20 mg     * zolpidem 10 MG Tabs  What changed: Another medication with the same name was added. Make sure you understand how and when to take each.  Commonly known as: AMBIEN   TK 1 T PO HS PRF SLP  GM 47.00     * zolpidem 5 MG Tabs  What changed: You were already taking a medication with the same name, and this prescription was added. Make sure you understand how and when to take each.  Commonly known as: AMBIEN   Take 1 Tab by mouth at bedtime as needed for up to 30 days.  Dose: 5 mg         * This list has 2 medication(s) that are the same as other medications prescribed for you. Read the directions carefully, and ask your doctor or other care provider to review them with you.            CONTINUE taking these medications      Instructions   ALBUTEROL INH   Inhale 1 Puff by mouth every four hours as needed.  Dose: 1 Puff            Allergies  No Known Allergies    DIET  Orders Placed This Encounter   Procedures   • Diet Order Regular     Standing Status:   Standing     Number of Occurrences:   1     Order Specific Question:   Diet:     Answer:   Regular [1]       ACTIVITY  As tolerated.  Weight bearing as tolerated    LINES, DRAINS, AND WOUNDS  This is an automated list. Peripheral IVs will be removed prior to discharge.  Peripheral IV  10/28/20 20 G Left Forearm (Active)   Site Assessment Clean;Dry;Intact 10/31/20 0930   Dressing Type Transparent 10/31/20 0930   Line Status Scrubbed the hub prior to access;Saline locked;Flushed 10/31/20 0930   Dressing Status Clean;Dry;Intact 10/31/20 0930   Dressing Intervention N/A 10/31/20 0930   Date Primary Tubing Changed 10/27/20 10/28/20 1955   Date Secondary Tubing Changed 10/28/20 10/28/20 1955   NEXT Primary Tubing Change  10/31/20 10/29/20 0900   NEXT Secondary Tubing Change  10/29/20 10/29/20 0900   Infiltration Grading ([facility], [facility]) 0 10/31/20 0930   Phlebitis Scale ([facility] Only) 0 10/31/20 0930     Closed/Suction Drain 1 Left;Posterior Leg  (Active)   Dressing Type Occlusive;Transparent 10/24/20 0821   Dressing Status Clean;Dry;Intact 10/24/20 0821      Wound 10/07/20 Full Thickness Wound Leg Posterior Left (Active)   Wound Image   10/30/20 1115   Site Assessment KRISTEN 10/31/20 0930   Periwound Assessment Hot;Edema 10/31/20 0930   Margins Attached edges 10/30/20 1115   Closure Secondary intention 10/30/20 1115   Drainage Amount Moderate 10/31/20 0930   Drainage Description Serosanguineous 10/31/20 0930   Treatments Cleansed 10/30/20 1115   Wound Cleansing Approved Wound Cleanser 10/30/20 1115   Periwound Protectant Benzoin;Paste Ring 10/30/20 1115   Dressing Cleansing/Solutions Normal Saline 10/30/20 2012   Dressing Options Wound Vac 10/31/20 0930   Dressing Changed Observed 10/31/20 0930   Dressing Status Clean;Dry;Intact 10/31/20 0930   Dressing Change/Treatment Frequency Monday, Wednesday, Friday, and As Needed 10/31/20 0930   NEXT Dressing Change/Treatment Date 11/02/20 10/31/20 0930   NEXT Weekly Photo (Inpatient Only) 11/06/20 10/30/20 1115   Non-staged Wound Description Full thickness 10/30/20 1115   Wound Length (cm) 10.5 cm 10/30/20 1115   Wound Width (cm) 10 cm 10/30/20 1115   Wound Depth (cm) 0.5 cm 10/30/20 1115   Wound Surface Area (cm^2) 105 cm^2 10/30/20 1115   Wound Volume (cm^3) 52.5  cm^3 10/30/20 1115   Wound Bed Granulation (%) 0 % 10/20/20 1100   Wound Bed Epithelium (%) 0 % 10/20/20 1100   Wound Bed Slough (%) 0 % 10/20/20 1100   Wound Bed Eschar (%) 50 % 10/24/20 1430   Tunneling (cm) 0 cm 10/30/20 1115   Undermining (cm) 0 cm 10/30/20 1115   Shape circular 10/26/20 1400   Wound Odor None 10/30/20 1115   Exposed Structures None 10/30/20 1115   WOUND NURSE ONLY - Time Spent with Patient (mins) 60 10/30/20 1115       Peripheral IV 10/28/20 20 G Left Forearm (Active)   Site Assessment Clean;Dry;Intact 10/31/20 0930   Dressing Type Transparent 10/31/20 0930   Line Status Scrubbed the hub prior to access;Saline locked;Flushed 10/31/20 0930   Dressing Status Clean;Dry;Intact 10/31/20 0930   Dressing Intervention N/A 10/31/20 0930   Date Primary Tubing Changed 10/27/20 10/28/20 1955   Date Secondary Tubing Changed 10/28/20 10/28/20 1955   NEXT Primary Tubing Change  10/31/20 10/29/20 0900   NEXT Secondary Tubing Change  10/29/20 10/29/20 0900   Infiltration Grading ([facility], [facility]) 0 10/31/20 0930   Phlebitis Scale ([facility] Only) 0 10/31/20 0930               MENTAL STATUS ON TRANSFER  Level of Consciousness: Alert  Orientation : Oriented x 4  Speech: Speech Clear    CONSULTATIONS  Orthopedic surgery  Plastic surgery  Infectious disease    PROCEDURES  10/22/2020  1.  Excisional debridement of left posterior calf wound including skin,   subcutaneous tissue, muscle and fascia, total area debrided is 150 cm2.  2.  Release of the superficial posterior compartment with fasciotomies.  3.  Placement of a vacuum-assisted closure device, 150 cm2.    LABORATORY  Lab Results   Component Value Date    SODIUM 136 10/27/2020    POTASSIUM 4.1 10/27/2020    CHLORIDE 101 10/27/2020    CO2 25 10/27/2020    GLUCOSE 91 10/27/2020    BUN 18 10/27/2020    CREATININE 1.20 10/27/2020    CREATININE 1.5 (H) 06/10/2005        Lab Results   Component Value Date    WBC 7.0 10/27/2020    HEMOGLOBIN 14.0 10/27/2020    HEMATOCRIT  42.5 10/27/2020    PLATELETCT 107 (L) 10/27/2020        Total time of the discharge process exceeds 41 minutes.

## 2020-10-31 NOTE — DISCHARGE INSTRUCTIONS
Cellulitis, Adult    Cellulitis is a skin infection. The infected area is often warm, red, swollen, and sore. It occurs most often in the arms and lower legs. It is very important to get treated for this condition.  What are the causes?  This condition is caused by bacteria. The bacteria enter through a break in the skin, such as a cut, burn, insect bite, open sore, or crack.  What increases the risk?  This condition is more likely to occur in people who:  · Have a weak body defense system (immune system).  · Have open cuts, burns, bites, or scrapes on the skin.  · Are older than 60 years of age.  · Have a blood sugar problem (diabetes).  · Have a long-lasting (chronic) liver disease (cirrhosis) or kidney disease.  · Are very overweight (obese).  · Have a skin problem, such as:  ? Itchy rash (eczema).  ? Slow movement of blood in the veins (venous stasis).  ? Fluid buildup below the skin (edema).  · Have been treated with high-energy rays (radiation).  · Use IV drugs.  What are the signs or symptoms?  Symptoms of this condition include:  · Skin that is:  ? Red.  ? Streaking.  ? Spotting.  ? Swollen.  ? Sore or painful when you touch it.  ? Warm.  · A fever.  · Chills.  · Blisters.  How is this diagnosed?  This condition is diagnosed based on:  · Medical history.  · Physical exam.  · Blood tests.  · Imaging tests.  How is this treated?  Treatment for this condition may include:  · Medicines to treat infections or allergies.  · Home care, such as:  ? Rest.  ? Placing cold or warm cloths (compresses) on the skin.  · Hospital care, if the condition is very bad.  Follow these instructions at home:  Medicines  · Take over-the-counter and prescription medicines only as told by your doctor.  · If you were prescribed an antibiotic medicine, take it as told by your doctor. Do not stop taking it even if you start to feel better.  General instructions    · Drink enough fluid to keep your pee (urine) pale yellow.  · Do not touch  or rub the infected area.  · Raise (elevate) the infected area above the level of your heart while you are sitting or lying down.  · Place cold or warm cloths on the area as told by your doctor.  · Keep all follow-up visits as told by your doctor. This is important.  Contact a doctor if:  · You have a fever.  · You do not start to get better after 1-2 days of treatment.  · Your bone or joint under the infected area starts to hurt after the skin has healed.  · Your infection comes back. This can happen in the same area or another area.  · You have a swollen bump in the area.  · You have new symptoms.  · You feel ill and have muscle aches and pains.  Get help right away if:  · Your symptoms get worse.  · You feel very sleepy.  · You throw up (vomit) or have watery poop (diarrhea) for a long time.  · You see red streaks coming from the area.  · Your red area gets larger.  · Your red area turns dark in color.  These symptoms may represent a serious problem that is an emergency. Do not wait to see if the symptoms will go away. Get medical help right away. Call your local emergency services (911 in the [place] Do not drive yourself to the hospital.  Summary  · Cellulitis is a skin infection. The area is often warm, red, swollen, and sore.  · This condition is treated with medicines, rest, and cold and warm cloths.  · Take all medicines only as told by your doctor.  · Tell your doctor if symptoms do not start to get better after 1-2 days of treatment.  This information is not intended to replace advice given to you by your health care provider. Make sure you discuss any questions you have with your health care provider.  Document Released: 06/05/2009 Document Revised: 05/09/2019 Document Reviewed: 05/09/2019  [employer] Patient Education © 2020 [employer]    Vacuum-Assisted Closure Therapy  Vacuum-assisted closure (VAC) therapy uses a device that removes fluid and germs from wounds to help them heal. It is used on wounds  that cannot be closed with stitches. They often heal slowly. Vacuum-assisted therapy helps the wound stay clean and healthy while the open wound slowly grows back together.  Vacuum-assisted closure therapy uses a bandage (dressing) that is made of foam. It is put inside the wound. Then, a drape is placed over the wound. This drape sticks to your skin to keep air out, and to protect the wound. A tube is hooked up to a small pump and is attached to the drape. The pump sucks out the fluid and germs. Vacuum-assisted closure therapy can also help reduce the bad smell that comes from the wound.  HOW DOES IT WORK?   The vacuum pump pulls fluid through the foam dressing. The dressing may wrinkle during this process. The fluid goes into the tube and away from the wound. The fluid then goes into a container. The fluid in the container must be replaced if it is full or at least once a week, even if the container is not full. The pulling from the pump helps to close the wound and bring better circulation to the wound area.  The foam dressing covers and protects the wound. It helps your wound heal faster.   HOW DOES IT FEEL?   · You might feel a little pulling when the pump is on.  · You might also feel a mild vibrating sensation.    · You might feel some discomfort when the dressing is taken off.  CAN I MOVE AROUND WITH VACUUM-ASSISTED CLOSURE THERAPY?  Yes, it has a backup battery which is used when the machine is not plugged in, as long as the battery is working, you can move freely.  WHAT ARE SOME THINGS I MUST KNOW?  · Do not turn off the pump yourself, unless instructed to do so by your healthcare provider, such as for bathing.  · Do not take off the dressing yourself, unless instructed to do so by your caregiver.  · You can wash or shower with the dressing. However, do not take the pump into the shower. Make sure the wound dressing is protected and covered with plastic. The wound area must stay dry.  · Do not turn off the  pump for more than 2 hours. If the pump is off for more than 2 hours, your nurse must change your dressing.  · Check frequently that the machine is on, that the machine indicates the therapy is on, and that all clamps are open.  THE ALARM IS SOUNDING! WHAT SHOULD I DO?   · Stay calm.  · Do not turn off the pump or do anything with the dressing.  · Call your clinic or caregiver right away if the alarm goes off and you cannot fix the problem. Some reasons the alarm might go off include:  ¨ The fluid collection container is full.  ¨ The battery is low.  ¨ The dressing has a leak.  · Explain to your caregiver what is happening. Follow the instructions you receive.  WHEN SHOULD I CALL FOR HELP?   · You have severe pain.  · You have difficulty breathing.  · You have bleeding that will not stop.  · Your wound smells bad.  · You have redness, swelling, or fluid leaking from your wound.  · Your alarm goes off and you do not know what to do.  · You have a fever.  · Your wound itches severely.  · Your dressing changes are often painful or bleeding often occurs.  · You have diarrhea.  · You have a sore throat.  · You have a rash around the dressing or anywhere else on your body.  · You feel nauseous.  · You feel dizzy or weak.  · The VAC machine has been off for more than 2 hours.  HOW DO I GET READY TO GO HOME WITH A PUMP?   A trained caregiver will talk to you and answer your questions about your vacuum-assisted closure therapy before you go home. He or she will explain what to expect. A caregiver will come to your home to apply the pump and care for your wound.  The at-home caregiver will be available for questions and will come back for the scheduled dressing changes, usually every 48-72 hours (or more often for severely infected wounds). Your at-home caregiver will also come if you are having an unexpected problem. If you have questions or do not know what to do when you go home, talk to your healthcare provider.  This  information is not intended to replace advice given to you by your health care provider. Make sure you discuss any questions you have with your health care provider.  Document Released: 11/30/2009 Document Revised: 08/20/2014 Document Reviewed: 09/22/2016  [employer] Interactive Patient Education © 2017 [employer]        Discharge Instructions    Discharged to other by medical transportation with escort. Discharged via wheelchair, hospital escort: Yes.  Special equipment needed: Not Applicable    Be sure to schedule a follow-up appointment with your primary care doctor or any specialists as instructed.     Discharge Plan:   Diet Plan: Discussed  Activity Level: Discussed  Confirmed Follow up Appointment: Patient to Call and Schedule Appointment  Confirmed Symptoms Management: Discussed  Medication Reconciliation Updated: Yes  Influenza Vaccine Indication: Not indicated: Previously immunized this influenza season and > 8 years of age  Influenza Vaccine Given - only chart on this line when given: Influenza Vaccine Given (See MAR)    I understand that a diet low in cholesterol, fat, and sodium is recommended for good health. Unless I have been given specific instructions below for another diet, I accept this instruction as my diet prescription.   Other diet: Regular      · Is patient discharged on Warfarin / Coumadin?   No     Depression / Suicide Risk    As you are discharged from this [facility] facility, it is important to learn how to keep safe from harming yourself.    Recognize the warning signs:  · Abrupt changes in personality, positive or negative- including increase in energy   · Giving away possessions  · Change in eating patterns- significant weight changes-  positive or negative  · Change in sleeping patterns- unable to sleep or sleeping all the time   · Unwillingness or inability to communicate  · Depression  · Unusual sadness, discouragement and loneliness  · Talk of wanting to die  · Neglect of  personal appearance   · Rebelliousness- reckless behavior  · Withdrawal from people/activities they love  · Confusion- inability to concentrate     If you or a loved one observes any of these behaviors or has concerns about self-harm, here's what you can do:  · Talk about it- your feelings and reasons for harming yourself  · Remove any means that you might use to hurt yourself (examples: pills, rope, extension cords, firearm)  · Get professional help from the community (Mental Health, Substance Abuse, psychological counseling)  · Do not be alone:Call your Safe Contact- someone whom you trust who will be there for you.  · Call your local CRISIS HOTLINE 786-0634 or 347-310-4116  · Call your local Children's Mobile Crisis Response Team [place] (598) 521-8345 or www.echoecho  · Call the toll free National Suicide Prevention Hotlines   · National Suicide Prevention Lifeline 587-227-OQPA (5213)  · National Hope Line Network 800-SUICIDE (953-2141)

## 2020-10-31 NOTE — PROGRESS NOTES
"AAOx4. C/o 5/10 pain, declining intervention at this time, states \"This is the level I'm comfortable at\". -N/V. +N/T in L heel, states this is chronic. Denies new onset of chest pain/SOB. +BS in all 4 quadrants, last BM this AM per pt. Room air, satting > 90%. Wound vac in place to LLE, changed M,W,F. POC discussed, denies further needs at this time. Bed alarm on, call light within reach & hourly rounding in place.   "

## 2020-10-31 NOTE — PROGRESS NOTES
Patient alert/oriented x4,room air,pain med given with relief,left lower leg wound with wound vac in placed,patien and dressing in placed cdi.call light and personal belongings within reach and bed in low position.

## 2020-10-31 NOTE — DISCHARGE PLANNING
Care Transition Team Discharge Planning    Anticipated Discharge Information  Discharge Disposition: Disch to a long term care facility (63)  Discharge Address: [address], [address] [place], [place] [address]  Discharge Contact Phone Number: [phone]         Discharge Plan: Patient is discharging to a [facility], [facility] via [facility] at 1630. [facility] document and communication document was sent to [facility], Yu Phillips. Will from [facility] stated a bed has opened up today, patient will be followed by Dr. Jono Keane, hand off report phone: [phone]. Provider, Dr. Dr. Lyric Hernandez, Bedside RN, Angela Enriquez, and patient all notified.     Heather Hernandez, RN, .

## 2020-11-01 NOTE — DISCHARGE PLANNING
Agency/Facility Name: [facility]  Spoke To: Roscoe  Outcome: Referral accepted.  Walker was delivered to the patient on 10/27/2020.

## 2020-11-01 NOTE — PROGRESS NOTES
Patient was discharged without the hospital wound vac,cleaned wound vac and [facility].

## 2020-11-03 ENCOUNTER — APPOINTMENT (OUTPATIENT)
Dept: WOUND CARE | Facility: MEDICAL CENTER | Age: 55
End: 2020-11-03
Attending: PHYSICIAN ASSISTANT
Payer: COMMERCIAL

## 2020-12-30 ENCOUNTER — APPOINTMENT (OUTPATIENT)
Dept: RADIOLOGY | Facility: MEDICAL CENTER | Age: 55
End: 2020-12-30
Attending: EMERGENCY MEDICINE
Payer: COMMERCIAL

## 2020-12-30 ENCOUNTER — HOSPITAL ENCOUNTER (OUTPATIENT)
Facility: MEDICAL CENTER | Age: 55
End: 2021-01-04
Attending: EMERGENCY MEDICINE | Admitting: STUDENT IN AN ORGANIZED HEALTH CARE EDUCATION/TRAINING PROGRAM
Payer: COMMERCIAL

## 2020-12-30 DIAGNOSIS — I82.432 ACUTE DEEP VEIN THROMBOSIS (DVT) OF POPLITEAL VEIN OF LEFT LOWER EXTREMITY (HCC): ICD-10-CM

## 2020-12-30 DIAGNOSIS — S81.802D WOUND OF LEFT LOWER EXTREMITY, SUBSEQUENT ENCOUNTER: ICD-10-CM

## 2020-12-30 DIAGNOSIS — S81.802A WOUND OF LEFT LOWER EXTREMITY, INITIAL ENCOUNTER: ICD-10-CM

## 2020-12-30 PROBLEM — D64.9 NORMOCYTIC ANEMIA: Status: ACTIVE | Noted: 2020-12-30

## 2020-12-30 LAB
ALBUMIN SERPL BCP-MCNC: 4.1 G/DL (ref 3.2–4.9)
ALBUMIN/GLOB SERPL: 1.4 G/DL
ALP SERPL-CCNC: 111 U/L (ref 30–99)
ALT SERPL-CCNC: 5 U/L (ref 2–50)
ANION GAP SERPL CALC-SCNC: 10 MMOL/L (ref 7–16)
AST SERPL-CCNC: 8 U/L (ref 12–45)
BASOPHILS # BLD AUTO: 0.8 % (ref 0–1.8)
BASOPHILS # BLD: 0.05 K/UL (ref 0–0.12)
BILIRUB SERPL-MCNC: 0.3 MG/DL (ref 0.1–1.5)
BUN SERPL-MCNC: 18 MG/DL (ref 8–22)
CALCIUM SERPL-MCNC: 9.1 MG/DL (ref 8.5–10.5)
CHLORIDE SERPL-SCNC: 102 MMOL/L (ref 96–112)
CO2 SERPL-SCNC: 26 MMOL/L (ref 20–33)
COVID ORDER STATUS COVID19: NORMAL
CREAT SERPL-MCNC: 1.16 MG/DL (ref 0.5–1.4)
CRP SERPL HS-MCNC: 3.8 MG/DL (ref 0–0.75)
EOSINOPHIL # BLD AUTO: 0.22 K/UL (ref 0–0.51)
EOSINOPHIL NFR BLD: 3.4 % (ref 0–6.9)
ERYTHROCYTE [DISTWIDTH] IN BLOOD BY AUTOMATED COUNT: 47.2 FL (ref 35.9–50)
ERYTHROCYTE [SEDIMENTATION RATE] IN BLOOD BY WESTERGREN METHOD: 20 MM/HOUR (ref 0–20)
FERRITIN SERPL-MCNC: 313 NG/ML (ref 22–322)
FLUAV RNA SPEC QL NAA+PROBE: NEGATIVE
FLUBV RNA SPEC QL NAA+PROBE: NEGATIVE
FOLATE SERPL-MCNC: 9.8 NG/ML
GLOBULIN SER CALC-MCNC: 2.9 G/DL (ref 1.9–3.5)
GLUCOSE SERPL-MCNC: 93 MG/DL (ref 65–99)
GRAM STN SPEC: NORMAL
HCT VFR BLD AUTO: 42.6 % (ref 42–52)
HGB BLD-MCNC: 13.7 G/DL (ref 14–18)
HGB RETIC QN AUTO: 31.2 PG/CELL (ref 29–35)
IMM GRANULOCYTES # BLD AUTO: 0.04 K/UL (ref 0–0.11)
IMM GRANULOCYTES NFR BLD AUTO: 0.6 % (ref 0–0.9)
IMM RETICS NFR: 12.8 % (ref 9.3–17.4)
IRON SATN MFR SERPL: 17 % (ref 15–55)
IRON SERPL-MCNC: 33 UG/DL (ref 50–180)
LACTATE BLD-SCNC: 1.6 MMOL/L (ref 0.5–2)
LYMPHOCYTES # BLD AUTO: 1.29 K/UL (ref 1–4.8)
LYMPHOCYTES NFR BLD: 19.7 % (ref 22–41)
MAGNESIUM SERPL-MCNC: 2.1 MG/DL (ref 1.5–2.5)
MCH RBC QN AUTO: 28.5 PG (ref 27–33)
MCHC RBC AUTO-ENTMCNC: 32.2 G/DL (ref 33.7–35.3)
MCV RBC AUTO: 88.8 FL (ref 81.4–97.8)
MONOCYTES # BLD AUTO: 0.52 K/UL (ref 0–0.85)
MONOCYTES NFR BLD AUTO: 7.9 % (ref 0–13.4)
NEUTROPHILS # BLD AUTO: 4.44 K/UL (ref 1.82–7.42)
NEUTROPHILS NFR BLD: 67.6 % (ref 44–72)
NRBC # BLD AUTO: 0 K/UL
NRBC BLD-RTO: 0 /100 WBC
PHOSPHATE SERPL-MCNC: 3.4 MG/DL (ref 2.5–4.5)
PLATELET # BLD AUTO: 73 K/UL (ref 164–446)
PMV BLD AUTO: 11 FL (ref 9–12.9)
POTASSIUM SERPL-SCNC: 3.9 MMOL/L (ref 3.6–5.5)
PROCALCITONIN SERPL-MCNC: 0.06 NG/ML
PROT SERPL-MCNC: 7 G/DL (ref 6–8.2)
RBC # BLD AUTO: 4.8 M/UL (ref 4.7–6.1)
RETICS # AUTO: 0.07 M/UL (ref 0.04–0.06)
RETICS/RBC NFR: 1.7 % (ref 0.8–2.1)
RSV RNA SPEC QL NAA+PROBE: NEGATIVE
SARS-COV-2 RNA RESP QL NAA+PROBE: NOTDETECTED
SIGNIFICANT IND 70042: NORMAL
SITE SITE: NORMAL
SODIUM SERPL-SCNC: 138 MMOL/L (ref 135–145)
SOURCE SOURCE: NORMAL
SPECIMEN SOURCE: NORMAL
TIBC SERPL-MCNC: 193 UG/DL (ref 250–450)
UIBC SERPL-MCNC: 160 UG/DL (ref 110–370)
VIT B12 SERPL-MCNC: 380 PG/ML (ref 211–911)
WBC # BLD AUTO: 6.6 K/UL (ref 4.8–10.8)

## 2020-12-30 PROCEDURE — 82746 ASSAY OF FOLIC ACID SERUM: CPT

## 2020-12-30 PROCEDURE — 99285 EMERGENCY DEPT VISIT HI MDM: CPT

## 2020-12-30 PROCEDURE — 700105 HCHG RX REV CODE 258: Performed by: STUDENT IN AN ORGANIZED HEALTH CARE EDUCATION/TRAINING PROGRAM

## 2020-12-30 PROCEDURE — 82728 ASSAY OF FERRITIN: CPT

## 2020-12-30 PROCEDURE — 80053 COMPREHEN METABOLIC PANEL: CPT

## 2020-12-30 PROCEDURE — 700105 HCHG RX REV CODE 258: Performed by: EMERGENCY MEDICINE

## 2020-12-30 PROCEDURE — 85025 COMPLETE CBC W/AUTO DIFF WBC: CPT

## 2020-12-30 PROCEDURE — G0378 HOSPITAL OBSERVATION PER HR: HCPCS

## 2020-12-30 PROCEDURE — 87040 BLOOD CULTURE FOR BACTERIA: CPT

## 2020-12-30 PROCEDURE — 36415 COLL VENOUS BLD VENIPUNCTURE: CPT

## 2020-12-30 PROCEDURE — 96367 TX/PROPH/DG ADDL SEQ IV INF: CPT

## 2020-12-30 PROCEDURE — 85652 RBC SED RATE AUTOMATED: CPT

## 2020-12-30 PROCEDURE — 87205 SMEAR GRAM STAIN: CPT

## 2020-12-30 PROCEDURE — 0241U HCHG SARS-COV-2 COVID-19 NFCT DS RESP RNA 4 TRGT MIC: CPT

## 2020-12-30 PROCEDURE — 84100 ASSAY OF PHOSPHORUS: CPT

## 2020-12-30 PROCEDURE — 84145 PROCALCITONIN (PCT): CPT

## 2020-12-30 PROCEDURE — 700111 HCHG RX REV CODE 636 W/ 250 OVERRIDE (IP): Performed by: STUDENT IN AN ORGANIZED HEALTH CARE EDUCATION/TRAINING PROGRAM

## 2020-12-30 PROCEDURE — 99220 PR INITIAL OBSERVATION CARE,LEVL III: CPT | Performed by: STUDENT IN AN ORGANIZED HEALTH CARE EDUCATION/TRAINING PROGRAM

## 2020-12-30 PROCEDURE — 700111 HCHG RX REV CODE 636 W/ 250 OVERRIDE (IP): Performed by: EMERGENCY MEDICINE

## 2020-12-30 PROCEDURE — 82607 VITAMIN B-12: CPT

## 2020-12-30 PROCEDURE — 83605 ASSAY OF LACTIC ACID: CPT

## 2020-12-30 PROCEDURE — 96365 THER/PROPH/DIAG IV INF INIT: CPT

## 2020-12-30 PROCEDURE — 71045 X-RAY EXAM CHEST 1 VIEW: CPT

## 2020-12-30 PROCEDURE — 700102 HCHG RX REV CODE 250 W/ 637 OVERRIDE(OP): Performed by: EMERGENCY MEDICINE

## 2020-12-30 PROCEDURE — A9270 NON-COVERED ITEM OR SERVICE: HCPCS | Performed by: STUDENT IN AN ORGANIZED HEALTH CARE EDUCATION/TRAINING PROGRAM

## 2020-12-30 PROCEDURE — 83550 IRON BINDING TEST: CPT

## 2020-12-30 PROCEDURE — 83735 ASSAY OF MAGNESIUM: CPT

## 2020-12-30 PROCEDURE — 700102 HCHG RX REV CODE 250 W/ 637 OVERRIDE(OP): Performed by: STUDENT IN AN ORGANIZED HEALTH CARE EDUCATION/TRAINING PROGRAM

## 2020-12-30 PROCEDURE — 83540 ASSAY OF IRON: CPT

## 2020-12-30 PROCEDURE — A9270 NON-COVERED ITEM OR SERVICE: HCPCS | Performed by: EMERGENCY MEDICINE

## 2020-12-30 PROCEDURE — 96375 TX/PRO/DX INJ NEW DRUG ADDON: CPT

## 2020-12-30 PROCEDURE — 87070 CULTURE OTHR SPECIMN AEROBIC: CPT

## 2020-12-30 PROCEDURE — 93971 EXTREMITY STUDY: CPT | Mod: LT

## 2020-12-30 PROCEDURE — 85046 RETICYTE/HGB CONCENTRATE: CPT

## 2020-12-30 PROCEDURE — 86140 C-REACTIVE PROTEIN: CPT

## 2020-12-30 RX ORDER — OXYCODONE HYDROCHLORIDE 5 MG/1
5 TABLET ORAL EVERY 4 HOURS PRN
Status: DISCONTINUED | OUTPATIENT
Start: 2020-12-30 | End: 2021-01-04 | Stop reason: HOSPADM

## 2020-12-30 RX ORDER — ACETAMINOPHEN 325 MG/1
650 TABLET ORAL EVERY 6 HOURS PRN
Status: DISCONTINUED | OUTPATIENT
Start: 2020-12-30 | End: 2021-01-04 | Stop reason: HOSPADM

## 2020-12-30 RX ORDER — AMOXICILLIN 250 MG
2 CAPSULE ORAL 2 TIMES DAILY
Status: DISCONTINUED | OUTPATIENT
Start: 2020-12-30 | End: 2021-01-04 | Stop reason: HOSPADM

## 2020-12-30 RX ORDER — POLYETHYLENE GLYCOL 3350 17 G/17G
1 POWDER, FOR SOLUTION ORAL
Status: DISCONTINUED | OUTPATIENT
Start: 2020-12-30 | End: 2021-01-04 | Stop reason: HOSPADM

## 2020-12-30 RX ORDER — TIZANIDINE 4 MG/1
2 TABLET ORAL EVERY 6 HOURS PRN
Status: CANCELLED | OUTPATIENT
Start: 2020-12-30

## 2020-12-30 RX ORDER — OXYCODONE HYDROCHLORIDE 5 MG/1
5 TABLET ORAL EVERY 4 HOURS PRN
COMMUNITY
End: 2022-05-22

## 2020-12-30 RX ORDER — NICOTINE 21 MG/24HR
14 PATCH, TRANSDERMAL 24 HOURS TRANSDERMAL
Status: DISCONTINUED | OUTPATIENT
Start: 2020-12-30 | End: 2021-01-04 | Stop reason: HOSPADM

## 2020-12-30 RX ORDER — TIZANIDINE 4 MG/1
2 TABLET ORAL EVERY 6 HOURS PRN
Status: DISCONTINUED | OUTPATIENT
Start: 2020-12-30 | End: 2021-01-04 | Stop reason: HOSPADM

## 2020-12-30 RX ORDER — HYDROMORPHONE HYDROCHLORIDE 1 MG/ML
0.5 INJECTION, SOLUTION INTRAMUSCULAR; INTRAVENOUS; SUBCUTANEOUS EVERY 4 HOURS PRN
Status: DISCONTINUED | OUTPATIENT
Start: 2020-12-30 | End: 2021-01-04 | Stop reason: HOSPADM

## 2020-12-30 RX ORDER — BISACODYL 10 MG
10 SUPPOSITORY, RECTAL RECTAL
Status: DISCONTINUED | OUTPATIENT
Start: 2020-12-30 | End: 2021-01-04 | Stop reason: HOSPADM

## 2020-12-30 RX ADMIN — RIVAROXABAN 15 MG: 15 TABLET, FILM COATED ORAL at 18:03

## 2020-12-30 RX ADMIN — OXYCODONE 5 MG: 5 TABLET ORAL at 23:31

## 2020-12-30 RX ADMIN — OXYCODONE 5 MG: 5 TABLET ORAL at 16:01

## 2020-12-30 RX ADMIN — AMPICILLIN AND SULBACTAM 3 G: 1; 2 INJECTION, POWDER, FOR SOLUTION INTRAMUSCULAR; INTRAVENOUS at 14:23

## 2020-12-30 RX ADMIN — ACETAMINOPHEN 650 MG: 325 TABLET, FILM COATED ORAL at 16:01

## 2020-12-30 RX ADMIN — HYDROMORPHONE HYDROCHLORIDE 0.5 MG: 1 INJECTION, SOLUTION INTRAMUSCULAR; INTRAVENOUS; SUBCUTANEOUS at 14:23

## 2020-12-30 RX ADMIN — TIZANIDINE 2 MG: 4 TABLET ORAL at 18:28

## 2020-12-30 RX ADMIN — NICOTINE 14 MG: 14 PATCH TRANSDERMAL at 14:22

## 2020-12-30 RX ADMIN — AMPICILLIN SODIUM AND SULBACTAM SODIUM 3 G: 2; 1 INJECTION, POWDER, FOR SOLUTION INTRAMUSCULAR; INTRAVENOUS at 23:51

## 2020-12-30 ASSESSMENT — ENCOUNTER SYMPTOMS
BRUISES/BLEEDS EASILY: 0
MEMORY LOSS: 0
BLURRED VISION: 0
DIARRHEA: 0
DOUBLE VISION: 0
ABDOMINAL PAIN: 0
HEADACHES: 0
DEPRESSION: 0
CHILLS: 0
MYALGIAS: 1
SORE THROAT: 0
WEAKNESS: 0
SPUTUM PRODUCTION: 0
FALLS: 0
BACK PAIN: 0
INSOMNIA: 0
FEVER: 0
CONSTIPATION: 1
VOMITING: 0
DIZZINESS: 0
COUGH: 1
BLOOD IN STOOL: 0
FOCAL WEAKNESS: 0
PALPITATIONS: 0
NERVOUS/ANXIOUS: 0
SHORTNESS OF BREATH: 0
NAUSEA: 0

## 2020-12-30 ASSESSMENT — COGNITIVE AND FUNCTIONAL STATUS - GENERAL
DAILY ACTIVITIY SCORE: 24
WALKING IN HOSPITAL ROOM: A LITTLE
MOBILITY SCORE: 23
SUGGESTED CMS G CODE MODIFIER DAILY ACTIVITY: CH
SUGGESTED CMS G CODE MODIFIER MOBILITY: CI

## 2020-12-30 ASSESSMENT — PAIN DESCRIPTION - PAIN TYPE
TYPE: ACUTE PAIN

## 2020-12-30 ASSESSMENT — FIBROSIS 4 INDEX
FIB4 SCORE: 2.03
FIB4 SCORE: 2.74

## 2020-12-30 NOTE — ED PROVIDER NOTES
"ED Provider Note    Scribed for Papito Williamson M.D. by Bhupinder Gomez. 12/30/2020, 10:41 AM.    Primary care provider: Gio Wang M.D.  Means of arrival: EMS  History obtained from: Patient  History limited by: None    CHIEF COMPLAINT  Chief Complaint   Patient presents with   • Wound Check       HPI  Panchito Moura is a 55 y.o. male with history of PE and DVT who presents to the Emergency Department for evaluation of wound on his left calf. He states that the wound initially occurred in June of 2020, but has worsenng over the past week. The patient states that he has a wound care nurse who comes to his house 3 times a week to check the wound and change his wound vac. Patient was sent into the ED by his wound nurse do to possible infection. At this time the patient does state that he has had increased swelling and pain to the area, but denies any sensory changes to the leg. 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 Patient has two surgeries on his legs. Anticoagulated on Xarelto, but patient notes that he has not taken it in weeks. Patient was on heparin through a pic line. States that pic line was removed 2 days ago. No exacerbating or alleviating factors were stated    REVIEW OF SYSTEMS  As above otherwise all other systems are negative    PAST MEDICAL HISTORY   has a past medical history of Cancer (HCC), Cancer (HCC), DVT (deep venous thrombosis) (HCC), Pulmonary embolism (HCC), and Smoker.    SURGICAL HISTORY   has a past surgical history that includes nephrectomy partial; nerve repair (6/18/08); laceration repair (6/18/08); other; nephrectomy partial (Right); and irrigation & debridement general (Left, 10/22/2020).    SOCIAL HISTORY  Social History     Tobacco Use   • Smoking status: Current Every Day Smoker     Packs/day: 1.50     Types: Cigarettes   • Smokeless tobacco: Never Used   Substance Use " Topics   • Alcohol use: No     Frequency: Never     Drinks per session: 1 or 2     Binge frequency: Never   • Drug use: Yes     Comment: edibles      Social History     Substance and Sexual Activity   Drug Use Yes    Comment: edibles       FAMILY HISTORY  Family History   Problem Relation Age of Onset   • Heart Attack Mother    • Cancer Father        CURRENT MEDICATIONS    Current Facility-Administered Medications:   •  rivaroxaban **FOLLOWED BY** [START ON 1/20/2021] rivaroxaban  •  oxyCODONE immediate-release  •  senna-docusate **AND** polyethylene glycol/lytes **AND** magnesium hydroxide **AND** bisacodyl  •  acetaminophen  •  nicotine **AND** Nicotine Replacement Patient Education Materials **AND** nicotine polacrilex  •  HYDROmorphone  •  tizanidine     ALLERGIES  No Known Allergies    PHYSICAL EXAM  VITAL SIGNS: /59   Pulse 78   Temp 37.1 °C (98.8 °F)   Resp 18   Ht 1.829 m (6')   Wt 99.8 kg (220 lb)   SpO2 95%   BMI 29.84 kg/m²     Constitutional: Well developed, Well nourished, No acute distress, Non-toxic appearance.   HENT: Normocephalic, Atraumatic, Bilateral external ears normal, oropharynx moist, No oral exudates, Nose normal.   Eyes:conjunctiva is normal, there are no signs of exudate.   Neck: Supple, no meningeal signs.  Lymphatic: No lymphadenopathy noted.   Cardiovascular: Regular rate and rhythm without murmurs gallops or rubs.   Thorax & Lungs: No respiratory distress. Breathing comfortably. Lungs are clear to auscultation bilaterally, there are no wheezes no rales. Chest wall is nontender.  Abdomen: Soft, nontender, nondistended. Bowel sounds are present.   Skin: See MSK. Warm, Dry    Back: No tenderness, No CVA tenderness.  Musculoskeletal: Tenderness in posterior aspect of calf edematous. No significant erythema around the wound itself. Exquisite pain in proximal aspect of calf all the way up to into popliteal fossa   Good range of motion in all major joints. Intact distal pulses,  no clubbing, no cyanosis,  Neurologic: Alert & oriented x 3, Moving all extremities. No gross abnormalities.    Psychiatric: Affect normal, Judgment normal, Mood normal.     LABS  Results for orders placed or performed during the hospital encounter of 12/30/20   LACTIC ACID   Result Value Ref Range    Lactic Acid 1.6 0.5 - 2.0 mmol/L   CBC WITH DIFFERENTIAL   Result Value Ref Range    WBC 6.6 4.8 - 10.8 K/uL    RBC 4.80 4.70 - 6.10 M/uL    Hemoglobin 13.7 (L) 14.0 - 18.0 g/dL    Hematocrit 42.6 42.0 - 52.0 %    MCV 88.8 81.4 - 97.8 fL    MCH 28.5 27.0 - 33.0 pg    MCHC 32.2 (L) 33.7 - 35.3 g/dL    RDW 47.2 35.9 - 50.0 fL    Platelet Count 73 (L) 164 - 446 K/uL    MPV 11.0 9.0 - 12.9 fL    Neutrophils-Polys 67.60 44.00 - 72.00 %    Lymphocytes 19.70 (L) 22.00 - 41.00 %    Monocytes 7.90 0.00 - 13.40 %    Eosinophils 3.40 0.00 - 6.90 %    Basophils 0.80 0.00 - 1.80 %    Immature Granulocytes 0.60 0.00 - 0.90 %    Nucleated RBC 0.00 /100 WBC    Neutrophils (Absolute) 4.44 1.82 - 7.42 K/uL    Lymphs (Absolute) 1.29 1.00 - 4.80 K/uL    Monos (Absolute) 0.52 0.00 - 0.85 K/uL    Eos (Absolute) 0.22 0.00 - 0.51 K/uL    Baso (Absolute) 0.05 0.00 - 0.12 K/uL    Immature Granulocytes (abs) 0.04 0.00 - 0.11 K/uL    NRBC (Absolute) 0.00 K/uL   COMP METABOLIC PANEL   Result Value Ref Range    Sodium 138 135 - 145 mmol/L    Potassium 3.9 3.6 - 5.5 mmol/L    Chloride 102 96 - 112 mmol/L    Co2 26 20 - 33 mmol/L    Anion Gap 10.0 7.0 - 16.0    Glucose 93 65 - 99 mg/dL    Bun 18 8 - 22 mg/dL    Creatinine 1.16 0.50 - 1.40 mg/dL    Calcium 9.1 8.5 - 10.5 mg/dL    AST(SGOT) 8 (L) 12 - 45 U/L    ALT(SGPT) 5 2 - 50 U/L    Alkaline Phosphatase 111 (H) 30 - 99 U/L    Total Bilirubin 0.3 0.1 - 1.5 mg/dL    Albumin 4.1 3.2 - 4.9 g/dL    Total Protein 7.0 6.0 - 8.2 g/dL    Globulin 2.9 1.9 - 3.5 g/dL    A-G Ratio 1.4 g/dL   ESTIMATED GFR   Result Value Ref Range    GFR If African American >60 >60 mL/min/1.73 m 2    GFR If Non African American  >60 >60 mL/min/1.73 m 2   Routine (COVID/SARS COV-2 In-House PCR up to 24 hours)    Specimen: Nasopharyngeal; Respirate   Result Value Ref Range    COVID Order Status Received    CoV-2, Flu A/B, And RSV by PCR   Result Value Ref Range    Influenza virus A RNA Negative Negative    Influenza virus B, PCR Negative Negative    RSV, PCR Negative Negative    SARS-CoV-2 by PCR NotDetected     SARS-CoV-2 Source NP Swab    GRAM STAIN    Specimen: Wound   Result Value Ref Range    Significant Indicator .     Source WND     Site LEFT LEG     Gram Stain Result Moderate WBCs.  No organisms seen.         All labs reviewed by me.    RADIOLOGY  US-EXTREMITY VENOUS LOWER UNILAT LEFT   Final Result      DX-CHEST-PORTABLE (1 VIEW)   Final Result      Unchanged right basilar density consistent with atelectasis or scar        The radiologist's interpretation of all radiological studies have been reviewed by me.    COURSE & MEDICAL DECISION MAKING  Pertinent Labs & Imaging studies reviewed. (See chart for details)    Review of past medical history shows that patient has an ultrasound of left lower extremity in October 2020 which wad negative for DVT.    PPE Note: I personally donned full PPE for all patient encounters during this visit, including being clean-shaven with an N95 respirator mask, gloves, and goggles.     Scribe remained outside the patient's room and did not have any contact with the patient for the duration of patient encounter.      10:41 AM - Patient seen and examined at bedside. Ordered Blood culture, CMP, CBC with differential, lactic acid, PTT, PT/INR, culture wound with gram stain, lactic acid, us-extremity, and dx-chest to evaluate his symptoms. The differential diagnoses include but are not limited to: DVT, infection    11:19 AM I informed that patient has DVT at this time.     11:40 AM Patient was reevaluated at bedside. Discussed lab and radiology results with the patient and informed them that he does have a  DVT.      12:09 PM I discussed the patient's case and the above findings with Dr. Dutta (hospitalist) who agrees to evaluate the patient for hospitalization.      For Coding personnel:    PT/PTT was ordered because the condition that the patient is presenting with is concerning for the possibility of bleeding and is necessary further evaluation of liver functions and bleeding functions.       Decision Making:  Patient presents emerge department for evaluation.  Clinically I feel there is more of a DVT than it is an infection.  Laboratory studies were drawn white blood cell count is normal I did empirically start the patient on antibiotics in case I did order for a wound culture and laboratory studies.  Ultrasound is positive for DVT in that left lower extremity so I started the patient on Xarelto for this but after long discussion with the patient he feels uncomfortable going home.  I did empirically start with antibiotics we will wait for the culture to come back and we will admit the patient for further treatment and care.  The reason for the DVT the patient stopped taking his Xarelto states he has been off it for least 2 to 3 weeks.    DISPOSITION:  Patient will be hospitalized by Dr. Dutta in guarded condition.      FINAL IMPRESSION  1. Acute deep vein thrombosis (DVT) of popliteal vein of left lower extremity (HCC)    2. Wound of left lower extremity, initial encounter          IBhupinder (Scribe), am scribing for, and in the presence of, Papito Williamson M.D..    Electronically signed by: Bhupinder Gomez (Scribe), 12/30/2020    IPapito M.D. personally performed the services described in this documentation, as scribed by Bhupinder Gomez in my presence, and it is both accurate and complete.  C  The note accurately reflects work and decisions made by me.  Papito Williamson M.D.  12/30/2020  6:26 PM

## 2020-12-30 NOTE — ED NOTES
2 sets of blood cultures sent, PIV established, labs sent.    Xray at bedside. Provided patient with urinal, pending urine sample.

## 2020-12-30 NOTE — H&P
Hospital Medicine History & Physical Note    Date of Service  12/30/2020    Primary Care Physician  Gio Wang M.D.    Consultants      Code Status  Full Code    Chief Complaint  Chief Complaint   Patient presents with   • Wound Check       History of Presenting Illness  55 y.o. male who presented 12/30/2020 with worsening left leg pain and swelling for several days.  He was sent to the ER by the wound care nurse due to concern of cellulitis.  He has a history of a unprovoked DVT and PE which he developed 2 to 3 years ago and is on lifetime rivaroxaban.  He reports not having taken his rivaroxaban since being discharged from this institution on October 31, 2020.  During the hospitalization, on October 22, 2020 he underwent excisional debridement of the left posterior calf wound, release of superficial posterior compartment with fasciotomies, and placement of a wound VAC by plastic surgery.  He also completed a course of IV antibiotics and then was discharged to an LTAC facility.     Following his discharge, he reports being taken to [facility] a few weeks later for rehospitalization when he presented with worsening left leg pain.  He reports being discharged with a PICC line, which he manage on his own with help and antibiotics for 10 days.  The patient also has a history of renal cell carcinoma status post nephrectomy in 2003 as well as continued smoking.  He reports increased activity with 1 pack/day.  He also uses edible marijuana for pain control regularly.  He has admission is currently on short-term disability.  He reports not having taken his oxycodone, which he received on his prior hospitalization.    He currently denies any fevers, chills, nausea, or vomiting.  He does report a dry cough, which occurs with eating marijuana edibles.  His main concern is worsening swelling and pain in the left lower extremity.  He is able to ambulate on his own.    In the ER, ultrasound of the lower  extremity showed acute and subocclusive left lower extremity DVT within the popliteal vein extending into the calf veins at the proximal level.      Review of Systems  Review of Systems   Constitutional: Negative for chills, fever and malaise/fatigue.   HENT: Negative for ear discharge, ear pain and sore throat.    Eyes: Negative for blurred vision and double vision.   Respiratory: Positive for cough. Negative for sputum production and shortness of breath.    Cardiovascular: Positive for leg swelling (Left lower extremity). Negative for chest pain and palpitations.   Gastrointestinal: Positive for constipation (Resolved with stool softener). Negative for abdominal pain, blood in stool, diarrhea, nausea and vomiting.   Genitourinary: Negative for dysuria, frequency, hematuria and urgency.   Musculoskeletal: Positive for myalgias (Left calf). Negative for back pain, falls and joint pain.   Skin: Positive for itching and rash (Arms).   Neurological: Negative for dizziness, focal weakness, weakness and headaches.   Endo/Heme/Allergies: Negative for environmental allergies. Does not bruise/bleed easily.   Psychiatric/Behavioral: Negative for depression and memory loss. The patient is not nervous/anxious and does not have insomnia.        Past Medical History   has a past medical history of Cancer (HCC), Cancer (HCC), DVT (deep venous thrombosis) (HCC), Pulmonary embolism (HCC), and Smoker.    Surgical History   has a past surgical history that includes nephrectomy partial; nerve repair (6/18/08); laceration repair (6/18/08); other; nephrectomy partial (Right); and irrigation & debridement general (Left, 10/22/2020).     Family History  Mother had myocardial infarction, father had cancer of the back.    Social History   reports that he has been smoking cigarettes. He has been smoking about 1.50 packs per day. He has never used smokeless tobacco. He reports current drug use. He reports that he does not drink  alcohol.    Allergies  No Known Allergies    Medications  Prior to Admission Medications   Prescriptions Last Dose Informant Patient Reported? Taking?   ALBUTEROL INH PRN at PRN Patient Yes No   Sig: Inhale 1 Puff every four hours as needed (shortness of breath).   lidocaine (XYLOCAINE) 4 % Solution Not Taking at Unknown time Patient No No   Sig: Apply 1 mL to affected area(s) 1 time daily as needed (Administer 10-15 minutes prior to procedure either topically or injected into dressing/wound vac if 2% lidocaine ineffective.).   Patient not taking: Reported on 12/30/2020   oxyCODONE immediate-release (ROXICODONE) 5 MG Tab 12/30/2020 at 0900 Patient Yes Yes   Sig: Take 5 mg by mouth every four hours as needed for Severe Pain.   rivaroxaban (XARELTO) 20 MG Tab tablet <3WEEKS at UNK Patient No No   Sig: Take 1 Tab by mouth with dinner.      Facility-Administered Medications: None       Physical Exam  Temp:  [37.1 °C (98.8 °F)] 37.1 °C (98.8 °F)  Pulse:  [68-78] 68  Resp:  [18] 18  BP: (106-124)/(55-73) 121/73  SpO2:  [95 %-97 %] 95 %    Physical Exam  Vitals signs and nursing note reviewed.   Constitutional:       General: He is in acute distress (Secondary to pain).      Appearance: He is well-developed. He is not ill-appearing or diaphoretic.   HENT:      Head: Normocephalic and atraumatic.      Right Ear: External ear normal.      Left Ear: External ear normal.      Nose: Nose normal. No congestion or rhinorrhea.      Mouth/Throat:      Mouth: Mucous membranes are moist.      Pharynx: Oropharynx is clear. No oropharyngeal exudate or posterior oropharyngeal erythema.   Eyes:      General: No scleral icterus.        Right eye: No discharge.         Left eye: No discharge.      Conjunctiva/sclera: Conjunctivae normal.      Pupils: Pupils are equal, round, and reactive to light.   Neck:      Musculoskeletal: Neck supple. No muscular tenderness.      Thyroid: No thyromegaly.      Vascular: No JVD.      Trachea: No  tracheal deviation.   Cardiovascular:      Rate and Rhythm: Normal rate and regular rhythm.      Pulses: Normal pulses.      Heart sounds: Normal heart sounds. No murmur. No friction rub. No gallop.    Pulmonary:      Effort: Pulmonary effort is normal. No respiratory distress.      Breath sounds: No stridor. Rales (Mild expiratory bibasilar crackles) present. No wheezing or rhonchi.   Abdominal:      General: Bowel sounds are normal. There is no distension.      Palpations: Abdomen is soft. There is no mass.      Tenderness: There is no abdominal tenderness. There is no guarding or rebound.   Musculoskeletal:         General: Tenderness and deformity present. No swelling or signs of injury.      Right lower leg: No edema.      Left lower leg: Edema present.      Comments: Posterior aspect of left calf with a large wound with signs of granulation.  There is significant tenderness and mild erythema along the margins of the wound.  No active drainage.   Lymphadenopathy:      Cervical: No cervical adenopathy.   Skin:     General: Skin is warm and dry.      Coloration: Skin is not pale.      Findings: No erythema or rash.   Neurological:      General: No focal deficit present.      Mental Status: He is alert and oriented to person, place, and time.      Cranial Nerves: No cranial nerve deficit.      Sensory: No sensory deficit.      Motor: No weakness or abnormal muscle tone.   Psychiatric:         Behavior: Behavior normal.         Thought Content: Thought content normal.         Judgment: Judgment normal.         Laboratory:  Recent Labs     12/30/20  1035   WBC 6.6   RBC 4.80   HEMOGLOBIN 13.7*   HEMATOCRIT 42.6   MCV 88.8   MCH 28.5   MCHC 32.2*   RDW 47.2   PLATELETCT 73*   MPV 11.0     Recent Labs     12/30/20  1035   SODIUM 138   POTASSIUM 3.9   CHLORIDE 102   CO2 26   GLUCOSE 93   BUN 18   CREATININE 1.16   CALCIUM 9.1     Recent Labs     12/30/20  1035   ALTSGPT 5   ASTSGOT 8*   ALKPHOSPHAT 111*   TBILIRUBIN  0.3   GLUCOSE 93         No results for input(s): NTPROBNP in the last 72 hours.      No results for input(s): TROPONINT in the last 72 hours.    Imaging:  US-EXTREMITY VENOUS LOWER UNILAT LEFT   Final Result      DX-CHEST-PORTABLE (1 VIEW)   Final Result      Unchanged right basilar density consistent with atelectasis or scar            Chest x-ray per my review shows hazy perihilar infiltrates on the right, clear on the left.  Sharp costophrenic angles.  No focal consolidation.        Assessment/Plan:  I anticipate this patient is appropriate for observation status at this time.    * Acute deep vein thrombosis (DVT) of popliteal vein of left lower extremity (HCC)- (present on admission)  Assessment & Plan  Per my examination of the wound, unlikely that he will need another debridement at this time.  There is evidence of healthy granulation tissue.  Therefore, will resume home rivaroxaban rather than placing the patient on a heparin drip.  No signs of respiratory distress or hypoxia at this time.  Continue to monitor closely.    Patient was counseled that it is imperative for him to continue taking rivaroxaban due to his history of a unprovoked DVT and PE.  He was reminded that this is could be life-threatening.  He understood the implications of not complying with his rivaroxaban and agreed to resume it.    Cellulitis- (present on admission)  Assessment & Plan  Minimal erythema although there is significant tenderness around the margins suggestive of active cellulitis.  White count is normal.  He did finish a course of antibiotics through a PICC line when he was hospitalized at [facility] several weeks ago.  Back in December, he was also treated with courses of doxycycline outpatient basis and also when he presented to the ER.    Plan:  Check ESR, CRP, and procalcitonin levels.  Blood cultures.  Continue Unasyn for now.  De-escalate as indicated.  Oxycodone as needed for pain.  Dilaudid low-dose IV as needed  for breakthrough pain.  Wound care consult.    Normocytic anemia- (present on admission)  Assessment & Plan  Check iron panel, B12 and folate levels.  Check ferritin.  Likely from anemia of chronic inflammation.    History of pulmonary embolus (PE)- (present on admission)  Assessment & Plan  As per acute DVT.  Resume home rivaroxaban.    History of DVT (deep vein thrombosis)- (present on admission)  Assessment & Plan  As per her acute DVT.  Resume home rivaroxaban.    Smoker- (present on admission)  Assessment & Plan  Counseled on cessation.  Failed Chantix in the past.  Nicotine replacement therapy on admission.      Labs reviewed, Medications reviewed and Radiology images reviewed      DVT: Rivaroxaban.  DVT prophylaxis - mechanical: SCDs  Ulcer prophylaxis: Not indicated and No  Antibiotics: Treating active/ suspected infection

## 2020-12-30 NOTE — ED TRIAGE NOTES
Panchito Moura  55 y.o. male  Chief Complaint   Patient presents with   • Wound Check     Patient brought in by EMS from home. Patient had a wound vac change appointment to his left calf with his wound nurse this morning and was instructed to go to ED for suspicion of infection. Patient reports increased swelling to left leg and severe pain. Obtained picture and uploaded to Epic. Patient reported wound started in July and has had previous I&D and recently completed antibiotics 2 weeks ago.

## 2020-12-30 NOTE — ED NOTES
Pharmacy Medication Reconciliation      Medication reconciliation updated and complete per pt at bedside  Allergies have been verified   No oral ABX within the last 14 days  Patient home pharmacy:[facility]

## 2020-12-30 NOTE — ASSESSMENT & PLAN NOTE
Has been off his paroxetine  Restarted rivaroxaban loading on admission, counseling provided for importance of staying on anticoagulation lifelong

## 2020-12-31 LAB
ALBUMIN SERPL BCP-MCNC: 3.4 G/DL (ref 3.2–4.9)
ALBUMIN/GLOB SERPL: 1.2 G/DL
ALP SERPL-CCNC: 91 U/L (ref 30–99)
ALT SERPL-CCNC: 10 U/L (ref 2–50)
ANION GAP SERPL CALC-SCNC: 10 MMOL/L (ref 7–16)
AST SERPL-CCNC: 13 U/L (ref 12–45)
BASOPHILS # BLD AUTO: 0.6 % (ref 0–1.8)
BASOPHILS # BLD: 0.04 K/UL (ref 0–0.12)
BILIRUB SERPL-MCNC: 0.6 MG/DL (ref 0.1–1.5)
BUN SERPL-MCNC: 17 MG/DL (ref 8–22)
CALCIUM SERPL-MCNC: 9 MG/DL (ref 8.5–10.5)
CHLORIDE SERPL-SCNC: 107 MMOL/L (ref 96–112)
CO2 SERPL-SCNC: 24 MMOL/L (ref 20–33)
CREAT SERPL-MCNC: 1.06 MG/DL (ref 0.5–1.4)
EOSINOPHIL # BLD AUTO: 0.26 K/UL (ref 0–0.51)
EOSINOPHIL NFR BLD: 3.8 % (ref 0–6.9)
ERYTHROCYTE [DISTWIDTH] IN BLOOD BY AUTOMATED COUNT: 46.5 FL (ref 35.9–50)
GLOBULIN SER CALC-MCNC: 2.8 G/DL (ref 1.9–3.5)
GLUCOSE SERPL-MCNC: 88 MG/DL (ref 65–99)
HCT VFR BLD AUTO: 38 % (ref 42–52)
HGB BLD-MCNC: 12.8 G/DL (ref 14–18)
IMM GRANULOCYTES # BLD AUTO: 0.03 K/UL (ref 0–0.11)
IMM GRANULOCYTES NFR BLD AUTO: 0.4 % (ref 0–0.9)
LYMPHOCYTES # BLD AUTO: 1.32 K/UL (ref 1–4.8)
LYMPHOCYTES NFR BLD: 19.2 % (ref 22–41)
MCH RBC QN AUTO: 29.6 PG (ref 27–33)
MCHC RBC AUTO-ENTMCNC: 33.7 G/DL (ref 33.7–35.3)
MCV RBC AUTO: 88 FL (ref 81.4–97.8)
MONOCYTES # BLD AUTO: 0.54 K/UL (ref 0–0.85)
MONOCYTES NFR BLD AUTO: 7.8 % (ref 0–13.4)
NEUTROPHILS # BLD AUTO: 4.69 K/UL (ref 1.82–7.42)
NEUTROPHILS NFR BLD: 68.2 % (ref 44–72)
NRBC # BLD AUTO: 0 K/UL
NRBC BLD-RTO: 0 /100 WBC
PLATELET # BLD AUTO: 80 K/UL (ref 164–446)
PMV BLD AUTO: 10.3 FL (ref 9–12.9)
POTASSIUM SERPL-SCNC: 3.9 MMOL/L (ref 3.6–5.5)
PROT SERPL-MCNC: 6.2 G/DL (ref 6–8.2)
RBC # BLD AUTO: 4.32 M/UL (ref 4.7–6.1)
SODIUM SERPL-SCNC: 141 MMOL/L (ref 135–145)
WBC # BLD AUTO: 6.9 K/UL (ref 4.8–10.8)

## 2020-12-31 PROCEDURE — 700102 HCHG RX REV CODE 250 W/ 637 OVERRIDE(OP): Performed by: STUDENT IN AN ORGANIZED HEALTH CARE EDUCATION/TRAINING PROGRAM

## 2020-12-31 PROCEDURE — 302098 PASTE RING (FLAT): Performed by: HOSPITALIST

## 2020-12-31 PROCEDURE — 96366 THER/PROPH/DIAG IV INF ADDON: CPT

## 2020-12-31 PROCEDURE — A9270 NON-COVERED ITEM OR SERVICE: HCPCS | Performed by: EMERGENCY MEDICINE

## 2020-12-31 PROCEDURE — 700102 HCHG RX REV CODE 250 W/ 637 OVERRIDE(OP): Performed by: EMERGENCY MEDICINE

## 2020-12-31 PROCEDURE — 80053 COMPREHEN METABOLIC PANEL: CPT

## 2020-12-31 PROCEDURE — A9270 NON-COVERED ITEM OR SERVICE: HCPCS | Performed by: STUDENT IN AN ORGANIZED HEALTH CARE EDUCATION/TRAINING PROGRAM

## 2020-12-31 PROCEDURE — 700111 HCHG RX REV CODE 636 W/ 250 OVERRIDE (IP): Performed by: STUDENT IN AN ORGANIZED HEALTH CARE EDUCATION/TRAINING PROGRAM

## 2020-12-31 PROCEDURE — 700105 HCHG RX REV CODE 258: Performed by: STUDENT IN AN ORGANIZED HEALTH CARE EDUCATION/TRAINING PROGRAM

## 2020-12-31 PROCEDURE — 99225 PR SUBSEQUENT OBSERVATION CARE,LEVEL II: CPT | Performed by: HOSPITALIST

## 2020-12-31 PROCEDURE — 96375 TX/PRO/DX INJ NEW DRUG ADDON: CPT

## 2020-12-31 PROCEDURE — 85025 COMPLETE CBC W/AUTO DIFF WBC: CPT

## 2020-12-31 PROCEDURE — G0378 HOSPITAL OBSERVATION PER HR: HCPCS

## 2020-12-31 PROCEDURE — 36415 COLL VENOUS BLD VENIPUNCTURE: CPT

## 2020-12-31 RX ORDER — LIDOCAINE HYDROCHLORIDE 20 MG/ML
20 INJECTION, SOLUTION INFILTRATION; PERINEURAL
Status: DISCONTINUED | OUTPATIENT
Start: 2020-12-31 | End: 2021-01-04 | Stop reason: HOSPADM

## 2020-12-31 RX ORDER — LIDOCAINE HYDROCHLORIDE 40 MG/ML
SOLUTION TOPICAL
Status: DISCONTINUED | OUTPATIENT
Start: 2020-12-31 | End: 2021-01-04 | Stop reason: HOSPADM

## 2020-12-31 RX ADMIN — OXYCODONE 5 MG: 5 TABLET ORAL at 05:06

## 2020-12-31 RX ADMIN — RIVAROXABAN 15 MG: 15 TABLET, FILM COATED ORAL at 08:16

## 2020-12-31 RX ADMIN — RIVAROXABAN 15 MG: 15 TABLET, FILM COATED ORAL at 17:21

## 2020-12-31 RX ADMIN — AMPICILLIN SODIUM AND SULBACTAM SODIUM 3 G: 2; 1 INJECTION, POWDER, FOR SOLUTION INTRAMUSCULAR; INTRAVENOUS at 12:27

## 2020-12-31 RX ADMIN — AMPICILLIN SODIUM AND SULBACTAM SODIUM 3 G: 2; 1 INJECTION, POWDER, FOR SOLUTION INTRAMUSCULAR; INTRAVENOUS at 17:20

## 2020-12-31 RX ADMIN — HYDROMORPHONE HYDROCHLORIDE 0.5 MG: 1 INJECTION, SOLUTION INTRAMUSCULAR; INTRAVENOUS; SUBCUTANEOUS at 12:27

## 2020-12-31 RX ADMIN — TIZANIDINE 2 MG: 4 TABLET ORAL at 10:34

## 2020-12-31 RX ADMIN — NICOTINE 14 MG: 14 PATCH TRANSDERMAL at 05:01

## 2020-12-31 RX ADMIN — DOCUSATE SODIUM 50 MG AND SENNOSIDES 8.6 MG 2 TABLET: 8.6; 5 TABLET, FILM COATED ORAL at 04:57

## 2020-12-31 RX ADMIN — AMPICILLIN SODIUM AND SULBACTAM SODIUM 3 G: 2; 1 INJECTION, POWDER, FOR SOLUTION INTRAMUSCULAR; INTRAVENOUS at 05:03

## 2020-12-31 RX ADMIN — DOCUSATE SODIUM 50 MG AND SENNOSIDES 8.6 MG 2 TABLET: 8.6; 5 TABLET, FILM COATED ORAL at 17:21

## 2020-12-31 RX ADMIN — TIZANIDINE 2 MG: 4 TABLET ORAL at 17:32

## 2020-12-31 RX ADMIN — AMPICILLIN SODIUM AND SULBACTAM SODIUM 3 G: 2; 1 INJECTION, POWDER, FOR SOLUTION INTRAMUSCULAR; INTRAVENOUS at 23:16

## 2020-12-31 ASSESSMENT — ENCOUNTER SYMPTOMS
EYE DISCHARGE: 0
LOSS OF CONSCIOUSNESS: 0
COUGH: 0
HEMOPTYSIS: 0
SPEECH CHANGE: 0
MYALGIAS: 1
EYE REDNESS: 0
DIARRHEA: 0
VOMITING: 0
FLANK PAIN: 0
CHILLS: 0
ABDOMINAL PAIN: 0
FEVER: 0
STRIDOR: 0
SPUTUM PRODUCTION: 0
SORE THROAT: 0
BLOOD IN STOOL: 0
SHORTNESS OF BREATH: 0
FOCAL WEAKNESS: 0
EYE PAIN: 0
SEIZURES: 0
NERVOUS/ANXIOUS: 0
HALLUCINATIONS: 0
BRUISES/BLEEDS EASILY: 0
PALPITATIONS: 0

## 2020-12-31 ASSESSMENT — PAIN DESCRIPTION - PAIN TYPE
TYPE: ACUTE PAIN
TYPE: ACUTE PAIN

## 2020-12-31 ASSESSMENT — LIFESTYLE VARIABLES
HAVE PEOPLE ANNOYED YOU BY CRITICIZING YOUR DRINKING: NO
CONSUMPTION TOTAL: NEGATIVE
EVER HAD A DRINK FIRST THING IN THE MORNING TO STEADY YOUR NERVES TO GET RID OF A HANGOVER: NO
HOW MANY TIMES IN THE PAST YEAR HAVE YOU HAD 5 OR MORE DRINKS IN A DAY: 0
TOTAL SCORE: 0
HOW MANY TIMES IN THE PAST YEAR HAVE YOU HAD 5 OR MORE DRINKS IN A DAY: 0
TOTAL SCORE: 0
AVERAGE NUMBER OF DAYS PER WEEK YOU HAVE A DRINK CONTAINING ALCOHOL: 0
ON A TYPICAL DAY WHEN YOU DRINK ALCOHOL HOW MANY DRINKS DO YOU HAVE: 0
ALCOHOL_USE: NO
EVER FELT BAD OR GUILTY ABOUT YOUR DRINKING: NO
TOTAL SCORE: 0
AVERAGE NUMBER OF DAYS PER WEEK YOU HAVE A DRINK CONTAINING ALCOHOL: 0
ON A TYPICAL DAY WHEN YOU DRINK ALCOHOL HOW MANY DRINKS DO YOU HAVE: 0
HAVE YOU EVER FELT YOU SHOULD CUT DOWN ON YOUR DRINKING: NO
ALCOHOL_USE: NO
CONSUMPTION TOTAL: INCOMPLETE

## 2020-12-31 NOTE — PROGRESS NOTES
Pt is A&Ox4, VSS on RA.  Pt is able to ambulate with two person assist.  Pt reports 6/10 pain, denies need for pain medication, comfort measures provided.   Safety measures and hourly rounding in place.

## 2020-12-31 NOTE — WOUND TEAM
[facility]  Inpatient Services  Initial Wound and Skin Care Evaluation    Admission Date: 12/30/2020     Last order of IP CONSULT TO WOUND CARE was found on 12/30/2020 from Hospital Encounter on 12/30/2020     HPI, PMH, SH: Reviewed    Unit where seen by Wound Team: [id number]     WOUND CONSULT/FOLLOW UP RELATED TO:  Left posterior leg, NPWT placement     Self Report / Pain Level:  9/10 pain, tolerated well though. Had PO muscle relaxer and IV pain meds when we began. Ordered topical lidocaine for next change.        OBJECTIVE:  Patient with non-adhesive dressing in place to left calf. On regular redistribution mattress. Able to turn self. Patient well known to wound team for prevoius NPWT that was in place on last admission.    WOUND TYPE, LOCATION, CHARACTERISTICS (Pressure Injuries: location, stage, POA or date identified)     Negative Pressure Wound Therapy 12/31/20 Surgical Leg Posterior Left (Active)   NPWT Pump Mode / Pressure Setting Continuous;Ulta;125 mmHg 12/31/20 1200   Dressing Type Medium;Black Foam (Regular);White Foam 12/31/20 1200   Number of Foam Pieces Used 2 12/31/20 1200   Canister Changed Yes 12/31/20 1200   NEXT Dressing Change/Treatment Date 01/02/21 12/31/20 1200           Wound 12/30/20 Full Thickness Wound Leg Posterior Left POA (Active)   Wound Image   12/31/20 1200   Site Assessment Red;Granulation tissue;Non-healing;Painful 12/31/20 1200   Periwound Assessment Scar tissue;Clean;Dry;Intact;Pink 12/31/20 1200   Margins Defined edges 12/31/20 1200   Closure Secondary intention 12/31/20 1200   Drainage Amount Scant 12/31/20 1200   Drainage Description Serosanguineous 12/31/20 1200   Treatments Cleansed;Site care;Tape change 12/31/20 1200   Wound Cleansing Approved Wound Cleanser 12/31/20 1200   Periwound Protectant Paste Ring;Skin Protectant Wipes to Periwound 12/31/20 1200   Dressing Cleansing/Solutions Not Applicable 12/31/20 1200   Dressing Options Wound Vac 12/31/20 1200    Dressing Changed New 12/31/20 1200   Dressing Status Clean;Dry;Intact 12/31/20 1200   Dressing Change/Treatment Frequency Tuesday, Thursday, Saturday, and As Needed 12/31/20 1200   NEXT Dressing Change/Treatment Date 01/02/21 12/31/20 1200   NEXT Weekly Photo (Inpatient Only) 01/07/21 12/31/20 1200   Non-staged Wound Description Full thickness 12/31/20 1200   Wound Length (cm) 12 cm 12/31/20 1200   Wound Width (cm) 12 cm 12/31/20 1200   Wound Surface Area (cm^2) 144 cm^2 12/31/20 1200            Vascular:    CARLA:   No results found.    Lab Values:    Lab Results   Component Value Date/Time    WBC 6.9 12/31/2020 03:35 AM    RBC 4.32 (L) 12/31/2020 03:35 AM    HEMOGLOBIN 12.8 (L) 12/31/2020 03:35 AM    HEMATOCRIT 38.0 (L) 12/31/2020 03:35 AM    CREACTPROT 3.80 (H) 12/30/2020 05:45 PM    SEDRATEWES 20 12/30/2020 05:45 PM        Culture Results show:  No results found for this or any previous visit (from the past 720 hour(s)).    INTERVENTIONS BY WOUND TEAM:  Chart and images reviewed. Discussed with bedside RN. This RN in to assess patient with wound HANNAH Bowers. Removed current dressing. Gently cleansed with wound cleanser and gauze, very painful for patient including periwound pain. Prepped periwound with no sting skin prep and applied paste ring due to undefined edges and some maceration present. Wound bed with some hypergranulation on superior aspect of wound (+0.1cm), and then distal aspect with some depth (0.1cm). Applied one whole piece of white foam over entire superior aspect of wound bed, then cut to fit a half thickness black regular medium foam to place over entire wound. Secured with drape. Hole cut towards distal end, trak pad was applied with tubing going upward per patient request. Suction obtained at 125mmhg, no leaks present. Updated bedside RN. ORders placed.     Interdisciplinary consultation: Patient, Bedside RNElissa    EVALUATION / RATIONALE FOR TREATMENT: Patient known to wound team from last  admission, patient left posterior lower leg with a full thickness wound present. POA. Some edema present and patient in high amounts of pain, will not tolerate tubi  mild compression per patient. On IV antibiotics, culture already obtained. Patient superior aspect of wound bed with some hypergranulation tissue present, while distal end with some depth still. Applied white foam over superior aspect to assist in hypergranulation. Otherwise continue NPWT to assist in granulation and oxygenation, heal by secondary intention, and manage drainage. Wound team to continue changes 3x/week.      Goals: Steady decrease in wound area and depth weekly.    NURSING PLAN OF CARE ORDERS (X):    Dressing changes: See Dressing Care orders: X  Skin care: See Skin Care orders:   RN Prevention Protocol:   Rectal tube care: See Rectal Tube Care orders:   Other orders:    RSKIN:   CURRENTLY IN PLACE (X), APPLIED THIS VISIT (A), ORDERED (O):     Q shift John:  X  Q shift pressure point assessments:  X  Pressure redistribution mattress   X         Low Airloss          Bariatric FEDERICO         Bariatric foam           Heel float boots     Heel Silicone dressing        Float Heels off Bed with Pillows               Barrier wipes         Barrier Cream         Barrier paste          Sacral silicone dressing         Silicone O2 tubing         Anchorfast         Cannula fixation Device (Tender )          Gray Foam Ear protectors     High flow offloading Clip    Elastic head band offloading device                                                      Trach with Optifoam split foam       Z Sly Pillow                             Waffle cushion        Waffle Overlay         Rectal tube or BMS    Purwick/Condom Cath          Antifungal tx      Interdry          Reposition q 2 hours    X  TAPs Turning system                 Up to chair        Ambulate      PT/OT        Dietician        Diabetes Education      PO X    TF     TPN     NPO   # days    Other        WOUND TEAM PLAN OF CARE:   Dressing changes by wound team:                   Follow up 3 times weekly:                NPWT change 3 times weekly:   X - left posterior lower leg  Follow up 1-2 times weekly:      Follow up Bi-Monthly:                   Follow up as needed:       Other (explain):     Anticipated discharge plans: will need ongoing wound care 3x/week.  LTACH:        SNF/Rehab:                  Home Health Care:   X        Outpatient Wound Center:            Self Care:

## 2020-12-31 NOTE — CARE PLAN
Problem: Communication  Goal: The ability to communicate needs accurately and effectively will improve  Outcome: PROGRESSING AS EXPECTED  Note: Pt instructed to communicate needs via call light.      Problem: Respiratory:  Goal: Respiratory status will improve  Outcome: PROGRESSING AS EXPECTED  Note: Pt reporting 6/10 pain, denies need for pain medication. RN and pt reviewed available pain medications for pt. Comfort measures provided.

## 2020-12-31 NOTE — PROGRESS NOTES
Pt A&Ox4 on RA. Pt reported 7/10 pain on left leg, managed with PRN medication per MAR. Pt currently sleeping. All pt needs met at this time. Call light within reach. Safety precautions and hourly rounding in place.

## 2020-12-31 NOTE — DIETARY
[facility] - Alert received for newly identified wound. Appears wound is cellulitis and not pressure related, however wound team consult pending, will await wound staging to make recommendations if appropriate.     RD will monitor per dept policy.

## 2020-12-31 NOTE — PROGRESS NOTES
Pt A&Ox4.  VSS on RA. POC discussed with patient; all questions and concerns addressed.    Managing pain with PRN oxycodone, tylenol, and tinazidine. Tolerating regular diet, denies nausea. Skin CDI, except for L calf with large open wound -- Mepliex currently in place; wound care ordered. Pt free from falls. Turning self in bed.  Call light within reach, bed in lowest locked position, hourly rounding in place.

## 2020-12-31 NOTE — PROGRESS NOTES
Hospital Medicine Daily Progress Note    Date of Service  12/31/2020    Chief Complaint  55 y.o. male admitted 12/30/2020 with left leg pain.    Hospital Course  55 years old male with a past medical history of medication noncompliance with anticoagulation, history of unprovoked deep venous thrombosis/pulmonary embolism and is on lifetime rivaroxaban, left posterior calf wound, status post plastic surgery and wound VAC and LTAC stay, history of renal cell carcinoma status post nephrectomy in 2003. Admitted 12/30/2020 with worsening left leg pain and swelling for several days found to have. In the ER, ultrasound of the lower extremity showed acute and subocclusive left lower extremity DVT within the popliteal vein extending into the calf veins at the proximal level.    Interval Problem Update  Heart rate 60s, saturating well on room air this morning.  Having mild-moderate pain left lower extremity.  Started on Xarelto, tolerating well.  Hemoglobin stable.  Tolerating Unasyn for cellulitis, no evidence of hyperkalemia or elevated creatinine, continue to monitor.    Wound care consulted  Discussed with patient, and care coordination [Jonathan]     Consultants/Specialty  None     Code Status  Full Code    Disposition  Anticipate medical clearance in 2-3 days [IV antibiotics, culture and sensitivity results]  Xarelto restarted, prescription sent to pharmacy, co-pay may be an issue    Review of Systems  Review of Systems   Constitutional: Positive for malaise/fatigue. Negative for chills and fever.   HENT: Negative for congestion and sore throat.    Eyes: Negative for pain, discharge and redness.   Respiratory: Negative for cough, hemoptysis, sputum production, shortness of breath and stridor.    Cardiovascular: Negative for chest pain, palpitations and leg swelling.   Gastrointestinal: Negative for abdominal pain, blood in stool, diarrhea and vomiting.   Genitourinary: Negative for flank pain, hematuria and urgency.    Musculoskeletal: Positive for myalgias.        Left Calf pain    Skin: Negative.    Neurological: Negative for speech change, focal weakness, seizures and loss of consciousness.   Endo/Heme/Allergies: Does not bruise/bleed easily.   Psychiatric/Behavioral: Negative for hallucinations and suicidal ideas. The patient is not nervous/anxious.       Physical Exam  Temp:  [36.2 °C (97.1 °F)-36.7 °C (98 °F)] 36.4 °C (97.5 °F)  Pulse:  [57-71] 61  Resp:  [16-20] 18  BP: (104-122)/(54-77) 121/77  SpO2:  [91 %-97 %] 91 %    Physical Exam  Constitutional:       General: He is not in acute distress.     Appearance: He is not ill-appearing or diaphoretic.   HENT:      Head: Atraumatic.      Right Ear: External ear normal.      Left Ear: External ear normal.      Nose: No congestion or rhinorrhea.      Mouth/Throat:      Mouth: Mucous membranes are moist.   Eyes:      General: No scleral icterus.        Right eye: No discharge.         Left eye: No discharge.      Pupils: Pupils are equal, round, and reactive to light.   Neck:      Musculoskeletal: Neck supple. No neck rigidity or muscular tenderness.   Cardiovascular:      Rate and Rhythm: Normal rate and regular rhythm.      Heart sounds: No friction rub. No gallop.    Pulmonary:      Effort: No respiratory distress.      Breath sounds: No stridor. No wheezing.   Abdominal:      Palpations: Abdomen is soft.      Tenderness: There is no abdominal tenderness. There is no guarding or rebound.   Musculoskeletal:         General: Swelling and tenderness present.      Right lower leg: No edema.      Left lower leg: Edema present.      Comments: LKeft calf wound with granulation tissue.  Mild tenderness. No active drainage.    Skin:     Coloration: Skin is not jaundiced or pale.   Neurological:      Mental Status: He is alert and oriented to person, place, and time.      Coordination: Coordination normal.   Psychiatric:         Mood and Affect: Mood normal.         Behavior:  Behavior normal.       Fluids    Intake/Output Summary (Last 24 hours) at 12/31/2020 1035  Last data filed at 12/31/2020 0900  Gross per 24 hour   Intake 240 ml   Output 450 ml   Net -210 ml       Laboratory  Recent Labs     12/30/20  1035 12/31/20  0335   WBC 6.6 6.9   RBC 4.80 4.32*   HEMOGLOBIN 13.7* 12.8*   HEMATOCRIT 42.6 38.0*   MCV 88.8 88.0   MCH 28.5 29.6   MCHC 32.2* 33.7   RDW 47.2 46.5   PLATELETCT 73* 80*   MPV 11.0 10.3     Recent Labs     12/30/20  1035 12/31/20  0335   SODIUM 138 141   POTASSIUM 3.9 3.9   CHLORIDE 102 107   CO2 26 24   GLUCOSE 93 88   BUN 18 17   CREATININE 1.16 1.06   CALCIUM 9.1 9.0                   Imaging  US-EXTREMITY VENOUS LOWER UNILAT LEFT   Final Result      DX-CHEST-PORTABLE (1 VIEW)   Final Result      Unchanged right basilar density consistent with atelectasis or scar         Assessment/Plan  * Acute deep vein thrombosis (DVT) of popliteal vein of left lower extremity (HCC)- (present on admission)  Assessment & Plan  Has been off his paroxetine  Restarted rivaroxaban loading on admission, counseling provided for importance of staying on anticoagulation lifelong     Cellulitis- (present on admission)  Assessment & Plan  Started on Unasyn on admission, continue for now follow on cultures and sensitivities   Multimodal pain control.  Wound care.     Normocytic anemia- (present on admission)  Assessment & Plan  Continue to monitor, transfuse for hemoglobin of less than or equal to 7.     History of pulmonary embolus (PE)- (present on admission)  Assessment & Plan  Restart the rivaroxaban     History of DVT (deep vein thrombosis)- (present on admission)  Assessment & Plan  Restart home rivaroxaban.      Smoker- (present on admission)  Assessment & Plan  Counseling provided      VTE prophylaxis: Rivaroxaban

## 2021-01-01 PROBLEM — S81.802A LEG WOUND, LEFT: Status: ACTIVE | Noted: 2021-01-01

## 2021-01-01 LAB
ANION GAP SERPL CALC-SCNC: 11 MMOL/L (ref 7–16)
BACTERIA WND AEROBE CULT: ABNORMAL
BACTERIA WND AEROBE CULT: ABNORMAL
BASOPHILS # BLD AUTO: 0.6 % (ref 0–1.8)
BASOPHILS # BLD: 0.04 K/UL (ref 0–0.12)
BUN SERPL-MCNC: 17 MG/DL (ref 8–22)
CALCIUM SERPL-MCNC: 8.9 MG/DL (ref 8.5–10.5)
CHLORIDE SERPL-SCNC: 103 MMOL/L (ref 96–112)
CO2 SERPL-SCNC: 23 MMOL/L (ref 20–33)
CREAT SERPL-MCNC: 1.03 MG/DL (ref 0.5–1.4)
EOSINOPHIL # BLD AUTO: 0.26 K/UL (ref 0–0.51)
EOSINOPHIL NFR BLD: 4.1 % (ref 0–6.9)
ERYTHROCYTE [DISTWIDTH] IN BLOOD BY AUTOMATED COUNT: 45 FL (ref 35.9–50)
GLUCOSE SERPL-MCNC: 94 MG/DL (ref 65–99)
GRAM STN SPEC: ABNORMAL
HCT VFR BLD AUTO: 37.9 % (ref 42–52)
HGB BLD-MCNC: 12.7 G/DL (ref 14–18)
IMM GRANULOCYTES # BLD AUTO: 0.02 K/UL (ref 0–0.11)
IMM GRANULOCYTES NFR BLD AUTO: 0.3 % (ref 0–0.9)
LYMPHOCYTES # BLD AUTO: 1.29 K/UL (ref 1–4.8)
LYMPHOCYTES NFR BLD: 20.3 % (ref 22–41)
MCH RBC QN AUTO: 29.3 PG (ref 27–33)
MCHC RBC AUTO-ENTMCNC: 33.5 G/DL (ref 33.7–35.3)
MCV RBC AUTO: 87.3 FL (ref 81.4–97.8)
MONOCYTES # BLD AUTO: 0.51 K/UL (ref 0–0.85)
MONOCYTES NFR BLD AUTO: 8 % (ref 0–13.4)
NEUTROPHILS # BLD AUTO: 4.23 K/UL (ref 1.82–7.42)
NEUTROPHILS NFR BLD: 66.7 % (ref 44–72)
NRBC # BLD AUTO: 0 K/UL
NRBC BLD-RTO: 0 /100 WBC
PLATELET # BLD AUTO: 77 K/UL (ref 164–446)
PMV BLD AUTO: 9.7 FL (ref 9–12.9)
POTASSIUM SERPL-SCNC: 3.6 MMOL/L (ref 3.6–5.5)
RBC # BLD AUTO: 4.34 M/UL (ref 4.7–6.1)
SIGNIFICANT IND 70042: ABNORMAL
SITE SITE: ABNORMAL
SODIUM SERPL-SCNC: 137 MMOL/L (ref 135–145)
SOURCE SOURCE: ABNORMAL
WBC # BLD AUTO: 6.4 K/UL (ref 4.8–10.8)

## 2021-01-01 PROCEDURE — 700102 HCHG RX REV CODE 250 W/ 637 OVERRIDE(OP): Performed by: EMERGENCY MEDICINE

## 2021-01-01 PROCEDURE — 96366 THER/PROPH/DIAG IV INF ADDON: CPT

## 2021-01-01 PROCEDURE — 700102 HCHG RX REV CODE 250 W/ 637 OVERRIDE(OP): Performed by: HOSPITALIST

## 2021-01-01 PROCEDURE — 700105 HCHG RX REV CODE 258: Performed by: STUDENT IN AN ORGANIZED HEALTH CARE EDUCATION/TRAINING PROGRAM

## 2021-01-01 PROCEDURE — 700102 HCHG RX REV CODE 250 W/ 637 OVERRIDE(OP): Performed by: STUDENT IN AN ORGANIZED HEALTH CARE EDUCATION/TRAINING PROGRAM

## 2021-01-01 PROCEDURE — A9270 NON-COVERED ITEM OR SERVICE: HCPCS | Performed by: HOSPITALIST

## 2021-01-01 PROCEDURE — A9270 NON-COVERED ITEM OR SERVICE: HCPCS | Performed by: STUDENT IN AN ORGANIZED HEALTH CARE EDUCATION/TRAINING PROGRAM

## 2021-01-01 PROCEDURE — A9270 NON-COVERED ITEM OR SERVICE: HCPCS | Performed by: EMERGENCY MEDICINE

## 2021-01-01 PROCEDURE — 80048 BASIC METABOLIC PNL TOTAL CA: CPT

## 2021-01-01 PROCEDURE — 700111 HCHG RX REV CODE 636 W/ 250 OVERRIDE (IP): Performed by: STUDENT IN AN ORGANIZED HEALTH CARE EDUCATION/TRAINING PROGRAM

## 2021-01-01 PROCEDURE — G0378 HOSPITAL OBSERVATION PER HR: HCPCS

## 2021-01-01 PROCEDURE — 36415 COLL VENOUS BLD VENIPUNCTURE: CPT

## 2021-01-01 PROCEDURE — 99225 PR SUBSEQUENT OBSERVATION CARE,LEVEL II: CPT | Performed by: HOSPITALIST

## 2021-01-01 PROCEDURE — 85025 COMPLETE CBC W/AUTO DIFF WBC: CPT

## 2021-01-01 RX ORDER — DIPHENHYDRAMINE HCL 25 MG
25 TABLET ORAL EVERY 8 HOURS PRN
Status: DISCONTINUED | OUTPATIENT
Start: 2021-01-01 | End: 2021-01-04 | Stop reason: HOSPADM

## 2021-01-01 RX ADMIN — DIPHENHYDRAMINE HYDROCHLORIDE 25 MG: 25 TABLET ORAL at 13:57

## 2021-01-01 RX ADMIN — DOCUSATE SODIUM 50 MG AND SENNOSIDES 8.6 MG 2 TABLET: 8.6; 5 TABLET, FILM COATED ORAL at 18:00

## 2021-01-01 RX ADMIN — AMPICILLIN SODIUM AND SULBACTAM SODIUM 3 G: 2; 1 INJECTION, POWDER, FOR SOLUTION INTRAMUSCULAR; INTRAVENOUS at 18:00

## 2021-01-01 RX ADMIN — AMPICILLIN SODIUM AND SULBACTAM SODIUM 3 G: 2; 1 INJECTION, POWDER, FOR SOLUTION INTRAMUSCULAR; INTRAVENOUS at 12:29

## 2021-01-01 RX ADMIN — DOCUSATE SODIUM 50 MG AND SENNOSIDES 8.6 MG 2 TABLET: 8.6; 5 TABLET, FILM COATED ORAL at 05:50

## 2021-01-01 RX ADMIN — NICOTINE 14 MG: 14 PATCH TRANSDERMAL at 05:57

## 2021-01-01 RX ADMIN — OXYCODONE 5 MG: 5 TABLET ORAL at 10:07

## 2021-01-01 RX ADMIN — AMPICILLIN SODIUM AND SULBACTAM SODIUM 3 G: 2; 1 INJECTION, POWDER, FOR SOLUTION INTRAMUSCULAR; INTRAVENOUS at 23:52

## 2021-01-01 RX ADMIN — RIVAROXABAN 15 MG: 15 TABLET, FILM COATED ORAL at 18:00

## 2021-01-01 RX ADMIN — DIPHENHYDRAMINE HYDROCHLORIDE 25 MG: 25 TABLET ORAL at 21:37

## 2021-01-01 RX ADMIN — TIZANIDINE 2 MG: 4 TABLET ORAL at 21:04

## 2021-01-01 RX ADMIN — RIVAROXABAN 15 MG: 15 TABLET, FILM COATED ORAL at 07:34

## 2021-01-01 RX ADMIN — TIZANIDINE 2 MG: 4 TABLET ORAL at 05:55

## 2021-01-01 RX ADMIN — AMPICILLIN SODIUM AND SULBACTAM SODIUM 3 G: 2; 1 INJECTION, POWDER, FOR SOLUTION INTRAMUSCULAR; INTRAVENOUS at 05:50

## 2021-01-01 ASSESSMENT — PAIN DESCRIPTION - PAIN TYPE: TYPE: ACUTE PAIN

## 2021-01-01 ASSESSMENT — ENCOUNTER SYMPTOMS
NERVOUS/ANXIOUS: 0
SORE THROAT: 0
SEIZURES: 0
BLOOD IN STOOL: 0
SHORTNESS OF BREATH: 0
COUGH: 0
CHILLS: 0
HALLUCINATIONS: 0
VOMITING: 0
STRIDOR: 0
EYE REDNESS: 0
LOSS OF CONSCIOUSNESS: 0
SPEECH CHANGE: 0
EYE DISCHARGE: 0
ABDOMINAL PAIN: 0
MYALGIAS: 1
PALPITATIONS: 0
FOCAL WEAKNESS: 0
DIARRHEA: 0
SPUTUM PRODUCTION: 0
FLANK PAIN: 0
HEMOPTYSIS: 0
BRUISES/BLEEDS EASILY: 0
FEVER: 0
EYE PAIN: 0

## 2021-01-01 NOTE — FACE TO FACE
Face to Face Supporting Documentation - Home Health    The encounter with this patient was in whole or in part the primary reason for home health admission.    Date of encounter:   Patient:                    MRN:                       YOB: 2022  Panchito Moura  [id number]  1965     Home health to see patient for:  Skilled Nursing care for assessment, interventions & education and Wound Care    Skilled need for:  Comment: Wound care    Skilled nursing interventions to include:  Wound Care  Wound VAC, dressing changes 3 times a week.    Homebound status evidenced by:  Needs the assistance of another person in order to leave the home. Leaving home requires a considerable and taxing effort. There is a normal inability to leave the home.    Community Physician to provide follow up care: Gio Wang M.D.     Optional Interventions? No      I certify the face to face encounter for this home health care referral meets the CMS requirements and the encounter/clinical assessment with the patient was, in whole, or in part, for the medical condition(s) listed above, which is the primary reason for home health care. Based on my clinical findings: the service(s) are medically necessary, support the need for home health care, and the homebound criteria are met.  I certify that this patient has had a face to face encounter by myself.  Trent Leal M.D. - NPI: [id number]

## 2021-01-01 NOTE — PROGRESS NOTES
[facility] Medicine Daily Progress Note    Date of Service  1/1/2021    Chief Complaint  55 y.o. male admitted 12/30/2020 with left leg pain.    Hospital Course  55 years old male with a past medical history of medication noncompliance with anticoagulation, history of unprovoked deep venous thrombosis/pulmonary embolism and is on lifetime rivaroxaban, left posterior calf wound, status post plastic surgery and wound VAC and LTAC stay, history of renal cell carcinoma status post nephrectomy in 2003. Admitted 12/30/2020 with worsening left leg pain and swelling for several days found to have. In the ER, ultrasound of the lower extremity showed acute and subocclusive left lower extremity DVT within the popliteal vein extending into the calf veins at the proximal level.    Interval Problem Update  Heart rate 50's-60s, saturating well on room air this morning.  Still having moderate pain left lower extremity.    Evaluated by wound care, wound VAC replaced, plan for home health, I will order.  Tolerating Xarelto, sent to pharmacy d/w care coordination to check on co-pay   Unasyn for cellulitis, no evidence of hyperkalemia or elevated creatinine, continue to monitor.    Discussed with patient, bedside nurse, and care coordination [Jonathan] anticipated discharge 1/3 with home health.    Consultants/Specialty  None     Code Status  Full Code    Disposition  Anticipated discharge Jem 3 with home health.  Xarelto restarted, prescription sent to pharmacy, co-pay may be an issue    Review of Systems  Review of Systems   Constitutional: Positive for malaise/fatigue. Negative for chills and fever.   HENT: Negative for congestion and sore throat.    Eyes: Negative for pain, discharge and redness.   Respiratory: Negative for cough, hemoptysis, sputum production, shortness of breath and stridor.    Cardiovascular: Negative for chest pain, palpitations and leg swelling.   Gastrointestinal: Negative for abdominal pain, blood in stool,  diarrhea and vomiting.   Genitourinary: Negative for flank pain, hematuria and urgency.   Musculoskeletal: Positive for myalgias.        Left Calf pain    Skin: Negative.    Neurological: Negative for speech change, focal weakness, seizures and loss of consciousness.   Endo/Heme/Allergies: Does not bruise/bleed easily.   Psychiatric/Behavioral: Negative for hallucinations and suicidal ideas. The patient is not nervous/anxious.       Physical Exam  Temp:  [36.2 °C (97.2 °F)-36.8 °C (98.2 °F)] 36.2 °C (97.2 °F)  Pulse:  [59-63] 59  Resp:  [17-18] 18  BP: (110-129)/(68-73) 129/73  SpO2:  [92 %-97 %] 95 %    Physical Exam  Constitutional:       General: He is not in acute distress.     Appearance: He is not ill-appearing or diaphoretic.   HENT:      Head: Atraumatic.      Right Ear: External ear normal.      Left Ear: External ear normal.      Nose: No congestion or rhinorrhea.      Mouth/Throat:      Mouth: Mucous membranes are moist.   Eyes:      General: No scleral icterus.        Right eye: No discharge.         Left eye: No discharge.      Pupils: Pupils are equal, round, and reactive to light.   Neck:      Musculoskeletal: Neck supple. No neck rigidity or muscular tenderness.   Cardiovascular:      Rate and Rhythm: Normal rate and regular rhythm.      Heart sounds: No friction rub. No gallop.    Pulmonary:      Effort: No respiratory distress.      Breath sounds: No stridor. No wheezing.   Abdominal:      Palpations: Abdomen is soft.      Tenderness: There is no abdominal tenderness. There is no guarding or rebound.   Musculoskeletal:         General: Swelling and tenderness present.      Right lower leg: No edema.      Left lower leg: Edema present.      Comments: LKeft calf wound with granulation tissue.  Mild tenderness. No active drainage.    Skin:     Coloration: Skin is not jaundiced or pale.   Neurological:      Mental Status: He is alert and oriented to person, place, and time.      Coordination:  Coordination normal.   Psychiatric:         Mood and Affect: Mood normal.         Behavior: Behavior normal.       Fluids    Intake/Output Summary (Last 24 hours) at 1/1/2021 1125  Last data filed at 1/1/2021 1015  Gross per 24 hour   Intake 1420 ml   Output 1150 ml   Net 270 ml       Laboratory  Recent Labs     12/30/20  1035 12/31/20  0335 01/01/21  0413   WBC 6.6 6.9 6.4   RBC 4.80 4.32* 4.34*   HEMOGLOBIN 13.7* 12.8* 12.7*   HEMATOCRIT 42.6 38.0* 37.9*   MCV 88.8 88.0 87.3   MCH 28.5 29.6 29.3   MCHC 32.2* 33.7 33.5*   RDW 47.2 46.5 45.0   PLATELETCT 73* 80* 77*   MPV 11.0 10.3 9.7     Recent Labs     12/30/20  1035 12/31/20  0335 01/01/21  0413   SODIUM 138 141 137   POTASSIUM 3.9 3.9 3.6   CHLORIDE 102 107 103   CO2 26 24 23   GLUCOSE 93 88 94   BUN 18 17 17   CREATININE 1.16 1.06 1.03   CALCIUM 9.1 9.0 8.9                   Imaging  US-EXTREMITY VENOUS LOWER UNILAT LEFT   Final Result      DX-CHEST-PORTABLE (1 VIEW)   Final Result      Unchanged right basilar density consistent with atelectasis or scar         Assessment/Plan  * Acute deep vein thrombosis (DVT) of popliteal vein of left lower extremity (HCC)- (present on admission)  Assessment & Plan  Has been off his paroxetine  Restarted rivaroxaban loading on admission, counseling provided for importance of staying on anticoagulation lifelong     Cellulitis- (present on admission)  Assessment & Plan  Started on Unasyn on admission, continue for now follow on cultures and sensitivities   Multimodal pain control.  Wound care.     Leg wound, left- (present on admission)  Assessment & Plan  Evaluated by wound care, wound VAC replaced, plan for home health, I will order.     Normocytic anemia- (present on admission)  Assessment & Plan  Continue to monitor, transfuse for hemoglobin of less than or equal to 7.     History of pulmonary embolus (PE)- (present on admission)  Assessment & Plan  Restart the rivaroxaban     History of DVT (deep vein thrombosis)-  (present on admission)  Assessment & Plan  Restart home rivaroxaban.      Smoker- (present on admission)  Assessment & Plan  Counseling provided      VTE prophylaxis: Rivaroxaban

## 2021-01-01 NOTE — DISCHARGE PLANNING
Received Choice form at 4844  Agency/Facility Name: [facility]  Referral sent per Choice form @ 1361

## 2021-01-01 NOTE — DISCHARGE PLANNING
Anticipated Discharge Disposition: home with HH and wound vac    Action: Referral rec’d from Dr Leal for HH. Reviewed with  that the HH order has to be written as for nursing to do wound vac dressing changes 3 x week. In addition, since it is a holiday, no home health agencies are open today. We must get an accepting hh agency before pt discharges. Also, pt has [employer] insurance and the HH agency will need to obtain auth in order to accept pt to their service.      states that pt already has his own home wound vac at bedside. When pt is ready to dc to home, wound care RN can change pt from hospital vac to pt’s home vac.     Reviewed plan with pt over the phone. Pt states he has been on service with [facility] and his nurse has been Suha. They have been changing his wound vac dressing on a M-W-F schudule at home. Choice form completed for [facility] and faxed to Jeanne HUSTON to send referral.     Pt to dc on Xaralto and states he has been on this medication for some time and it only costs him $10/month.     Barriers to Discharge: need accepting HH agency prior to dc, it’s a holiday/weekend.     Plan: Referral to be sent to [facility] to resume HH services.   Care coordination to follow for dc planning needs.           Pt states he has to call his brother when he is ready to dc since he does not have a key to get into his home, his brother has it.

## 2021-01-01 NOTE — CARE PLAN
Problem: Pain Management  Goal: Pain level will decrease to patient's comfort goal  Outcome: PROGRESSING AS EXPECTED  Note: Pain medications discussed with pt, pt verbalizes understanding of pain rating scale.     Problem: Mobility  Goal: Risk for activity intolerance will decrease  Outcome: PROGRESSING AS EXPECTED  Note: Pt encouraged to ambulate frequently.

## 2021-01-01 NOTE — DISCHARGE PLANNING
Care Transition Team Assessment    Information Source  Orientation : Oriented x 4  Information Given By: Patient  Informant's Name: Panchito  Who is responsible for making decisions for patient? : Patient    Readmission Evaluation  Is this a readmission?: No    Elopement Risk  Legal Hold: No  Ambulatory or Self Mobile in Wheelchair: Yes  Disoriented: No  Psychiatric Symptoms: None  History of Wandering: No  Elopement this Admit: No  Vocalizing Wanting to Leave: No  Displays Behaviors, Body Language Wanting to Leave: No-Not at Risk for Elopement  Elopement Risk: Not at Risk for Elopement    Interdisciplinary Discharge Planning  Does Admitting Nurse Feel This Could be a Complex Discharge?: Yes  Primary Care Physician: (Dr Wang)  Patient or legal guardian wants to designate a caregiver: No  Support Systems: Family Member(s)  Do You Take your Prescribed Medications Regularly: Yes  Able to Return to Previous ADL's: Future Time w/Therapy  Mobility Issues: Yes  Prior Services: Skilled Home Health Services  Patient Prefers to be Discharged to:: (Home with home health)  Assistance Needed: Yes  Durable Medical Equipment: Other - Specify  DME Provider / Phone: (wound vac-[facility])    Discharge Preparedness  What is your plan after discharge?: Home health care  Prior Functional Level: Needs Assist with Activities of Daily Living    Functional Assesment  Prior Functional Level: Needs Assist with Activities of Daily Living    Finances  Prescription Coverage: Yes    Vision / Hearing Impairment  Right Eye Vision: Impaired, Wears Glasses  Left Eye Vision: Impaired, Wears Glasses         Advance Directive  Advance Directive?: None  Advance Directive offered?: AD Booklet refused    Domestic Abuse  Have you ever been the victim of abuse or violence?: No  Physical Abuse or Sexual Abuse: No  Verbal Abuse or Emotional Abuse: No  Possible Abuse/Neglect Reported to:: Not Applicable         Discharge Risks or Barriers  Discharge risks or  barriers?: Post-acute placement / services  Patient risk factors: Cognitive / sensory / physical deficit, Complex medical needs, Lives alone and no community support    Anticipated Discharge Information  Discharge Disposition: D/T to home under A care in anticipation of covered skilled care (06)  Discharge Contact Phone Number: ([phone])

## 2021-01-02 LAB
ANION GAP SERPL CALC-SCNC: 10 MMOL/L (ref 7–16)
BASOPHILS # BLD AUTO: 0.5 % (ref 0–1.8)
BASOPHILS # BLD: 0.03 K/UL (ref 0–0.12)
BUN SERPL-MCNC: 14 MG/DL (ref 8–22)
CALCIUM SERPL-MCNC: 9.2 MG/DL (ref 8.5–10.5)
CHLORIDE SERPL-SCNC: 103 MMOL/L (ref 96–112)
CO2 SERPL-SCNC: 23 MMOL/L (ref 20–33)
CREAT SERPL-MCNC: 1.08 MG/DL (ref 0.5–1.4)
EOSINOPHIL # BLD AUTO: 0.24 K/UL (ref 0–0.51)
EOSINOPHIL NFR BLD: 4.1 % (ref 0–6.9)
ERYTHROCYTE [DISTWIDTH] IN BLOOD BY AUTOMATED COUNT: 44.1 FL (ref 35.9–50)
GLUCOSE SERPL-MCNC: 93 MG/DL (ref 65–99)
HCT VFR BLD AUTO: 38.6 % (ref 42–52)
HGB BLD-MCNC: 12.9 G/DL (ref 14–18)
IMM GRANULOCYTES # BLD AUTO: 0.02 K/UL (ref 0–0.11)
IMM GRANULOCYTES NFR BLD AUTO: 0.3 % (ref 0–0.9)
LYMPHOCYTES # BLD AUTO: 1.27 K/UL (ref 1–4.8)
LYMPHOCYTES NFR BLD: 21.6 % (ref 22–41)
MCH RBC QN AUTO: 29.1 PG (ref 27–33)
MCHC RBC AUTO-ENTMCNC: 33.4 G/DL (ref 33.7–35.3)
MCV RBC AUTO: 86.9 FL (ref 81.4–97.8)
MONOCYTES # BLD AUTO: 0.44 K/UL (ref 0–0.85)
MONOCYTES NFR BLD AUTO: 7.5 % (ref 0–13.4)
NEUTROPHILS # BLD AUTO: 3.89 K/UL (ref 1.82–7.42)
NEUTROPHILS NFR BLD: 66 % (ref 44–72)
NRBC # BLD AUTO: 0 K/UL
NRBC BLD-RTO: 0 /100 WBC
PLATELET # BLD AUTO: 97 K/UL (ref 164–446)
PMV BLD AUTO: 10.2 FL (ref 9–12.9)
POTASSIUM SERPL-SCNC: 3.6 MMOL/L (ref 3.6–5.5)
RBC # BLD AUTO: 4.44 M/UL (ref 4.7–6.1)
SODIUM SERPL-SCNC: 136 MMOL/L (ref 135–145)
WBC # BLD AUTO: 5.9 K/UL (ref 4.8–10.8)

## 2021-01-02 PROCEDURE — 96376 TX/PRO/DX INJ SAME DRUG ADON: CPT

## 2021-01-02 PROCEDURE — G0378 HOSPITAL OBSERVATION PER HR: HCPCS

## 2021-01-02 PROCEDURE — 99214 OFFICE O/P EST MOD 30 MIN: CPT | Performed by: INTERNAL MEDICINE

## 2021-01-02 PROCEDURE — 700102 HCHG RX REV CODE 250 W/ 637 OVERRIDE(OP): Performed by: EMERGENCY MEDICINE

## 2021-01-02 PROCEDURE — 85025 COMPLETE CBC W/AUTO DIFF WBC: CPT

## 2021-01-02 PROCEDURE — 80048 BASIC METABOLIC PNL TOTAL CA: CPT

## 2021-01-02 PROCEDURE — A9270 NON-COVERED ITEM OR SERVICE: HCPCS | Performed by: STUDENT IN AN ORGANIZED HEALTH CARE EDUCATION/TRAINING PROGRAM

## 2021-01-02 PROCEDURE — 700111 HCHG RX REV CODE 636 W/ 250 OVERRIDE (IP): Performed by: STUDENT IN AN ORGANIZED HEALTH CARE EDUCATION/TRAINING PROGRAM

## 2021-01-02 PROCEDURE — 36415 COLL VENOUS BLD VENIPUNCTURE: CPT

## 2021-01-02 PROCEDURE — 96366 THER/PROPH/DIAG IV INF ADDON: CPT

## 2021-01-02 PROCEDURE — 700102 HCHG RX REV CODE 250 W/ 637 OVERRIDE(OP): Performed by: STUDENT IN AN ORGANIZED HEALTH CARE EDUCATION/TRAINING PROGRAM

## 2021-01-02 PROCEDURE — 99225 PR SUBSEQUENT OBSERVATION CARE,LEVEL II: CPT | Performed by: HOSPITALIST

## 2021-01-02 PROCEDURE — A9270 NON-COVERED ITEM OR SERVICE: HCPCS | Performed by: EMERGENCY MEDICINE

## 2021-01-02 PROCEDURE — A9270 NON-COVERED ITEM OR SERVICE: HCPCS | Performed by: HOSPITALIST

## 2021-01-02 PROCEDURE — 700105 HCHG RX REV CODE 258: Performed by: STUDENT IN AN ORGANIZED HEALTH CARE EDUCATION/TRAINING PROGRAM

## 2021-01-02 PROCEDURE — 700102 HCHG RX REV CODE 250 W/ 637 OVERRIDE(OP): Performed by: HOSPITALIST

## 2021-01-02 PROCEDURE — 97606 NEG PRS WND THER DME>50 SQCM: CPT

## 2021-01-02 RX ADMIN — AMPICILLIN SODIUM AND SULBACTAM SODIUM 3 G: 2; 1 INJECTION, POWDER, FOR SOLUTION INTRAMUSCULAR; INTRAVENOUS at 05:18

## 2021-01-02 RX ADMIN — RIVAROXABAN 15 MG: 15 TABLET, FILM COATED ORAL at 07:36

## 2021-01-02 RX ADMIN — RIVAROXABAN 15 MG: 15 TABLET, FILM COATED ORAL at 17:42

## 2021-01-02 RX ADMIN — NICOTINE 14 MG: 14 PATCH TRANSDERMAL at 05:18

## 2021-01-02 RX ADMIN — TIZANIDINE 2 MG: 4 TABLET ORAL at 13:35

## 2021-01-02 RX ADMIN — DIPHENHYDRAMINE HYDROCHLORIDE 25 MG: 25 TABLET ORAL at 15:14

## 2021-01-02 RX ADMIN — AMPICILLIN SODIUM AND SULBACTAM SODIUM 3 G: 2; 1 INJECTION, POWDER, FOR SOLUTION INTRAMUSCULAR; INTRAVENOUS at 12:02

## 2021-01-02 RX ADMIN — HYDROMORPHONE HYDROCHLORIDE 0.5 MG: 1 INJECTION, SOLUTION INTRAMUSCULAR; INTRAVENOUS; SUBCUTANEOUS at 14:18

## 2021-01-02 ASSESSMENT — FIBROSIS 4 INDEX
FIB4 SCORE: 2.37
FIB4 SCORE: 2.37

## 2021-01-02 ASSESSMENT — PAIN DESCRIPTION - PAIN TYPE: TYPE: ACUTE PAIN

## 2021-01-02 ASSESSMENT — ENCOUNTER SYMPTOMS
MYALGIAS: 1
EYE PAIN: 0
LOSS OF CONSCIOUSNESS: 0
HEADACHES: 0
FLANK PAIN: 0
SEIZURES: 0
SORE THROAT: 0
VOMITING: 0
STRIDOR: 0
NERVOUS/ANXIOUS: 0
HEMOPTYSIS: 0
NAUSEA: 0
SPUTUM PRODUCTION: 0
DIARRHEA: 0
FEVER: 0
BRUISES/BLEEDS EASILY: 0
SHORTNESS OF BREATH: 0
EYE REDNESS: 0
DOUBLE VISION: 0
HALLUCINATIONS: 0
ABDOMINAL PAIN: 0
EYE DISCHARGE: 0
SPEECH CHANGE: 0
WEAKNESS: 0
BLOOD IN STOOL: 0
BLURRED VISION: 0
COUGH: 0
CHILLS: 0
CONSTIPATION: 0
PALPITATIONS: 0
FOCAL WEAKNESS: 0

## 2021-01-02 NOTE — CONSULTS
Consults   INFECTIOUS DISEASES INPATIENT CONSULT NOTE     Date of Service: 1/2/2021    Consult Requested By: Trent Leal M.D.    Reason for Consultation: Nonhealing leg ulcer, concern for cellulitis    History of Present Illness:   Panchito Moura is a 55 y.o.  admitted 12/30/2020. 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   Prior biopsies negative for malignancy.   He was seen by ID in 10/2020 due to concern for superimposed bacterial cellulitis and he underwent excisional debridement down to fascia.  He was then placed on wound VAC.  During hospital stay he was given Unasyn and then transition to Augmentin to complete a 10-day course.  He is now readmitted with worsening left leg pain and swelling for several days.      Hospital Course:   Patient's been afebrile, vitals within normal limits and no white count.  In the ER he underwent ultrasound which showed acute and subocclusive left lower extremity DVT within the popliteal vein extending into the calf veins.  Again noted chronic nonhealing wound.  It concerns for infection he was started on Unasyn.    Review Of Systems:  Review of Systems   Constitutional: Negative for chills, fever and malaise/fatigue.   HENT: Negative for hearing loss and tinnitus.    Eyes: Negative for blurred vision and double vision.   Respiratory: Negative for cough, sputum production and shortness of breath.    Cardiovascular: Positive for leg swelling. Negative for chest pain.   Gastrointestinal: Negative for abdominal pain, constipation, diarrhea, nausea and vomiting.   Genitourinary: Negative for dysuria.   Musculoskeletal: Positive for myalgias.   Skin: Negative for rash.   Neurological: Negative for weakness and headaches.   Psychiatric/Behavioral: The patient is not nervous/anxious.           PMH:   Past Medical History:   Diagnosis Date   • Cancer (HCC)     kidney   • Cancer (HCC)     renal   • DVT (deep venous thrombosis) (HCC)    • Pulmonary embolism (HCC)    • Smoker        PSH:  Past Surgical History:   Procedure Laterality Date   • IRRIGATION & DEBRIDEMENT GENERAL Left 10/22/2020    Procedure: IRRIGATION AND DEBRIDEMENT, WOUND- CALF AND WOUND VAC PLACEMENT;  Surgeon: Temo Mayer M.D.;  Location: SURGERY [facility];  Service: Plastics   • NERVE REPAIR  6/18/08    Performed by SKYE RAY at [facility]   • LACERATION REPAIR  6/18/08    Performed by SKYE RAY at [facility]   • NEPHRECTOMY PARTIAL      right   • NEPHRECTOMY PARTIAL Right    • OTHER      tonsillectomy       FAMILY HX:  Family History   Problem Relation Age of Onset   • Heart Attack Mother    • Cancer Father        SOCIAL HX:  Social History     Socioeconomic History   • Marital status: Single     Spouse name: Not on file   • Number of children: Not on file   • Years of education: Not on file   • Highest education level: Not on file   Occupational History   • Not on file   Social Needs   • Financial resource strain: Not on file   • Food insecurity     Worry: Never true     Inability: Never true   • Transportation needs     Medical: No     Non-medical: No   Tobacco Use   • Smoking status: Current Every Day Smoker     Packs/day: 1.50     Types: Cigarettes   • Smokeless tobacco: Never Used   Substance and Sexual Activity   • Alcohol use: No     Frequency: Never     Drinks per session: 1 or 2     Binge frequency: Never   • Drug use: Yes     Comment: edibles   • Sexual activity: Not on file   Lifestyle   • Physical activity     Days per week: Not on file     Minutes per session: Not on file   • Stress: Not on file   Relationships   • Social connections     Talks on phone: Not on file     Gets together: Not on file     Attends Congregation service: Not on file     Active member of club or  organization: Not on file     Attends meetings of clubs or organizations: Not on file     Relationship status: Not on file   • Intimate partner violence     Fear of current or ex partner: Not on file     Emotionally abused: Not on file     Physically abused: Not on file     Forced sexual activity: Not on file   Other Topics Concern   • Not on file   Social History Narrative    ** Merged History Encounter **          Social History     Tobacco Use   Smoking Status Current Every Day Smoker   • Packs/day: 1.50   • Types: Cigarettes   Smokeless Tobacco Never Used     Social History     Substance and Sexual Activity   Alcohol Use No   • Frequency: Never   • Drinks per session: 1 or 2   • Binge frequency: Never       Allergies/Intolerances:  No Known Allergies    History reviewed with the patient     Other Current Medications:    Current Facility-Administered Medications:   •  diphenhydrAMINE (BENADRYL) tablet/capsule 25 mg, 25 mg, Oral, Q8HRS PRN, Trent Leal M.D., 25 mg at 01/01/21 2137  •  lidocaine (XYLOCAINE) 2 % injection 20 mL, 20 mL, Topical, QDAY PRN **OR** lidocaine (XYLOCAINE) 4 % topical solution, , Topical, QDAY PRN, Trent Leal M.D.  •  rivaroxaban (XARELTO) tablet 15 mg, 15 mg, Oral, BID WITH MEALS, 15 mg at 01/02/21 0736 **FOLLOWED BY** [START ON 1/20/2021] rivaroxaban (XARELTO) tablet 20 mg, 20 mg, Oral, PM MEAL, Papito Williamson M.D.  •  oxyCODONE immediate-release (ROXICODONE) tablet 5 mg, 5 mg, Oral, Q4HRS PRN, Malick Dutta M.D., 5 mg at 01/01/21 1007  •  senna-docusate (PERICOLACE or SENOKOT S) 8.6-50 MG per tablet 2 Tab, 2 Tab, Oral, BID, 2 Tab at 01/01/21 1800 **AND** polyethylene glycol/lytes (MIRALAX) PACKET 1 Packet, 1 Packet, Oral, QDAY PRN **AND** magnesium hydroxide (MILK OF MAGNESIA) suspension 30 mL, 30 mL, Oral, QDAY PRN **AND** bisacodyl (DULCOLAX) suppository 10 mg, 10 mg, Rectal, QDAY PRN, Malick Dutta M.D.  •  acetaminophen (Tylenol) tablet 650 mg, 650 mg, Oral, Q6HRS PRN,  "Malick Dutta M.D., 650 mg at 20 1601  •  nicotine (NICODERM) 14 MG/24HR 14 mg, 14 mg, Transdermal, Daily-0600, 14 mg at 21 0518 **AND** Nicotine Replacement Patient Education Materials, , , Once **AND** nicotine polacrilex (NICORETTE) 2 MG piece 2 mg, 2 mg, Oral, Q HOUR PRN, Malick Dutta M.D.  •  HYDROmorphone pf (DILAUDID) injection 0.5 mg, 0.5 mg, Intravenous, Q4HRS PRN, Malick Dutta M.D., 0.5 mg at 20 1227  •  tizanidine (ZANAFLEX) tablet 2 mg, 2 mg, Oral, Q6HRS PRN, Malick Dutta M.D., 2 mg at 21 2104  •  ampicillin/sulbactam (UNASYN) 3 g in  mL IVPB, 3 g, Intravenous, Q6HRS, Malick Dutta M.D., Stopped at 21 0548  [unfilled]    Most Recent Vital Signs:  /63   Pulse 60   Temp 37.2 °C (98.9 °F) (Temporal)   Resp 16   Ht 1.956 m (6' 5\")   Wt 101.7 kg (224 lb 3.3 oz)   SpO2 89%   BMI 26.59 kg/m²   Temp  Av.6 °C (97.8 °F)  Min: 36.1 °C (97 °F)  Max: 37.2 °C (98.9 °F)    Physical Exam:  Physical Exam   Constitutional: He is oriented to person, place, and time and well-developed, well-nourished, and in no distress. No distress.   HENT:   Head: Normocephalic and atraumatic.   Right Ear: External ear normal.   Left Ear: External ear normal.   Nose: Nose normal.   Eyes: Pupils are equal, round, and reactive to light. Conjunctivae and EOM are normal.   Neck: Normal range of motion. Neck supple.   Cardiovascular: Normal rate, regular rhythm and normal heart sounds.   Pulmonary/Chest: Effort normal and breath sounds normal.   Abdominal: Soft. Bowel sounds are normal. He exhibits no distension. There is no abdominal tenderness. There is no rebound and no guarding.   Musculoskeletal: Normal range of motion.         General: Tenderness and edema present.      Comments: Left leg with wound VAC in place, mild edema,  tenderness to palpation.  No obvious infection.  Reviewed photos and wound bases clean granulation tissue and no obvious infection.   Neurological: He is " "alert and oriented to person, place, and time.   Skin: Skin is warm and dry. He is not diaphoretic.   Psychiatric: Affect and judgment normal.           Pertinent Lab Results:  Recent Labs     12/31/20  0335 01/01/21  0413 01/02/21  0134   WBC 6.9 6.4 5.9      Recent Labs     12/31/20  0335 01/01/21  0413 01/02/21  0134   HEMOGLOBIN 12.8* 12.7* 12.9*   HEMATOCRIT 38.0* 37.9* 38.6*   MCV 88.0 87.3 86.9   MCH 29.6 29.3 29.1   PLATELETCT 80* 77* 97*         Recent Labs     12/31/20  0335 01/01/21  0413 01/02/21  0134   SODIUM 141 137 136   POTASSIUM 3.9 3.6 3.6   CHLORIDE 107 103 103   CO2 24 23 23   CREATININE 1.06 1.03 1.08        Recent Labs     12/30/20  1035 12/31/20  0335   ALBUMIN 4.1 3.4        Pertinent Micro:  Results     Procedure Component Value Units Date/Time    CULTURE WOUND W/ GRAM STAIN [[id number]]  (Abnormal) Collected: 12/30/20 1131    Order Status: Completed Specimen: Wound from Left Leg Updated: 01/01/21 1220     Significant Indicator POS     Source WND     Site LEFT LEG     Culture Result -     Gram Stain Result Moderate WBCs.  No organisms seen.       Culture Result Diphtheroids  Light growth      BLOOD CULTURE [[id number]] Collected: 12/30/20 1035    Order Status: Completed Specimen: Blood from Peripheral Updated: 12/31/20 0747     Significant Indicator NEG     Source BLD     Site PERIPHERAL     Culture Result No Growth  Note: Blood cultures are incubated for 5 days and  are monitored continuously.Positive blood cultures  are called to the RN and reported as soon as  they are identified.      Narrative:      Per Hospital Policy: Only change Specimen Src: to \"Line\" if  specified by physician order.  No site indicated    BLOOD CULTURE [[id number]] Collected: 12/30/20 1101    Order Status: Completed Specimen: Blood from Peripheral Updated: 12/31/20 0747     Significant Indicator NEG     Source BLD     Site PERIPHERAL     Culture Result No Growth  Note: Blood cultures are incubated for 5 days and  are " "monitored continuously.Positive blood cultures  are called to the RN and reported as soon as  they are identified.      Narrative:      Per Hospital Policy: Only change Specimen Src: to \"Line\" if  specified by physician order.  Left Forearm/Arm    GRAM STAIN [[id number]] Collected: 12/30/20 1131    Order Status: Completed Specimen: Wound Updated: 12/30/20 1705     Significant Indicator .     Source WND     Site LEFT LEG     Gram Stain Result Moderate WBCs.  No organisms seen.      CoV-2, Flu A/B, And RSV by PCR [[id number]] Collected: 12/30/20 1600    Order Status: Completed Updated: 12/30/20 1703     Influenza virus A RNA Negative     Influenza virus B, PCR Negative     RSV, PCR Negative     SARS-CoV-2 by PCR NotDetected     Comment: PATIENTS: Important information regarding your results and instructions can  be found at [email or web address]   \"After your  Covid-19 Test\"  [facility] providers: PLEASE REFER TO DE-ESCALATION AND RETESTING PROTOCOL  on [email or web address]  **The [employer] GeneXpert Xpress SARS-CoV-2 Test has been made available for  use under the Emergency Use Authorization (EUA) only.          SARS-CoV-2 Source NP Swab    Narrative:      Droplet, Contact, and Eye Protection  Is patient being admitted?->Yes  Does this patient meet criteria for Rush/Cepheid per [facility]  Inpatient Workflow? (See workflow link below)->Yes  Expected turn around time?->Rush (Cepheid 2-4 hours)  ** acute DVT  Have you been in close contact with a person who is suspected  or known to be positive for COVID-19 within the last 30 days  (e.g. last seen that person < 30 days ago)->Unknown    Routine (COVID/SARS COV-2 In-House PCR up to 24 hours) [[id number]] Collected: 12/30/20 1600    Order Status: Completed Specimen: Respirate from Nasopharyngeal Updated: 12/30/20 1612     COVID Order Status Received     Comment: The order for SARS CoV-2 testing has been received by the  Laboratory. This result is neither " positive nor negative.  Final results of testing will report in 24-48 hours, separately.         Narrative:      Droplet, Contact, and Eye Protection  Is patient being admitted?->Yes  Does this patient meet criteria for Rush/Cepheid per [facility]  Inpatient Workflow? (See workflow link below)->Yes  Expected turn around time?->Rush (Cepheid 2-4 hours)  ** acute DVT  Have you been in close contact with a person who is suspected  or known to be positive for COVID-19 within the last 30 days  (e.g. last seen that person < 30 days ago)->Unknown    Routine (COVID/SARS COV-2 In-House PCR up to 24 hours) [[id number]]     Order Status: Canceled Specimen: Respirate from Nasopharyngeal     URINE CULTURE(NEW) [[id number]]     Order Status: Canceled Specimen: Urine     URINALYSIS [[id number]]     Order Status: Canceled Specimen: Urine     BLOOD CULTURE [[id number]] Collected: 12/30/20 0000    Order Status: Canceled Specimen: Other from Peripheral     BLOOD CULTURE [[id number]] Collected: 12/30/20 0000    Order Status: Canceled Specimen: Other from Peripheral     Influenza A/B By PCR (Adult - Flu Only) [[id number]] Collected: 12/30/20 0000    Order Status: Canceled Specimen: Respirate from Nasopharyngeal         Blood Culture   Date Value Ref Range Status   12/13/2010   Final    Blood culture testing and Gram stain, if indicated, are  performed at [facility], [address] [place] [address].  Positive blood cultures are  sent to [facility], [address] [place] [address], for organism identification and  susceptibility testing.  No growth after 5 days of incubation.   12/13/2010   Final    Blood culture testing and Gram stain, if indicated, are  performed at [facility], [facility]  [facility].[place], [place].  Positive blood cultures are  sent to [facility], [address] [place] [address], for organism identification and  susceptibility  testing.  No growth after 5 days of incubation.        Studies:  Dx-chest-portable (1 View)    Result Date: 2020 11:00 AM HISTORY/REASON FOR EXAM:  Sepsis; sepsis. Short of breath TECHNIQUE/EXAM DESCRIPTION AND NUMBER OF VIEWS: Single portable view of the chest. COMPARISON: None FINDINGS: LUNGS: There is linear density at the right lung base which is most likely atelectasis or scar. HEART and MEDIASTINUM: normal in size. Pleura: There are no pleural effusion or pneumothoraces. Osseous structures: No significant bony abnormality.     Unchanged right basilar density consistent with atelectasis or scar    Us-extremity Venous Lower Unilat Left    Result Date: 2020   Vascular Laboratory  CONCLUSIONS  1.  LEFT popliteal vein acute partially occlusive DVT.  Findings called to Dr. Whitehead at 11:30 am on 20.  TANIA GUTIERREZ  Exam Date:     2020 11:05  Room #:     Inpatient  Priority:     Stat  Ht (in):             Wt (lb):  Ordering Physician:        DEVYN MARES  Referring Physician:       [id number]  Sonographer:               Maryan Viramontes RVT  Study Type:                Complete Unilateral  Technical Quality:         Adequate  Age:    55    Gender:     M  MRN:    [id number]  :    [id number]      BSA:  Indications:     Pain in left lower leg  CPT Codes:       22129  ICD Codes:       M79.662  History:         Pain in Left calf with recent wound vac removal, wound                   present to Left posterior calf; Previous exam 10/11/20.  Limitations:  PROCEDURES:  Left lower extremity venous duplex imaging.  The following venous structures were evaluated: common femoral, profunda  femoral, greater saphenous, femoral, popliteal , peroneal and posterior  tibial veins.  Serial compression, augmentation maneuvers,  color and spectral Doppler  flow evaluations were performed.  FINDINGS:  Left lower extremity -  Deep vein thrombus, acute & sub-occlusive, visualized within the  popliteal  vein extending into the calf veins at the proximal level.  Where visualized, all other veins complete color filling and  compressibility with normal venous flow dynamics including spontaneous  flow, response to augmentation maneuvers, and respiratory phasicity.  The peroneal and posterior tibial veins are difficult to assess for  compressibility, but flow response to augmentation is demonstrated.  Flow was evaluated in the contralateral common femoral vein and normal  venous flow dynamics including spontaneous flow, respiratory phasic  variation and augmentation were demonstrated.  Manuela Camacho  (Electronically Signed)  Final Date:      30 December 2020                   11:31      ASSESSMENT/PLAN:     55 y.o.  admitted 12/30/2020. 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   Prior biopsies negative for malignancy.   He was seen by ID in 10/2020 due to concern for superimposed bacterial cellulitis and he underwent excisional debridement down to fascia.  He was then placed on wound VAC.  During hospital stay he was given Unasyn and then transition to Augmentin to complete a 10-day course.  He is now readmitted with worsening left leg pain and swelling for several days.      Hospital Course:   Patient's been afebrile, vitals within normal limits and no white count.  In the ER he underwent ultrasound which showed acute and subocclusive left lower extremity DVT within the popliteal vein extending into the calf veins.  Again noted chronic nonhealing wound.  It concerns for infection he was started on Unasyn.    It appears that the new leg edema and pain is associated with DVT secondary to noncompliance with Xarelto.  No fevers, no white count and no obvious infection on exam or per photos.  Wound culture with  diphtheroids only which is likely contaminant.    --- Stop Unasyn and monitor  --- Follow-up cultures to final  --- Monitor labs  --- Discussed compliance with the patient and he is stating that he will be compliant with Xarelto in the future  --- We will need ongoing wound care    Plan of care discussed with VY Leal M.D..  ID will sign off.     Nae Louis M.D.

## 2021-01-02 NOTE — WOUND TEAM
[facility]  Inpatient Services  Wound and Skin Care Evaluation    Admission Date: 12/30/2020     Last order of IP CONSULT TO WOUND CARE was found on 12/30/2020 from Hospital Encounter on 12/30/2020     HPI, PMH, SH: Reviewed    Unit where seen by Wound Team: [id number]     WOUND CONSULT/FOLLOW UP RELATED TO:  Left posterior leg, NPWT change per protocol    Self Report / Pain Level:  10/10 pain with dressing change, pre medicated with PO oxy and IV pain medication. Lidocaine used topically on wound bed.        OBJECTIVE:  NPWT dressing intact    WOUND TYPE, LOCATION, CHARACTERISTICS (Pressure Injuries: location, stage, POA or date identified)       Negative Pressure Wound Therapy 12/31/20 Surgical Leg Posterior Left (Active)   NPWT Pump Mode / Pressure Setting Continuous;125 mmHg    Dressing Type Medium;Black Foam (Regular);White Foam    Number of Foam Pieces Used 3    NEXT Dressing Change/Treatment Date 01/05/21            Wound 12/30/20 Full Thickness Wound Leg Posterior Left POA (Active)   Wound Image   12/31/20 1200   Site Assessment Early/partial granulation    Periwound Assessment Purple;Pink    Margins Defined edges    Closure Secondary intention    Drainage Amount Small    Drainage Description Serosanguineous    Treatments Cleansed;Site care    Wound Cleansing Approved Wound Cleanser    Periwound Protectant Skin Protectant Wipes to Periwound;Drape    Dressing Cleansing/Solutions Not Applicable    Dressing Options Wound Vac    Dressing Changed Changed    Dressing Status Clean;Dry;Intact    Dressing Change/Treatment Frequency Tuesday, Thursday, Saturday, and As Needed    NEXT Dressing Change/Treatment Date 01/05/21    NEXT Weekly Photo (Inpatient Only) 01/09/21    Non-staged Wound Description Full thickness    Wound Length (cm) 12 cm 12/31/20 1200   Wound Width (cm) 12 cm 12/31/20 1200   Wound Surface Area (cm^2) 144 cm^2 12/31/20 1200   Shape circular    Wound Odor None    Exposed Structures None     WOUND NURSE ONLY - Time Spent with Patient (mins) 75           Vascular:    CARLA:   No results found.    Lab Values:    Lab Results   Component Value Date/Time    WBC 5.9 01/02/2021 01:34 AM    RBC 4.44 (L) 01/02/2021 01:34 AM    HEMOGLOBIN 12.9 (L) 01/02/2021 01:34 AM    HEMATOCRIT 38.6 (L) 01/02/2021 01:34 AM    CREACTPROT 3.80 (H) 12/30/2020 05:45 PM    SEDRATEWES 20 12/30/2020 05:45 PM        Culture Results show:  Recent Results (from the past 720 hour(s))   CULTURE WOUND W/ GRAM STAIN    Collection Time: 12/30/20 11:31 AM    Specimen: Left Leg; Wound   Result Value Ref Range    Significant Indicator POS (POS)     Source WND     Site LEFT LEG     Culture Result - (A)     Gram Stain Result Moderate WBCs.  No organisms seen.       Culture Result Diphtheroids  Light growth   (A)        INTERVENTIONS BY WOUND TEAM: Pt seen at bedside.  Removed NPWT dressing partially to so that lidocaine could be applied topically. Allowed it to swell for about 15 minutes. Soaked foam with wound cleanser and gently pulled dressing. No retained foam noted. Cleansed wound with wound cleanser and gauze. Dr Louis at bedside to observed wound.  Prepped periwound with no sting skin prep and drape.  Applied one whole piece of white foam over wound bed, then 1 strip of black foam applied around the boarder of the white foam where wound bed is exposed. 1 half thickness of black foam used to cover white foam. Foam secured with drape. Small window cut and trac pad applied. Tubing going upward per patient request. Mepilex applied under tubing to protect skin. Suction obtained at 125mmhg, no leaks present. Updated bedside RN.      Interdisciplinary consultation: Patient, Bedside RNElissa    EVALUATION / RATIONALE FOR TREATMENT: Patient known to wound team from last admission, patient left posterior lower leg with a full thickness wound present. POA. Some edema present, no compression applied, pt with DVT visualized in popliteal vein per  US that was done on 12/30. LLE still very painful and tender. Lidocaine was used today, explained to pt that if he does dc home with HH, lidocaine likely not an option. Pt willing to try dressing change w/o lidocaine at next change. White foam continued to help with pain when removing next dressing. Continue NPWT dressing, almost to skin level. Slightly more hypergranulated on distal aspect of wound bed vs proximal aspect but not yet at skin level.      Goals: Steady decrease in wound area and depth weekly.    NURSING PLAN OF CARE ORDERS (X):    Dressing changes: See Dressing Care orders: X  Skin care: See Skin Care orders:   RN Prevention Protocol:   Rectal tube care: See Rectal Tube Care orders:   Other orders:    WOUND TEAM PLAN OF CARE:   Dressing changes by wound team:                   Follow up 3 times weekly:                NPWT change 3 times weekly:   X - left posterior lower leg  Follow up 1-2 times weekly:      Follow up Bi-Monthly:                   Follow up as needed:       Other (explain):     Anticipated discharge plans: will need ongoing wound care 3x/week.  LTACH:        SNF/Rehab:                  Home Health Care:   X        Outpatient Wound Center:            Self Care:

## 2021-01-02 NOTE — DISCHARGE PLANNING
Anticipated Discharge Disposition: Home to his prior living situation with resumption of care with [facility], will need [facility] to accept before patient can discharge. Patient has his own wound vac at bedside.     Action: This RN,  discussed patient with Provider, Dr. Trent Leal, patient is anticipated to discharge on 1/4-5 to home with resumption of care with [facility] and his own wound vac placed on prior to discharge. Wound Vac is at bedside. Xarelto has been restarted, prescription sent to pharmacy, patient's co-pay is 10.00.     Barriers to Discharge: Medical clearance, resumption of care with [facility], referral has been sent, closed for holiday weekend.     Plan:  to follow up with [facility] for [facility] acceptance.     Heather Hernandez RN,

## 2021-01-02 NOTE — PROGRESS NOTES
[facility] Medicine Daily Progress Note    Date of Service  1/2/2021    Chief Complaint  55 y.o. male admitted 12/30/2020 with left leg pain.    Hospital Course  55 years old male with a past medical history of medication noncompliance with anticoagulation, history of unprovoked deep venous thrombosis/pulmonary embolism and is on lifetime rivaroxaban, left posterior calf wound, status post plastic surgery and wound VAC and LTAC stay, history of renal cell carcinoma status post nephrectomy in 2003. Admitted 12/30/2020 with worsening left leg pain and swelling for several days found to have. In the ER, ultrasound of the lower extremity showed acute and subocclusive left lower extremity DVT within the popliteal vein extending into the calf veins at the proximal level.    Interval Problem Update  Leg swelling slightly better today with wound VAC and elevation.  Wound cultures growing diphtheroids, I will consult infectious disease.  Heart rate 50's-60s, saturating well on room air this morning.  Pain slightly better today, moderate.    Discussed with patient, bedside nurse, and care coordination, Dr. Louis. Anticipated discharge Jan 4-5 with home health for wound care with wound VAC changes 3 times a week..    Consultants/Specialty  None     Code Status  Full Code    Disposition  Anticipated discharge Jan 4-5 with home health for wound care with wound VAC changes 3 times a week..  Xarelto restarted, prescription sent to pharmacy, co-pay may be an issue     Review of Systems  Review of Systems   Constitutional: Positive for malaise/fatigue. Negative for chills and fever.   HENT: Negative for congestion and sore throat.    Eyes: Negative for pain, discharge and redness.   Respiratory: Negative for cough, hemoptysis, sputum production, shortness of breath and stridor.    Cardiovascular: Negative for chest pain, palpitations and leg swelling.   Gastrointestinal: Negative for abdominal pain, blood in stool, diarrhea and  vomiting.   Genitourinary: Negative for flank pain, hematuria and urgency.   Musculoskeletal: Positive for myalgias.        Left Calf pain    Skin: Negative.    Neurological: Negative for speech change, focal weakness, seizures and loss of consciousness.   Endo/Heme/Allergies: Does not bruise/bleed easily.   Psychiatric/Behavioral: Negative for hallucinations and suicidal ideas. The patient is not nervous/anxious.       Physical Exam  Temp:  [36.1 °C (97 °F)-37.2 °C (98.9 °F)] 37.2 °C (98.9 °F)  Pulse:  [56-60] 60  Resp:  [16] 16  BP: (115-139)/(63-78) 115/63  SpO2:  [89 %-94 %] 89 %    Physical Exam  Constitutional:       General: He is not in acute distress.     Appearance: He is not ill-appearing or diaphoretic.   HENT:      Head: Atraumatic.      Right Ear: External ear normal.      Left Ear: External ear normal.      Nose: No congestion or rhinorrhea.      Mouth/Throat:      Mouth: Mucous membranes are moist.   Eyes:      General: No scleral icterus.        Right eye: No discharge.         Left eye: No discharge.      Pupils: Pupils are equal, round, and reactive to light.   Neck:      Musculoskeletal: Neck supple. No neck rigidity or muscular tenderness.   Cardiovascular:      Rate and Rhythm: Normal rate and regular rhythm.      Heart sounds: No friction rub. No gallop.    Pulmonary:      Effort: No respiratory distress.      Breath sounds: No stridor. No wheezing.   Abdominal:      Palpations: Abdomen is soft.      Tenderness: There is no abdominal tenderness. There is no guarding or rebound.   Musculoskeletal:         General: Swelling and tenderness present.      Right lower leg: No edema.      Left lower leg: Edema present.      Comments: LKeft calf wound with granulation tissue.  Mild tenderness. No active drainage.    Skin:     Coloration: Skin is not jaundiced or pale.   Neurological:      Mental Status: He is alert and oriented to person, place, and time.      Coordination: Coordination normal.    Psychiatric:         Mood and Affect: Mood normal.         Behavior: Behavior normal.       Fluids    Intake/Output Summary (Last 24 hours) at 1/2/2021 1338  Last data filed at 1/2/2021 1329  Gross per 24 hour   Intake 660 ml   Output --   Net 660 ml       Laboratory  Recent Labs     12/31/20 0335 01/01/21 0413 01/02/21  0134   WBC 6.9 6.4 5.9   RBC 4.32* 4.34* 4.44*   HEMOGLOBIN 12.8* 12.7* 12.9*   HEMATOCRIT 38.0* 37.9* 38.6*   MCV 88.0 87.3 86.9   MCH 29.6 29.3 29.1   MCHC 33.7 33.5* 33.4*   RDW 46.5 45.0 44.1   PLATELETCT 80* 77* 97*   MPV 10.3 9.7 10.2     Recent Labs     12/31/20 0335 01/01/21 0413 01/02/21  0134   SODIUM 141 137 136   POTASSIUM 3.9 3.6 3.6   CHLORIDE 107 103 103   CO2 24 23 23   GLUCOSE 88 94 93   BUN 17 17 14   CREATININE 1.06 1.03 1.08   CALCIUM 9.0 8.9 9.2                   Imaging  US-EXTREMITY VENOUS LOWER UNILAT LEFT   Final Result      DX-CHEST-PORTABLE (1 VIEW)   Final Result      Unchanged right basilar density consistent with atelectasis or scar         Assessment/Plan  * Acute deep vein thrombosis (DVT) of popliteal vein of left lower extremity (HCC)- (present on admission)  Assessment & Plan  Has been off his paroxetine  Restarted rivaroxaban loading on admission, counseling provided for importance of staying on anticoagulation lifelong     Cellulitis- (present on admission)  Assessment & Plan  Started on Unasyn on admission, continue for now follow on cultures and sensitivities   Multimodal pain control.  Wound culture growing diphtheroids, I am consulting infectious disease.    Leg wound, left- (present on admission)  Assessment & Plan  Evaluated by wound care, wound VAC replaced, plan for home health, I will order.     Normocytic anemia- (present on admission)  Assessment & Plan  Continue to monitor, transfuse for hemoglobin of less than or equal to 7.     History of pulmonary embolus (PE)- (present on admission)  Assessment & Plan  Restart the rivaroxaban     History of  DVT (deep vein thrombosis)- (present on admission)  Assessment & Plan  Restart home rivaroxaban.      Smoker- (present on admission)  Assessment & Plan  Counseling provided      VTE prophylaxis: Rivaroxaban

## 2021-01-03 PROBLEM — M79.89 LEFT LEG SWELLING: Status: ACTIVE | Noted: 2020-10-20

## 2021-01-03 LAB
ANION GAP SERPL CALC-SCNC: 10 MMOL/L (ref 7–16)
BASOPHILS # BLD AUTO: 0.5 % (ref 0–1.8)
BASOPHILS # BLD: 0.03 K/UL (ref 0–0.12)
BUN SERPL-MCNC: 17 MG/DL (ref 8–22)
CALCIUM SERPL-MCNC: 9.3 MG/DL (ref 8.5–10.5)
CHLORIDE SERPL-SCNC: 103 MMOL/L (ref 96–112)
CO2 SERPL-SCNC: 25 MMOL/L (ref 20–33)
CREAT SERPL-MCNC: 1.15 MG/DL (ref 0.5–1.4)
EOSINOPHIL # BLD AUTO: 0.2 K/UL (ref 0–0.51)
EOSINOPHIL NFR BLD: 3.5 % (ref 0–6.9)
ERYTHROCYTE [DISTWIDTH] IN BLOOD BY AUTOMATED COUNT: 44.5 FL (ref 35.9–50)
GLUCOSE SERPL-MCNC: 103 MG/DL (ref 65–99)
HCT VFR BLD AUTO: 39.3 % (ref 42–52)
HGB BLD-MCNC: 12.8 G/DL (ref 14–18)
IMM GRANULOCYTES # BLD AUTO: 0.02 K/UL (ref 0–0.11)
IMM GRANULOCYTES NFR BLD AUTO: 0.3 % (ref 0–0.9)
LYMPHOCYTES # BLD AUTO: 1.31 K/UL (ref 1–4.8)
LYMPHOCYTES NFR BLD: 22.7 % (ref 22–41)
MCH RBC QN AUTO: 28.3 PG (ref 27–33)
MCHC RBC AUTO-ENTMCNC: 32.6 G/DL (ref 33.7–35.3)
MCV RBC AUTO: 86.8 FL (ref 81.4–97.8)
MONOCYTES # BLD AUTO: 0.54 K/UL (ref 0–0.85)
MONOCYTES NFR BLD AUTO: 9.4 % (ref 0–13.4)
NEUTROPHILS # BLD AUTO: 3.66 K/UL (ref 1.82–7.42)
NEUTROPHILS NFR BLD: 63.6 % (ref 44–72)
NRBC # BLD AUTO: 0 K/UL
NRBC BLD-RTO: 0 /100 WBC
PLATELET # BLD AUTO: 99 K/UL (ref 164–446)
PMV BLD AUTO: 10.1 FL (ref 9–12.9)
POTASSIUM SERPL-SCNC: 3.8 MMOL/L (ref 3.6–5.5)
RBC # BLD AUTO: 4.53 M/UL (ref 4.7–6.1)
SODIUM SERPL-SCNC: 138 MMOL/L (ref 135–145)
WBC # BLD AUTO: 5.8 K/UL (ref 4.8–10.8)

## 2021-01-03 PROCEDURE — 99225 PR SUBSEQUENT OBSERVATION CARE,LEVEL II: CPT | Performed by: HOSPITALIST

## 2021-01-03 PROCEDURE — 80048 BASIC METABOLIC PNL TOTAL CA: CPT

## 2021-01-03 PROCEDURE — A9270 NON-COVERED ITEM OR SERVICE: HCPCS | Performed by: STUDENT IN AN ORGANIZED HEALTH CARE EDUCATION/TRAINING PROGRAM

## 2021-01-03 PROCEDURE — G0378 HOSPITAL OBSERVATION PER HR: HCPCS

## 2021-01-03 PROCEDURE — A9270 NON-COVERED ITEM OR SERVICE: HCPCS | Performed by: EMERGENCY MEDICINE

## 2021-01-03 PROCEDURE — 700102 HCHG RX REV CODE 250 W/ 637 OVERRIDE(OP): Performed by: STUDENT IN AN ORGANIZED HEALTH CARE EDUCATION/TRAINING PROGRAM

## 2021-01-03 PROCEDURE — 85025 COMPLETE CBC W/AUTO DIFF WBC: CPT

## 2021-01-03 PROCEDURE — 700102 HCHG RX REV CODE 250 W/ 637 OVERRIDE(OP): Performed by: EMERGENCY MEDICINE

## 2021-01-03 PROCEDURE — 700102 HCHG RX REV CODE 250 W/ 637 OVERRIDE(OP): Performed by: HOSPITALIST

## 2021-01-03 PROCEDURE — 36415 COLL VENOUS BLD VENIPUNCTURE: CPT

## 2021-01-03 PROCEDURE — A9270 NON-COVERED ITEM OR SERVICE: HCPCS | Performed by: HOSPITALIST

## 2021-01-03 RX ADMIN — NICOTINE 14 MG: 14 PATCH TRANSDERMAL at 05:31

## 2021-01-03 RX ADMIN — TIZANIDINE 2 MG: 4 TABLET ORAL at 11:40

## 2021-01-03 RX ADMIN — RIVAROXABAN 15 MG: 15 TABLET, FILM COATED ORAL at 17:31

## 2021-01-03 RX ADMIN — DIPHENHYDRAMINE HYDROCHLORIDE 25 MG: 25 TABLET ORAL at 20:55

## 2021-01-03 RX ADMIN — DIPHENHYDRAMINE HYDROCHLORIDE 25 MG: 25 TABLET ORAL at 11:41

## 2021-01-03 RX ADMIN — RIVAROXABAN 15 MG: 15 TABLET, FILM COATED ORAL at 07:51

## 2021-01-03 ASSESSMENT — ENCOUNTER SYMPTOMS
HALLUCINATIONS: 0
EYE DISCHARGE: 0
MYALGIAS: 1
PALPITATIONS: 0
ABDOMINAL PAIN: 0
VOMITING: 0
BLOOD IN STOOL: 0
COUGH: 0
CHILLS: 0
FOCAL WEAKNESS: 0
NERVOUS/ANXIOUS: 0
SPEECH CHANGE: 0
FLANK PAIN: 0
SHORTNESS OF BREATH: 0
EYE PAIN: 0
LOSS OF CONSCIOUSNESS: 0
STRIDOR: 0
SPUTUM PRODUCTION: 0
SORE THROAT: 0
EYE REDNESS: 0
HEMOPTYSIS: 0
DIARRHEA: 0
SEIZURES: 0
BRUISES/BLEEDS EASILY: 0
FEVER: 0

## 2021-01-03 ASSESSMENT — PAIN DESCRIPTION - PAIN TYPE
TYPE: ACUTE PAIN
TYPE: ACUTE PAIN

## 2021-01-03 NOTE — PROGRESS NOTES
Bedside report received at change of shift, assumed care of pt. No immediate needs.    Pt is axox 4, cooperative with staff. Denies pain. VSS on RA. Wound vac in tact, suction at 125 mmHg. Pt is Standby assist with wound vac to the bathroom. Urinal at bedside. Plan of care reviewed, patient board updated, environmental safety precautions in place, and frequent rounding in practice.

## 2021-01-03 NOTE — PROGRESS NOTES
Bedside report received. Pt is A&O X4.  He is currently on RA, resting comfortably in bed..  He denies any pain at present.  Pt has a wound vac on LLE.  Pt is able to ambulate to the bathroom with standby assist. POC discussed with pt; all questions answered at this time.

## 2021-01-03 NOTE — PROGRESS NOTES
[facility] Medicine Daily Progress Note    Date of Service  1/3/2021    Chief Complaint  55 y.o. male admitted 12/30/2020 with left leg pain.    Hospital Course  55 years old male with a past medical history of medication noncompliance with anticoagulation, history of unprovoked deep venous thrombosis/pulmonary embolism and is on lifetime rivaroxaban, left posterior calf wound, status post plastic surgery and wound VAC and LTAC stay, history of renal cell carcinoma status post nephrectomy in 2003. Admitted 12/30/2020 with worsening left leg pain and swelling for several days found to have. In the ER, ultrasound of the lower extremity showed acute and subocclusive left lower extremity DVT within the popliteal vein extending into the calf veins at the proximal level.    Interval Problem Update  Seen by infectious disease yesterday, plan to monitor off antibiotics, thank positive wound cultures were contaminant.  Wound VAC change yesterday, appears to be functioning well.  Swelling slightly better today.  Discussed with patient, & bedside nurse, will need home health set up prior to discharge.  Anticipated discharge Jan 4-5 with home health for wound care with wound VAC changes 3 times a week.    Consultants/Specialty  Infectious disease.    Code Status  Full Code    Disposition  Anticipated discharge Jan 4-5 with home health for wound care with wound VAC changes 3 times a week.    Xarelto restarted, prescription sent to pharmacy, co-pay may be an issue.      Review of Systems  Review of Systems   Constitutional: Positive for malaise/fatigue. Negative for chills and fever.   HENT: Negative for congestion and sore throat.    Eyes: Negative for pain, discharge and redness.   Respiratory: Negative for cough, hemoptysis, sputum production, shortness of breath and stridor.    Cardiovascular: Negative for chest pain, palpitations and leg swelling.   Gastrointestinal: Negative for abdominal pain, blood in stool, diarrhea and  vomiting.   Genitourinary: Negative for flank pain, hematuria and urgency.   Musculoskeletal: Positive for myalgias.        Left Calf pain    Skin: Negative.    Neurological: Negative for speech change, focal weakness, seizures and loss of consciousness.   Endo/Heme/Allergies: Does not bruise/bleed easily.   Psychiatric/Behavioral: Negative for hallucinations and suicidal ideas. The patient is not nervous/anxious.       Physical Exam  Temp:  [36.1 °C (96.9 °F)-36.8 °C (98.2 °F)] 36.2 °C (97.2 °F)  Pulse:  [59-66] 60  Resp:  [17-20] 17  BP: (114-118)/(63-75) 114/70  SpO2:  [92 %-96 %] 94 %    Physical Exam  Constitutional:       General: He is not in acute distress.     Appearance: He is not ill-appearing or diaphoretic.   HENT:      Head: Atraumatic.      Right Ear: External ear normal.      Left Ear: External ear normal.      Nose: No congestion or rhinorrhea.      Mouth/Throat:      Mouth: Mucous membranes are moist.   Eyes:      General: No scleral icterus.        Right eye: No discharge.         Left eye: No discharge.      Pupils: Pupils are equal, round, and reactive to light.   Neck:      Musculoskeletal: Neck supple. No neck rigidity or muscular tenderness.   Cardiovascular:      Rate and Rhythm: Normal rate and regular rhythm.      Heart sounds: No friction rub. No gallop.    Pulmonary:      Effort: No respiratory distress.      Breath sounds: No stridor. No wheezing.   Abdominal:      Palpations: Abdomen is soft.      Tenderness: There is no abdominal tenderness. There is no guarding or rebound.   Musculoskeletal:         General: Swelling and tenderness present.      Right lower leg: No edema.      Left lower leg: Edema present.      Comments: LKeft calf wound with granulation tissue.  Mild tenderness. No active drainage.    Skin:     Coloration: Skin is not jaundiced or pale.   Neurological:      Mental Status: He is alert and oriented to person, place, and time.      Coordination: Coordination normal.    Psychiatric:         Mood and Affect: Mood normal.         Behavior: Behavior normal.       Fluids    Intake/Output Summary (Last 24 hours) at 1/3/2021 1302  Last data filed at 1/3/2021 0900  Gross per 24 hour   Intake 1250 ml   Output 240 ml   Net 1010 ml       Laboratory  Recent Labs     01/01/21  0413 01/02/21  0134 01/03/21  0323   WBC 6.4 5.9 5.8   RBC 4.34* 4.44* 4.53*   HEMOGLOBIN 12.7* 12.9* 12.8*   HEMATOCRIT 37.9* 38.6* 39.3*   MCV 87.3 86.9 86.8   MCH 29.3 29.1 28.3   MCHC 33.5* 33.4* 32.6*   RDW 45.0 44.1 44.5   PLATELETCT 77* 97* 99*   MPV 9.7 10.2 10.1     Recent Labs     01/01/21 0413 01/02/21  0134 01/03/21  0323   SODIUM 137 136 138   POTASSIUM 3.6 3.6 3.8   CHLORIDE 103 103 103   CO2 23 23 25   GLUCOSE 94 93 103*   BUN 17 14 17   CREATININE 1.03 1.08 1.15   CALCIUM 8.9 9.2 9.3                   Imaging  US-EXTREMITY VENOUS LOWER UNILAT LEFT   Final Result      DX-CHEST-PORTABLE (1 VIEW)   Final Result      Unchanged right basilar density consistent with atelectasis or scar         Assessment/Plan  * Acute deep vein thrombosis (DVT) of popliteal vein of left lower extremity (HCC)- (present on admission)  Assessment & Plan  Has been off his paroxetine  Restarted rivaroxaban loading on admission, counseling provided for importance of staying on anticoagulation lifelong     Left leg swelling secondary to venous thrombosis, less likely cellulitis- (present on admission)  Assessment & Plan  Started on Unasyn on admission for concern for cellulitis  Wound culture growing diphtheroids, infectious disease consulted, think this is more likely contaminant.  Currently monitoring off antibiotics.  Multimodal pain control.  Leg elevation, treating associated DVT      Leg wound, left- (present on admission)  Assessment & Plan  Evaluated by wound care, wound VAC replaced, plan for home health, I will order.     Normocytic anemia- (present on admission)  Assessment & Plan  Continue to monitor, transfuse for  hemoglobin of less than or equal to 7.     History of pulmonary embolus (PE)- (present on admission)  Assessment & Plan  Restart the rivaroxaban      History of DVT (deep vein thrombosis)- (present on admission)  Assessment & Plan  Restart home rivaroxaban.       Smoker- (present on admission)  Assessment & Plan  Counseling provided      VTE prophylaxis: Rivaroxaban

## 2021-01-03 NOTE — CARE PLAN
Problem: Safety  Goal: Will remain free from injury  Outcome: PROGRESSING AS EXPECTED     Problem: Knowledge Deficit  Goal: Knowledge of disease process/condition, treatment plan, diagnostic tests, and medications will improve  Outcome: PROGRESSING AS EXPECTED  Note: Pt educated regarding plan of care and medications. All questions answered.

## 2021-01-04 VITALS
WEIGHT: 213.41 LBS | DIASTOLIC BLOOD PRESSURE: 73 MMHG | RESPIRATION RATE: 16 BRPM | SYSTOLIC BLOOD PRESSURE: 130 MMHG | OXYGEN SATURATION: 95 % | BODY MASS INDEX: 25.2 KG/M2 | HEART RATE: 64 BPM | HEIGHT: 77 IN | TEMPERATURE: 97.4 F

## 2021-01-04 LAB
ANION GAP SERPL CALC-SCNC: 8 MMOL/L (ref 7–16)
BACTERIA BLD CULT: NORMAL
BACTERIA BLD CULT: NORMAL
BASOPHILS # BLD AUTO: 0.8 % (ref 0–1.8)
BASOPHILS # BLD: 0.05 K/UL (ref 0–0.12)
BUN SERPL-MCNC: 22 MG/DL (ref 8–22)
CALCIUM SERPL-MCNC: 9.4 MG/DL (ref 8.5–10.5)
CHLORIDE SERPL-SCNC: 105 MMOL/L (ref 96–112)
CO2 SERPL-SCNC: 26 MMOL/L (ref 20–33)
CREAT SERPL-MCNC: 1.15 MG/DL (ref 0.5–1.4)
EOSINOPHIL # BLD AUTO: 0.21 K/UL (ref 0–0.51)
EOSINOPHIL NFR BLD: 3.5 % (ref 0–6.9)
ERYTHROCYTE [DISTWIDTH] IN BLOOD BY AUTOMATED COUNT: 45.6 FL (ref 35.9–50)
GLUCOSE SERPL-MCNC: 97 MG/DL (ref 65–99)
HCT VFR BLD AUTO: 43.6 % (ref 42–52)
HGB BLD-MCNC: 14.2 G/DL (ref 14–18)
IMM GRANULOCYTES # BLD AUTO: 0.01 K/UL (ref 0–0.11)
IMM GRANULOCYTES NFR BLD AUTO: 0.2 % (ref 0–0.9)
LYMPHOCYTES # BLD AUTO: 1.06 K/UL (ref 1–4.8)
LYMPHOCYTES NFR BLD: 17.7 % (ref 22–41)
MCH RBC QN AUTO: 28.9 PG (ref 27–33)
MCHC RBC AUTO-ENTMCNC: 32.6 G/DL (ref 33.7–35.3)
MCV RBC AUTO: 88.6 FL (ref 81.4–97.8)
MONOCYTES # BLD AUTO: 0.41 K/UL (ref 0–0.85)
MONOCYTES NFR BLD AUTO: 6.8 % (ref 0–13.4)
NEUTROPHILS # BLD AUTO: 4.26 K/UL (ref 1.82–7.42)
NEUTROPHILS NFR BLD: 71 % (ref 44–72)
NRBC # BLD AUTO: 0 K/UL
NRBC BLD-RTO: 0 /100 WBC
PLATELET # BLD AUTO: 83 K/UL (ref 164–446)
PMV BLD AUTO: 9.8 FL (ref 9–12.9)
POTASSIUM SERPL-SCNC: 4.1 MMOL/L (ref 3.6–5.5)
RBC # BLD AUTO: 4.92 M/UL (ref 4.7–6.1)
SIGNIFICANT IND 70042: NORMAL
SIGNIFICANT IND 70042: NORMAL
SITE SITE: NORMAL
SITE SITE: NORMAL
SODIUM SERPL-SCNC: 139 MMOL/L (ref 135–145)
SOURCE SOURCE: NORMAL
SOURCE SOURCE: NORMAL
WBC # BLD AUTO: 6 K/UL (ref 4.8–10.8)

## 2021-01-04 PROCEDURE — 700102 HCHG RX REV CODE 250 W/ 637 OVERRIDE(OP): Performed by: EMERGENCY MEDICINE

## 2021-01-04 PROCEDURE — A9270 NON-COVERED ITEM OR SERVICE: HCPCS | Performed by: HOSPITALIST

## 2021-01-04 PROCEDURE — 302098 PASTE RING (FLAT): Performed by: HOSPITALIST

## 2021-01-04 PROCEDURE — A9270 NON-COVERED ITEM OR SERVICE: HCPCS | Performed by: STUDENT IN AN ORGANIZED HEALTH CARE EDUCATION/TRAINING PROGRAM

## 2021-01-04 PROCEDURE — 700102 HCHG RX REV CODE 250 W/ 637 OVERRIDE(OP): Performed by: HOSPITALIST

## 2021-01-04 PROCEDURE — G0378 HOSPITAL OBSERVATION PER HR: HCPCS

## 2021-01-04 PROCEDURE — 80048 BASIC METABOLIC PNL TOTAL CA: CPT

## 2021-01-04 PROCEDURE — 700102 HCHG RX REV CODE 250 W/ 637 OVERRIDE(OP): Performed by: STUDENT IN AN ORGANIZED HEALTH CARE EDUCATION/TRAINING PROGRAM

## 2021-01-04 PROCEDURE — 85025 COMPLETE CBC W/AUTO DIFF WBC: CPT

## 2021-01-04 PROCEDURE — 99217 PR OBSERVATION CARE DISCHARGE: CPT | Performed by: HOSPITALIST

## 2021-01-04 PROCEDURE — A9270 NON-COVERED ITEM OR SERVICE: HCPCS | Performed by: EMERGENCY MEDICINE

## 2021-01-04 PROCEDURE — 36415 COLL VENOUS BLD VENIPUNCTURE: CPT

## 2021-01-04 RX ORDER — ACETAMINOPHEN 325 MG/1
650 TABLET ORAL EVERY 6 HOURS PRN
Qty: 30 TAB | Refills: 0 | Status: ON HOLD | OUTPATIENT
Start: 2021-01-04 | End: 2022-06-03

## 2021-01-04 RX ORDER — TIZANIDINE 2 MG/1
2 TABLET ORAL EVERY 6 HOURS PRN
Qty: 30 TAB | Refills: 1 | Status: SHIPPED | OUTPATIENT
Start: 2021-01-04 | End: 2022-05-22

## 2021-01-04 RX ADMIN — TIZANIDINE 2 MG: 4 TABLET ORAL at 13:59

## 2021-01-04 RX ADMIN — DOCUSATE SODIUM 50 MG AND SENNOSIDES 8.6 MG 2 TABLET: 8.6; 5 TABLET, FILM COATED ORAL at 17:53

## 2021-01-04 RX ADMIN — RIVAROXABAN 15 MG: 15 TABLET, FILM COATED ORAL at 17:53

## 2021-01-04 RX ADMIN — RIVAROXABAN 15 MG: 15 TABLET, FILM COATED ORAL at 09:37

## 2021-01-04 RX ADMIN — NICOTINE 14 MG: 14 PATCH TRANSDERMAL at 05:47

## 2021-01-04 RX ADMIN — DIPHENHYDRAMINE HYDROCHLORIDE 25 MG: 25 TABLET ORAL at 11:04

## 2021-01-04 ASSESSMENT — PAIN DESCRIPTION - PAIN TYPE: TYPE: ACUTE PAIN

## 2021-01-04 NOTE — DISCHARGE SUMMARY
Discharge Summary    CHIEF COMPLAINT ON ADMISSION  Chief Complaint   Patient presents with   • Wound Check       Reason for Admission  Left leg pain and swelling, nonhealing wound    Admission Date  12/30/2020    CODE STATUS  Full Code    HPI & HOSPITAL COURSE  55 years old male with a past medical history of medication noncompliance with anticoagulation, history of unprovoked deep venous thrombosis/pulmonary embolism and is on lifetime rivaroxaban, left posterior calf wound, status post plastic surgery and wound VAC and LTAC stay, history of renal cell carcinoma status post nephrectomy in 2003. Admitted 12/30/2020 with worsening left leg pain and swelling for several days found to have. Ultrasound of the lower extremity showed acute and subocclusive left lower extremity DVT within the popliteal vein extending into the calf veins at the proximal level.  Patient was restarted on anticoagulation with rivaroxaban.  There was concern for possible cellulitis, accordingly patient was initially started on Unasyn on admission.  Wound cultures grew diphtheroids.  Infectious disease were consulted thought that the cultures reflect contamination rather than true infection.  Patient was observed off antibiotics with good results.  Pain and swelling improved with wound care and wound VAC.  Patient will be discharged home on anticoagulation with home health for wound care and wound VAC dressing changes 3 times a week. Therefore, he is discharged in fair and stable condition to home with close outpatient follow-up.    Discharge Date  1/4/2021     DISCHARGE DIAGNOSES  Principal Problem:    Acute deep vein thrombosis (DVT) of popliteal vein of left lower extremity (HCC) POA: Yes  Active Problems:    Left leg swelling secondary to venous thrombosis, less likely cellulitis POA: Yes    History of DVT (deep vein thrombosis) POA: Yes    History of pulmonary embolus (PE) (Chronic) POA: Yes    Normocytic anemia POA: Yes    Leg wound, left  POA: Yes    Smoker (Chronic) POA: Yes  Resolved Problems:    * No resolved hospital problems. *    FOLLOW UP  [facility]  [address]  [place] [phone]        Gio Wang M.D.  [address]  [place] [id number] [phone]    In 5 days      MEDICATIONS ON DISCHARGE     Medication List      START taking these medications      Instructions   acetaminophen 325 MG Tabs  Commonly known as: Tylenol   Take 2 Tabs by mouth every 6 hours as needed for Mild Pain or Moderate Pain (Mild Pain; (Pain scale 1-3); Temp greater than 100.5 F).  Dose: 650 mg     tizanidine 2 MG tablet  Commonly known as: ZANAFLEX   Take 1 Tab by mouth every 6 hours as needed.  Dose: 2 mg        CHANGE how you take these medications      Instructions   * rivaroxaban 15 MG Tabs tablet  What changed: You were already taking a medication with the same name, and this prescription was added. Make sure you understand how and when to take each.  Commonly known as: XARELTO   Take 1 Tab by mouth 2 times a day, with meals for 19 days.  Dose: 15 mg     * rivaroxaban 20 MG Tabs tablet  Start taking on: January 20, 2021  What changed: These instructions start on January 20, 2021. If you are unsure what to do until then, ask your doctor or other care provider.  Commonly known as: XARELTO   Take 1 Tab by mouth with dinner.  Dose: 20 mg         * This list has 2 medication(s) that are the same as other medications prescribed for you. Read the directions carefully, and ask your doctor or other care provider to review them with you.            CONTINUE taking these medications      Instructions   ALBUTEROL INH   Inhale 1 Puff every four hours as needed (shortness of breath).  Dose: 1 Puff     oxyCODONE immediate-release 5 MG Tabs  Commonly known as: ROXICODONE   Take 5 mg by mouth every four hours as needed for Severe Pain.  Dose: 5 mg        STOP taking these medications    lidocaine 4 % Soln  Commonly known as:  XYLOCAINE          Allergies  No Known Allergies    DIET  Orders Placed This Encounter   Procedures   • Diet Order Diet: Regular     Standing Status:   Standing     Number of Occurrences:   1     Order Specific Question:   Diet:     Answer:   Regular [1]     CONSULTATIONS  Infectious disease    PROCEDURES  None     LABORATORY  Lab Results   Component Value Date    SODIUM 139 01/04/2021    POTASSIUM 4.1 01/04/2021    CHLORIDE 105 01/04/2021    CO2 26 01/04/2021    GLUCOSE 97 01/04/2021    BUN 22 01/04/2021    CREATININE 1.15 01/04/2021    CREATININE 1.5 (H) 06/10/2005        Lab Results   Component Value Date    WBC 6.0 01/04/2021    HEMOGLOBIN 14.2 01/04/2021    HEMATOCRIT 43.6 01/04/2021    PLATELETCT 83 (L) 01/04/2021      Total time of the discharge process exceeds 34 minutes.

## 2021-01-04 NOTE — WOUND TEAM
Patient is discharging home today, [facility] will see him tomorrow and change vac dressing, therefore it does not need to be changed today.

## 2021-01-04 NOTE — CARE PLAN
Problem: Infection  Goal: Will remain free from infection  Outcome: PROGRESSING AS EXPECTED     Problem: Knowledge Deficit  Goal: Knowledge of disease process/condition, treatment plan, diagnostic tests, and medications will improve  Outcome: PROGRESSING AS EXPECTED  Note: Pt educated regarding plan of care and medications. All questions answered.

## 2021-01-04 NOTE — DISCHARGE PLANNING
Agency/[facility] Name: [facility]   Spoke To: Kylie  Outcome: Referral accepted.  [facility] let Kylie know that the patient will discharge today.    @1021  gency/Facility Name: [facility]  Spoke To: Ely  Outcome:  [facility] has the patient on the schedule to be seen today.  [facility] let her know that the patient is still in the hospital.  Ely will call scheduling to let them know that.  Patient will be seen tomorrow.      TORSTEN Castillo notified.

## 2021-01-05 NOTE — DISCHARGE INSTRUCTIONS
Discharge Instructions per Trent Leal M.D.  Follow-up with primary care provider within 5 days.  DIAGNOSIS: Left leg swelling secondary to acute deep vein thrombosis (DVT) of popliteal vein of left lower extremity   Return to ER if you develop any of the following symptoms fever, chills, weakness, not feeling well, rash, bleeding, blurred vision, palpitations, cough, pain in any part of your body, shortness of breath, nausea, vomiting, diarrhea, difficulty with urination, dizziness, headache, seizures, loss of consciousness or if you develop any new symptoms.        Deep Vein Thrombosis    Deep vein thrombosis (DVT) is a condition in which a blood clot forms in a deep vein, such as a lower leg, thigh, or arm vein. A clot is blood that has thickened into a gel or solid. This condition is dangerous. It can lead to serious and even life-threatening complications if the clot travels to the lungs and causes a blockage (pulmonary embolism). It can also damage veins in the leg. This can result in leg pain, swelling, discoloration, and sores (post-thrombotic syndrome).  What are the causes?  This condition may be caused by:  · A slowdown of blood flow.  · Damage to a vein.  · A condition that causes blood to clot more easily, such as an inherited clotting disorder.  What increases the risk?  The following factors may make you more likely to develop this condition:  · Being overweight.  · Being older, especially over age 60.  · Sitting or lying down for more than four hours.  · Being in the hospital.  · Lack of physical activity (sedentary lifestyle).  · Pregnancy, being in childbirth, or having recently given birth.  · Taking medicines that contain estrogen, such as medicines to prevent pregnancy.  · Smoking.  · A history of any of the following:  ? Blood clots or a blood clotting disease.  ? Peripheral vascular disease.  ? Inflammatory bowel disease.  ? Cancer.  ? Heart disease.  ? Genetic conditions that  affect how your blood clots, such as Factor V Leiden mutation.  ? Neurological diseases that affect your legs (leg paresis).  ? A recent injury, such as a car accident.  ? Major or lengthy surgery.  ? A central line placed inside a large vein.  What are the signs or symptoms?  Symptoms of this condition include:  · Swelling, pain, or tenderness in an arm or leg.  · Warmth, redness, or discoloration in an arm or leg.  If the clot is in your leg, symptoms may be more noticeable or worse when you stand or walk. Some people may not develop any symptoms.  How is this diagnosed?  This condition is diagnosed with:  · A medical history and physical exam.  · Tests, such as:  ? Blood tests. These are done to check how well your blood clots.  ? Ultrasound. This is done to check for clots.  ? Venogram. For this test, contrast dye is injected into a vein and X-rays are taken to check for any clots.  How is this treated?  Treatment for this condition depends on:  · The cause of your DVT.  · Your risk for bleeding or developing more clots.  · Any other medical conditions that you have.  Treatment may include:  · Taking a blood thinner (anticoagulant). This type of medicine prevents clots from forming. It may be taken by mouth, injected under the skin, or injected through an IV (catheter).  · Injecting clot-dissolving medicines into the affected vein (catheter-directed thrombolysis).  · Having surgery. Surgery may be done to:  ? Remove the clot.  ? Place a filter in a large vein to catch blood clots before they reach the lungs.  Some treatments may be continued for up to six months.  Follow these instructions at home:  If you are taking blood thinners:  · Take the medicine exactly as told by your health care provider. Some blood thinners need to be taken at the same time every day. Do not skip a dose.  · Talk with your health care provider before you take any medicines that contain aspirin or NSAIDs. These medicines increase your  risk for dangerous bleeding.  · Ask your health care provider about foods and drugs that could change the way the medicine works (may interact). Avoid those things if your health care provider tells you to do so.  · Blood thinners can cause easy bruising and may make it difficult to stop bleeding. Because of this:  ? Be very careful when using knives, scissors, or other sharp objects.  ? Use an electric razor instead of a blade.  ? Avoid activities that could cause injury or bruising, and follow instructions about how to prevent falls.  · Wear a medical alert bracelet or carry a card that lists what medicines you take.  General instructions  · Take over-the-counter and prescription medicines only as told by your health care provider.  · Return to your normal activities as told by your health care provider. Ask your health care provider what activities are safe for you.  · Wear compression stockings if recommended by your health care provider.  · Keep all follow-up visits as told by your health care provider. This is important.  How is this prevented?  To lower your risk of developing this condition again:  · For 30 or more minutes every day, do an activity that:  ? Involves moving your arms and legs.  ? Increases your heart rate.  · When traveling for longer than four hours:  ? Exercise your arms and legs every hour.  ? Drink plenty of water.  ? Avoid drinking alcohol.  · Avoid sitting or lying for a long time without moving your legs.  · If you have surgery or you are hospitalized, ask about ways to prevent blood clots. These may include taking frequent walks or using anticoagulants.  · Stay at a healthy weight.  · If you are a woman who is older than age 35, avoid unnecessary use of medicines that contain estrogen, such as some birth control pills.  · Do not use any products that contain nicotine or tobacco, such as cigarettes and e-cigarettes. This is especially important if you take estrogen medicines. If you  need help quitting, ask your health care provider.  Contact a health care provider if:  · You miss a dose of your blood thinner.  · Your menstrual period is heavier than usual.  · You have unusual bruising.  Get help right away if:  · You have:  ? New or increased pain, swelling, or redness in an arm or leg.  ? Numbness or tingling in an arm or leg.  ? Shortness of breath.  ? Chest pain.  ? A rapid or irregular heartbeat.  ? A severe headache or confusion.  ? A cut that will not stop bleeding.  · There is blood in your vomit, stool, or urine.  · You have a serious fall or accident, or you hit your head.  · You feel light-headed or dizzy.  · You cough up blood.  These symptoms may represent a serious problem that is an emergency. Do not wait to see if the symptoms will go away. Get medical help right away. Call your local emergency services (911 in the [place] Do not drive yourself to the hospital.  Summary  · Deep vein thrombosis (DVT) is a condition in which a blood clot forms in a deep vein, such as a lower leg, thigh, or arm vein.  · Symptoms can include swelling, warmth, pain, and redness in your leg or arm.  · This condition may be treated with a blood thinner (anticoagulant medicine), medicine that is injected to dissolve blood clots,compression stockings, or surgery.  · If you are prescribed blood thinners, take them exactly as told.  This information is not intended to replace advice given to you by your health care provider. Make sure you discuss any questions you have with your health care provider.  Document Released: 12/18/2006 Document Revised: 11/30/2018 Document Reviewed: 05/18/2018  [employer] Patient Education © 2020 [employer]        Rivaroxaban oral tablets  What is this medicine?  RIVAROXABAN (ri va DANIEL a ban) is an anticoagulant (blood thinner). It is used to treat blood clots in the lungs or in the veins. It is also used to prevent blood clots in the lungs or in the veins. It is also used to  lower the chance of stroke in people with a medical condition called atrial fibrillation.  This medicine may be used for other purposes; ask your health care provider or pharmacist if you have questions.  COMMON BRAND NAME(S): Xarelto, Xarelto Starter Pack  What should I tell my health care provider before I take this medicine?  They need to know if you have any of these conditions:  · antiphospholipid antibody syndrome  · artificial heart valve  · bleeding disorders  · bleeding in the brain  · blood in your stools (black or tarry stools) or if you have blood in your vomit  · history of blood clots  · history of stomach bleeding  · kidney disease  · liver disease  · low blood counts, like low white cell, platelet, or red cell counts  · recent or planned spinal or epidural procedure  · take medicines that treat or prevent blood clots  · an unusual or allergic reaction to rivaroxaban, other medicines, foods, dyes, or preservatives  · pregnant or trying to get pregnant  · breast-feeding  How should I use this medicine?  Take this medicine by mouth with a glass of water. Follow the directions on the prescription label. Take your medicine at regular intervals. Do not take it more often than directed. Do not stop taking except on your doctor's advice. Stopping this medicine may increase your risk of a blood clot. Be sure to refill your prescription before you run out of medicine.  If you are taking this medicine after hip or knee replacement surgery, take it with or without food. If you are taking this medicine for atrial fibrillation, take it with your evening meal. If you are taking this medicine to treat blood clots, take it with food at the same time each day. If you are unable to swallow your tablet, you may crush the tablet and mix it in applesauce. Then, immediately eat the applesauce. You should eat more food right after you eat the applesauce containing the crushed tablet.  Talk to your pediatrician regarding the  use of this medicine in children. Special care may be needed.  Overdosage: If you think you have taken too much of this medicine contact a poison control center or emergency room at once.  NOTE: This medicine is only for you. Do not share this medicine with others.  What if I miss a dose?  If you take your medicine once a day and miss a dose, take the missed dose as soon as you remember. If it is almost time for your next dose, take only that dose. Do not take double or extra doses.  If you take your medicine twice a day and miss a dose, take the missed dose immediately. In this instance, 2 tablets may be taken at the same time. The next day you should take 1 tablet twice a day as directed.  What may interact with this medicine?  Do not take this medicine with any of the following medications:  · defibrotide  This medicine may also interact with the following medications:  · aspirin and aspirin-like medicines  · certain antibiotics like erythromycin, azithromycin, and clarithromycin  · certain medicines for fungal infections like ketoconazole and itraconazole  · certain medicines for irregular heart beat like amiodarone, quinidine, dronedarone  · certain medicines for seizures like carbamazepine, phenytoin  · certain medicines that treat or prevent blood clots like warfarin, enoxaparin, and dalteparin  · conivaptan  · felodipine  · indinavir  · lopinavir; ritonavir  · NSAIDS, medicines for pain and inflammation, like ibuprofen or naproxen  · ranolazine  · rifampin  · ritonavir  · SNRIs, medicines for depression, like desvenlafaxine, duloxetine, levomilnacipran, venlafaxine  · SSRIs, medicines for depression, like citalopram, escitalopram, fluoxetine, fluvoxamine, paroxetine, sertraline  · [place]'s wort  · verapamil  This list may not describe all possible interactions. Give your health care provider a list of all the medicines, herbs, non-prescription drugs, or dietary supplements you use. Also tell them if you  smoke, drink alcohol, or use illegal drugs. Some items may interact with your medicine.  What should I watch for while using this medicine?  Visit your healthcare professional for regular checks on your progress. You may need blood work done while you are taking this medicine. Your condition will be monitored carefully while you are receiving this medicine. It is important not to miss any appointments.  Avoid sports and activities that might cause injury while you are using this medicine. Severe falls or injuries can cause unseen bleeding. Be careful when using sharp tools or knives. Consider using an electric razor. Take special care brushing or flossing your teeth. Report any injuries, bruising, or red spots on the skin to your healthcare professional.  If you are going to need surgery or other procedure, tell your healthcare professional that you are taking this medicine.  Wear a medical ID bracelet or chain. Carry a card that describes your disease and details of your medicine and dosage times.  What side effects may I notice from receiving this medicine?  Side effects that you should report to your doctor or health care professional as soon as possible:  · allergic reactions like skin rash, itching or hives, swelling of the face, lips, or tongue  · back pain  · redness, blistering, peeling or loosening of the skin, including inside the mouth  · signs and symptoms of bleeding such as bloody or black, tarry stools; red or dark-brown urine; spitting up blood or brown material that looks like coffee grounds; red spots on the skin; unusual bruising or bleeding from the eye, gums, or nose  · signs and symptoms of a blood clot such as chest pain; shortness of breath; pain, swelling, or warmth in the leg  · signs and symptoms of a stroke such as changes in vision; confusion; trouble speaking or understanding; severe headaches; sudden numbness or weakness of the face, arm or leg; trouble walking; dizziness; loss of  coordination  Side effects that usually do not require medical attention (report to your doctor or health care professional if they continue or are bothersome):  · dizziness  · muscle pain  This list may not describe all possible side effects. Call your doctor for medical advice about side effects. You may report side effects to FDA at 8-084-GLF-2857.  Where should I keep my medicine?  Keep out of the reach of children.  Store at room temperature between 15 and 30 degrees C (59 and 86 degrees F). Throw away any unused medicine after the expiration date.  NOTE: This sheet is a summary. It may not cover all possible information. If you have questions about this medicine, talk to your doctor, pharmacist, or health care provider.  Tizanidine tablets or capsules  What is this medicine?  TIZANIDINE (lorenzo ANGE knapp jaylan) helps to relieve muscle spasms. It may be used to help in the treatment of multiple sclerosis and spinal cord injury.  This medicine may be used for other purposes; ask your health care provider or pharmacist if you have questions.  COMMON BRAND NAME(S): Zanaflex  What should I tell my health care provider before I take this medicine?  They need to know if you have any of these conditions:  · kidney disease  · liver disease  · low blood pressure  · mental disorder  · an unusual or allergic reaction to tizanidine, other medicines, lactose (tablets only), foods, dyes, or preservatives  · pregnant or trying to get pregnant  · breast-feeding  How should I use this medicine?  Take this medicine by mouth with a full glass of water. Take this medicine on an empty stomach, at least 30 minutes before or 2 hours after food. Do not take with food unless you talk with your doctor. Follow the directions on the prescription label. Take your medicine at regular intervals. Do not take your medicine more often than directed. Do not stop taking except on your doctor's advice. Suddenly stopping the medicine can be very  dangerous.  Talk to your pediatrician regarding the use of this medicine in children.  Patients over 65 years old may have a stronger reaction and need a smaller dose.  Overdosage: If you think you have taken too much of this medicine contact a poison control center or emergency room at once.  NOTE: This medicine is only for you. Do not share this medicine with others.  What if I miss a dose?  If you miss a dose, take it as soon as you can. If it is almost time for your next dose, take only that dose. Do not take double or extra doses.  What may interact with this medicine?  Do not take this medicine with any of the following medications:  · ciprofloxacin  · fluvoxamine  · narcotic medicines for cough  · thiabendazole  This medicine may also interact with the following medications:  · acyclovir  · alcohol  · antihistamines for allergy, cough, and cold  · baclofen  · certain medicines for anxiety or sleep  · certain medicines for blood pressure, heart disease, irregular heartbeat  · certain medicines for depression like amitriptyline, fluoxetine, sertraline  · certain medicines for seizures like phenobarbital, primidone  · certain medicines for stomach problems like cimetidine, famotidine  · female hormones, like estrogens or progestins and birth control pills, patches, rings, or injections  · general anesthetics like halothane, isoflurane, methoxyflurane, propofol  · local anesthetics like lidocaine, pramoxine, tetracaine  · medicines that relax muscles for surgery  · narcotic medicines for pain  · phenothiazines like chlorpromazine, mesoridazine, prochlorperazine  · ticlopidine  · zileuton  This list may not describe all possible interactions. Give your health care provider a list of all the medicines, herbs, non-prescription drugs, or dietary supplements you use. Also tell them if you smoke, drink alcohol, or use illegal drugs. Some items may interact with your medicine.  What should I watch for while using this  medicine?  Tell your doctor or health care professional if your symptoms do not start to get better or if they get worse.  You may get drowsy or dizzy. Do not drive, use machinery, or do anything that needs mental alertness until you know how this medicine affects you. Do not stand or sit up quickly, especially if you are an older patient. This reduces the risk of dizzy or fainting spells. Alcohol may interfere with the effect of this medicine. Avoid alcoholic drinks.  If you are taking another medicine that also causes drowsiness, you may have more side effects. Give your health care provider a list of all medicines you use. Your doctor will tell you how much medicine to take. Do not take more medicine than directed. Call emergency for help if you have problems breathing or unusual sleepiness.  Your mouth may get dry. Chewing sugarless gum or sucking hard candy, and drinking plenty of water may help. Contact your doctor if the problem does not go away or is severe.  What side effects may I notice from receiving this medicine?  Side effects that you should report to your doctor or health care professional as soon as possible:  · allergic reactions like skin rash, itching or hives, swelling of the face, lips, or tongue  · breathing problems  · hallucinations  · signs and symptoms of liver injury like dark yellow or brown urine; general ill feeling or flu-like symptoms; light-colored stools; loss of appetite; nausea; right upper quadrant belly pain; unusually weak or tired; yellowing of the eyes or skin  · signs and symptoms of low blood pressure like dizziness; feeling faint or lightheaded, falls; unusually weak or tired  · unusually slow heartbeat  · unusually weak or tired  Side effects that usually do not require medical attention (report to your doctor or health care professional if they continue or are bothersome):  · blurred vision  · constipation  · dizziness  · dry mouth  · tiredness  This list may not  describe all possible side effects. Call your doctor for medical advice about side effects. You may report side effects to FDA at 6-205-DVS-3383.  Where should I keep my medicine?  Keep out of the reach of children.  Store at room temperature between 15 and 30 degrees C (59 and 86 degrees F). Throw away any unused medicine after the expiration date.  NOTE: This sheet is a summary. It may not cover all possible information. If you have questions about this medicine, talk to your doctor, pharmacist, or health care provider.    Discharge Instructions    Discharged to home by car with friend. Discharged via wheelchair, hospital escort: Yes.  Special equipment needed: Not Applicable    Be sure to schedule a follow-up appointment with your primary care doctor or any specialists as instructed.     Discharge Plan:   Diet Plan: Discussed  Activity Level: Discussed  Confirmed Follow up Appointment: Patient to Call and Schedule Appointment  Confirmed Symptoms Management: Discussed  Medication Reconciliation Updated: Yes  Influenza Vaccine Indication: Not indicated: Previously immunized this influenza season and > 8 years of age(recieved 10/25/2020)    I understand that a diet low in cholesterol, fat, and sodium is recommended for good health. Unless I have been given specific instructions below for another diet, I accept this instruction as my diet prescription.     Special Instructions: NO  · Is patient discharged on Warfarin / Coumadin?   No     Depression / Suicide Risk    [facility], it is important to learn how to keep safe from harming yourself.    Recognize the warning signs:  · Abrupt changes in personality, positive or negative- including increase in energy   · Giving away possessions  · Change in eating patterns- significant weight changes-  positive or negative  · Change in sleeping patterns- unable to sleep or sleeping all the time   · Unwillingness or inability to  communicate  · Depression  · Unusual sadness, discouragement and loneliness  · Talk of wanting to die  · Neglect of personal appearance   · Rebelliousness- reckless behavior  · Withdrawal from people/activities they love  · Confusion- inability to concentrate     If you or a loved one observes any of these behaviors or has concerns about self-harm, here's what you can do:  · Talk about it- your feelings and reasons for harming yourself  · Remove any means that you might use to hurt yourself (examples: pills, rope, extension cords, firearm)  · Get professional help from the community (Mental Health, Substance Abuse, psychological counseling)  · Do not be alone:Call your Safe Contact- someone whom you trust who will be there for you.  · Call your local CRISIS HOTLINE 419-5419 or 139-334-7887  · Call your local Children's Mobile Crisis Response Team [place] (058) 187-3395 or www.[employer]  · Call the toll free National Suicide Prevention Hotlines   · National Suicide Prevention Lifeline 981-418-QNQU (5649)  · National Hope Line Network 800-SUICIDE (349-1711)

## 2021-01-05 NOTE — PROGRESS NOTES
Discharge orders acknowledged, Pt aware and compliant with new orders, D/C teaching completed, Pt able to verbalize needs and complaint with discharge orders.  IV removed.

## 2021-01-09 NOTE — PROGRESS NOTES
Discharge Summary     CHIEF COMPLAINT ON ADMISSION      Chief Complaint   Patient presents with   • Wound Check         Reason for Admission  Left leg pain and swelling, nonhealing wound     Admission Date  12/30/2020     CODE STATUS  Full Code     HPI & HOSPITAL COURSE  55 years old male with a past medical history of medication noncompliance with anticoagulation, history of unprovoked deep venous thrombosis/pulmonary embolism and is on lifetime rivaroxaban, left posterior calf wound, status post plastic surgery and wound VAC and LTAC stay, history of renal cell carcinoma status post nephrectomy in 2003. Admitted 12/30/2020 with worsening left leg pain and swelling for several days found to have. Ultrasound of the lower extremity showed acute and subocclusive left lower extremity DVT within the popliteal vein extending into the calf veins at the proximal level.  Patient was restarted on anticoagulation with rivaroxaban.  There was concern for possible cellulitis, accordingly patient was initially started on Unasyn on admission.  Wound cultures grew diphtheroids.  Infectious disease were consulted thought that the cultures reflect contamination rather than true infection.  Patient was observed off antibiotics with good results.  Pain and swelling improved with wound care and wound VAC.  Patient will be discharged home on anticoagulation with home health for wound care and wound VAC dressing changes 3 times a week. Therefore, he is discharged in fair and stable condition to home with close outpatient follow-up.     Discharge Date  1/4/2021      DISCHARGE DIAGNOSES  Principal Problem:    Acute deep vein thrombosis (DVT) of popliteal vein of left lower extremity (HCC) POA: Yes  Active Problems:    Left leg swelling secondary to venous thrombosis, less likely cellulitis POA: Yes    History of DVT (deep vein thrombosis) POA: Yes    History of pulmonary embolus (PE) (Chronic) POA: Yes    Normocytic anemia POA: Yes    Leg  wound, left POA: Yes    Smoker (Chronic) POA: Yes  Resolved Problems:    * No resolved hospital problems. *     FOLLOW UP  [facility]  [address] [place] [phone]           Gio Wang M.D.  [address] [facility]  [place] [id number] [phone]     In 5 days        MEDICATIONS ON DISCHARGE          Medication List           START taking these medications      Instructions   acetaminophen 325 MG Tabs  Commonly known as: Tylenol    Take 2 Tabs by mouth every 6 hours as needed for Mild Pain or Moderate Pain (Mild Pain; (Pain scale 1-3); Temp greater than 100.5 F).  Dose: 650 mg      tizanidine 2 MG tablet  Commonly known as: ZANAFLEX    Take 1 Tab by mouth every 6 hours as needed.  Dose: 2 mg                  CHANGE how you take these medications      Instructions   * rivaroxaban 15 MG Tabs tablet  What changed: You were already taking a medication with the same name, and this prescription was added. Make sure you understand how and when to take each.  Commonly known as: XARELTO    Take 1 Tab by mouth 2 times a day, with meals for 19 days.  Dose: 15 mg      * rivaroxaban 20 MG Tabs tablet  Start taking on: January 20, 2021  What changed: These instructions start on January 20, 2021. If you are unsure what to do until then, ask your doctor or other care provider.  Commonly known as: XARELTO    Take 1 Tab by mouth with dinner.  Dose: 20 mg               * This list has 2 medication(s) that are the same as other medications prescribed for you. Read the directions carefully, and ask your doctor or other care provider to review them with you.                      CONTINUE taking these medications      Instructions   ALBUTEROL INH    Inhale 1 Puff every four hours as needed (shortness of breath).  Dose: 1 Puff      oxyCODONE immediate-release 5 MG Tabs  Commonly known as: ROXICODONE    Take 5 mg by mouth every four hours as needed for Severe Pain.  Dose: 5 mg          STOP  taking these medications    lidocaine 4 % Soln  Commonly known as: XYLOCAINE             Allergies  No Known Allergies     DIET        Orders Placed This Encounter   Procedures   • Diet Order Diet: Regular       Standing Status:   Standing       Number of Occurrences:   1       Order Specific Question:   Diet:       Answer:   Regular [1]      CONSULTATIONS  Infectious disease     PROCEDURES  None      LABORATORY        Lab Results   Component Value Date     SODIUM 139 01/04/2021     POTASSIUM 4.1 01/04/2021     CHLORIDE 105 01/04/2021     CO2 26 01/04/2021     GLUCOSE 97 01/04/2021     BUN 22 01/04/2021     CREATININE 1.15 01/04/2021     CREATININE 1.5 (H) 06/10/2005               Lab Results   Component Value Date     WBC 6.0 01/04/2021     HEMOGLOBIN 14.2 01/04/2021     HEMATOCRIT 43.6 01/04/2021     PLATELETCT 83 (L) 01/04/2021      Total time of the discharge process exceeds 34 minutes.

## 2021-02-02 ENCOUNTER — HOSPITAL ENCOUNTER (OUTPATIENT)
Facility: MEDICAL CENTER | Age: 56
End: 2021-02-04
Attending: EMERGENCY MEDICINE | Admitting: STUDENT IN AN ORGANIZED HEALTH CARE EDUCATION/TRAINING PROGRAM
Payer: COMMERCIAL

## 2021-02-02 ENCOUNTER — APPOINTMENT (OUTPATIENT)
Dept: RADIOLOGY | Facility: MEDICAL CENTER | Age: 56
End: 2021-02-02
Attending: EMERGENCY MEDICINE
Payer: COMMERCIAL

## 2021-02-02 DIAGNOSIS — I82.432 ACUTE DEEP VEIN THROMBOSIS (DVT) OF POPLITEAL VEIN OF LEFT LOWER EXTREMITY (HCC): ICD-10-CM

## 2021-02-02 DIAGNOSIS — S81.802D WOUND OF LEFT LOWER EXTREMITY, SUBSEQUENT ENCOUNTER: ICD-10-CM

## 2021-02-02 DIAGNOSIS — M79.89 LEFT LEG SWELLING: ICD-10-CM

## 2021-02-02 LAB
ALBUMIN SERPL BCP-MCNC: 3.9 G/DL (ref 3.2–4.9)
ALBUMIN/GLOB SERPL: 1.4 G/DL
ALP SERPL-CCNC: 106 U/L (ref 30–99)
ALT SERPL-CCNC: 9 U/L (ref 2–50)
ANION GAP SERPL CALC-SCNC: 11 MMOL/L (ref 7–16)
AST SERPL-CCNC: 11 U/L (ref 12–45)
BASOPHILS # BLD AUTO: 0.6 % (ref 0–1.8)
BASOPHILS # BLD: 0.04 K/UL (ref 0–0.12)
BILIRUB SERPL-MCNC: 0.5 MG/DL (ref 0.1–1.5)
BUN SERPL-MCNC: 15 MG/DL (ref 8–22)
CALCIUM SERPL-MCNC: 9.5 MG/DL (ref 8.5–10.5)
CHLORIDE SERPL-SCNC: 107 MMOL/L (ref 96–112)
CO2 SERPL-SCNC: 22 MMOL/L (ref 20–33)
CREAT SERPL-MCNC: 1.16 MG/DL (ref 0.5–1.4)
EOSINOPHIL # BLD AUTO: 0.11 K/UL (ref 0–0.51)
EOSINOPHIL NFR BLD: 1.6 % (ref 0–6.9)
ERYTHROCYTE [DISTWIDTH] IN BLOOD BY AUTOMATED COUNT: 44 FL (ref 35.9–50)
GLOBULIN SER CALC-MCNC: 2.8 G/DL (ref 1.9–3.5)
GLUCOSE SERPL-MCNC: 87 MG/DL (ref 65–99)
HCT VFR BLD AUTO: 39.6 % (ref 42–52)
HGB BLD-MCNC: 12.9 G/DL (ref 14–18)
IMM GRANULOCYTES # BLD AUTO: 0.02 K/UL (ref 0–0.11)
IMM GRANULOCYTES NFR BLD AUTO: 0.3 % (ref 0–0.9)
INR PPP: 1.26 (ref 0.87–1.13)
LACTATE BLD-SCNC: 2 MMOL/L (ref 0.5–2)
LYMPHOCYTES # BLD AUTO: 1.34 K/UL (ref 1–4.8)
LYMPHOCYTES NFR BLD: 19.6 % (ref 22–41)
MCH RBC QN AUTO: 28.4 PG (ref 27–33)
MCHC RBC AUTO-ENTMCNC: 32.6 G/DL (ref 33.7–35.3)
MCV RBC AUTO: 87 FL (ref 81.4–97.8)
MONOCYTES # BLD AUTO: 0.49 K/UL (ref 0–0.85)
MONOCYTES NFR BLD AUTO: 7.2 % (ref 0–13.4)
NEUTROPHILS # BLD AUTO: 4.83 K/UL (ref 1.82–7.42)
NEUTROPHILS NFR BLD: 70.7 % (ref 44–72)
NRBC # BLD AUTO: 0 K/UL
NRBC BLD-RTO: 0 /100 WBC
PLATELET # BLD AUTO: 141 K/UL (ref 164–446)
PMV BLD AUTO: 9.6 FL (ref 9–12.9)
POTASSIUM SERPL-SCNC: 3.8 MMOL/L (ref 3.6–5.5)
PROT SERPL-MCNC: 6.7 G/DL (ref 6–8.2)
PROTHROMBIN TIME: 16.2 SEC (ref 12–14.6)
RBC # BLD AUTO: 4.55 M/UL (ref 4.7–6.1)
SODIUM SERPL-SCNC: 140 MMOL/L (ref 135–145)
WBC # BLD AUTO: 6.8 K/UL (ref 4.8–10.8)

## 2021-02-02 PROCEDURE — 85610 PROTHROMBIN TIME: CPT

## 2021-02-02 PROCEDURE — 700102 HCHG RX REV CODE 250 W/ 637 OVERRIDE(OP): Performed by: EMERGENCY MEDICINE

## 2021-02-02 PROCEDURE — A9270 NON-COVERED ITEM OR SERVICE: HCPCS | Performed by: EMERGENCY MEDICINE

## 2021-02-02 PROCEDURE — 99285 EMERGENCY DEPT VISIT HI MDM: CPT

## 2021-02-02 PROCEDURE — 85025 COMPLETE CBC W/AUTO DIFF WBC: CPT

## 2021-02-02 PROCEDURE — 83605 ASSAY OF LACTIC ACID: CPT

## 2021-02-02 PROCEDURE — 93971 EXTREMITY STUDY: CPT | Mod: LT

## 2021-02-02 PROCEDURE — 80053 COMPREHEN METABOLIC PANEL: CPT

## 2021-02-02 RX ORDER — OXYCODONE AND ACETAMINOPHEN 10; 325 MG/1; MG/1
1 TABLET ORAL ONCE
Status: COMPLETED | OUTPATIENT
Start: 2021-02-02 | End: 2021-02-02

## 2021-02-02 RX ADMIN — OXYCODONE HYDROCHLORIDE AND ACETAMINOPHEN 1 TABLET: 10; 325 TABLET ORAL at 22:24

## 2021-02-02 ASSESSMENT — FIBROSIS 4 INDEX: FIB4 SCORE: 2.77

## 2021-02-03 ENCOUNTER — APPOINTMENT (OUTPATIENT)
Dept: RADIOLOGY | Facility: MEDICAL CENTER | Age: 56
End: 2021-02-03
Attending: FAMILY MEDICINE
Payer: COMMERCIAL

## 2021-02-03 LAB
FLUAV RNA SPEC QL NAA+PROBE: NEGATIVE
FLUBV RNA SPEC QL NAA+PROBE: NEGATIVE
LACTATE BLD-SCNC: 0.9 MMOL/L (ref 0.5–2)
LACTATE BLD-SCNC: 1.5 MMOL/L (ref 0.5–2)
SARS-COV-2 RNA RESP QL NAA+PROBE: NOTDETECTED
SPECIMEN SOURCE: NORMAL

## 2021-02-03 PROCEDURE — 700102 HCHG RX REV CODE 250 W/ 637 OVERRIDE(OP): Performed by: FAMILY MEDICINE

## 2021-02-03 PROCEDURE — A9270 NON-COVERED ITEM OR SERVICE: HCPCS | Performed by: FAMILY MEDICINE

## 2021-02-03 PROCEDURE — 96376 TX/PRO/DX INJ SAME DRUG ADON: CPT

## 2021-02-03 PROCEDURE — 0240U HCHG SARS-COV-2 COVID-19 NFCT DS RESP RNA 3 TRGT MIC: CPT

## 2021-02-03 PROCEDURE — 700101 HCHG RX REV CODE 250: Performed by: FAMILY MEDICINE

## 2021-02-03 PROCEDURE — 700111 HCHG RX REV CODE 636 W/ 250 OVERRIDE (IP)

## 2021-02-03 PROCEDURE — 96375 TX/PRO/DX INJ NEW DRUG ADDON: CPT

## 2021-02-03 PROCEDURE — 700111 HCHG RX REV CODE 636 W/ 250 OVERRIDE (IP): Performed by: FAMILY MEDICINE

## 2021-02-03 PROCEDURE — A9270 NON-COVERED ITEM OR SERVICE: HCPCS | Performed by: STUDENT IN AN ORGANIZED HEALTH CARE EDUCATION/TRAINING PROGRAM

## 2021-02-03 PROCEDURE — 99152 MOD SED SAME PHYS/QHP 5/>YRS: CPT

## 2021-02-03 PROCEDURE — 700117 HCHG RX CONTRAST REV CODE 255: Performed by: FAMILY MEDICINE

## 2021-02-03 PROCEDURE — 96372 THER/PROPH/DIAG INJ SC/IM: CPT

## 2021-02-03 PROCEDURE — G0378 HOSPITAL OBSERVATION PER HR: HCPCS

## 2021-02-03 PROCEDURE — 96374 THER/PROPH/DIAG INJ IV PUSH: CPT

## 2021-02-03 PROCEDURE — 99219 PR INITIAL OBSERVATION CARE,LEVL II: CPT | Performed by: STUDENT IN AN ORGANIZED HEALTH CARE EDUCATION/TRAINING PROGRAM

## 2021-02-03 PROCEDURE — 700102 HCHG RX REV CODE 250 W/ 637 OVERRIDE(OP): Performed by: STUDENT IN AN ORGANIZED HEALTH CARE EDUCATION/TRAINING PROGRAM

## 2021-02-03 PROCEDURE — 97605 NEG PRS WND THER DME<=50SQCM: CPT

## 2021-02-03 PROCEDURE — C9803 HOPD COVID-19 SPEC COLLECT: HCPCS | Performed by: STUDENT IN AN ORGANIZED HEALTH CARE EDUCATION/TRAINING PROGRAM

## 2021-02-03 PROCEDURE — 83605 ASSAY OF LACTIC ACID: CPT

## 2021-02-03 PROCEDURE — 700111 HCHG RX REV CODE 636 W/ 250 OVERRIDE (IP): Performed by: STUDENT IN AN ORGANIZED HEALTH CARE EDUCATION/TRAINING PROGRAM

## 2021-02-03 RX ORDER — LIDOCAINE HYDROCHLORIDE 40 MG/ML
SOLUTION TOPICAL
Status: DISCONTINUED | OUTPATIENT
Start: 2021-02-03 | End: 2021-02-04 | Stop reason: HOSPADM

## 2021-02-03 RX ORDER — LIDOCAINE HYDROCHLORIDE 20 MG/ML
20 INJECTION, SOLUTION INFILTRATION; PERINEURAL
Status: DISCONTINUED | OUTPATIENT
Start: 2021-02-03 | End: 2021-02-04 | Stop reason: HOSPADM

## 2021-02-03 RX ORDER — MIDAZOLAM HYDROCHLORIDE 1 MG/ML
INJECTION INTRAMUSCULAR; INTRAVENOUS
Status: COMPLETED
Start: 2021-02-03 | End: 2021-02-03

## 2021-02-03 RX ORDER — HYDROMORPHONE HYDROCHLORIDE 1 MG/ML
1 INJECTION, SOLUTION INTRAMUSCULAR; INTRAVENOUS; SUBCUTANEOUS ONCE
Status: COMPLETED | OUTPATIENT
Start: 2021-02-03 | End: 2021-02-03

## 2021-02-03 RX ORDER — AMOXICILLIN 250 MG
2 CAPSULE ORAL 2 TIMES DAILY
Status: DISCONTINUED | OUTPATIENT
Start: 2021-02-03 | End: 2021-02-04 | Stop reason: HOSPADM

## 2021-02-03 RX ORDER — POLYETHYLENE GLYCOL 3350 17 G/17G
1 POWDER, FOR SOLUTION ORAL
Status: DISCONTINUED | OUTPATIENT
Start: 2021-02-03 | End: 2021-02-04 | Stop reason: HOSPADM

## 2021-02-03 RX ORDER — SODIUM CHLORIDE 9 MG/ML
500 INJECTION, SOLUTION INTRAVENOUS
Status: ACTIVE | OUTPATIENT
Start: 2021-02-03 | End: 2021-02-03

## 2021-02-03 RX ORDER — HYDROMORPHONE HYDROCHLORIDE 1 MG/ML
0.5 INJECTION, SOLUTION INTRAMUSCULAR; INTRAVENOUS; SUBCUTANEOUS PRN
Status: DISCONTINUED | OUTPATIENT
Start: 2021-02-03 | End: 2021-02-04 | Stop reason: HOSPADM

## 2021-02-03 RX ORDER — HYDROCODONE BITARTRATE AND ACETAMINOPHEN 5; 325 MG/1; MG/1
1-2 TABLET ORAL EVERY 6 HOURS PRN
Status: DISCONTINUED | OUTPATIENT
Start: 2021-02-03 | End: 2021-02-04 | Stop reason: HOSPADM

## 2021-02-03 RX ORDER — WARFARIN SODIUM 2.5 MG/1
5 TABLET ORAL DAILY
Status: DISCONTINUED | OUTPATIENT
Start: 2021-02-03 | End: 2021-02-03

## 2021-02-03 RX ORDER — MIDAZOLAM HYDROCHLORIDE 1 MG/ML
.5-2 INJECTION INTRAMUSCULAR; INTRAVENOUS PRN
Status: ACTIVE | OUTPATIENT
Start: 2021-02-03 | End: 2021-02-03

## 2021-02-03 RX ORDER — BISACODYL 10 MG
10 SUPPOSITORY, RECTAL RECTAL
Status: DISCONTINUED | OUTPATIENT
Start: 2021-02-03 | End: 2021-02-04 | Stop reason: HOSPADM

## 2021-02-03 RX ORDER — LIDOCAINE HYDROCHLORIDE 20 MG/ML
1 JELLY TOPICAL
Status: DISCONTINUED | OUTPATIENT
Start: 2021-02-03 | End: 2021-02-04 | Stop reason: HOSPADM

## 2021-02-03 RX ORDER — MORPHINE SULFATE 4 MG/ML
2 INJECTION, SOLUTION INTRAMUSCULAR; INTRAVENOUS EVERY 4 HOURS PRN
Status: DISCONTINUED | OUTPATIENT
Start: 2021-02-03 | End: 2021-02-03

## 2021-02-03 RX ORDER — ONDANSETRON 2 MG/ML
4 INJECTION INTRAMUSCULAR; INTRAVENOUS PRN
Status: ACTIVE | OUTPATIENT
Start: 2021-02-03 | End: 2021-02-03

## 2021-02-03 RX ADMIN — DOCUSATE SODIUM 50 MG AND SENNOSIDES 8.6 MG 2 TABLET: 8.6; 5 TABLET, FILM COATED ORAL at 17:45

## 2021-02-03 RX ADMIN — HYDROCODONE BITARTRATE AND ACETAMINOPHEN 2 TABLET: 5; 325 TABLET ORAL at 18:36

## 2021-02-03 RX ADMIN — HYDROMORPHONE HYDROCHLORIDE 0.5 MG: 1 INJECTION, SOLUTION INTRAMUSCULAR; INTRAVENOUS; SUBCUTANEOUS at 16:33

## 2021-02-03 RX ADMIN — DOCUSATE SODIUM 50 MG AND SENNOSIDES 8.6 MG 2 TABLET: 8.6; 5 TABLET, FILM COATED ORAL at 05:35

## 2021-02-03 RX ADMIN — LIDOCAINE HYDROCHLORIDE 30 ML: 40 SOLUTION TOPICAL at 16:35

## 2021-02-03 RX ADMIN — FENTANYL CITRATE 50 MCG: 50 INJECTION, SOLUTION INTRAMUSCULAR; INTRAVENOUS at 13:30

## 2021-02-03 RX ADMIN — IOHEXOL 40 ML: 300 INJECTION, SOLUTION INTRAVENOUS at 13:54

## 2021-02-03 RX ADMIN — ENOXAPARIN SODIUM 100 MG: 100 INJECTION SUBCUTANEOUS at 17:45

## 2021-02-03 RX ADMIN — ENOXAPARIN SODIUM 100 MG: 100 INJECTION SUBCUTANEOUS at 05:35

## 2021-02-03 RX ADMIN — HYDROMORPHONE HYDROCHLORIDE 1 MG: 1 INJECTION, SOLUTION INTRAMUSCULAR; INTRAVENOUS; SUBCUTANEOUS at 04:48

## 2021-02-03 RX ADMIN — MIDAZOLAM HYDROCHLORIDE 1 MG: 1 INJECTION INTRAMUSCULAR; INTRAVENOUS at 13:30

## 2021-02-03 RX ADMIN — LIDOCAINE HYDROCHLORIDE 1 APPLICATION: 20 JELLY TOPICAL at 16:36

## 2021-02-03 RX ADMIN — MIDAZOLAM HYDROCHLORIDE 1 MG: 1 INJECTION, SOLUTION INTRAMUSCULAR; INTRAVENOUS at 13:30

## 2021-02-03 ASSESSMENT — FIBROSIS 4 INDEX
FIB4 SCORE: 1.46
FIB4 SCORE: 1.46

## 2021-02-03 ASSESSMENT — PAIN DESCRIPTION - PAIN TYPE
TYPE: ACUTE PAIN

## 2021-02-03 ASSESSMENT — PATIENT HEALTH QUESTIONNAIRE - PHQ9
2. FEELING DOWN, DEPRESSED, IRRITABLE, OR HOPELESS: NOT AT ALL
SUM OF ALL RESPONSES TO PHQ9 QUESTIONS 1 AND 2: 0
1. LITTLE INTEREST OR PLEASURE IN DOING THINGS: NOT AT ALL

## 2021-02-03 ASSESSMENT — LIFESTYLE VARIABLES
HAVE PEOPLE ANNOYED YOU BY CRITICIZING YOUR DRINKING: NO
HAVE YOU EVER FELT YOU SHOULD CUT DOWN ON YOUR DRINKING: NO
CONSUMPTION TOTAL: NEGATIVE
EVER HAD A DRINK FIRST THING IN THE MORNING TO STEADY YOUR NERVES TO GET RID OF A HANGOVER: NO
HOW MANY TIMES IN THE PAST YEAR HAVE YOU HAD 5 OR MORE DRINKS IN A DAY: 0
DOES PATIENT WANT TO STOP DRINKING: NO
AVERAGE NUMBER OF DAYS PER WEEK YOU HAVE A DRINK CONTAINING ALCOHOL: 0
TOTAL SCORE: 0
TOTAL SCORE: 0
ALCOHOL_USE: NO
ON A TYPICAL DAY WHEN YOU DRINK ALCOHOL HOW MANY DRINKS DO YOU HAVE: 0
EVER FELT BAD OR GUILTY ABOUT YOUR DRINKING: NO
TOTAL SCORE: 0

## 2021-02-03 NOTE — PROGRESS NOTES
Pt back to unit from IR. Pt lying flat in bed. VS cycling q 15min. Femoral site CDI. Will continue to monitor.

## 2021-02-03 NOTE — DISCHARGE PLANNING
Care Transition Team Assessment      Attempted to speak with patient but patient sleeping Chart reviewed.    Information Source  Information Given By: (Chart reviewed.)    Readmission Evaluation  Is this a readmission?: No    Interdisciplinary Discharge Planning  Primary Care Physician: Aniya Wang  Patient or legal guardian wants to designate a caregiver: No  Support Systems: Family Member(s)  Housing / Facility: 1 Story Apartment / Condo  Able to Return to Previous ADL's: Yes  Patient Prefers to be Discharged to:: Home  Assistance Needed: Unknown at this Time  Durable Medical Equipment: Unknown    Discharge Preparedness  What are your discharge supports?: Sibling  Prior Functional Level: Ambulatory    Functional Assesment  Prior Functional Level: Ambulatory    Finances  Prescription Coverage: Yes    Anticipated Discharge Information  Discharge Address: [address] [place] [address]  Discharge Contact Phone Number: [phone]

## 2021-02-03 NOTE — ED PROVIDER NOTES
ED Provider Note    ED Provider Note    Primary care provider: Gio Wang M.D.  Means of arrival: EMS  History obtained from: Patient    CHIEF COMPLAINT  Chief Complaint   Patient presents with   • Leg Pain     Seen at 10:08 PM.   HPI  Panchito Moura is a 55 y.o. male who presents to the Emergency Department for left lower extremity pain.  The patient states that normally his pain is around a 6 or 7 out of 10 which she states is tolerable but for the past several days it has been a 9 out of 10.  He has a history of recurrent DVTs as well as an open wound to the left lower extremity.  He is compliant with his Xarelto, he was on 15 mg twice daily for 2 weeks following discharge and is now on the 20 mg daily.  He has not missed any doses.  He is mostly sedentary with regards to his DVT and wound VAC.  The patient can walk a few paces at home but he does not leave the house.  He has wound care changes wound VAC 3 times a week.  They state that his wound is looking well and healing appropriately.    He despite this has noted increased swelling in the left lower extremity which is causing him to have some significant pain over baseline.  He received 50 mcg of fentanyl in route and states that took the edge off.  He does not note any modifying factors.  He denies any trauma.    He denies any recent fevers, chills, headache, chest pain, shortness of breath, cough, nausea, vomiting, abdominal pain, numbness or focal weakness.    REVIEW OF SYSTEMS  See HPI,   Remainder of ROS negative.     PAST MEDICAL HISTORY   has a past medical history of Cancer (HCC), Cancer (HCC), DVT (deep venous thrombosis) (HCC), Pulmonary embolism (HCC), and Smoker.    SURGICAL HISTORY   has a past surgical history that includes nephrectomy partial; nerve repair (6/18/08); laceration repair (6/18/08); other; nephrectomy partial (Right); and irrigation & debridement general (Left, 10/22/2020).    SOCIAL HISTORY  Social History     Tobacco  Use   • Smoking status: Current Every Day Smoker     Packs/day: 1.50     Types: Cigarettes   • Smokeless tobacco: Never Used   Substance Use Topics   • Alcohol use: No     Frequency: Never     Drinks per session: 1 or 2     Binge frequency: Never   • Drug use: Yes     Comment: edibles      Social History     Substance and Sexual Activity   Drug Use Yes    Comment: edibles       FAMILY HISTORY  Family History   Problem Relation Age of Onset   • Heart Attack Mother    • Cancer Father        CURRENT MEDICATIONS  Reviewed.  See Encounter Summary.     ALLERGIES  No Known Allergies    PHYSICAL EXAM  VITAL SIGNS: /80   Pulse 62   Temp 36.3 °C (97.4 °F) (Temporal)   Resp 18   Ht 1.829 m (6')   Wt 97.1 kg (214 lb 1.1 oz)   SpO2 95%   BMI 29.03 kg/m²   Constitutional: Awake, alert in no apparent distress.  Appears uncomfortable.  Massaging his left hamstring.  HENT: Normocephalic, Bilateral external ears normal. Nose normal.   Eyes: Conjunctiva normal, non-icteric, EOMI.    Thorax & Lungs: Easy unlabored respirations, Clear to ascultation bilaterally.  Cardiovascular: Regular rate, Regular rhythm, No murmurs, rubs or gallops. Bilateral pulses symmetrical.   Abdomen:  Soft, nontender, nondistended, normal active bowel sounds.   :    Skin: Visualized skin is  Dry, No erythema, No rash.  No warmth, no erythema, induration or abscess noted to the visualized skin of the left lower leg.  Wound VAC is intact and appears to be functional.    Musculoskeletal: Wound VAC noted to the left posterior calf, surrounding skin is normal in appearance.  There is significant edema to the left lower leg compared to the right which has no edema.  Tenderness noted throughout the left lower leg.  Neurologic: Alert, Grossly non-focal.   Psychiatric: Normal affect, Normal mood  Lymphatic:  No cervical LAD      RADIOLOGY  US-EXTREMITY VENOUS LOWER UNILAT LEFT               COURSE & MEDICAL DECISION MAKING  Pertinent Labs & Imaging  "studies reviewed. (See chart for details)    Differential diagnoses include but are not limited to: DVT, dependent edema, less likely infection.    10:08 PM - Medical record reviewed, from discharge summary 1/4: \"past medical history of medication noncompliance with anticoagulation, history of unprovoked deep venous thrombosis/pulmonary embolism and is on lifetime rivaroxaban, left posterior calf wound, status post plastic surgery and wound VAC and LTAC stay, history of renal cell carcinoma status post nephrectomy in 2003. Admitted 12/30/2020 with worsening left leg pain and swelling for several days found to have. Ultrasound of the lower extremity showed acute and subocclusive left lower extremity DVT within the popliteal vein extending into the calf veins at the proximal level.  Patient was restarted on anticoagulation with rivaroxaban.  There was concern for possible cellulitis, accordingly patient was initially started on Unasyn on admission.  Wound cultures grew diphtheroids.  Infectious disease were consulted thought that the cultures reflect contamination rather than true infection.  Patient was observed off antibiotics with good results.  Pain and swelling improved with wound care and wound VAC.  Patient will be discharged home on anticoagulation with home health for wound care and wound VAC dressing changes 3 times a week\"    12:15 AM-patient with acute DVT of the left lower extremity.  He is compliant with his Xarelto.  Unfortunate this would be failure of DOAC, therefore he will likely have to start with Coumadin and Lovenox bridging.  He is hypoxic on reassessment the patient was asleep after receiving a dose of morphine.  When he wakes back up and breathes normally, oxygenation is in the 90s.  Do not suspect acute pulmonary embolus.      Decision Making:  This is a 55 y.o. year old male who presents with pain in the left lower extremity.  The patient does have a history of noncompliance with " anticoagulation.  Today however he endorses compliance with his Xarelto and his INR is 1.26 which suggest that he is indeed compliant.  Despite this he has edema of the left lower extremity and an acute popliteal DVT.  Unfortunately this indicates failure of a DOAC, therefore he will need to transition to alternative anticoagulation such as Coumadin with a Lovenox bridge.  The patient was only hypoxic the emergency department after sleeping and receiving pain medications.  When awake oxygenation is excellent.  I do not suspect acute pulmonary embolus.  Case discussed with the hospitalist.  I think observation would be appropriate to manage his anticoagulation.  No indication for thrombectomy.      FINAL IMPRESSION  1. Acute deep vein thrombosis (DVT) of popliteal vein of left lower extremity (HCC)

## 2021-02-03 NOTE — ASSESSMENT & PLAN NOTE
Acute partially occlusive thrombosis visualized in the distal popliteal vein that likely extends into the proximal calf veins.  Caution with opiiod adminsteration as patient became hypoxic. Continue with oxygen supplementation to maintain SpO2>92%.   2/3: Apparently patient was not taking Xarelto as instructed.  We will place IVC filter due to recurrent DVT and PE.  Continue therapeutic Lovenox for now.  Discuss with patient the importance of strict administration of medications as prescribed.  He verbalized understanding and will follow the recommendations.  Consider switching back to Xarelto (loading dose) after IVC filter placed.

## 2021-02-03 NOTE — OR SURGEON
Immediate Post- Operative Note      PostOp Diagnosis: DVT, LLE PAIN      Procedure(s): RIGHT COMMON FEMORAL PUNCTURE WITH U/S GUIDANCE, IVC GRAM, INFRARENAL PLACEMENT  IVC FILTER PLACEMENT (ARGON OPTION)      Estimated Blood Loss: <20CC (FLUSHES)        Complications: NONE          2/3/2021     1:49 PM     Temo Daugherty M.D.

## 2021-02-03 NOTE — PROGRESS NOTES
Inpatient Anticoagulation Service Note    Date: 2/3/2021    Reason for Anticoagulation: New Deep Vein Thrombosis   Target INR: 2.0 to 3.0         Hemoglobin Value: (!) 12.9  Hematocrit Value: (!) 39.6  Lab Platelet Value: (!) 141    INR from last 7 days     Date/Time INR Value    02/02/21 2156  (!) 1.26        Dose from last 7 days     Date/Time Dose (mg)    02/03/21 0157  5        Bridge Therapy: Yes  Date of Last VTE Event: 02/03/21  Bridge Therapy Start Date: 02/03/21  Days of Overlap Therapy: 1 (If less than 5 days and overlap therapy discontinued -- document reason (i.e. Bleed Risk))  INR Value Greater than 2 Prior to Discontinuation of Parenteral Anticoagulation: Not Applicable (If still on overlap therapy, if No -- document reason (i.e. Bleed Risk))    Reversal Agent Administered: Not Applicable  Comments: Patient presented with DVT in popliteal vein of LLE, on Xarelto 20mg PO QD with dinner, compliance reported (INR 1.26). Starting lovenox bridge (100mg SQ BID). H/H stable, plt low but stable, no overt bleeding noted per chart review. No DDI's identified at this time. Will initiate warfarin 5mg daily, INR with AM labs. Pharmacy will continue to follow.    Plan: Give warfarin 5mg tonight and check INR with AM labs  Education Material Provided?: No  Pharmacist suggested discharge dosing: TBD based on subsequent INR results. May be able to discharge on warfarin 5 mg PO daily. Obtain a follow up INR within 72h of discharge.         Fabiola Plaza, PharmD

## 2021-02-03 NOTE — H&P
Hospital Medicine History & Physical Note    Date of Service  2/3/2021    Primary Care Physician  Gio Wang M.D.    Consultants      Code Status  Full Code    Chief Complaint  Chief Complaint   Patient presents with   • Leg Pain       History of Presenting Illness  55 y.o. male with past medical history of recurrent DVT, left calf wound s/p wound VAC placement, history of RCC s/p nephrectomy in 2003 presents  on 2/2/2021 with  Left leg pain that started yesterday , 10 out of 10 intensity not relieved with anything.  Patient states he has a visiitng nurse who comes to change the dressing on his wounds. As per patient he is compliant with his Xarelto. Patient will be observed to start coumadin and bridge with lovenox.       Review of Systems  Review of Systems   Cardiovascular: Positive for leg swelling.   Musculoskeletal:        Left leg pain    All other systems reviewed and are negative.      Past Medical History   has a past medical history of Cancer (HCC), Cancer (HCC), DVT (deep venous thrombosis) (HCC), Pulmonary embolism (HCC), and Smoker.    Surgical History   has a past surgical history that includes nephrectomy partial; nerve repair (6/18/08); laceration repair (6/18/08); other; nephrectomy partial (Right); and irrigation & debridement general (Left, 10/22/2020).     Family History  family history includes Cancer in his father; Heart Attack in his mother.     Social History   reports that he has been smoking cigarettes. He has been smoking about 1.50 packs per day. He has never used smokeless tobacco. He reports current drug use. He reports that he does not drink alcohol.    Allergies  No Known Allergies    Medications  Prior to Admission Medications   Prescriptions Last Dose Informant Patient Reported? Taking?   ALBUTEROL INH  Patient Yes No   Sig: Inhale 1 Puff every four hours as needed (shortness of breath).   acetaminophen (TYLENOL) 325 MG Tab   No No   Sig: Take 2 Tabs by mouth every 6  hours as needed for Mild Pain or Moderate Pain (Mild Pain; (Pain scale 1-3); Temp greater than 100.5 F).   oxyCODONE immediate-release (ROXICODONE) 5 MG Tab  Patient Yes No   Sig: Take 5 mg by mouth every four hours as needed for Severe Pain.   rivaroxaban (XARELTO) 20 MG Tab tablet   No No   Sig: Take 1 Tab by mouth with dinner.   tizanidine (ZANAFLEX) 2 MG tablet   No No   Sig: Take 1 Tab by mouth every 6 hours as needed.      Facility-Administered Medications: None       Physical Exam  Temp:  [36.5 °C (97.7 °F)] 36.5 °C (97.7 °F)  Pulse:  [51-60] 56  Resp:  [14-20] 20  BP: (101-131)/(61-75) 101/62  SpO2:  [76 %-99 %] 95 %    Physical Exam  Constitutional:       Appearance: Normal appearance.   HENT:      Head: Normocephalic and atraumatic.      Mouth/Throat:      Pharynx: Oropharynx is clear.   Eyes:      Extraocular Movements: Extraocular movements intact.      Pupils: Pupils are equal, round, and reactive to light.   Neck:      Musculoskeletal: Neck supple.   Cardiovascular:      Rate and Rhythm: Normal rate and regular rhythm.      Pulses: Normal pulses.      Heart sounds: Normal heart sounds.   Pulmonary:      Effort: Pulmonary effort is normal.      Breath sounds: Normal breath sounds.   Abdominal:      General: Bowel sounds are normal. There is no distension.      Palpations: Abdomen is soft.   Musculoskeletal:         General: Swelling (left calf swelling and wound VAC noted. Erythema, tenderness and edema 2+ LLE) and deformity present.   Skin:     General: Skin is warm.      Capillary Refill: Capillary refill takes less than 2 seconds.   Neurological:      General: No focal deficit present.      Mental Status: He is alert and oriented to person, place, and time.   Psychiatric:         Mood and Affect: Mood normal.         Behavior: Behavior normal.         Laboratory:  Recent Labs     02/02/21  2156   WBC 6.8   RBC 4.55*   HEMOGLOBIN 12.9*   HEMATOCRIT 39.6*   MCV 87.0   MCH 28.4   MCHC 32.6*   RDW 44.0    PLATELETCT 141*   MPV 9.6     Recent Labs     02/02/21  2156   SODIUM 140   POTASSIUM 3.8   CHLORIDE 107   CO2 22   GLUCOSE 87   BUN 15   CREATININE 1.16   CALCIUM 9.5     Recent Labs     02/02/21  2156   ALTSGPT 9   ASTSGOT 11*   ALKPHOSPHAT 106*   TBILIRUBIN 0.5   GLUCOSE 87     Recent Labs     02/02/21  2156   INR 1.26*     No results for input(s): NTPROBNP in the last 72 hours.      No results for input(s): TROPONINT in the last 72 hours.    Imaging:  US-EXTREMITY VENOUS LOWER UNILAT LEFT                  FINDINGS:   Left lower extremity -.    Acute partially occlusive thrombosis visualized in the distal popliteal    vein that likely extends into the proximal calf veins.   Unable to clearly visualize the posterior tibial and peroneal veins.      Flow was evaluated in the contralateral common femoral vein and normal    venous flow dynamics including spontaneous flow, respiratory phasic    variation and augmentation were demonstrated.       Assessment/Plan:  I anticipate this patient is appropriate for observation status at this time.    * Acute deep vein thrombosis (DVT) of popliteal vein of left lower extremity (HCC)- (present on admission)  Assessment & Plan   Acute partially occlusive thrombosis visualized in the distal popliteal vein that likely extends into the proximal calf veins.  Patient states he is complaint with Xarelto. Acute DVT possibly secondary to xarelto ?failure   Warfarin with lovenox bridge   Pharm to dose warfarin   Caution with opiiod adminsteration as patient became hypoxic. Continue with oxygen supplementation to maintain SpO2>92%.       Leg wound, left- (present on admission)  Assessment & Plan  Continue with wound VAC    Smoker- (present on admission)  Assessment & Plan  Nicotine patch protocol     VTE: therapeutic anticoagulation    2

## 2021-02-03 NOTE — ED TRIAGE NOTES
Pt here for LLE pain that increased yesterday. Pt has known DVT to LLE and wound vac to left calf. Pt states he thinks his DVT is getting worse. EMS gave fentanyl 50 mcg enroute. Pt currently on xarelto

## 2021-02-03 NOTE — THERAPY
Missed Therapy     Patient Name: Panchito Moura  Age:  55 y.o., Sex:  male  Medical Record #: [id number]  Today's Date: 2/3/2021    Discussed missed therapy with RN    OT consult rec'd. Pt started on Coumadin for DVT. INR not currently w/in therapeutic range. Will hold and re-attempt as appropriate/able.

## 2021-02-03 NOTE — PROGRESS NOTES
IR Nursing Note:    Patient underwent a IVC filter placement by Dr. Daugherty. Procedure site was marked by MD and verified using imaging guidance.  Patient was placed in a supine position.  Right femoral vein was accessed.  Vitals were taken every 5 minutes and remained stable during procedure (see doc flow sheet for results).  CO2 waveform capnography was monitored and remained 20-26 throughout procedure.  Manual pressure was held for 5 minutes to achieve hemostasis.  A Tegaderm and gauze dressing was placed over surgical site. Report called to Teresa YOUNG. Pt transported by samreen with RN to [id number].     Argon Option Elite Ref # [id number] Lot # [id number] Expiration 12-

## 2021-02-03 NOTE — ED NOTES
Left calf is red around wound vac area. Pt has several abrasions that are healed over and have scabs pt states it is from him scratching his leg. Pt states he is unsure how he got the infection in his leg and ended up with the wound vac. LLE = PWD. Pulses palpable @ this time.

## 2021-02-03 NOTE — PROGRESS NOTES
This is a 55 years old male who has past medical history of chronic left lower extremity wound who has wound VAC since October last year, history of DVT of left lower extremity and noncompliance to medications presented with worsening swelling and pain on left lower extremity.  Venous Doppler study on lower extremity showed acute partially occlusive DVT in the distal popliteal vein extends to the proximal calf veins.  Considering has history of recurrent DVT, PE, and noncompliance the decision was made to place retrievable IVC filter.  Apparently patient has placed on the loading dose of Xarelto but was taken only 1 dose in the morning and not taking the night dose.  Patient was placed on therapeutic Lovenox while inpatient.  Wound care consulted to manage left lower extremity wound VAC.

## 2021-02-04 VITALS
OXYGEN SATURATION: 94 % | DIASTOLIC BLOOD PRESSURE: 56 MMHG | WEIGHT: 214.07 LBS | RESPIRATION RATE: 18 BRPM | BODY MASS INDEX: 28.99 KG/M2 | HEART RATE: 53 BPM | TEMPERATURE: 97 F | HEIGHT: 72 IN | SYSTOLIC BLOOD PRESSURE: 123 MMHG

## 2021-02-04 DIAGNOSIS — Z95.828 PRESENCE OF IVC FILTER: ICD-10-CM

## 2021-02-04 LAB
EKG IMPRESSION: NORMAL
INR PPP: 1.02 (ref 0.87–1.13)
PROTHROMBIN TIME: 13.7 SEC (ref 12–14.6)

## 2021-02-04 PROCEDURE — 96375 TX/PRO/DX INJ NEW DRUG ADDON: CPT

## 2021-02-04 PROCEDURE — 96372 THER/PROPH/DIAG INJ SC/IM: CPT

## 2021-02-04 PROCEDURE — 93010 ELECTROCARDIOGRAM REPORT: CPT | Performed by: INTERNAL MEDICINE

## 2021-02-04 PROCEDURE — 700111 HCHG RX REV CODE 636 W/ 250 OVERRIDE (IP): Performed by: FAMILY MEDICINE

## 2021-02-04 PROCEDURE — 99217 PR OBSERVATION CARE DISCHARGE: CPT | Performed by: FAMILY MEDICINE

## 2021-02-04 PROCEDURE — 93005 ELECTROCARDIOGRAM TRACING: CPT | Performed by: FAMILY MEDICINE

## 2021-02-04 PROCEDURE — G0378 HOSPITAL OBSERVATION PER HR: HCPCS

## 2021-02-04 PROCEDURE — 97161 PT EVAL LOW COMPLEX 20 MIN: CPT

## 2021-02-04 PROCEDURE — 700102 HCHG RX REV CODE 250 W/ 637 OVERRIDE(OP): Performed by: STUDENT IN AN ORGANIZED HEALTH CARE EDUCATION/TRAINING PROGRAM

## 2021-02-04 PROCEDURE — 700102 HCHG RX REV CODE 250 W/ 637 OVERRIDE(OP): Performed by: FAMILY MEDICINE

## 2021-02-04 PROCEDURE — 97166 OT EVAL MOD COMPLEX 45 MIN: CPT

## 2021-02-04 PROCEDURE — A9270 NON-COVERED ITEM OR SERVICE: HCPCS | Performed by: STUDENT IN AN ORGANIZED HEALTH CARE EDUCATION/TRAINING PROGRAM

## 2021-02-04 PROCEDURE — A9270 NON-COVERED ITEM OR SERVICE: HCPCS | Performed by: FAMILY MEDICINE

## 2021-02-04 PROCEDURE — 700111 HCHG RX REV CODE 636 W/ 250 OVERRIDE (IP): Performed by: STUDENT IN AN ORGANIZED HEALTH CARE EDUCATION/TRAINING PROGRAM

## 2021-02-04 PROCEDURE — 85610 PROTHROMBIN TIME: CPT

## 2021-02-04 RX ORDER — OMEPRAZOLE 20 MG/1
20 CAPSULE, DELAYED RELEASE ORAL DAILY
Qty: 30 CAP | Refills: 0 | Status: ON HOLD | OUTPATIENT
Start: 2021-02-05 | End: 2022-06-03

## 2021-02-04 RX ORDER — AMOXICILLIN 250 MG
2 CAPSULE ORAL 2 TIMES DAILY
Qty: 30 TAB | Refills: 0 | Status: SHIPPED | OUTPATIENT
Start: 2021-02-04 | End: 2022-05-22

## 2021-02-04 RX ORDER — OMEPRAZOLE 20 MG/1
20 CAPSULE, DELAYED RELEASE ORAL DAILY
Status: DISCONTINUED | OUTPATIENT
Start: 2021-02-04 | End: 2021-02-04 | Stop reason: HOSPADM

## 2021-02-04 RX ORDER — POLYETHYLENE GLYCOL 3350 17 G/17G
17 POWDER, FOR SOLUTION ORAL
Qty: 30 EACH | Refills: 0 | Status: SHIPPED | OUTPATIENT
Start: 2021-02-04 | End: 2022-05-22

## 2021-02-04 RX ORDER — ONDANSETRON 2 MG/ML
4 INJECTION INTRAMUSCULAR; INTRAVENOUS EVERY 4 HOURS PRN
Status: DISCONTINUED | OUTPATIENT
Start: 2021-02-04 | End: 2021-02-04 | Stop reason: HOSPADM

## 2021-02-04 RX ORDER — TIZANIDINE 4 MG/1
2 TABLET ORAL EVERY 6 HOURS PRN
Status: DISCONTINUED | OUTPATIENT
Start: 2021-02-04 | End: 2021-02-04

## 2021-02-04 RX ORDER — TIZANIDINE 4 MG/1
2 TABLET ORAL EVERY 6 HOURS PRN
Status: DISCONTINUED | OUTPATIENT
Start: 2021-02-04 | End: 2021-02-04 | Stop reason: HOSPADM

## 2021-02-04 RX ORDER — HYDROCODONE BITARTRATE AND ACETAMINOPHEN 5; 325 MG/1; MG/1
1-2 TABLET ORAL EVERY 6 HOURS PRN
Qty: 20 TAB | Refills: 0 | Status: SHIPPED | OUTPATIENT
Start: 2021-02-04 | End: 2021-02-09

## 2021-02-04 RX ORDER — ONDANSETRON 4 MG/1
4 TABLET, ORALLY DISINTEGRATING ORAL EVERY 6 HOURS PRN
Qty: 10 TAB | Refills: 0 | Status: SHIPPED | OUTPATIENT
Start: 2021-02-04 | End: 2022-05-22

## 2021-02-04 RX ORDER — BISACODYL 10 MG
10 SUPPOSITORY, RECTAL RECTAL
Qty: 30 SUPPOSITORY | Refills: 0 | Status: SHIPPED | OUTPATIENT
Start: 2021-02-04 | End: 2022-05-22

## 2021-02-04 RX ADMIN — HYDROCODONE BITARTRATE AND ACETAMINOPHEN 2 TABLET: 5; 325 TABLET ORAL at 02:47

## 2021-02-04 RX ADMIN — OMEPRAZOLE 20 MG: 20 CAPSULE, DELAYED RELEASE ORAL at 09:38

## 2021-02-04 RX ADMIN — ONDANSETRON 4 MG: 2 INJECTION INTRAMUSCULAR; INTRAVENOUS at 09:56

## 2021-02-04 RX ADMIN — HYDROCODONE BITARTRATE AND ACETAMINOPHEN 2 TABLET: 5; 325 TABLET ORAL at 08:43

## 2021-02-04 RX ADMIN — HYDROCODONE BITARTRATE AND ACETAMINOPHEN 2 TABLET: 5; 325 TABLET ORAL at 15:02

## 2021-02-04 RX ADMIN — DOCUSATE SODIUM 50 MG AND SENNOSIDES 8.6 MG 2 TABLET: 8.6; 5 TABLET, FILM COATED ORAL at 05:29

## 2021-02-04 RX ADMIN — ENOXAPARIN SODIUM 100 MG: 100 INJECTION SUBCUTANEOUS at 05:29

## 2021-02-04 ASSESSMENT — COGNITIVE AND FUNCTIONAL STATUS - GENERAL
MOBILITY SCORE: 23
SUGGESTED CMS G CODE MODIFIER DAILY ACTIVITY: CJ
TOILETING: A LITTLE
SUGGESTED CMS G CODE MODIFIER MOBILITY: CI
HELP NEEDED FOR BATHING: A LITTLE
DAILY ACTIVITIY SCORE: 21
DRESSING REGULAR LOWER BODY CLOTHING: A LITTLE
CLIMB 3 TO 5 STEPS WITH RAILING: A LITTLE

## 2021-02-04 ASSESSMENT — GAIT ASSESSMENTS
DISTANCE (FEET): 50
GAIT LEVEL OF ASSIST: SUPERVISED
ASSISTIVE DEVICE: FRONT WHEEL WALKER

## 2021-02-04 ASSESSMENT — ACTIVITIES OF DAILY LIVING (ADL): TOILETING: INDEPENDENT

## 2021-02-04 ASSESSMENT — PAIN DESCRIPTION - PAIN TYPE: TYPE: ACUTE PAIN

## 2021-02-04 ASSESSMENT — PAIN SCALES - WONG BAKER: WONGBAKER_NUMERICALRESPONSE: HURTS A LITTLE MORE

## 2021-02-04 NOTE — DISCHARGE SUMMARY
Discharge Summary    CHIEF COMPLAINT ON ADMISSION  Chief Complaint   Patient presents with   • Leg Pain       Reason for Admission  EMS     Admission Date  2/2/2021    CODE STATUS  Full Code    HPI & HOSPITAL COURSE  This is a 55 years old male who has past medical history of chronic left lower extremity wound who has wound VAC since October last year, history of DVT of left lower extremity and noncompliance to medications presented with worsening swelling and pain on left lower extremity.  Venous Doppler study on lower extremity showed acute partially occlusive DVT in the distal popliteal vein extends to the proximal calf veins.  Considering has history of recurrent DVT, PE, and noncompliance the decision was made to place retrievable IVC filter.  Apparently patient has placed on the loading dose of Xarelto but was taken only 1 dose in the morning and not taking the night dose.  Patient was placed on therapeutic Lovenox while inpatient.  Wound care consulted to manage left lower extremity wound VAC.  His wound was cared for by wound care with wound VAC replaced.  Wound care team recommended patient to have wound VAC changes 3 times a week which can be achieved by home health with wound care.  Apparently patient has outpatient home health and wound care set up with [facility].  Patient was placed back on Xarelto loading dose.  Pain was fairly controlled.  Was hemodynamically clinically stable.  Patient was cleared for discharge from medical standpoint.        Therefore, he is discharged in guarded and stable condition to home with organized home healthcare and close outpatient follow-up.    The patient recovered much more quickly than anticipated on admission.    Discharge Date  2/4/21    FOLLOW UP ITEMS POST DISCHARGE  Follow-up with PCP in 1 week    DISCHARGE DIAGNOSES  Principal Problem:    Acute deep vein thrombosis (DVT) of popliteal vein of left lower extremity (HCC) POA: Yes  Active Problems:    Leg wound, left  POA: Yes    Smoker (Chronic) POA: Yes  Resolved Problems:    * No resolved hospital problems. *      FOLLOW UP  No future appointments.  No follow-up provider specified.    MEDICATIONS ON DISCHARGE     Medication List      START taking these medications      Instructions   bisacodyl 10 MG Supp  Commonly known as: DULCOLAX   Insert 1 Suppository into the rectum 1 time a day as needed (if magnesium hydroxide ineffective after 24 hours).  Dose: 10 mg     HYDROcodone-acetaminophen 5-325 MG Tabs per tablet  Commonly known as: NORCO   Take 1-2 Tabs by mouth every 6 hours as needed for up to 5 days.  Dose: 1-2 Tab     omeprazole 20 MG delayed-release capsule  Start taking on: February 5, 2021  Commonly known as: PRILOSEC   Take 1 Cap by mouth every day.  Dose: 20 mg     ondansetron 4 MG Tbdp  Commonly known as: ZOFRAN ODT   Take 1 Tab by mouth every 6 hours as needed for Nausea.  Dose: 4 mg     polyethylene glycol/lytes 17 g Pack  Commonly known as: MIRALAX   Take 1 Packet by mouth 1 time a day as needed (if sennosides and docusate ineffective after 24 hours).  Dose: 17 g     senna-docusate 8.6-50 MG Tabs  Commonly known as: PERICOLACE or SENOKOT S   Take 2 Tabs by mouth 2 Times a Day.  Dose: 2 Tab        CHANGE how you take these medications      Instructions   * rivaroxaban 15 MG Tabs tablet  What changed:   · medication strength  · how much to take  · when to take this  Commonly known as: XARELTO   Take 1 Tab by mouth 2 times a day, with meals.  Dose: 15 mg     * rivaroxaban 20 MG Tabs tablet  Start taking on: February 25, 2021  What changed: You were already taking a medication with the same name, and this prescription was added. Make sure you understand how and when to take each.  Commonly known as: XARELTO   Take 1 Tab by mouth with dinner.  Dose: 20 mg         * This list has 2 medication(s) that are the same as other medications prescribed for you. Read the directions carefully, and ask your doctor or other care  provider to review them with you.            CONTINUE taking these medications      Instructions   acetaminophen 325 MG Tabs  Commonly known as: Tylenol   Take 2 Tabs by mouth every 6 hours as needed for Mild Pain or Moderate Pain (Mild Pain; (Pain scale 1-3); Temp greater than 100.5 F).  Dose: 650 mg     ALBUTEROL INH   Inhale 1 Puff every four hours as needed (shortness of breath).  Dose: 1 Puff     oxyCODONE immediate-release 5 MG Tabs  Commonly known as: ROXICODONE   Take 5 mg by mouth every four hours as needed for Severe Pain.  Dose: 5 mg     tizanidine 2 MG tablet  Commonly known as: ZANAFLEX   Take 1 Tab by mouth every 6 hours as needed.  Dose: 2 mg            Allergies  No Known Allergies    DIET  Orders Placed This Encounter   Procedures   • Diet Order Diet: Regular     Standing Status:   Standing     Number of Occurrences:   1     Order Specific Question:   Diet:     Answer:   Regular [1]       ACTIVITY  As tolerated.  Weight bearing as tolerated    CONSULTATIONS  None    PROCEDURES  IVC filter placement as mentioned above    LABORATORY  Lab Results   Component Value Date    SODIUM 140 02/02/2021    POTASSIUM 3.8 02/02/2021    CHLORIDE 107 02/02/2021    CO2 22 02/02/2021    GLUCOSE 87 02/02/2021    BUN 15 02/02/2021    CREATININE 1.16 02/02/2021    CREATININE 1.5 (H) 06/10/2005        Lab Results   Component Value Date    WBC 6.8 02/02/2021    HEMOGLOBIN 12.9 (L) 02/02/2021    HEMATOCRIT 39.6 (L) 02/02/2021    PLATELETCT 141 (L) 02/02/2021        Total time of the discharge process exceeds 33 minutes.

## 2021-02-04 NOTE — PROGRESS NOTES
Pt c/o increasing pain in his L leg due to wound vac change. Pt states morphine upsets his stomach. MD Kennedy messaged and prn norco ordered.

## 2021-02-04 NOTE — FACE TO FACE
Face to Face Supporting Documentation - Home Health    The encounter with this patient was in whole or in part the primary reason for home health admission.    Date of encounter:   Patient:                    MRN:                       YOB: 2022  Panchito Moura  [id number]  1965     Home health to see patient for:  Skilled Nursing care for assessment, interventions & education and Wound Care    Skilled need for:  Comment: Skilled/Wound care.     Skilled nursing interventions to include:  Wound Care    Homebound status evidenced by:  Need the aid of supportive devices such as crutches, canes, wheelchairs or walkers. Leaving home requires a considerable and taxing effort. There is a normal inability to leave the home.    Community Physician to provide follow up care: Gio Wang M.D.     Optional Interventions? No      I certify the face to face encounter for this home health care referral meets the CMS requirements and the encounter/clinical assessment with the patient was, in whole, or in part, for the medical condition(s) listed above, which is the primary reason for home health care. Based on my clinical findings: the service(s) are medically necessary, support the need for home health care, and the homebound criteria are met.  I certify that this patient has had a face to face encounter by myself.  Jean Benavides M.D. - NPI: [id number]

## 2021-02-04 NOTE — THERAPY
Physical Therapy   Initial Evaluation     Patient Name: Panchito Moura  Age:  55 y.o., Sex:  male  Medical Record #: [id number]  Today's Date: 2/4/2021          Assessment  Patient is 55 y.o. male admitted for acute DVT workup, now with IVC filter, with hx of wound vac to L calf as well as hx of DVT. He presents to PT very near or at his recent functional baseline. Pt reports having FWW and wheelchair at home but he prefers to use neither. Today, pt able to perform bed mobility, transfer, and x50' of gait with FWW at supervision. He reports having assist from brother upon return home. At this time, pt does not require further acute PT services. Anticipate him to be functionally capable of return home with wound care follow up.       Plan    Recommend Physical Therapy for Evaluation only     DC Equipment Recommendations: None  Discharge Recommendations: Anticipate that the patient will have no further physical therapy needs after discharge from the hospital(needs are related to wound care)        Objective       02/04/21 0910   Prior Living Situation   Prior Services Skilled Home Health Services   Housing / Facility 1 Story Apartment / Condo   Steps Into Home 0   Steps In Home 0   Equipment Owned Front-Wheel Walker   Lives with - Patient's Self Care Capacity Alone and Able to Care For Self   Comments pt rpeorts his brother assists prn   Prior Level of Functional Mobility   Bed Mobility Independent   Transfer Status Independent   Ambulation Independent   Distance Ambulation (Feet)   (community)   Balance Assessment   Comments small amount of weight shift to L LE due to discomfort.    Gait Analysis   Gait Level Of Assist Supervised   Assistive Device Front Wheel Walker   Distance (Feet) 50   Comments pt limited distance due to discomfort   Bed Mobility    Supine to Sit Supervised   Sit to Supine Supervised   Scooting Supervised   Rolling Supervised   Functional Mobility   Sit to Stand Supervised   Bed, Chair, Wheelchair  Transfer Supervised   Transfer Method Stand Step   Anticipated Discharge Equipment and Recommendations   DC Equipment Recommendations None   Discharge Recommendations Anticipate that the patient will have no further physical therapy needs after discharge from the hospital  (needs are related to wound care)

## 2021-02-04 NOTE — CARE PLAN
Reviewed plan of care and treatment plan with patient.  Verbalized his understanding.  Will continue to monitor and provide reinforcement as needed.    Problem: Safety  Goal: Will remain free from injury  Outcome: PROGRESSING AS EXPECTED     Problem: Pain Management  Goal: Pain level will decrease to patient's comfort goal  Outcome: PROGRESSING AS EXPECTED

## 2021-02-04 NOTE — THERAPY
Occupational Therapy   Initial Evaluation     Patient Name: Panchito Moura  Age:  55 y.o., Sex:  male  Medical Record #: [id number]  Today's Date: 2/4/2021     Precautions  Comments: (P) wound vac on calf    Assessment  Patient is 55 y.o. male with a diagnosis of recurrent DVT, wound vac placement.  Additional factors influencing patient status / progress: good family support at home.      Plan    Recommend Occupational Therapy for Evaluation only for the following treatments:  NA.    DC Equipment Recommendations: (P) None  Discharge Recommendations: (P) Anticipate that the patient will have no further occupational therapy needs after discharge from the hospital     Subjective    pleasant and cooperative throughout     Objective       02/04/21 1005   Total Time Spent   Total Time Spent (Mins) 41   Charge Group   OT Evaluation OT Evaluation Mod   Initial Contact Note    Initial Contact Note Order Received and Verified, Evaluation Only - Patient Does Not Require Further Acute Occupational Therapy at this Time.  However, May Benefit from Post Acute Therapy for Higher Level Functional Deficits.   Prior Living Situation   Prior Services Skilled Home Health Services   Housing / Facility 1 Story Apartment / Condo   Steps Into Home 0   Steps In Home 0   Bathroom Set up Bathtub / Shower Combination   Equipment Owned Front-Wheel Walker   Lives with - Patient's Self Care Capacity Alone and Able to Care For Self   Comments reports brother assists as able   Prior Level of ADL Function   Self Feeding Independent   Grooming / Hygiene Independent   Bathing Independent   Dressing Independent   Toileting Independent   Prior Level of IADL Function   Medication Management Independent   Laundry Independent   Kitchen Mobility Independent   Finances Independent   Home Management Requires Assist   Shopping Requires Assist   Prior Level Of Mobility Independent With Device in Home;Independent Without Device in Home   Driving / Transportation  Relatives / Others Provide Transportation   Occupation (Pre-Hospital Vocational) Employed Full Time  ()   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, so frequently uses convenience foods, brother brings sushil food also   Precautions   Comments wound vac on calf   Vitals   O2 Delivery Device None - Room Air   Pain 0 - 10 Group   Therapist Pain Assessment 5;During Activity;Post Activity Pain Same as Prior to Activity;Nurse Notified   Cognition    Cognition / Consciousness WDL   Level of Consciousness Alert   Passive ROM Upper Body   Passive ROM Upper Body WDL   Active ROM Upper Body   Active ROM Upper Body  WDL   Dominant Hand Right   Strength Upper Body   Upper Body Strength  WDL   Sensation Upper Body   Upper Extremity Sensation  WDL   Upper Body Muscle Tone   Upper Body Muscle Tone  WDL   Coordination Upper Body   Coordination WDL   Balance Assessment   Sitting Balance (Static) Good   Sitting Balance (Dynamic) Good   Standing Balance (Static) Fair +   Standing Balance (Dynamic) Fair   Weight Shift Sitting Good   Weight Shift Standing Fair   Bed Mobility    Supine to Sit Supervised   Sit to Supine Supervised   Scooting Supervised   Rolling Supervised   ADL Assessment   Eating Supervision   Grooming Supervision;Seated   Bathing   (NT)   Upper Body Dressing Supervision   Lower Body Dressing Supervision   Toileting Supervision   How much help from another person does the patient currently need...   Putting on and taking off regular lower body clothing? 3   Bathing (including washing, rinsing, and drying)? 3   Toileting, which includes using a toilet, bedpan, or urinal? 3   Putting on and taking off regular upper body clothing? 4   Taking care of personal grooming such as brushing teeth? 4   Eating meals? 4   6 Clicks Daily Activity Score 21   Functional Mobility   Sit to Stand Supervised   Bed, Chair, Wheelchair Transfer Supervised   Visual Perception   Visual Perception  WDL   Activity Tolerance    Sitting in Chair ad zach   Sitting Edge of Bed ad zach   Standing ad zach   Education Group   Role of Occupational Therapist Patient Response Patient;Acceptance;Explanation;Demonstration;Verbal Demonstration;Action Demonstration   Anticipated Discharge Equipment and Recommendations   DC Equipment Recommendations None   Discharge Recommendations Anticipate that the patient will have no further occupational therapy needs after discharge from the hospital   Interdisciplinary Plan of Care Collaboration   IDT Collaboration with  Nursing;Physical Therapist   Patient Position at End of Therapy In Bed;Call Light within Reach;Tray Table within Reach;Phone within Reach   Collaboration Comments report given   Session Information   Date / Session Number  2/4,1/1   Priority 0

## 2021-02-04 NOTE — WOUND TEAM
[facility]  Inpatient Services  Initial Wound and Skin Care Evaluation    Admission Date: 2/2/2021     Last order of IP CONSULT TO WOUND CARE was found on 2/3/2021 from Hospital Encounter on 2/2/2021     HPI, PMH, SH: Reviewed    Past Surgical History:   Procedure Laterality Date   • IRRIGATION & DEBRIDEMENT GENERAL Left 10/22/2020    Procedure: IRRIGATION AND DEBRIDEMENT, WOUND- CALF AND WOUND VAC PLACEMENT;  Surgeon: Temo Mayer M.D.;  Location: SURGERY [facility];  Service: Plastics   • NERVE REPAIR  6/18/08    Performed by SKYE RAY at [facility] ORS   • LACERATION REPAIR  6/18/08    Performed by SKYE RAY at [facility]   • NEPHRECTOMY PARTIAL      right   • NEPHRECTOMY PARTIAL Right    • OTHER      tonsillectomy     Social History     Tobacco Use   • Smoking status: Current Every Day Smoker     Packs/day: 1.50     Types: Cigarettes   • Smokeless tobacco: Never Used   Substance Use Topics   • Alcohol use: No     Frequency: Never     Drinks per session: 1 or 2     Binge frequency: Never     Chief Complaint   Patient presents with   • Leg Pain     Diagnosis: DVT, lower extremity, distal, acute, left (HCC) [I82.4Z2]    Unit where seen by Wound Team: T216/00     WOUND CONSULT/FOLLOW UP RELATED TO:  Left calf NPWT dressing change     WOUND HISTORY:  Patient states the wound began as a dark purple spot that evolved over time, he has an extensive history of DVT's and was admitted for increased leg pain, swelling, and redness. Went to IR for IVC filter placement.     WOUND ASSESSMENT/LDA       Negative Pressure Wound Therapy 12/31/20 Surgical Leg Posterior Left (Active)   NPWT Pump Mode / Pressure Setting Ulta;Continuous;125 mmHg    Dressing Type Medium;Black Foam (Regular)    Number of Foam Pieces Used 2    Canister Changed Yes    NEXT Dressing Change/Treatment Date 02/05/21    WOUND NURSE ONLY - Time Spent with Patient (mins) 75            Wound 12/30/20  Full Thickness Wound Leg Posterior Left POA (Active)   Wound Image      Site Assessment Granulation tissue;Red    Periwound Assessment Clean;Scar tissue;Pink    Margins Attached edges;Defined edges    Closure Secondary intention    Drainage Amount Moderate    Drainage Description Serosanguineous    Treatments Cleansed;Site care;Tape change    Wound Cleansing Normal Saline Irrigation    Periwound Protectant Skin Protectant Wipes to Periwound;Drape    Dressing Cleansing/Solutions Not Applicable    Dressing Options Wound Vac;Mepilex    Dressing Changed Changed    Dressing Status Clean;Dry;Intact    Dressing Change/Treatment Frequency Monday, Wednesday, Friday, and As Needed    NEXT Dressing Change/Treatment Date 02/05/21    NEXT Weekly Photo (Inpatient Only) 02/10/21    Non-staged Wound Description Full thickness    Wound Length (cm) 12 cm    Wound Width (cm) 10.5 cm    Wound Depth (cm) 0.1 cm    Wound Surface Area (cm^2) 126 cm^2    Wound Volume (cm^3) 12.6 cm^3    Exposed Structures None    WOUND NURSE ONLY - Time Spent with Patient (mins) 75              Vascular:    CARLA:   No results found.    Lab Values:    Lab Results   Component Value Date/Time    WBC 6.8 02/02/2021 09:56 PM    RBC 4.55 (L) 02/02/2021 09:56 PM    HEMOGLOBIN 12.9 (L) 02/02/2021 09:56 PM    HEMATOCRIT 39.6 (L) 02/02/2021 09:56 PM    CREACTPROT 3.80 (H) 12/30/2020 05:45 PM    SEDRATEWES 20 12/30/2020 05:45 PM        Culture Results show:  No results found for this or any previous visit (from the past 720 hour(s)).    Pain Level/Medicated:  Premed with IV and 4% lidocaine into foam, lidocaine gel directly onto wound bed. Still in 10/10 pain at times, however, tolerates well. More painful at superiorly. Would be beneficial for patient to have PO medications to stay on top of pain instead of chasing pain, will reach out to MD.        INTERVENTIONS BY WOUND TEAM:  Chart and images reviewed. Discussed with bedside RN. This RN in to assess patient.  Performed standard wound care which includes appropriate positioning, dressing removal and non-selective debridement. Pictures and measurements obtained weekly if/when required.  Preparation for Dressing removal: Dressing soaked with NS and Lidocaine 4%  Cleansed with:  NS and gauze.  Sharp debridement: n/a  Jina wound: Cleansed with NS and gauze, Prepped with no sting skin prep and drape  Primary Dressing: One piece of half thickness black regular foam cut to fit and applied to wound bed. Secured with drape  Secondary (Outer) Dressing:  Hole cut at superior end and half thickness button and trak pad applied with tubing going upward per patient request. Applied mepilex under tubing, secured with drape.    Interdisciplinary consultation: Patient, Bedside RN (Katy), wound RN Elissa    EVALUATION / RATIONALE FOR TREATMENT:  Most Recent Date:  2/4/21: Patient wound bed is granularized, red tissue with minimal depth of 0.1cm. Has decreased in size since last admi, serosanguinous drainage. Some swelling noted to LLE. Continue NPWT to assist in closure by secondary intention, manage drainage, and increase oxygenation and granulation to the area.      Goals: Steady decrease in wound area and depth weekly.    WOUND TEAM PLAN OF CARE ([X] for frequency of wound follow up,):   Nursing to follow orders written for wound care. Contact wound team if area fails to progress, deteriorates or with any questions/concerns  Dressing changes by wound team:                   Follow up 3 times weekly:                NPWT change 3 times weekly:   X  Follow up 1-2 times weekly:      Follow up Bi-Monthly:                   Follow up as needed:     Other (explain):     NURSING PLAN OF CARE ORDERS (X):  Dressing changes: See Dressing Care orders: X  Skin care: See Skin Care orders:   RN Prevention Protocol:   Rectal tube care: See Rectal Tube Care orders:   Other orders:    RSKIN:   CURRENTLY IN PLACE (X), APPLIED THIS VISIT (A), ORDERED (O):    Q shift John:  X  Q shift pressure point assessments:  X    Surface/Positioning   Pressure redistribution mattress  X          Low Airloss          Bariatric foam      Bariatric FEDERICO     Waffle cushion        Waffle Overlay          Reposition q 2 hours      TAPs Turning system     Z Sly Pillow     Offloading/Redistribution   Sacral Mepilex (Silicone dressing)   X- on LLE  Heel Mepilex (Silicone dressing)         Heel float boots (Prevalon boot)             Float Heels off Bed with Pillows           Respiratory n/a  Silicone O2 tubing         Gray Foam Ear protectors     Cannula fixation Device (Tender )          High flow offloading Clip    Elastic head band offloading device      Anchorfast                                                         Trach with Optifoam split foam             Containment/Moisture Prevention n/a    Rectal tube or BMS    Purwick/Condom Cath        Flynn Catheter    Barrier wipes           Barrier paste       Antifungal tx      Interdry        Mobilization KRISTEN, was on bed rest from IR      Up to chair        Ambulate      PT/OT      Nutrition       Dietician        Diabetes Education      PO X    TF     TPN     NPO   # days     Other        Anticipated discharge plans:   LTACH:        SNF/Rehab:                  Home Health Care:     X- continue home health wound care.       Outpatient Wound Center:            Self/Family Care:        Other:

## 2021-02-04 NOTE — DISCHARGE PLANNING
Received request for [facility]. Patient is already on service with [facility]. Called [facility] and spoke with Shauna and no need to send new referral as patient is OBS. MD updated.

## 2021-02-05 NOTE — PROGRESS NOTES
Family member at bedside to take patient . Will leave [facility] at nurse station. Patient got his own portable Vac.

## 2021-02-24 ENCOUNTER — TELEPHONE (OUTPATIENT)
Dept: VASCULAR LAB | Facility: MEDICAL CENTER | Age: 56
End: 2021-02-24

## 2021-02-25 NOTE — TELEPHONE ENCOUNTER
"LVM for pt to call back to schedule initial vascular medicine visit.     Per Dr. Bloch - \"Schedule initial visit with vascular APN to discuss what to do with his IVC filter\"    "

## 2021-03-15 DIAGNOSIS — Z23 NEED FOR VACCINATION: ICD-10-CM

## 2021-04-06 ENCOUNTER — TELEPHONE (OUTPATIENT)
Dept: VASCULAR LAB | Facility: MEDICAL CENTER | Age: 56
End: 2021-04-06

## 2021-04-06 NOTE — TELEPHONE ENCOUNTER
Msg to MA regarding IVC filter- needs initial vas medicine appt to discuss.    Destinee ARRIAGA  [facility]

## 2021-04-13 ENCOUNTER — TELEPHONE (OUTPATIENT)
Dept: VASCULAR LAB | Facility: MEDICAL CENTER | Age: 56
End: 2021-04-13

## 2021-04-22 ENCOUNTER — TELEPHONE (OUTPATIENT)
Dept: VASCULAR LAB | Facility: MEDICAL CENTER | Age: 56
End: 2021-04-22

## 2021-05-06 ENCOUNTER — TELEPHONE (OUTPATIENT)
Dept: VASCULAR LAB | Facility: MEDICAL CENTER | Age: 56
End: 2021-05-06

## 2021-05-06 NOTE — TELEPHONE ENCOUNTER
Certified letter sent to pt regarding IVC filter disposition.  Our office has been unable to contact pt after multiple attempts.    Destinee ARRIAGA  [facility]

## 2021-05-19 ENCOUNTER — TELEPHONE (OUTPATIENT)
Dept: VASCULAR LAB | Facility: MEDICAL CENTER | Age: 56
End: 2021-05-19

## 2021-05-20 ENCOUNTER — TELEPHONE (OUTPATIENT)
Dept: VASCULAR LAB | Facility: MEDICAL CENTER | Age: 56
End: 2021-05-20

## 2021-05-20 NOTE — TELEPHONE ENCOUNTER
Certified mail returned regarding IVC filter disposition letter.    Destinee ARRIAGA  [facility]

## 2021-05-21 ENCOUNTER — TELEPHONE (OUTPATIENT)
Dept: WOUND CARE | Facility: MEDICAL CENTER | Age: 56
End: 2021-05-21

## 2022-05-22 ENCOUNTER — HOSPITAL ENCOUNTER (OUTPATIENT)
Dept: RADIOLOGY | Facility: MEDICAL CENTER | Age: 57
End: 2022-05-22
Payer: COMMERCIAL

## 2022-05-22 ENCOUNTER — APPOINTMENT (OUTPATIENT)
Dept: RADIOLOGY | Facility: MEDICAL CENTER | Age: 57
DRG: 065 | End: 2022-05-22
Attending: EMERGENCY MEDICINE
Payer: COMMERCIAL

## 2022-05-22 ENCOUNTER — HOSPITAL ENCOUNTER (INPATIENT)
Facility: MEDICAL CENTER | Age: 57
LOS: 2 days | DRG: 065 | End: 2022-05-25
Attending: EMERGENCY MEDICINE | Admitting: INTERNAL MEDICINE
Payer: COMMERCIAL

## 2022-05-22 DIAGNOSIS — I63.439 CEREBROVASCULAR ACCIDENT (CVA) DUE TO EMBOLISM OF POSTERIOR CEREBRAL ARTERY, UNSPECIFIED BLOOD VESSEL LATERALITY (HCC): ICD-10-CM

## 2022-05-22 DIAGNOSIS — I63.9 ACUTE CVA (CEREBROVASCULAR ACCIDENT) (HCC): ICD-10-CM

## 2022-05-22 LAB
ANION GAP SERPL CALC-SCNC: 9 MMOL/L (ref 7–16)
APTT PPP: 72.2 SEC (ref 24.7–36)
BASOPHILS # BLD AUTO: 0.5 % (ref 0–1.8)
BASOPHILS # BLD: 0.04 K/UL (ref 0–0.12)
BUN SERPL-MCNC: 14 MG/DL (ref 8–22)
CALCIUM SERPL-MCNC: 8.5 MG/DL (ref 8.5–10.5)
CHLORIDE SERPL-SCNC: 106 MMOL/L (ref 96–112)
CO2 SERPL-SCNC: 24 MMOL/L (ref 20–33)
CREAT SERPL-MCNC: 1.03 MG/DL (ref 0.5–1.4)
EKG IMPRESSION: NORMAL
EOSINOPHIL # BLD AUTO: 0.23 K/UL (ref 0–0.51)
EOSINOPHIL NFR BLD: 2.8 % (ref 0–6.9)
ERYTHROCYTE [DISTWIDTH] IN BLOOD BY AUTOMATED COUNT: 47.7 FL (ref 35.9–50)
GFR SERPLBLD CREATININE-BSD FMLA CKD-EPI: 84 ML/MIN/1.73 M 2
GLUCOSE SERPL-MCNC: 83 MG/DL (ref 65–99)
HCT VFR BLD AUTO: 48.9 % (ref 42–52)
HGB BLD-MCNC: 16.2 G/DL (ref 14–18)
IMM GRANULOCYTES # BLD AUTO: 0.03 K/UL (ref 0–0.11)
IMM GRANULOCYTES NFR BLD AUTO: 0.4 % (ref 0–0.9)
INR PPP: 1.22 (ref 0.87–1.13)
LYMPHOCYTES # BLD AUTO: 1.49 K/UL (ref 1–4.8)
LYMPHOCYTES NFR BLD: 18.3 % (ref 22–41)
MCH RBC QN AUTO: 29.6 PG (ref 27–33)
MCHC RBC AUTO-ENTMCNC: 33.1 G/DL (ref 33.7–35.3)
MCV RBC AUTO: 89.2 FL (ref 81.4–97.8)
MONOCYTES # BLD AUTO: 0.51 K/UL (ref 0–0.85)
MONOCYTES NFR BLD AUTO: 6.3 % (ref 0–13.4)
NEUTROPHILS # BLD AUTO: 5.85 K/UL (ref 1.82–7.42)
NEUTROPHILS NFR BLD: 71.7 % (ref 44–72)
NRBC # BLD AUTO: 0 K/UL
NRBC BLD-RTO: 0 /100 WBC
PLATELET # BLD AUTO: 119 K/UL (ref 164–446)
PMV BLD AUTO: 9.4 FL (ref 9–12.9)
POTASSIUM SERPL-SCNC: 4.2 MMOL/L (ref 3.6–5.5)
PROTHROMBIN TIME: 15 SEC (ref 12–14.6)
RBC # BLD AUTO: 5.48 M/UL (ref 4.7–6.1)
SODIUM SERPL-SCNC: 139 MMOL/L (ref 135–145)
WBC # BLD AUTO: 8.2 K/UL (ref 4.8–10.8)

## 2022-05-22 PROCEDURE — 36415 COLL VENOUS BLD VENIPUNCTURE: CPT

## 2022-05-22 PROCEDURE — 70496 CT ANGIOGRAPHY HEAD: CPT

## 2022-05-22 PROCEDURE — 93005 ELECTROCARDIOGRAM TRACING: CPT | Performed by: EMERGENCY MEDICINE

## 2022-05-22 PROCEDURE — 71045 X-RAY EXAM CHEST 1 VIEW: CPT

## 2022-05-22 PROCEDURE — 99285 EMERGENCY DEPT VISIT HI MDM: CPT

## 2022-05-22 PROCEDURE — 99284 EMERGENCY DEPT VISIT MOD MDM: CPT | Performed by: PSYCHIATRY & NEUROLOGY

## 2022-05-22 PROCEDURE — 700117 HCHG RX CONTRAST REV CODE 255: Performed by: EMERGENCY MEDICINE

## 2022-05-22 PROCEDURE — 85610 PROTHROMBIN TIME: CPT

## 2022-05-22 PROCEDURE — 83036 HEMOGLOBIN GLYCOSYLATED A1C: CPT

## 2022-05-22 PROCEDURE — 80048 BASIC METABOLIC PNL TOTAL CA: CPT

## 2022-05-22 PROCEDURE — 85025 COMPLETE CBC W/AUTO DIFF WBC: CPT

## 2022-05-22 PROCEDURE — 0042T CT-CEREBRAL PERFUSION ANALYSIS: CPT

## 2022-05-22 PROCEDURE — 70498 CT ANGIOGRAPHY NECK: CPT

## 2022-05-22 PROCEDURE — 85730 THROMBOPLASTIN TIME PARTIAL: CPT

## 2022-05-22 RX ORDER — HYDROCODONE BITARTRATE AND ACETAMINOPHEN 5; 325 MG/1; MG/1
1 TABLET ORAL EVERY 4 HOURS PRN
Status: ON HOLD | COMMUNITY
End: 2022-06-03

## 2022-05-22 RX ORDER — FERROUS GLUCONATE 324(38)MG
324 TABLET ORAL SEE ADMIN INSTRUCTIONS
Status: ON HOLD | COMMUNITY
End: 2022-06-03

## 2022-05-22 RX ORDER — ATORVASTATIN CALCIUM 40 MG/1
40 TABLET, FILM COATED ORAL NIGHTLY
COMMUNITY

## 2022-05-22 RX ORDER — NIFEDIPINE 30 MG/1
30 TABLET, EXTENDED RELEASE ORAL EVERY EVENING
Status: ON HOLD | COMMUNITY
End: 2022-06-03

## 2022-05-22 RX ORDER — ALENDRONATE SODIUM 70 MG/1
70 TABLET ORAL
Status: ON HOLD | COMMUNITY
End: 2022-06-03

## 2022-05-22 RX ORDER — GABAPENTIN 600 MG/1
600 TABLET ORAL 3 TIMES DAILY
COMMUNITY

## 2022-05-22 RX ADMIN — IOHEXOL 115 ML: 350 INJECTION, SOLUTION INTRAVENOUS at 23:15

## 2022-05-22 ASSESSMENT — FIBROSIS 4 INDEX: FIB4 SCORE: 1.48

## 2022-05-23 PROBLEM — I63.439: Status: ACTIVE | Noted: 2022-05-23

## 2022-05-23 PROBLEM — R91.1 PULMONARY NODULE: Status: ACTIVE | Noted: 2022-05-23

## 2022-05-23 LAB
EST. AVERAGE GLUCOSE BLD GHB EST-MCNC: 111 MG/DL
HBA1C MFR BLD: 5.5 % (ref 4–5.6)

## 2022-05-23 PROCEDURE — 99223 1ST HOSP IP/OBS HIGH 75: CPT | Mod: 25 | Performed by: INTERNAL MEDICINE

## 2022-05-23 PROCEDURE — 700102 HCHG RX REV CODE 250 W/ 637 OVERRIDE(OP): Performed by: INTERNAL MEDICINE

## 2022-05-23 PROCEDURE — 99232 SBSQ HOSP IP/OBS MODERATE 35: CPT | Performed by: NURSE PRACTITIONER

## 2022-05-23 PROCEDURE — 99406 BEHAV CHNG SMOKING 3-10 MIN: CPT | Performed by: STUDENT IN AN ORGANIZED HEALTH CARE EDUCATION/TRAINING PROGRAM

## 2022-05-23 PROCEDURE — A9270 NON-COVERED ITEM OR SERVICE: HCPCS | Performed by: INTERNAL MEDICINE

## 2022-05-23 PROCEDURE — 770020 HCHG ROOM/CARE - TELE (206)

## 2022-05-23 RX ORDER — GABAPENTIN 300 MG/1
600 CAPSULE ORAL 3 TIMES DAILY
Status: DISCONTINUED | OUTPATIENT
Start: 2022-05-23 | End: 2022-05-25 | Stop reason: HOSPADM

## 2022-05-23 RX ORDER — ACETAMINOPHEN 325 MG/1
650 TABLET ORAL EVERY 6 HOURS PRN
Status: DISCONTINUED | OUTPATIENT
Start: 2022-05-23 | End: 2022-05-25 | Stop reason: HOSPADM

## 2022-05-23 RX ORDER — AMOXICILLIN 250 MG
2 CAPSULE ORAL 2 TIMES DAILY
Status: DISCONTINUED | OUTPATIENT
Start: 2022-05-23 | End: 2022-05-25 | Stop reason: HOSPADM

## 2022-05-23 RX ORDER — GABAPENTIN 100 MG/1
100 CAPSULE ORAL 3 TIMES DAILY
Status: DISCONTINUED | OUTPATIENT
Start: 2022-05-23 | End: 2022-05-23

## 2022-05-23 RX ORDER — HYDROCODONE BITARTRATE AND ACETAMINOPHEN 5; 325 MG/1; MG/1
1 TABLET ORAL EVERY 4 HOURS PRN
Status: DISCONTINUED | OUTPATIENT
Start: 2022-05-23 | End: 2022-05-25 | Stop reason: HOSPADM

## 2022-05-23 RX ORDER — BISACODYL 10 MG
10 SUPPOSITORY, RECTAL RECTAL
Status: DISCONTINUED | OUTPATIENT
Start: 2022-05-23 | End: 2022-05-25 | Stop reason: HOSPADM

## 2022-05-23 RX ORDER — ALENDRONATE SODIUM 70 MG/1
70 TABLET ORAL
Status: DISCONTINUED | OUTPATIENT
Start: 2022-05-27 | End: 2022-05-25 | Stop reason: HOSPADM

## 2022-05-23 RX ORDER — NIFEDIPINE 30 MG/1
30 TABLET, EXTENDED RELEASE ORAL EVERY EVENING
Status: DISCONTINUED | OUTPATIENT
Start: 2022-05-23 | End: 2022-05-25 | Stop reason: HOSPADM

## 2022-05-23 RX ORDER — CARBOXYMETHYLCELLULOSE SODIUM 5 MG/ML
1 SOLUTION/ DROPS OPHTHALMIC PRN
Status: DISCONTINUED | OUTPATIENT
Start: 2022-05-23 | End: 2022-05-25 | Stop reason: HOSPADM

## 2022-05-23 RX ORDER — OMEPRAZOLE 20 MG/1
20 CAPSULE, DELAYED RELEASE ORAL DAILY
Status: DISCONTINUED | OUTPATIENT
Start: 2022-05-23 | End: 2022-05-25 | Stop reason: HOSPADM

## 2022-05-23 RX ORDER — ATORVASTATIN CALCIUM 40 MG/1
40 TABLET, FILM COATED ORAL NIGHTLY
Status: DISCONTINUED | OUTPATIENT
Start: 2022-05-23 | End: 2022-05-25 | Stop reason: HOSPADM

## 2022-05-23 RX ORDER — POLYETHYLENE GLYCOL 3350 17 G/17G
1 POWDER, FOR SOLUTION ORAL
Status: DISCONTINUED | OUTPATIENT
Start: 2022-05-23 | End: 2022-05-25 | Stop reason: HOSPADM

## 2022-05-23 RX ORDER — HYDRALAZINE HYDROCHLORIDE 20 MG/ML
10 INJECTION INTRAMUSCULAR; INTRAVENOUS EVERY 4 HOURS PRN
Status: DISCONTINUED | OUTPATIENT
Start: 2022-05-23 | End: 2022-05-25 | Stop reason: HOSPADM

## 2022-05-23 RX ORDER — ASPIRIN 81 MG/1
81 TABLET, CHEWABLE ORAL DAILY
Status: DISCONTINUED | OUTPATIENT
Start: 2022-05-23 | End: 2022-05-25

## 2022-05-23 RX ORDER — FERROUS GLUCONATE 324(38)MG
324 TABLET ORAL
Status: DISCONTINUED | OUTPATIENT
Start: 2022-05-24 | End: 2022-05-25 | Stop reason: HOSPADM

## 2022-05-23 RX ADMIN — OMEPRAZOLE 20 MG: 20 CAPSULE, DELAYED RELEASE ORAL at 05:33

## 2022-05-23 RX ADMIN — SENNOSIDES AND DOCUSATE SODIUM 2 TABLET: 50; 8.6 TABLET ORAL at 05:32

## 2022-05-23 RX ADMIN — GABAPENTIN 600 MG: 300 CAPSULE ORAL at 05:33

## 2022-05-23 RX ADMIN — NIFEDIPINE 30 MG: 30 TABLET, FILM COATED, EXTENDED RELEASE ORAL at 18:39

## 2022-05-23 RX ADMIN — ASPIRIN 81 MG 81 MG: 81 TABLET ORAL at 05:33

## 2022-05-23 RX ADMIN — ATORVASTATIN CALCIUM 40 MG: 40 TABLET, FILM COATED ORAL at 20:10

## 2022-05-23 RX ADMIN — ATORVASTATIN CALCIUM 40 MG: 40 TABLET, FILM COATED ORAL at 01:25

## 2022-05-23 RX ADMIN — GABAPENTIN 600 MG: 300 CAPSULE ORAL at 13:08

## 2022-05-23 ASSESSMENT — LIFESTYLE VARIABLES
HAVE PEOPLE ANNOYED YOU BY CRITICIZING YOUR DRINKING: NO
TOTAL SCORE: 0
DOES PATIENT WANT TO STOP DRINKING: NO
CONSUMPTION TOTAL: NEGATIVE
HAVE YOU EVER FELT YOU SHOULD CUT DOWN ON YOUR DRINKING: NO
ON A TYPICAL DAY WHEN YOU DRINK ALCOHOL HOW MANY DRINKS DO YOU HAVE: 0
EVER FELT BAD OR GUILTY ABOUT YOUR DRINKING: NO
ALCOHOL_USE: NO
TOTAL SCORE: 0
EVER HAD A DRINK FIRST THING IN THE MORNING TO STEADY YOUR NERVES TO GET RID OF A HANGOVER: NO
AVERAGE NUMBER OF DAYS PER WEEK YOU HAVE A DRINK CONTAINING ALCOHOL: 0
TOTAL SCORE: 0
HOW MANY TIMES IN THE PAST YEAR HAVE YOU HAD 5 OR MORE DRINKS IN A DAY: 0

## 2022-05-23 ASSESSMENT — COGNITIVE AND FUNCTIONAL STATUS - GENERAL
MOBILITY SCORE: 24
DAILY ACTIVITIY SCORE: 24
SUGGESTED CMS G CODE MODIFIER MOBILITY: CH
SUGGESTED CMS G CODE MODIFIER DAILY ACTIVITY: CH

## 2022-05-23 ASSESSMENT — PATIENT HEALTH QUESTIONNAIRE - PHQ9
SUM OF ALL RESPONSES TO PHQ9 QUESTIONS 1 AND 2: 0
2. FEELING DOWN, DEPRESSED, IRRITABLE, OR HOPELESS: NOT AT ALL
1. LITTLE INTEREST OR PLEASURE IN DOING THINGS: NOT AT ALL

## 2022-05-23 ASSESSMENT — ENCOUNTER SYMPTOMS
HEADACHES: 0
HEADACHES: 1
COUGH: 0
FEVER: 0
EYE PAIN: 1
HEMOPTYSIS: 0
MYALGIAS: 0
PALPITATIONS: 0
CHILLS: 0
BLURRED VISION: 1
DIZZINESS: 0
NECK PAIN: 0
HEARTBURN: 0
NAUSEA: 0
BRUISES/BLEEDS EASILY: 0

## 2022-05-23 ASSESSMENT — FIBROSIS 4 INDEX
FIB4 SCORE: 1.76
FIB4 SCORE: 1.76

## 2022-05-23 ASSESSMENT — PAIN DESCRIPTION - PAIN TYPE
TYPE: ACUTE PAIN
TYPE: ACUTE PAIN

## 2022-05-23 NOTE — ED NOTES
Med rec completed per patient and patients pharmacy ([facility])  Allergies reviewed  No PO Antibiotics in the last 30 days

## 2022-05-23 NOTE — ED NOTES
Pt to [facility], report received.  Pt denies pain.  Pt reports total right sided vision loss, onset yesterday morning.  RA oxygen sats down to 89%, pt reports he doesn't typically use O2.  Pt put on O2 2L via NC.

## 2022-05-23 NOTE — ED NOTES
Patient resting on stretcher in no acute distress. Equal chest rise noted. VS stable on monitor. Bed locked and in low position. Call bell within reach. Pt has no further needs at this time.

## 2022-05-23 NOTE — ED NOTES
rn w/ pt to CT and back to Rm.  No neuro changes. Pt resting in bed, call light in reach, bed locked in low position.

## 2022-05-23 NOTE — ASSESSMENT & PLAN NOTE
5 minutes on tobacco cessation counseling provided including nicotine patches, gum, and dangers of smoking. Discussed the risks of smoking including increased risk of heart disease, stroke, cancer and COPD. Discussed the benefits of quitting smoking.

## 2022-05-23 NOTE — PROGRESS NOTES
4 Eyes Skin Assessment Completed by HANNAH Hilton and HANNAH Francois.    Head WDL  Ears Redness  Nose WDL  Mouth WDL  Neck WDL  Breast/Chest WDL  Shoulder Blades WDL  Spine WDL  (R) Arm/Elbow/Hand WDL  (L) Arm/Elbow/Hand WDL  Abdomen WDL  Groin WDL  Scrotum/Coccyx/Buttocks WDL  (R) Leg WDL  (L) Leg Wound  (R) Heel/Foot/Toe WDL  (L) Heel/Foot/Toe missing great toe          Devices In Places Tele Box, Pulse Ox and Nasal Cannula      Interventions In Place Gray Ear Foams and Pressure Redistribution Mattress    Possible Skin Injury Yes    Pictures Uploaded Into Epic Yes  Wound Consult Placed Yes  RN Wound Prevention Protocol Ordered Yes

## 2022-05-23 NOTE — PROGRESS NOTES
Chief Complaint   Patient presents with   • Possible Stroke     [facility] reported visual mcnamara of loss of Periferal vision to right eye no other deficits reported.       Problem List Items Addressed This Visit    None     Visit Diagnoses     Acute CVA (cerebrovascular accident) (HCC)        Relevant Medications    atorvastatin (LIPITOR) 40 MG Tab    NIFEdipine SR (PROCARDIA-XL) 30 MG tablet    hydrALAZINE (APRESOLINE) injection 10 mg    atorvastatin (LIPITOR) tablet 40 mg    NIFEdipine SR (PROCARDIA-XL) tablet 30 mg (Start on 5/23/2022  6:00 PM)        Neurology Stroke Progress Note    Brief History of present illness:  56-year old male with PMHx significant for DVT/PE (on Xarelto with documented non compliance at times), renal cell carcinoma, tobacco abuse, stroke (2021; Right hand weakness) and chronic LLE/Left foot wound with poor healing who presented to [facility] on 5/22/22 for a chief complaint of Right visual field loss. Reportedly, the patient went to sleep at 0900 on 5/22; when he awoke at 1600, he noted difficulty seeing on the Right. Denies new/focal weakness, numbness or paresthesia. [facility] there, determined not to be a candidate for IV thrombolytics secondary to concurrent use of Xarelto. Vascular studies also noted thrombus in the left PCA and also the left carotid artery was occluded.  Patient was transferred here for IR consideration; was determined not to be a candidate for IR intervention. Further work up is in progress.     Interval, 5/23/22:  Patient is sitting up in stretcher; awake and alert. Admits to persistent Right HH. Denies new/focal weakness. Awaiting MRI Brain wo contrast. No events overnight; SBP 120s.     No changes to HPI as was previously documented.     Past medical history:   Past Medical History:   Diagnosis Date   • Cancer (HCC)     kidney   • Cancer (HCC)     renal   • DVT (deep venous thrombosis) (HCC)    •  Pulmonary embolism (HCC)    • Smoker        Past surgical history:   Past Surgical History:   Procedure Laterality Date   • IRRIGATION & DEBRIDEMENT GENERAL Left 10/22/2020    Procedure: IRRIGATION AND DEBRIDEMENT, WOUND- CALF AND WOUND VAC PLACEMENT;  Surgeon: Temo Mayer M.D.;  Location: SURGERY [facility];  Service: Plastics   • NERVE REPAIR  6/18/08    Performed by SKYE RAY at [facility]   • LACERATION REPAIR  6/18/08    Performed by SKYE RAY at [facility] ORS   • NEPHRECTOMY PARTIAL      right   • NEPHRECTOMY PARTIAL Right    • OTHER      tonsillectomy       Family history:   Family History   Problem Relation Age of Onset   • Heart Attack Mother    • Cancer Father        Social history:   Social History     Socioeconomic History   • Marital status: Single     Spouse name: Not on file   • Number of children: Not on file   • Years of education: Not on file   • Highest education level: Not on file   Occupational History   • Not on file   Tobacco Use   • Smoking status: Current Every Day Smoker     Packs/day: 1.50     Types: Cigarettes   • Smokeless tobacco: Never Used   Vaping Use   • Vaping Use: Never used   Substance and Sexual Activity   • Alcohol use: No   • Drug use: Yes     Comment: edibles   • Sexual activity: Not on file   Other Topics Concern   • Not on file   Social History Narrative    ** Merged History Encounter **          Social Determinants of Health     Financial Resource Strain: Not on file   Food Insecurity: Not on file   Transportation Needs: Not on file   Physical Activity: Not on file   Stress: Not on file   Social Connections: Not on file   Intimate Partner Violence: Not on file   Housing Stability: Not on file       Current medications:   Current Facility-Administered Medications   Medication Dose   • senna-docusate (PERICOLACE or SENOKOT S) 8.6-50 MG per tablet 2 Tablet  2 Tablet    And   • polyethylene glycol/lytes (MIRALAX) PACKET 1 Packet   1 Packet    And   • magnesium hydroxide (MILK OF MAGNESIA) suspension 30 mL  30 mL    And   • bisacodyl (DULCOLAX) suppository 10 mg  10 mg   • acetaminophen (Tylenol) tablet 650 mg  650 mg   • hydrALAZINE (APRESOLINE) injection 10 mg  10 mg   • [START ON 5/27/2022] alendronate (FOSAMAX) tablet 70 mg  70 mg   • atorvastatin (LIPITOR) tablet 40 mg  40 mg   • carboxymethylcellulose (REFRESH TEARS) 0.5 % ophthalmic drops 1 Drop  1 Drop   • [START ON 5/24/2022] ferrous gluconate (FERGON) tablet 324 mg  324 mg   • HYDROcodone-acetaminophen (NORCO) 5-325 MG per tablet 1 Tablet  1 Tablet   • NIFEdipine SR (PROCARDIA-XL) tablet 30 mg  30 mg   • omeprazole (PRILOSEC) capsule 20 mg  20 mg   • gabapentin (NEURONTIN) capsule 600 mg  600 mg   • aspirin (ASA) chewable tab 81 mg  81 mg     Current Outpatient Medications   Medication   • HYDROcodone-acetaminophen (NORCO) 5-325 MG Tab per tablet   • alendronate (FOSAMAX) 70 MG Tab   • atorvastatin (LIPITOR) 40 MG Tab   • Carboxymethylcellulose Sodium (DRY EYE RELIEF OP)   • ferrous gluconate (FERGON) 324 (38 Fe) MG Tab   • gabapentin (NEURONTIN) 600 MG tablet   • NIFEdipine SR (PROCARDIA-XL) 30 MG tablet   • rivaroxaban (XARELTO) 20 MG Tab tablet   • omeprazole (PRILOSEC) 20 MG delayed-release capsule   • acetaminophen (TYLENOL) 325 MG Tab       Medication Allergy:  No Known Allergies      Review of systems:   Constitutional: denies fever, night sweats, weight loss.   Eyes: denies acute vision change, eye pain or secretion.   Ears, Nose, Mouth, Throat: denies nasal secretion, nasal bleeding, difficulty swallowing, hearing loss, tinnitus, vertigo, ear pain, acute dental problems, oral ulcers or lesions.   Endocrine: denies recent weight changes, heat or cold intolerance, polyuria, polydypsia, polyphagia,abnormal hair growth.  Cardiovascular: denies new onset of chest pain, palpitations, syncope, or dyspnea of exertion.  Pulmonary: denies shortness of breath, new onset of cough,  hemoptysis, wheezing, chest pain or flu-like symptoms.   GI: denies nausea, vomiting, diarrhea, GI bleeding, change in appetite, abdominal pain, and change in bowel habits.  : denies dysuria, urinary incontinence, hematuria.  Heme/oncology: denies history of easy bruising or bleeding. No history of cancer, DVTor PE.  Allergy/immunology: denies hives/urticaria, or itching.   Dermatologic: denies new rash, or new skin lesions.  Musculoskeletal:denies joint swelling or pain, muscle pain, neck and back pain.   Neurologic: As noted in detail above.   Psychiatric: denies symptoms of depression, anxiety, hallucinations, mood swings or changes, suicidal or homicidal thoughts.       Physical examination:   Vitals:    05/23/22 0801 05/23/22 0901 05/23/22 0931 05/23/22 1000   BP: 110/56 119/58 132/63 137/64   Pulse: (!) 52 (!) 54 (!) 54 (!) 51   Resp: 13  19 15   Temp:       TempSrc:       SpO2: 92% 91% 94% 93%   Weight:       Height:         General: Patient in no acute distress, pleasant and cooperative.  HEENT: Normocephalic, no signs of acute trauma.   Neck: supple, no meningeal signs or carotid bruits. There is normal range of motion. No tenderness on exam.   Chest: clear to auscultation. No cough.   CV: RRR, no murmurs.   Skin: no signs of acute rashes or trauma.   Musculoskeletal: joints exhibit full range of motion, without any pain to palpation. There are no signs of joint or muscle swelling. There is no tenderness to deep palpation of muscles.   Psychiatric: No hallucinatory behavior.       NEUROLOGICAL EXAM:   Mental status, orientation: Awake, alert and fully oriented.   Speech and language: speech is clear and fluent. The patient is able to name, repeat and comprehend.   Cranial nerve exam: Pupils are 3-4 mm bilaterally and equally reactive to light. Right homonymous hemianopsia present. There is no nystagmus on primary or secondary gaze. Intact full EOM in all directions of gaze. Face appears symmetric.  Sensation in the face is intact to light touch. Uvula is midline. Palate elevates symmetrically. Tongue is midline and without any signs of tongue biting or fasciculations.Shoulder shrug is intact bilaterally.   Motor exam: Strength is 5/5 in all extremities. Tone is normal. No abnormal movements were seen on exam.   Sensory exam reveals normal sense of light touch and pinprick in all extremities.   Deep tendon reflexes:  Plantar responses are flexor. There is no clonus.   Coordination: shows a normal finger-nose-finger.  Gait: Not assessed at this time as patient is currently unable.       NIH Stroke Scale    1a Level of Consciousness   1b Orientation Questions   1c Response to Commands   2 Gaze   3 Visual Fields 2  4 Facial Movement   5 Motor Function (arm)   a Left   b Right   6 Motor Function (leg)   a Left   b Right   7 Limb Ataxia   8 Sensory   9 Language   10 Articulation   11 Extinction/Inattention     Score: 2      ANCILLARY DATA REVIEWED:     Lab Data Review:  Recent Results (from the past 24 hour(s))   EKG (NOW)    Collection Time: 22  9:51 PM   Result Value Ref Range    Report       [facility] Emergency Dept.    Test Date:  2022  Pt Name:    TANIA GUTIERREZ              Department: ER  MRN:        [id number]                      Room:       [facility]  Gender:     Male                         Technician: [id number]  :        1965                   Requested By:CALEB LOWE  Order #:    [id number]                    Reading MD:    Measurements  Intervals                                Axis  Rate:       56                           P:          44  WA:         181                          QRS:        70  QRSD:       103                          T:          68  QT:         450  QTc:        435    Interpretive Statements  Sinus bradycardia  Minimal ST elevation, inferior leads  Compared to ECG 2021 10:08:45  ST (T wave) deviation now present     CBC WITH DIFFERENTIAL     Collection Time: 05/22/22 10:00 PM   Result Value Ref Range    WBC 8.2 4.8 - 10.8 K/uL    RBC 5.48 4.70 - 6.10 M/uL    Hemoglobin 16.2 14.0 - 18.0 g/dL    Hematocrit 48.9 42.0 - 52.0 %    MCV 89.2 81.4 - 97.8 fL    MCH 29.6 27.0 - 33.0 pg    MCHC 33.1 (L) 33.7 - 35.3 g/dL    RDW 47.7 35.9 - 50.0 fL    Platelet Count 119 (L) 164 - 446 K/uL    MPV 9.4 9.0 - 12.9 fL    Neutrophils-Polys 71.70 44.00 - 72.00 %    Lymphocytes 18.30 (L) 22.00 - 41.00 %    Monocytes 6.30 0.00 - 13.40 %    Eosinophils 2.80 0.00 - 6.90 %    Basophils 0.50 0.00 - 1.80 %    Immature Granulocytes 0.40 0.00 - 0.90 %    Nucleated RBC 0.00 /100 WBC    Neutrophils (Absolute) 5.85 1.82 - 7.42 K/uL    Lymphs (Absolute) 1.49 1.00 - 4.80 K/uL    Monos (Absolute) 0.51 0.00 - 0.85 K/uL    Eos (Absolute) 0.23 0.00 - 0.51 K/uL    Baso (Absolute) 0.04 0.00 - 0.12 K/uL    Immature Granulocytes (abs) 0.03 0.00 - 0.11 K/uL    NRBC (Absolute) 0.00 K/uL   BASIC METABOLIC PANEL    Collection Time: 05/22/22 10:00 PM   Result Value Ref Range    Sodium 139 135 - 145 mmol/L    Potassium 4.2 3.6 - 5.5 mmol/L    Chloride 106 96 - 112 mmol/L    Co2 24 20 - 33 mmol/L    Glucose 83 65 - 99 mg/dL    Bun 14 8 - 22 mg/dL    Creatinine 1.03 0.50 - 1.40 mg/dL    Calcium 8.5 8.5 - 10.5 mg/dL    Anion Gap 9.0 7.0 - 16.0   APTT    Collection Time: 05/22/22 10:00 PM   Result Value Ref Range    APTT 72.2 (H) 24.7 - 36.0 sec   PROTHROMBIN TIME (INR)    Collection Time: 05/22/22 10:00 PM   Result Value Ref Range    PT 15.0 (H) 12.0 - 14.6 sec    INR 1.22 (H) 0.87 - 1.13   ESTIMATED GFR    Collection Time: 05/22/22 10:00 PM   Result Value Ref Range    GFR (CKD-EPI) 84 >60 mL/min/1.73 m 2   HEMOGLOBIN A1C    Collection Time: 05/22/22 10:00 PM   Result Value Ref Range    Glycohemoglobin 5.5 4.0 - 5.6 %    Est Avg Glucose 111 mg/dL       Labs reviewed by me.       Imaging reviewed by me:     CT-CEREBRAL PERFUSION ANALYSIS   Final Result         1.  Cerebral blood flow less than 30% likely  representing completed infarct = 0 mL.      2.  T Max more than 6 seconds likely representing combination of completed infarct and ischemia = 58 mL.      3.  Mismatched volume likely representing ischemic brain/penumbra = 58 mL      4.  Please note that the cerebral perfusion was performed on the limited brain tissue around the basal ganglia region. Infarct/ischemia outside the CT perfusion sections can be missed in this study.      CT-CTA HEAD WITH & W/O-POST PROCESS   Final Result         1.  Left P2 occlusion with area of low-density compatible with evolving infarct.   2.  Occlusion of the left internal carotid artery with reconstitution of the middle cerebral artery in the [place]      These findings were discussed with the patient's clinician, CALEB LOWE, on 5/22/2022 11:39 PM.            CT-CTA NECK WITH & W/O-POST PROCESSING   Final Result         1.  Proximal left internal carotid artery occlusion extending intracranially   2.  Heterogeneous right thyroid nodule, recommend follow-up thyroid sonography for further characterization due to nodule size.   3.  Left apical pulmonary nodule, see nodule follow-up recommendations below.      Fleischner Society pulmonary nodule recommendations:      Low Risk: CT at 6-12 months, then consider CT at 18-24 months      High Risk: CT at 6-12 months, then CT at 18-24 months      Low Risk - Minimal or absent history of smoking and of other known risk factors.      High Risk - History of smoking or of other known risk factors.      Note: These recommendations do not apply to lung cancer screening, patients with immunosuppression, or patients with known primary cancer.      Fleischner Society 2017 Guidelines for Management of Incidentally Detected Pulmonary Nodules in Adults      These findings were discussed with the patient's clinician, CALEB LOWE, on 5/22/2022 11:40 PM.      DX-CHEST-PORTABLE (1 VIEW)   Final Result         1.  No acute cardiopulmonary  disease.      OUTSIDE IMAGES-DX CHEST   Final Result      OUTSIDE IMAGES-CT HEAD   Final Result      CT-FOREIGN FILM CAT SCAN   Final Result      MR-BRAIN-WITH & W/O    (Results Pending)         Presumed mechanism by TOAST:  __Large Artery Atherosclerosis  __Small Vessel (Lacunar)  __Cardioembolic  _X_Other (Sickle Cell, Vasculitis, Hypercoagulable)  __Unknown    Modified Artemio Scale (MRS): 1 = No significant disability, despite symptoms; able to perform all usual duties and activities      ASSESSMENT AND PLAN:  56-year old male with PMHx significant for DVT/PE (on Xarelto with documented non compliance at times), renal cell carcinoma, tobacco abuse, stroke (2021; Right hand weakness) and chronic LLE/Left foot wound with poor healing who [facility] for a chief complaint of Right visual field loss; CT head negative, however was found to have Left PCA, Left ICA occlusion, neither amenable to thrombectomy. Suspect evolving Left PCA territory infarct; Awaiting MRI Brain wo contrast; NIHSS 2.     Recommendations/Plan:    -q4h and PRN neuro assessment. VS per nursing/unit protocol. Patient's exam does not appear to be perfusion dependent thus BP goal is normotension. Please notify neurology with changes in exam. Antihypertensives per primary team.   -Obtain MRI Brain wo contrast-- ordered and pending..   -Telemetry; currently SR. Screen for Afib/arrhythmia. Obtain TTE with bubble study.   -Hold Xarelto for now; will await MRI Brain results and make additional recs accordingly. ]-Atorvastatin 80 mg PO q HS. Check lipid panel.   -Recommend aggressive BG management per primary team. Avoid IVF with Dextrose. BG goal 140-180. Check hemoglobin A1c.   -PT/OT/SLP eval and treat.   -Counseled patient at length regarding life style and risk factor modification for secondary stroke prevention.   -All other medical management per primary team.   -DVT PPX:  SCDs.      The plan of care above has been discussed with Dr. Bar. Please call with questions.     OMERO Rowan.  [place] [facility]

## 2022-05-23 NOTE — ED PROVIDER NOTES
ED Provider Note    ER Provider Note         CHIEF COMPLAINT  Chief Complaint   Patient presents with   • Possible Stroke     [facility] reported visual mcnamara of loss of Periferal vision to right eye no other deficits reported.       HPI  Panchito Moura is a 56 y.o. male who presents to the Emergency Department with there is no other deficits reported.  [facility]. The patient denies any weakness or numbness. The patient is on Xarelto.  The patient denies any fevers or chills.  Says that she is very troubling.s. All other systems are negative.  There is a report of a clot in the patient's left PCA and this is why he was sent over to us.  The patient does not have any headache.  The patient says that it is very difficult for him to see when trying to look to the right.    REVIEW OF SYSTEMS  See HPI for further detail      P  .AST MEDICAL HISTORY   has a past medical history of Cancer (HCC), Cancer (HCC), DVT (deep venous thrombosis) (HCC), Pulmonary embolism (HCC), and Smoker.    SURGICAL HISTORY   has a past surgical history that includes nephrectomy partial; nerve repair (6/18/08); laceration repair (6/18/08); other; nephrectomy partial (Right); and irrigation & debridement general (Left, 10/22/2020).    SOCIAL HISTORY  Social History     Tobacco Use   • Smoking status: Current Every Day Smoker     Packs/day: 1.50     Types: Cigarettes   • Smokeless tobacco: Never Used   Vaping Use   • Vaping Use: Never used   Substance Use Topics   • Alcohol use: No   • Drug use: Yes     Comment: deysi      Social History     Substance and Sexual Activity   Drug Use Yes    Comment: deysi       FAMILY HISTORY  Family History   Problem Relation Age of Onset   • Heart Attack Mother    • Cancer Father        CURRENT MEDICATIONS  Home Medications     Reviewed by Nito Alvarez (Pharmacy Tech) on 05/22/22 at 8208  Med List Status: Complete   Medication Last Dose Status  "  acetaminophen (TYLENOL) 325 MG Tab PRN Active   alendronate (FOSAMAX) 70 MG Tab 5/20/2022 Active   atorvastatin (LIPITOR) 40 MG Tab 5/21/2022 Active   Carboxymethylcellulose Sodium (DRY EYE RELIEF OP) PRN Active   ferrous gluconate (FERGON) 324 (38 Fe) MG Tab 5/21/2022 Active   gabapentin (NEURONTIN) 600 MG tablet 5/22/2022 Active   HYDROcodone-acetaminophen (NORCO) 5-325 MG Tab per tablet PRN Active   NIFEdipine SR (PROCARDIA-XL) 30 MG tablet 5/21/2022 Active   omeprazole (PRILOSEC) 20 MG delayed-release capsule PRN Active   rivaroxaban (XARELTO) 20 MG Tab tablet 5/21/2022 Active                ALLERGIES  No Known Allergies    PHYSICAL EXAM  VITAL SIGNS: /59   Pulse 62   Temp 36.7 °C (98.1 °F) (Temporal)   Resp 16   Ht 1.829 m (6' 0.01\")   Wt 97.1 kg (214 lb 1.1 oz)   SpO2 96%   BMI 29.03 kg/m²      Constitutional: Alert in no apparent distress.  HENT: No signs of trauma, Bilateral external ears normal, Nose normal.   Eyes: Pupils are equal, Conjunctiva normal, Non-icteric.   Neck: Normal range of motion, No tenderness, Supple, No stridor.   Lymphatic: No lymphadenopathy noted.   Cardiovascular: Regular rate and rhythm, nondisplaced PMI  Thorax & Lungs: No respiratory distress,  No chest tenderness.   Abdomen: Bowel sounds normal, Soft, No tenderness, No masses, No pulsatile masses. No peritoneal signs.  Skin: Warm, Dry, No erythema, No rash.   Back: No bony tenderness, No CVA tenderness.   Extremities: Intact distal pulses, No edema, No tenderness, No cyanosis.  Musculoskeletal: Good range of motion in all major joints. No tenderness to palpation or major deformities noted.   Neurologic: Symmetric smile, eyes shut tight bilaterally, forehead wrinkles bilaterally, sensation intact to light touch bilateral face, tongue midline, head turn and shoulder shrug with full strength. Hearing intact grossly bilaterally. 5/5  strength bilaterally, 5/5 tricep and bicep strength bilaterally. Sensation intact " to light touch r, m, u, axillary nerves bilaterally. 5/5 strength quadricep, plantarflexion/dorsiflexion/extensor hallicus longus bilaterally. Sensation intact to light touch bilateral lower extremities in all nerve distributions.  Intact finger-to-nose.  Visual field cut noted to the right in both eyes.    Psychiatric: Affect normal, Judgment normal, Mood normal.     DIAGNOSTIC STUDIES / PROCEDURES    EKG Interpretation:  Interpreted by me  Sinus bradycardia with no significant ST elevation or depression.  This is a nonischemic EKG.       LABS  Labs Reviewed   CBC WITH DIFFERENTIAL - Abnormal; Notable for the following components:       Result Value    MCHC 33.1 (*)     Platelet Count 119 (*)     Lymphocytes 18.30 (*)     All other components within normal limits    Narrative:     Indicate which anticoagulants the patient is on:->UNKNOWN   APTT - Abnormal; Notable for the following components:    APTT 72.2 (*)     All other components within normal limits    Narrative:     Indicate which anticoagulants the patient is on:->UNKNOWN   PROTHROMBIN TIME - Abnormal; Notable for the following components:    PT 15.0 (*)     INR 1.22 (*)     All other components within normal limits    Narrative:     Indicate which anticoagulants the patient is on:->UNKNOWN   BASIC METABOLIC PANEL    Narrative:     Indicate which anticoagulants the patient is on:->UNKNOWN   ESTIMATED GFR    Narrative:     Indicate which anticoagulants the patient is on:->UNKNOWN   HEMOGLOBIN A1C       All labs reviewed by me.    RADIOLOGY  CT-CEREBRAL PERFUSION ANALYSIS   Final Result         1.  Cerebral blood flow less than 30% likely representing completed infarct = 0 mL.      2.  T Max more than 6 seconds likely representing combination of completed infarct and ischemia = 58 mL.      3.  Mismatched volume likely representing ischemic brain/penumbra = 58 mL      4.  Please note that the cerebral perfusion was performed on the limited brain tissue around  the basal ganglia region. Infarct/ischemia outside the CT perfusion sections can be missed in this study.      CT-CTA HEAD WITH & W/O-POST PROCESS   Final Result         1.  Left P2 occlusion with area of low-density compatible with evolving infarct.   2.  Occlusion of the left internal carotid artery with reconstitution of the middle cerebral artery in the [place]      These findings were discussed with the patient's clinician, CALEB LOWE, on 5/22/2022 11:39 PM.            CT-CTA NECK WITH & W/O-POST PROCESSING   Final Result         1.  Proximal left internal carotid artery occlusion extending intracranially   2.  Heterogeneous right thyroid nodule, recommend follow-up thyroid sonography for further characterization due to nodule size.   3.  Left apical pulmonary nodule, see nodule follow-up recommendations below.      Fleischner Society pulmonary nodule recommendations:      Low Risk: CT at 6-12 months, then consider CT at 18-24 months      High Risk: CT at 6-12 months, then CT at 18-24 months      Low Risk - Minimal or absent history of smoking and of other known risk factors.      High Risk - History of smoking or of other known risk factors.      Note: These recommendations do not apply to lung cancer screening, patients with immunosuppression, or patients with known primary cancer.      Fleischner Society 2017 Guidelines for Management of Incidentally Detected Pulmonary Nodules in Adults      These findings were discussed with the patient's clinician, CALEB LOWE, on 5/22/2022 11:40 PM.      DX-CHEST-PORTABLE (1 VIEW)   Final Result         1.  No acute cardiopulmonary disease.      OUTSIDE IMAGES-DX CHEST   Final Result      OUTSIDE IMAGES-CT HEAD   Final Result      CT-FOREIGN FILM CAT SCAN   Final Result      MR-BRAIN-WITH & W/O    (Results Pending)        The radiologist's interpretation of all radiological studies have been reviewed by me.    COURSE & MEDICAL DECISION MAKING  Pertinent  Labs & Imaging studies reviewed. (See chart for details)    This is a 56 y.o. male that presents with a right sided visual deficit and left-sided PCA stroke.  At the time the patient is stable.  He was sent over here for potential IR intervention.  We will repeat labs.  We will consult neurology and then reassess.    9:26 PM - Patient seen and examined at bedside.     The neurologist asked me to repeat the studies.  In consultation with the neurologist they felt that the patient had completed the infarct and does not need to have a clot extracted.  There is no coagulopathy.  There is no anemia.  There is no electrolyte derangement.  Will admit the patient to the hospitalist in guarded condition.      The total critical care time on this patient is 30 minutes, resuscitating patient, speaking with admitting physician, and deciphering test results. This  is exclusive of separately billable procedures.      FINAL IMPRESSION  1. Acute CVA (cerebrovascular accident) (HCC)              Electronically signed by: Kareem Jacinto M.D., 5/22/2022

## 2022-05-23 NOTE — ASSESSMENT & PLAN NOTE
Patient was seen by neurologist Dr. Dave  No  thrombolytics or mechanical clot retrieval due to being outside the window and on OAC.   MRI is still  pending  Hold anticoagulation pending mri and neuro recommendations  Continue aspirin 81 mg  PT OT and speech  Continue supportive care

## 2022-05-23 NOTE — ED NOTES
Per pt went to sleep today @ 930 am feeling fine and woke up @ 1530 today w/ R field vision loss and HA

## 2022-05-23 NOTE — H&P
Hospital Medicine History & Physical Note    Date of Service  5/23/2022    Primary Care Physician  Gio Wang M.D.    Consultants  neurology    Specialist Names:     Code Status  Full Code    Chief Complaint  Chief Complaint   Patient presents with   • Possible Stroke     [facility] visual mcnamara of loss of Periferal vision to right eye no other deficits reported.       History of Presenting Illness  Panchito Moura is a 56 y.o. male who presented 5/22/2022 with right-sided vision loss.  Patient apparently slept around 9 AM today in the morning and he woke up around 3 PM.  After waking up he noticed that he could not see the right side of his vision.  He also had a left-sided throbbing headache around his left eye.  Th the headache has happened in the past but this time it is much more severe.  Patient got up and then tried to log into his computer but he had trouble because he could not see on the right side.  He had no weakness in his arms or legs no numbness no tingling no trouble with his gait.  Patient has not had similar symptoms in the past.  [facility] and he was [facility] here for further stroke evaluation.      Review of Systems  Review of Systems   Constitutional: Negative for chills and fever.   HENT: Negative for hearing loss and tinnitus.    Eyes: Positive for blurred vision (Rt sided vision loss) and pain (Lt eye).   Respiratory: Negative for cough and hemoptysis.    Cardiovascular: Negative for chest pain and palpitations.   Gastrointestinal: Negative for heartburn and nausea.   Genitourinary: Negative for dysuria and urgency.   Musculoskeletal: Negative for myalgias and neck pain.   Skin: Negative for itching and rash.   Neurological: Negative for dizziness and headaches.   Endo/Heme/Allergies: Negative for environmental allergies. Does not bruise/bleed easily.       Past Medical History   has a past medical history  of Cancer (HCC), Cancer (HCC), DVT (deep venous thrombosis) (HCC), Pulmonary embolism (HCC), and Smoker.    Surgical History   has a past surgical history that includes nephrectomy partial; nerve repair (6/18/08); laceration repair (6/18/08); other; nephrectomy partial (Right); and irrigation & debridement general (Left, 10/22/2020).     Family History  family history includes Cancer in his father; Heart Attack in his mother.   Family history reviewed with patient. There is no family history that is pertinent to the chief complaint.     Social History   reports that he has been smoking cigarettes. He has been smoking about 1.50 packs per day. He has never used smokeless tobacco. He reports current drug use. He reports that he does not drink alcohol.    Allergies  No Known Allergies    Medications  Prior to Admission Medications   Prescriptions Last Dose Informant Patient Reported? Taking?   Carboxymethylcellulose Sodium (DRY EYE RELIEF OP) PRN at PRN Patient's Home Pharmacy Yes Yes   Sig: Administer 1 Drop into affected eye(s) as needed (Dry eyes). Dextran Glycol   HYDROcodone-acetaminophen (NORCO) 5-325 MG Tab per tablet PRN at PRN Patient Yes Yes   Sig: Take 1 Tablet by mouth every four hours as needed. Indications: Pain   NIFEdipine SR (PROCARDIA-XL) 30 MG tablet 5/21/2022 at PM Patient's Home Pharmacy Yes Yes   Sig: Take 30 mg by mouth every evening.   acetaminophen (TYLENOL) 325 MG Tab PRN at PRN Patient's Home Pharmacy No No   Sig: Take 2 Tabs by mouth every 6 hours as needed for Mild Pain or Moderate Pain (Mild Pain; (Pain scale 1-3); Temp greater than 100.5 F).   alendronate (FOSAMAX) 70 MG Tab 5/20/2022 at AM Patient's Home Pharmacy Yes Yes   Sig: Take 70 mg by mouth every Friday.   atorvastatin (LIPITOR) 40 MG Tab 5/21/2022 at PM Patient's Home Pharmacy Yes Yes   Sig: Take 40 mg by mouth every evening.   ferrous gluconate (FERGON) 324 (38 Fe) MG Tab 5/21/2022 at AM Patient's Home Pharmacy Yes Yes   Sig:  Take 324 mg by mouth see administration instructions. Tale by mouth on Sunday, Tuesday, Thursday and Saturday   gabapentin (NEURONTIN) 600 MG tablet 5/22/2022 at AM Patient's Home Pharmacy Yes Yes   Sig: Take 600 mg by mouth 3 times a day.   omeprazole (PRILOSEC) 20 MG delayed-release capsule PRN at PRN Patient No No   Sig: Take 1 Cap by mouth every day.   rivaroxaban (XARELTO) 20 MG Tab tablet 5/21/2022 at PM Patient No No   Sig: Take 1 Tab by mouth with dinner.      Facility-Administered Medications: None       Physical Exam  Temp:  [36.7 °C (98.1 °F)] 36.7 °C (98.1 °F)  Pulse:  [54-65] 54  Resp:  [11-16] 11  BP: (137-159)/(76-78) 137/76  SpO2:  [93 %-99 %] 93 %  Blood Pressure: (!) 159/78   Temperature: 36.7 °C (98.1 °F)   Pulse: (!) 54   Respiration: 15   Pulse Oximetry: 97 %       Physical Exam  Vitals and nursing note reviewed.   Constitutional:       Appearance: Normal appearance.   HENT:      Head: Normocephalic and atraumatic.      Mouth/Throat:      Mouth: Mucous membranes are moist.      Pharynx: Oropharynx is clear.   Eyes:      Extraocular Movements: Extraocular movements intact.      Pupils: Pupils are equal, round, and reactive to light.      Comments: Right-sided hemianopsia   Cardiovascular:      Rate and Rhythm: Normal rate and regular rhythm.      Pulses: Normal pulses.      Heart sounds: Normal heart sounds.   Pulmonary:      Effort: Pulmonary effort is normal.      Breath sounds: Normal breath sounds.   Abdominal:      General: Abdomen is flat. Bowel sounds are normal.   Musculoskeletal:         General: Normal range of motion.      Cervical back: Normal range of motion and neck supple.   Skin:     General: Skin is warm and dry.   Neurological:      General: No focal deficit present.      Mental Status: He is alert and oriented to person, place, and time.         Laboratory:  Recent Labs     05/22/22  2200   WBC 8.2   RBC 5.48   HEMOGLOBIN 16.2   HEMATOCRIT 48.9   MCV 89.2   MCH 29.6   MCHC  33.1*   RDW 47.7   PLATELETCT 119*   MPV 9.4     Recent Labs     05/22/22 2200   SODIUM 139   POTASSIUM 4.2   CHLORIDE 106   CO2 24   GLUCOSE 83   BUN 14   CREATININE 1.03   CALCIUM 8.5     Recent Labs     05/22/22 2200   GLUCOSE 83     Recent Labs     05/22/22 2200   APTT 72.2*   INR 1.22*     No results for input(s): NTPROBNP in the last 72 hours.      No results for input(s): TROPONINT in the last 72 hours.    Imaging:  CT-CEREBRAL PERFUSION ANALYSIS   Final Result         1.  Cerebral blood flow less than 30% likely representing completed infarct = 0 mL.      2.  T Max more than 6 seconds likely representing combination of completed infarct and ischemia = 58 mL.      3.  Mismatched volume likely representing ischemic brain/penumbra = 58 mL      4.  Please note that the cerebral perfusion was performed on the limited brain tissue around the basal ganglia region. Infarct/ischemia outside the CT perfusion sections can be missed in this study.      CT-CTA HEAD WITH & W/O-POST PROCESS   Final Result         1.  Left P2 occlusion with area of low-density compatible with evolving infarct.   2.  Occlusion of the left internal carotid artery with reconstitution of the middle cerebral artery in the [place]      These findings were discussed with the patient's clinician, CALEB LOWE, on 5/22/2022 11:39 PM.            CT-CTA NECK WITH & W/O-POST PROCESSING   Final Result         1.  Proximal left internal carotid artery occlusion extending intracranially   2.  Heterogeneous right thyroid nodule, recommend follow-up thyroid sonography for further characterization due to nodule size.   3.  Left apical pulmonary nodule, see nodule follow-up recommendations below.      Fleischner Society pulmonary nodule recommendations:      Low Risk: CT at 6-12 months, then consider CT at 18-24 months      High Risk: CT at 6-12 months, then CT at 18-24 months      Low Risk - Minimal or absent history of smoking and of other  known risk factors.      High Risk - History of smoking or of other known risk factors.      Note: These recommendations do not apply to lung cancer screening, patients with immunosuppression, or patients with known primary cancer.      Fleischner Society 2017 Guidelines for Management of Incidentally Detected Pulmonary Nodules in Adults      These findings were discussed with the patient's clinician, FRANCIEJOHN LOWE, on 5/22/2022 11:40 PM.      DX-CHEST-PORTABLE (1 VIEW)   Final Result         1.  No acute cardiopulmonary disease.      OUTSIDE IMAGES-DX CHEST   Final Result      OUTSIDE IMAGES-CT HEAD   Final Result      CT-FOREIGN FILM CAT SCAN   Final Result      MR-BRAIN-WITH & W/O    (Results Pending)       EKG:  My impression is: NSR     ED course: Patient was evaluated in the ER by neurologist Dr. Arreguin.  As the patient was outside the stroke window no thrombolytics were given.  And no mechanical clot retrieval was initiated.  He recommended getting an MRI and holding off on anticoagulation.    Assessment/Plan:  Justification for Admission Status  I anticipate this patient will require at least two midnights for appropriate medical management, necessitating inpatient admission because DT PCAs stroke and recovery time required for that    * Cerebrovascular accident (CVA) due to embolism of posterior cerebral artery, unspecified blood vessel laterality (HCC)- (present on admission)  Assessment & Plan  Patient was seen by neurologist Dr. Dave  No  thrombolytics or mechanical clot retrieval due to being outside the window  -We will get MRI  -Hold anticoagulation until then  -We will give aspirin 81 mg  -We will check LDL HbA1c  -We will monitor patient on telemetry  -We will get PT OT and speech therapy    Leg wound, left- (present on admission)  Assessment & Plan  Patient getting chronic wound care  Will order wound consult    History of pulmonary embolus (PE)- (present on admission)  Assessment &  Plan  We will hold anticoagulation until MRI results come back    History of DVT (deep vein thrombosis)- (present on admission)  Assessment & Plan  On anticoagulation will hold until MRI comes back      VTE prophylaxis: pharmacologic prophylaxis contraindicated due to Until MRI completed     I discussed the plan of care with patient.

## 2022-05-23 NOTE — PROGRESS NOTES
Hospital Medicine Daily Progress Note    Date of Service  5/23/2022    Chief Complaint  Panchito Moura is a 56 y.o. male admitted 5/22/2022 with Right sided vision loss    Hospital Course  56 y.o. male with history of DVT and PE on xarelto, chronic lower extremity wounds with multiple amputations on the L foot, who presented 5/22/2022 with right-sided vision loss.  Patient apparently slept around 9 AM today in the morning and he woke up around 3 PM.  After waking up he noticed that he could not see the right side of his vision.  He also had a left-sided throbbing headache around his left eye. No weakness in arms or legs. [facility]   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 No candidate of thrombolytics due to being outside the window and also on Xarelto.  No IR intervention given the plain CT changes and completion of the infarct.    Interval Problem Update  Reports R eye sight peripheral vision loss. No focal weakness on extremities  MRI pending. Neurology following    I have personally seen and examined the patient at bedside. I discussed the plan of care with patient and bedside RN.    Consultants/Specialty  neurology    Code Status  Full Code    Disposition  Patient is not medically cleared for discharge.   Anticipate discharge to to home with close outpatient follow-up.  I have placed the appropriate orders for post-discharge needs.    Review of Systems  Review of Systems   Neurological: Positive for headaches.        Right hemianopsia   All other systems reviewed and are negative.       Physical Exam  Temp:  [36.7 °C (98.1 °F)] 36.7 °C (98.1 °F)  Pulse:  [50-65] 50  Resp:  [11-19] 16  BP: ()/(51-78) 131/67  SpO2:  [89 %-99 %] 93 %    Physical Exam  Vitals and nursing note reviewed.   Constitutional:       Appearance: Normal appearance.   HENT:      Head: Normocephalic and atraumatic.      Mouth/Throat:      Mouth: Mucous membranes are  dry.      Pharynx: Oropharynx is clear.   Eyes:      Pupils: Pupils are equal, round, and reactive to light.   Neck:      Vascular: No carotid bruit.   Cardiovascular:      Rate and Rhythm: Normal rate and regular rhythm.   Pulmonary:      Effort: Pulmonary effort is normal. No respiratory distress.      Breath sounds: Normal breath sounds. No wheezing.   Abdominal:      General: Abdomen is flat. Bowel sounds are normal.      Palpations: Abdomen is soft. There is no mass.   Musculoskeletal:         General: Normal range of motion.      Cervical back: Neck supple.      Comments: Chronic left lower extremities wounds with multiple toes amputation on L foot.    Skin:     General: Skin is warm and dry.   Neurological:      Mental Status: He is alert and oriented to person, place, and time.      Comments: right sided visual deficit (especially on peripheral vision)   Psychiatric:         Mood and Affect: Mood normal.         Behavior: Behavior normal.         Fluids  No intake or output data in the 24 hours ending 05/23/22 1203    Laboratory  Recent Labs     05/22/22  2200   WBC 8.2   RBC 5.48   HEMOGLOBIN 16.2   HEMATOCRIT 48.9   MCV 89.2   MCH 29.6   MCHC 33.1*   RDW 47.7   PLATELETCT 119*   MPV 9.4     Recent Labs     05/22/22  2200   SODIUM 139   POTASSIUM 4.2   CHLORIDE 106   CO2 24   GLUCOSE 83   BUN 14   CREATININE 1.03   CALCIUM 8.5     Recent Labs     05/22/22  2200   APTT 72.2*   INR 1.22*               Imaging  CT-CEREBRAL PERFUSION ANALYSIS   Final Result         1.  Cerebral blood flow less than 30% likely representing completed infarct = 0 mL.      2.  T Max more than 6 seconds likely representing combination of completed infarct and ischemia = 58 mL.      3.  Mismatched volume likely representing ischemic brain/penumbra = 58 mL      4.  Please note that the cerebral perfusion was performed on the limited brain tissue around the basal ganglia region. Infarct/ischemia outside the CT perfusion sections can be  missed in this study.      CT-CTA HEAD WITH & W/O-POST PROCESS   Final Result         1.  Left P2 occlusion with area of low-density compatible with evolving infarct.   2.  Occlusion of the left internal carotid artery with reconstitution of the middle cerebral artery in the [place]      These findings were discussed with the patient's clinician, CALEB LOWE, on 5/22/2022 11:39 PM.            CT-CTA NECK WITH & W/O-POST PROCESSING   Final Result         1.  Proximal left internal carotid artery occlusion extending intracranially   2.  Heterogeneous right thyroid nodule, recommend follow-up thyroid sonography for further characterization due to nodule size.   3.  Left apical pulmonary nodule, see nodule follow-up recommendations below.      Fleischner Society pulmonary nodule recommendations:      Low Risk: CT at 6-12 months, then consider CT at 18-24 months      High Risk: CT at 6-12 months, then CT at 18-24 months      Low Risk - Minimal or absent history of smoking and of other known risk factors.      High Risk - History of smoking or of other known risk factors.      Note: These recommendations do not apply to lung cancer screening, patients with immunosuppression, or patients with known primary cancer.      Fleischner Society 2017 Guidelines for Management of Incidentally Detected Pulmonary Nodules in Adults      These findings were discussed with the patient's clinician, CALEB LOWE, on 5/22/2022 11:40 PM.      DX-CHEST-PORTABLE (1 VIEW)   Final Result         1.  No acute cardiopulmonary disease.      OUTSIDE IMAGES-DX CHEST   Final Result      OUTSIDE IMAGES-CT HEAD   Final Result      CT-FOREIGN FILM CAT SCAN   Final Result      MR-BRAIN-WITH & W/O    (Results Pending)        Assessment/Plan  * Cerebrovascular accident (CVA) due to embolism of posterior cerebral artery, unspecified blood vessel laterality (HCC)- (present on admission)  Assessment & Plan  Patient was seen by neurologist   Mederios  No  thrombolytics or mechanical clot retrieval due to being outside the window and on OAC.   -We will get MRI, pending  -Hold anticoagulation until then  -We will give aspirin 81 mg  -We will check LDL HbA1c 5.5  -We will monitor patient on telemetry  -We will get PT OT and speech therapy    Pulmonary nodule  Assessment & Plan  Seen on CTA. Outpatient surveillance recommended.     Leg wound, left- (present on admission)  Assessment & Plan  Patient getting chronic wound care  Will order wound consult    History of pulmonary embolus (PE)- (present on admission)  Assessment & Plan  We will hold anticoagulation until MRI results come back    Smoker- (present on admission)  Assessment & Plan  5 minutes on tobacco cessation counseling provided including nicotine patches, gum, and dangers of smoking. Discussed the risks of smoking including increased risk of heart disease, stroke, cancer and COPD. Discussed the benefits of quitting smoking.     History of DVT (deep vein thrombosis)- (present on admission)  Assessment & Plan  On anticoagulation will hold until MRI comes back       VTE prophylaxis: SCDs/TEDs    I have performed a physical exam and reviewed and updated ROS and Plan today (5/23/2022). In review of yesterday's note (5/22/2022), there are no changes except as documented above.

## 2022-05-23 NOTE — ED TRIAGE NOTES
"Chief Complaint   Patient presents with   • Possible Stroke     [facility] reported visual mcnamara of loss of Periferal vision to right eye no other deficits reported.     Blood Pressure (Abnormal) 142/78   Pulse 65   Temperature 36.7 °C (98.1 °F) (Temporal)   Respiration 16   Height 1.829 m (6' 0.01\")   Weight 97.1 kg (214 lb 1.1 oz)   Oxygen Saturation 99%   Body Mass Index 29.03 kg/m²     "

## 2022-05-23 NOTE — CONSULTS
Neurology STROKE CODE H&P  Neurohospitalist Service, [facility]    Referring Physician: Kareem Jacinto M.D.    STROKE CODE:   Chief Complaint   Patient presents with   • Possible Stroke     [facility] with a reported visual mcnamara of loss of Periferal vision to right eye no other deficits reported.       To obtain the most accurate data regarding the time called, and time patient seen, refer to the stroke run-sheet and chart.  For time of CT, refer to the radiology report. See A&P below for TPA Decision and door to needle time if and when applicable.    HPI: 56-year-old male past ministry of cancer, DVTs and PEs on Xarelto chronic lower extremity wounds with multiple amputations on the left side.  [facility] cute onset of right visual field deficit.  Went to bed at 9 AM this morning.  Woke up at 4 PM with symptoms.  Was not a tPA candidate due to being on Xarelto and also outside the window.  Outside transfer called mentioning a thrombus in the left PCA and also the left carotid artery was occluded.  Patient was transferred here for IR consideration.  [facility] was unable to [facility] any [facility].  A CD did accompany the patient on arrival here.    Due to the contrast shortage we attempted to load the CD with the images.  After following loading the images which was difficult due to the fact that no computer in the ER has a CD Drive.  After following loading images we found that there is only a plain CT loaded.  Therefore a stat CT perfusion and CTA head and neck was ordered.        Review of systems: In addition to what is detailed in the HPI above, (and scanned into the chart if and when applicable), all other systems reviewed and are negative.    Past Medical History:    has a past medical history of Cancer (HCC), Cancer (HCC), DVT (deep venous thrombosis) (HCC), Pulmonary embolism (HCC), and Smoker.    FHx:  family history includes Cancer in his father; Heart  "Attack in his mother.    SHx:   reports that he has been smoking cigarettes. He has been smoking about 1.50 packs per day. He has never used smokeless tobacco. He reports current drug use. He reports that he does not drink alcohol.    Allergies:  No Known Allergies    Medications:  No current facility-administered medications for this encounter.    Current Outpatient Medications:   •  HYDROcodone-acetaminophen (NORCO) 5-325 MG Tab per tablet, Take 1 Tablet by mouth every four hours as needed. Indications: Pain, Disp: , Rfl:   •  alendronate (FOSAMAX) 70 MG Tab, Take 70 mg by mouth every Friday., Disp: , Rfl:   •  atorvastatin (LIPITOR) 40 MG Tab, Take 40 mg by mouth every evening., Disp: , Rfl:   •  Carboxymethylcellulose Sodium (DRY EYE RELIEF OP), Administer 1 Drop into affected eye(s) as needed (Dry eyes). Dextran Glycol, Disp: , Rfl:   •  ferrous gluconate (FERGON) 324 (38 Fe) MG Tab, Take 324 mg by mouth see administration instructions. Tale by mouth on Sunday, Tuesday, Thursday and Saturday, Disp: , Rfl:   •  gabapentin (NEURONTIN) 600 MG tablet, Take 600 mg by mouth 3 times a day., Disp: , Rfl:   •  NIFEdipine SR (PROCARDIA-XL) 30 MG tablet, Take 30 mg by mouth every evening., Disp: , Rfl:   •  rivaroxaban (XARELTO) 20 MG Tab tablet, Take 1 Tab by mouth with dinner., Disp: 30 Tab, Rfl: 0  •  omeprazole (PRILOSEC) 20 MG delayed-release capsule, Take 1 Cap by mouth every day., Disp: 30 Cap, Rfl: 0  •  acetaminophen (TYLENOL) 325 MG Tab, Take 2 Tabs by mouth every 6 hours as needed for Mild Pain or Moderate Pain (Mild Pain; (Pain scale 1-3); Temp greater than 100.5 F)., Disp: 30 Tab, Rfl: 0    Physical Examination:    Vitals:    05/22/22 2111 05/22/22 2200   BP: (!) 142/78 (!) 149/76   Pulse: 65 (!) 57   Resp: 16 15   Temp: 36.7 °C (98.1 °F)    TempSrc: Temporal    SpO2: 99% 96%   Weight: 97.1 kg (214 lb 1.1 oz)    Height: 1.829 m (6' 0.01\")        General: Patient is awake and in no acute distress  Eyes: " Unremarkable  CV: RRR    NEUROLOGICAL EXAM:     Mental status: Awake, alert and fully oriented, follows commands  Speech and language: speech is clear and fluent. The patient is able to name and repeat.  Cranial nerve exam: Pupils are equal, round and reactive to light bilaterally. Visual fields shows a right visual field deficit in both eyes.  To midline.. Extraocular muscles are intact. Sensation in the face is intact to light touch. Face is symmetric. Hearing to finger rub equal. Palate elevates symmetrically. Shoulder shrug is full. Tongue is midline.  Motor exam: Antigravity in all 4.  Sensory exam: Symmetrical to soft touch in both sides  Deep tendon reflexes: Trace bilateral biceps absent bilateral patellar's toes are equivocal   coordination: no ataxia   Gait: Unremarkable  NIH Stroke Scale:    1a. Level of Consciousness (Alert, drowsy, etc): 0= Alert    1b. LOC Questions (Month, age): 0= Answers both correctly    1c. LOC Commands (Open/close eyes make fist/let go): 0= Obeys both correctly    2.   Best Gaze (Eyes open - patient follows examiner's finger on face): 0= Normal    3.   Visual Fields (introduce visual stimulus/threat to patient's field quadrants): 2= Complete Hemianopia  4.   Facial Paresis (Show teeth, raise eyebrows and squeeze eyes shut): 0= Normal     5a. Motor Arm - Left (Elevate arm to 90 degrees if patient is sitting, 45 degrees if  supine): 0= No drift    5b. Motor Arm - Right (Elevate arm to 90 degrees if patient is sitting, 45 degrees if supine): 0= No drift    6a. Motor Leg - Left (Elevate leg 30 degrees with patient supine): 0= No drift    6b. Motor Leg - Right  (Elevate leg 30 degrees with patient supine): 0= No drift    7.   Limb Ataxia (Finger-nose, heel down shin): 0= No ataxia    8.   Sensory (Pin prick to face, arm, trunk and leg - compare side to side): 0= Normal    9.  Best Language (Name item, describe a picture and read sentences): 0= No aphasia    10. Dysarthria (Evaluate  speech clarity by patient repeating listed words): 0= Normal articulation    11. Extinction and Inattention (Use information from prior testing to identify neglect or  double simultaneous stimuli testing): 0= No neglect    Total NIH Score: 2    Modified [place] Scale (MRS): 2 = Slight disability; unable to perform all previous activities but able to look after own affairs without assistance      Objective Data:    Labs:  Lab Results   Component Value Date/Time    PROTHROMBTM 13.7 02/04/2021 04:35 AM    INR 1.02 02/04/2021 04:35 AM      Lab Results   Component Value Date/Time    WBC 8.2 05/22/2022 10:00 PM    RBC 5.48 05/22/2022 10:00 PM    HEMOGLOBIN 16.2 05/22/2022 10:00 PM    HEMATOCRIT 48.9 05/22/2022 10:00 PM    MCV 89.2 05/22/2022 10:00 PM    MCH 29.6 05/22/2022 10:00 PM    MCHC 33.1 (L) 05/22/2022 10:00 PM    MPV 9.4 05/22/2022 10:00 PM    NEUTSPOLYS 71.70 05/22/2022 10:00 PM    LYMPHOCYTES 18.30 (L) 05/22/2022 10:00 PM    MONOCYTES 6.30 05/22/2022 10:00 PM    EOSINOPHILS 2.80 05/22/2022 10:00 PM    BASOPHILS 0.50 05/22/2022 10:00 PM      Lab Results   Component Value Date/Time    SODIUM 139 05/22/2022 10:00 PM    POTASSIUM 4.2 05/22/2022 10:00 PM    CHLORIDE 106 05/22/2022 10:00 PM    CO2 24 05/22/2022 10:00 PM    GLUCOSE 83 05/22/2022 10:00 PM    BUN 14 05/22/2022 10:00 PM    CREATININE 1.03 05/22/2022 10:00 PM    CREATININE 1.5 (H) 06/10/2005 04:35 AM      Lab Results   Component Value Date/Time    CHOLSTRLTOT 198 09/06/2012 08:15 AM     09/06/2012 08:15 AM    HDL 36 (A) 09/06/2012 08:15 AM    TRIGLYCERIDE 224 (H) 09/06/2012 08:15 AM       Lab Results   Component Value Date/Time    ALKPHOSPHAT 106 (H) 02/02/2021 09:56 PM    ASTSGOT 11 (L) 02/02/2021 09:56 PM    ALTSGPT 9 02/02/2021 09:56 PM    TBILIRUBIN 0.5 02/02/2021 09:56 PM        Imaging/Testing:  CT-CEREBRAL PERFUSION ANALYSIS   Final Result         1.  Cerebral blood flow less than 30% likely representing completed infarct = 0 mL.      2.  T  Max more than 6 seconds likely representing combination of completed infarct and ischemia = 58 mL.      3.  Mismatched volume likely representing ischemic brain/penumbra = 58 mL      4.  Please note that the cerebral perfusion was performed on the limited brain tissue around the basal ganglia region. Infarct/ischemia outside the CT perfusion sections can be missed in this study.      CT-CTA HEAD WITH & W/O-POST PROCESS   Final Result         1.  Left P2 occlusion with area of low-density compatible with evolving infarct.   2.  Occlusion of the left internal carotid artery with reconstitution of the middle cerebral artery in the [place]      These findings were discussed with the patient's clinician, CALEB LOWE, on 5/22/2022 11:39 PM.            CT-CTA NECK WITH & W/O-POST PROCESSING   Final Result         1.  Proximal left internal carotid artery occlusion extending intracranially   2.  Heterogeneous right thyroid nodule, recommend follow-up thyroid sonography for further characterization due to nodule size.   3.  Left apical pulmonary nodule, see nodule follow-up recommendations below.      Fleischner Society pulmonary nodule recommendations:      Low Risk: CT at 6-12 months, then consider CT at 18-24 months      High Risk: CT at 6-12 months, then CT at 18-24 months      Low Risk - Minimal or absent history of smoking and of other known risk factors.      High Risk - History of smoking or of other known risk factors.      Note: These recommendations do not apply to lung cancer screening, patients with immunosuppression, or patients with known primary cancer.      Fleischner Society 2017 Guidelines for Management of Incidentally Detected Pulmonary Nodules in Adults      These findings were discussed with the patient's clinician, CALEB LOWE, on 5/22/2022 11:40 PM.      DX-CHEST-PORTABLE (1 VIEW)   Final Result         1.  No acute cardiopulmonary disease.      OUTSIDE IMAGES-DX CHEST   Final Result       OUTSIDE IMAGES-CT HEAD   Final Result      CT-FOREIGN FILM CAT SCAN   Final Result            Assessment and Plan:    56-year-old [facility]  Presented there with a right hemianopsia.  Imaging here shows occluded left occipital infarct.  No intervention at this time.  Cannot receive thrombolytics due to being outside the window and also on Xarelto.  No IR intervention given the plain CT changes and completion of the infarct.  Will get MRI brain to determination when to restart isolation based on the size and infarct.  Patient also has left ICA occlusion just distal of the bifurcation.  The left MCA appears to be perfused by collateral flow from the right side.  There is also a left PCA occlusion, distal P1.  No intervention for these findings.  Does not need a vascular consultation.    Plan:  1.  MRI brain  2.  Hold anticoagulation for now.  We will determine when to restart after MRI brain.  Undulations due to history of DVT and PE.  3.  Check an A1c and lipid panel.  4.  Treat LDL for goal below 70  5.  Recommend keeping normoglycemia  6.  Blood pressure currently is 140 systolic with no worsening symptoms.  Does not need permissive hypertension.  Keep the blood pressure between 110s to 140.  7.  Telemetry monitoring  8.  PT/OT      The evaluation of the patient, and recommended management, was discussed with the resident staff.     This chart was partially generated using voice recognition technology and sound alike word replacement may be present, best efforts were made to make the chart accurate.    Albin Arreguin MD  Board Certified Neurology, ABPN  (t) [phone]

## 2022-05-23 NOTE — ED NOTES
Pt resting in bed, eyes closed, equal chest rise noted, no signs of distress noted, will continue to monitor

## 2022-05-23 NOTE — ED NOTES
Pt changed to a hospital bed, pt alert and oriented, pt able to ambulate with stead gait to the chair in the room to have beds switched

## 2022-05-24 ENCOUNTER — HOSPITAL ENCOUNTER (INPATIENT)
Facility: REHABILITATION | Age: 57
End: 2022-05-24
Attending: PHYSICAL MEDICINE & REHABILITATION | Admitting: PHYSICAL MEDICINE & REHABILITATION
Payer: COMMERCIAL

## 2022-05-24 ENCOUNTER — APPOINTMENT (OUTPATIENT)
Dept: CARDIOLOGY | Facility: MEDICAL CENTER | Age: 57
DRG: 065 | End: 2022-05-24
Attending: STUDENT IN AN ORGANIZED HEALTH CARE EDUCATION/TRAINING PROGRAM
Payer: COMMERCIAL

## 2022-05-24 ENCOUNTER — APPOINTMENT (OUTPATIENT)
Dept: RADIOLOGY | Facility: MEDICAL CENTER | Age: 57
DRG: 065 | End: 2022-05-24
Attending: INTERNAL MEDICINE
Payer: COMMERCIAL

## 2022-05-24 LAB
ANION GAP SERPL CALC-SCNC: 12 MMOL/L (ref 7–16)
BUN SERPL-MCNC: 14 MG/DL (ref 8–22)
CALCIUM SERPL-MCNC: 9.3 MG/DL (ref 8.5–10.5)
CHLORIDE SERPL-SCNC: 102 MMOL/L (ref 96–112)
CHOLEST SERPL-MCNC: 135 MG/DL (ref 100–199)
CO2 SERPL-SCNC: 23 MMOL/L (ref 20–33)
CREAT SERPL-MCNC: 1.13 MG/DL (ref 0.5–1.4)
ERYTHROCYTE [DISTWIDTH] IN BLOOD BY AUTOMATED COUNT: 45.1 FL (ref 35.9–50)
GFR SERPLBLD CREATININE-BSD FMLA CKD-EPI: 75 ML/MIN/1.73 M 2
GLUCOSE SERPL-MCNC: 87 MG/DL (ref 65–99)
HCT VFR BLD AUTO: 45.2 % (ref 42–52)
HDLC SERPL-MCNC: 32 MG/DL
HGB BLD-MCNC: 15.3 G/DL (ref 14–18)
LDLC SERPL CALC-MCNC: 76 MG/DL
LV EJECT FRACT  99904: 65
LV EJECT FRACT  99904: 65
LV EJECT FRACT MOD 2C 99903: 63.88
LV EJECT FRACT MOD 2C 99903: 63.88
LV EJECT FRACT MOD 4C 99902: 64.95
LV EJECT FRACT MOD 4C 99902: 64.95
LV EJECT FRACT MOD BP 99901: 64.73
LV EJECT FRACT MOD BP 99901: 64.73
MCH RBC QN AUTO: 29.5 PG (ref 27–33)
MCHC RBC AUTO-ENTMCNC: 33.8 G/DL (ref 33.7–35.3)
MCV RBC AUTO: 87.1 FL (ref 81.4–97.8)
PLATELET # BLD AUTO: 125 K/UL (ref 164–446)
PMV BLD AUTO: 9.1 FL (ref 9–12.9)
POTASSIUM SERPL-SCNC: 3.9 MMOL/L (ref 3.6–5.5)
RBC # BLD AUTO: 5.19 M/UL (ref 4.7–6.1)
SODIUM SERPL-SCNC: 137 MMOL/L (ref 135–145)
TRIGL SERPL-MCNC: 133 MG/DL (ref 0–149)
WBC # BLD AUTO: 6.9 K/UL (ref 4.8–10.8)

## 2022-05-24 PROCEDURE — 99232 SBSQ HOSP IP/OBS MODERATE 35: CPT | Performed by: NURSE PRACTITIONER

## 2022-05-24 PROCEDURE — 700117 HCHG RX CONTRAST REV CODE 255: Performed by: INTERNAL MEDICINE

## 2022-05-24 PROCEDURE — 92610 EVALUATE SWALLOWING FUNCTION: CPT

## 2022-05-24 PROCEDURE — 85027 COMPLETE CBC AUTOMATED: CPT

## 2022-05-24 PROCEDURE — A9576 INJ PROHANCE MULTIPACK: HCPCS | Performed by: INTERNAL MEDICINE

## 2022-05-24 PROCEDURE — 93306 TTE W/DOPPLER COMPLETE: CPT

## 2022-05-24 PROCEDURE — 97165 OT EVAL LOW COMPLEX 30 MIN: CPT

## 2022-05-24 PROCEDURE — A9270 NON-COVERED ITEM OR SERVICE: HCPCS | Performed by: INTERNAL MEDICINE

## 2022-05-24 PROCEDURE — 700102 HCHG RX REV CODE 250 W/ 637 OVERRIDE(OP): Performed by: INTERNAL MEDICINE

## 2022-05-24 PROCEDURE — 770020 HCHG ROOM/CARE - TELE (206)

## 2022-05-24 PROCEDURE — 80048 BASIC METABOLIC PNL TOTAL CA: CPT

## 2022-05-24 PROCEDURE — 36415 COLL VENOUS BLD VENIPUNCTURE: CPT

## 2022-05-24 PROCEDURE — 93306 TTE W/DOPPLER COMPLETE: CPT | Mod: 26 | Performed by: INTERNAL MEDICINE

## 2022-05-24 PROCEDURE — 700101 HCHG RX REV CODE 250: Performed by: HOSPITALIST

## 2022-05-24 PROCEDURE — 80061 LIPID PANEL: CPT

## 2022-05-24 PROCEDURE — 97162 PT EVAL MOD COMPLEX 30 MIN: CPT

## 2022-05-24 PROCEDURE — 70553 MRI BRAIN STEM W/O & W/DYE: CPT

## 2022-05-24 PROCEDURE — 97597 DBRDMT OPN WND 1ST 20 CM/<: CPT

## 2022-05-24 PROCEDURE — 99233 SBSQ HOSP IP/OBS HIGH 50: CPT | Performed by: HOSPITALIST

## 2022-05-24 RX ORDER — LIDOCAINE HYDROCHLORIDE 20 MG/ML
1 JELLY TOPICAL
Status: DISCONTINUED | OUTPATIENT
Start: 2022-05-24 | End: 2022-05-25 | Stop reason: HOSPADM

## 2022-05-24 RX ADMIN — NIFEDIPINE 30 MG: 30 TABLET, FILM COATED, EXTENDED RELEASE ORAL at 18:06

## 2022-05-24 RX ADMIN — HYDROCODONE BITARTRATE AND ACETAMINOPHEN 1 TABLET: 5; 325 TABLET ORAL at 20:32

## 2022-05-24 RX ADMIN — ASPIRIN 81 MG 81 MG: 81 TABLET ORAL at 05:19

## 2022-05-24 RX ADMIN — ATORVASTATIN CALCIUM 40 MG: 40 TABLET, FILM COATED ORAL at 20:33

## 2022-05-24 RX ADMIN — SENNOSIDES AND DOCUSATE SODIUM 2 TABLET: 50; 8.6 TABLET ORAL at 18:06

## 2022-05-24 RX ADMIN — FERROUS GLUCONATE 324 MG: 324 TABLET ORAL at 18:06

## 2022-05-24 RX ADMIN — GABAPENTIN 600 MG: 300 CAPSULE ORAL at 13:12

## 2022-05-24 RX ADMIN — GABAPENTIN 600 MG: 300 CAPSULE ORAL at 18:06

## 2022-05-24 RX ADMIN — OMEPRAZOLE 20 MG: 20 CAPSULE, DELAYED RELEASE ORAL at 05:18

## 2022-05-24 RX ADMIN — GADOTERIDOL 20 ML: 279.3 INJECTION, SOLUTION INTRAVENOUS at 20:05

## 2022-05-24 RX ADMIN — LIDOCAINE HYDROCHLORIDE 1 APPLICATION: 20 JELLY TOPICAL at 14:23

## 2022-05-24 RX ADMIN — SENNOSIDES AND DOCUSATE SODIUM 2 TABLET: 50; 8.6 TABLET ORAL at 05:20

## 2022-05-24 RX ADMIN — GABAPENTIN 600 MG: 300 CAPSULE ORAL at 05:19

## 2022-05-24 ASSESSMENT — GAIT ASSESSMENTS
DISTANCE (FEET): 150
GAIT LEVEL OF ASSIST: SUPERVISED
DEVIATION: SHUFFLED GAIT;INCREASED BASE OF SUPPORT

## 2022-05-24 ASSESSMENT — ENCOUNTER SYMPTOMS
PALPITATIONS: 0
MUSCULOSKELETAL NEGATIVE: 1
FEVER: 0
HEADACHES: 1
FOCAL WEAKNESS: 0
CHILLS: 0
DEPRESSION: 0
ABDOMINAL PAIN: 0
DIAPHORESIS: 0
RESPIRATORY NEGATIVE: 1
SHORTNESS OF BREATH: 0
GASTROINTESTINAL NEGATIVE: 1
BLURRED VISION: 0
WEIGHT LOSS: 0
SORE THROAT: 0
PSYCHIATRIC NEGATIVE: 1
EYES NEGATIVE: 1
BRUISES/BLEEDS EASILY: 0
WEAKNESS: 0
DIZZINESS: 0
NAUSEA: 0
MYALGIAS: 0
COUGH: 0
CONSTITUTIONAL NEGATIVE: 1
CARDIOVASCULAR NEGATIVE: 1
VOMITING: 0

## 2022-05-24 ASSESSMENT — COGNITIVE AND FUNCTIONAL STATUS - GENERAL
DAILY ACTIVITIY SCORE: 24
SUGGESTED CMS G CODE MODIFIER DAILY ACTIVITY: CH
SUGGESTED CMS G CODE MODIFIER MOBILITY: CH
MOBILITY SCORE: 24

## 2022-05-24 ASSESSMENT — LIFESTYLE VARIABLES: SUBSTANCE_ABUSE: 0

## 2022-05-24 ASSESSMENT — PAIN DESCRIPTION - PAIN TYPE: TYPE: ACUTE PAIN

## 2022-05-24 ASSESSMENT — ACTIVITIES OF DAILY LIVING (ADL): TOILETING: INDEPENDENT

## 2022-05-24 NOTE — THERAPY
Speech Language Pathology   Clinical Swallow Evaluation     Patient Name: Panchito Moura  AGE:  56 y.o., SEX:  male  Medical Record #: [id number]  Today's Date: 5/24/2022     Precautions  Precautions: Fall Risk    Assessment    HPI: 56 y.o. male with history of DVT and PE on xarelto, chronic lower extremity wounds with multiple amputations on the L foot, who presented 5/22/2022 with right-sided vision loss.  Patient apparently slept around 9 AM today in the morning and he woke up around 3 PM.  After waking up he noticed that he could not see the right side of his vision.  He also had a left-sided throbbing headache around his left eye. No weakness in arms or legs. [facility]   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 No candidate of thrombolytics due to being outside the window and also on Xarelto.  No IR intervention given the plain CT changes and completion of the infarct.    PMHx: includes DVT/PE, renal cell carcinoma, tobacco abuse, stroke (2021, R hand weakness), chronic LLE/L foot wound w/ poor healing.     Level of Consciousness: Alert  Affect/Behavior: Irritable, Cooperative  Follows Directives: Yes  Hearing: Functional hearing  Vision: Functional vision    Prior Living Situation & Level of Function:  Patient is on a regular/thin diet at baseline.     Oral Mechanism Evaluation  Facial Symmetry: Equal  Facial Sensation: Equal  Labial Observations: WFL  Lingual Observations: Midline  Dentition: Good  Comments:    Voice  Quality: WFL  Resonance: WFL  Intensity: Appropriate  Cough: WFL  Comments:    Current Method of Nutrition   NPO until cleared by speech pathology    Subjective  Exam was cursory was pt was irritated and wanted to go back to sleep.      Assessment  Positioning: Semi Vincent's (30-45 degrees)  Bolus Administration: Patient  Oxygen Requirements: 4 L Nasal Cannula; pt had removed on arrival; SLP replaced it  Factor(s) Affecting  Performance: None    Swallowing Trials  Ice: WFL  Thin Liquid (TN0): WFL  Liquidised (LQ3): WFL  Pureed (PU4): WFL  Regular (RG7): WFL    Comments:  Patient was able to self feed. Bolus acceptance, containment, mastication and lingual transit were functional with no anterior bolus loss or abnormal oral residue present post swallow. Pharyngeal swallow response appeared timely. No s/sx of aspiration occurred with PO intake.      Clinical Impressions  No clinical s/sx of oropharyngeal dysphagia. Swallow is functional for a regular/thin diet.     Recommendations  1.  Initiate an oral diet of regular solids/thin liquids (2g Na per orders)  2.  Instrumentation: None indicated at this time  3.  Swallowing Instructions & Precautions:   Supervision: Independent  Positioning: Meals sitting upright in a chair, as tolerated  Medication: Whole with liquid  Strategies: None  Oral Care: BID  4.  SLP will not actively follow for dysphagia tx as swallow is WFL.      Plan    Recommend Speech Therapy for Evaluation only for the following treatments:  Dysphagia Training and Patient / Family / Caregiver Education.    Discharge Recommendations: Anticipate that the patient will have no further speech therapy needs after discharge from the hospital       Objective       05/24/22 0837   Anticipated Discharge Needs   Discharge Recommendations Anticipate that the patient will have no further speech therapy needs after discharge from the hospital   Therapy Recommendations Upon DC Not Indicated

## 2022-05-24 NOTE — CARE PLAN
The patient is Stable - Low risk of patient condition declining or worsening    Shift Goals  Clinical Goals: Monitor neuro status, safety  Patient Goals: Sleep  Family Goals: KRISTEN    Progress made toward(s) clinical / shift goals:    Problem: Knowledge Deficit - Stroke Education  Goal: Patient's knowledge of stroke and risk factors will improve  Outcome: Progressing   Pt was educated on stroke condition, NIH stroke scale, and neurological symptoms.  Problem: Neuro Status  Goal: Neuro status will remain stable or improve  Outcome: Progressing   Q4hr neuro checks. Neuro status remains stable.      Patient is not progressing towards the following goals:

## 2022-05-24 NOTE — PROGRESS NOTES
Monitor Summary: SB 50-57, WA .19, QRS .10, QT .43 with occasional PAC per strip from monitor room

## 2022-05-24 NOTE — PROGRESS NOTES
"Hospital Medicine Daily Progress Note    Date of Service  5/24/2022    Chief Complaint  Panchito Moura is a 56 y.o. male admitted 5/22/2022 with Right sided vision loss    Hospital Course  Patient is a 56 year old male with history of DVT and PE on xarelto, chronic lower extremity wounds with multiple amputations on the L foot. He presented to [facility] on 5/22/2022 with right-sided vision loss.  Patient apparently slept around 9 AM today in the morning and he woke up around 3 PM.  After waking up he noticed that he could not see the right side of his vision.  He also had a left-sided throbbing headache around his left eye. No weakness in arms or legs. [facility]   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 No candidate of thrombolytics due to being outside the window and also on Xarelto.  No IR intervention given the plain CT changes and completion of the infarct.    Interval Problem Update  Slept poorly \"because the bed is uncomfortable\", denies pain, right sided vision loss unchanged, mild frontal h/a today.  MRI is still pending. ROS otherwise negative. [id number]    I have personally seen and examined the patient at bedside. I discussed the plan of care with patient and bedside RN.    Consultants/Specialty  neurology    Code Status  Full Code    Disposition  Patient is not medically cleared for discharge.   Anticipate discharge to to home with close outpatient follow-up.  I have placed the appropriate orders for post-discharge needs.    Review of Systems  Review of Systems   Constitutional: Negative.  Negative for chills, diaphoresis, fever, malaise/fatigue and weight loss.   HENT: Negative.  Negative for sore throat.    Eyes: Negative.  Negative for blurred vision.   Respiratory: Negative.  Negative for cough and shortness of breath.    Cardiovascular: Negative.  Negative for chest pain, palpitations and leg swelling.   Gastrointestinal: Negative.  Negative " for abdominal pain, nausea and vomiting.   Genitourinary: Negative.  Negative for dysuria.   Musculoskeletal: Negative.  Negative for myalgias.   Skin: Negative.  Negative for itching and rash.   Neurological: Positive for headaches. Negative for dizziness, focal weakness and weakness.        Right hemianopsia   Endo/Heme/Allergies: Negative.  Does not bruise/bleed easily.   Psychiatric/Behavioral: Negative.  Negative for depression, substance abuse and suicidal ideas.   All other systems reviewed and are negative.       Physical Exam  Temp:  [36.5 °C (97.7 °F)-36.9 °C (98.5 °F)] 36.6 °C (97.8 °F)  Pulse:  [54-63] 63  Resp:  [18-20] 20  BP: ()/(46-75) 110/75  SpO2:  [91 %-96 %] 91 %    Physical Exam  Vitals and nursing note reviewed.   Constitutional:       Appearance: Normal appearance.   HENT:      Head: Normocephalic and atraumatic.      Mouth/Throat:      Pharynx: Oropharynx is clear.   Eyes:      Pupils: Pupils are equal, round, and reactive to light.   Neck:      Vascular: No carotid bruit.   Cardiovascular:      Rate and Rhythm: Normal rate and regular rhythm.   Pulmonary:      Effort: Pulmonary effort is normal. No respiratory distress.      Breath sounds: Normal breath sounds. No wheezing.   Abdominal:      General: Abdomen is flat. Bowel sounds are normal.      Palpations: Abdomen is soft. There is no mass.   Musculoskeletal:         General: Normal range of motion.      Cervical back: Neck supple.      Comments: Chronic left lower extremities wounds with multiple toes amputation on L foot.    Skin:     General: Skin is warm and dry.   Neurological:      Mental Status: He is alert and oriented to person, place, and time.      Comments: right sided visual deficit (especially on peripheral vision)   Psychiatric:         Mood and Affect: Mood normal.         Behavior: Behavior normal.         Fluids    Intake/Output Summary (Last 24 hours) at 5/24/2022 9888  Last data filed at 5/23/2022 2000  Gross per  24 hour   Intake --   Output 30 ml   Net -30 ml       Laboratory  Recent Labs     05/22/22 2200 05/24/22  0151   WBC 8.2 6.9   RBC 5.48 5.19   HEMOGLOBIN 16.2 15.3   HEMATOCRIT 48.9 45.2   MCV 89.2 87.1   MCH 29.6 29.5   MCHC 33.1* 33.8   RDW 47.7 45.1   PLATELETCT 119* 125*   MPV 9.4 9.1     Recent Labs     05/22/22 2200 05/24/22  0151   SODIUM 139 137   POTASSIUM 4.2 3.9   CHLORIDE 106 102   CO2 24 23   GLUCOSE 83 87   BUN 14 14   CREATININE 1.03 1.13   CALCIUM 8.5 9.3     Recent Labs     05/22/22 2200   APTT 72.2*   INR 1.22*         Recent Labs     05/24/22  0151   TRIGLYCERIDE 133   HDL 32*   LDL 76       Imaging  CT-CEREBRAL PERFUSION ANALYSIS   Final Result         1.  Cerebral blood flow less than 30% likely representing completed infarct = 0 mL.      2.  T Max more than 6 seconds likely representing combination of completed infarct and ischemia = 58 mL.      3.  Mismatched volume likely representing ischemic brain/penumbra = 58 mL      4.  Please note that the cerebral perfusion was performed on the limited brain tissue around the basal ganglia region. Infarct/ischemia outside the CT perfusion sections can be missed in this study.      CT-CTA HEAD WITH & W/O-POST PROCESS   Final Result         1.  Left P2 occlusion with area of low-density compatible with evolving infarct.   2.  Occlusion of the left internal carotid artery with reconstitution of the middle cerebral artery in the [place]      These findings were discussed with the patient's clinician, CALEB LOWE, on 5/22/2022 11:39 PM.            CT-CTA NECK WITH & W/O-POST PROCESSING   Final Result         1.  Proximal left internal carotid artery occlusion extending intracranially   2.  Heterogeneous right thyroid nodule, recommend follow-up thyroid sonography for further characterization due to nodule size.   3.  Left apical pulmonary nodule, see nodule follow-up recommendations below.      Fleischner Society pulmonary nodule  recommendations:      Low Risk: CT at 6-12 months, then consider CT at 18-24 months      High Risk: CT at 6-12 months, then CT at 18-24 months      Low Risk - Minimal or absent history of smoking and of other known risk factors.      High Risk - History of smoking or of other known risk factors.      Note: These recommendations do not apply to lung cancer screening, patients with immunosuppression, or patients with known primary cancer.      Fleischner Society 2017 Guidelines for Management of Incidentally Detected Pulmonary Nodules in Adults      These findings were discussed with the patient's clinician, CALEB LOWE, on 5/22/2022 11:40 PM.      DX-CHEST-PORTABLE (1 VIEW)   Final Result         1.  No acute cardiopulmonary disease.      OUTSIDE IMAGES-DX CHEST   Final Result      OUTSIDE IMAGES-CT HEAD   Final Result      CT-FOREIGN FILM CAT SCAN   Final Result      MR-BRAIN-WITH & W/O    (Results Pending)   EC-ECHOCARDIOGRAM COMPLETE W/ CONT    (Results Pending)        Assessment/Plan  * Cerebrovascular accident (CVA) due to embolism of posterior cerebral artery, unspecified blood vessel laterality (HCC)- (present on admission)  Assessment & Plan  Patient was seen by neurologist Dr. Dave  No  thrombolytics or mechanical clot retrieval due to being outside the window and on OAC.   MRI is still  pending  Hold anticoagulation pending mri and neuro recommendations  Continue aspirin 81 mg  PT OT and speech  Continue supportive care    Pulmonary nodule  Assessment & Plan  Seen on CTA. Outpatient surveillance recommended.     Leg wound, left- (present on admission)  Assessment & Plan  Patient getting chronic wound care  Continue supportive care    History of pulmonary embolus (PE)- (present on admission)  Assessment & Plan  We will hold anticoagulation until MRI results come back    Smoker- (present on admission)  Assessment & Plan  5 minutes on tobacco cessation counseling provided including nicotine patches,  gum, and dangers of smoking. Discussed the risks of smoking including increased risk of heart disease, stroke, cancer and COPD. Discussed the benefits of quitting smoking.     History of DVT (deep vein thrombosis)- (present on admission)  Assessment & Plan  On anticoagulation will hold until MRI comes back       VTE prophylaxis: SCDs/TEDs    I have performed a physical exam and reviewed and updated ROS and Plan today (5/24/2022). In review of yesterday's note (5/23/2022), there are no changes except as documented above.

## 2022-05-24 NOTE — PROGRESS NOTES
Chief Complaint   Patient presents with   • Possible Stroke     [facility] reported visual mcnamara of loss of Periferal vision to right eye no other deficits reported.       Problem List Items Addressed This Visit     * (Principal) Cerebrovascular accident (CVA) due to embolism of posterior cerebral artery, unspecified blood vessel laterality (HCC)     Patient was seen by neurologist Dr. Dave  No  thrombolytics or mechanical clot retrieval due to being outside the window and on OAC.   -We will get MRI, pending  -Hold anticoagulation until then  -We will give aspirin 81 mg  -We will check LDL HbA1c 5.5  -We will monitor patient on telemetry  -We will get PT OT and speech therapy           Relevant Medications    atorvastatin (LIPITOR) 40 MG Tab    NIFEdipine SR (PROCARDIA-XL) 30 MG tablet    hydrALAZINE (APRESOLINE) injection 10 mg    atorvastatin (LIPITOR) tablet 40 mg    NIFEdipine SR (PROCARDIA-XL) tablet 30 mg    Other Relevant Orders    Referral to Physiatry (PMR)    Referral to Neurology      Other Visit Diagnoses     Acute CVA (cerebrovascular accident) (HCC)        Relevant Medications    atorvastatin (LIPITOR) 40 MG Tab    NIFEdipine SR (PROCARDIA-XL) 30 MG tablet    hydrALAZINE (APRESOLINE) injection 10 mg    atorvastatin (LIPITOR) tablet 40 mg    NIFEdipine SR (PROCARDIA-XL) tablet 30 mg        Neurology Stroke Progress Note    Brief History of present illness:  56-year old male with PMHx significant for DVT/PE (on Xarelto with documented non compliance at times), renal cell carcinoma, tobacco abuse, stroke (2021; Right hand weakness) and chronic LLE/Left foot wound with poor healing who presented [facility]22 for a chief complaint of Right visual field loss. Reportedly, the patient went to sleep at 0900 on 5/22; when he awoke at 1600, he noted difficulty seeing on the Right. Denies new/focal weakness, numbness or paresthesia. [facility], determined not to be a candidate for IV thrombolytics secondary to concurrent use of Xarelto. Vascular studies also noted thrombus in the left PCA and also the left carotid artery was occluded.  Patient was transferred here for IR consideration; was determined not to be a candidate for IR intervention. Further work up is in progress.     Interval, 5/23/22:  Patient is sitting up in stretcher; awake and alert. Admits to persistent Right HH. Denies new/focal weakness. Awaiting MRI Brain wo contrast. No events overnight; SBP 120s.     Interval, 5/24/22  Patient is sitting up in bed; awake and alert. Working with PT/OT. Admits to persistent Right visual field deficits, unchanged today. Still awaiting MRI Brain. No events overnight.     No changes to HPI as was previously documented.     Past medical history:   Past Medical History:   Diagnosis Date   • Cancer (HCC)     kidney   • Cancer (HCC)     renal   • DVT (deep venous thrombosis) (HCC)    • Pulmonary embolism (HCC)    • Smoker        Past surgical history:   Past Surgical History:   Procedure Laterality Date   • IRRIGATION & DEBRIDEMENT GENERAL Left 10/22/2020    Procedure: IRRIGATION AND DEBRIDEMENT, WOUND- CALF AND WOUND VAC PLACEMENT;  Surgeon: Temo Mayer M.D.;  Location: SURGERY [facility];  Service: Plastics   • NERVE REPAIR  6/18/08    Performed by SKYE RAY at [facility] ORS   • LACERATION REPAIR  6/18/08    Performed by SKYE RAY at [facility] ORS   • NEPHRECTOMY PARTIAL      right   • NEPHRECTOMY PARTIAL Right    • OTHER      tonsillectomy       Family history:   Family History   Problem Relation Age of Onset   • Heart Attack Mother    • Cancer Father        Social history:   Social History     Socioeconomic History   • Marital status: Single     Spouse name: Not on file   • Number of children: Not on file   • Years of education: Not on file   • Highest education level: Not on file   Occupational  History   • Not on file   Tobacco Use   • Smoking status: Current Every Day Smoker     Packs/day: 0.50     Types: Cigarettes   • Smokeless tobacco: Never Used   Vaping Use   • Vaping Use: Never used   Substance and Sexual Activity   • Alcohol use: No   • Drug use: Not Currently     Comment: edibles   • Sexual activity: Not on file   Other Topics Concern   • Not on file   Social History Narrative    ** Merged History Encounter **          Social Determinants of Health     Financial Resource Strain: Not on file   Food Insecurity: Not on file   Transportation Needs: Not on file   Physical Activity: Not on file   Stress: Not on file   Social Connections: Not on file   Intimate Partner Violence: Not on file   Housing Stability: Not on file       Current medications:   Current Facility-Administered Medications   Medication Dose   • lidocaine 2 % jelly 1 Application  1 Application   • senna-docusate (PERICOLACE or SENOKOT S) 8.6-50 MG per tablet 2 Tablet  2 Tablet    And   • polyethylene glycol/lytes (MIRALAX) PACKET 1 Packet  1 Packet    And   • magnesium hydroxide (MILK OF MAGNESIA) suspension 30 mL  30 mL    And   • bisacodyl (DULCOLAX) suppository 10 mg  10 mg   • acetaminophen (Tylenol) tablet 650 mg  650 mg   • hydrALAZINE (APRESOLINE) injection 10 mg  10 mg   • [START ON 5/27/2022] alendronate (FOSAMAX) tablet 70 mg  70 mg   • atorvastatin (LIPITOR) tablet 40 mg  40 mg   • carboxymethylcellulose (REFRESH TEARS) 0.5 % ophthalmic drops 1 Drop  1 Drop   • ferrous gluconate (FERGON) tablet 324 mg  324 mg   • HYDROcodone-acetaminophen (NORCO) 5-325 MG per tablet 1 Tablet  1 Tablet   • NIFEdipine SR (PROCARDIA-XL) tablet 30 mg  30 mg   • omeprazole (PRILOSEC) capsule 20 mg  20 mg   • gabapentin (NEURONTIN) capsule 600 mg  600 mg   • aspirin (ASA) chewable tab 81 mg  81 mg       Medication Allergy:  No Known Allergies      Review of systems:   Constitutional: denies fever, night sweats, weight loss.   Eyes: denies acute  vision change, eye pain or secretion.   Ears, Nose, Mouth, Throat: denies nasal secretion, nasal bleeding, difficulty swallowing, hearing loss, tinnitus, vertigo, ear pain, acute dental problems, oral ulcers or lesions.   Endocrine: denies recent weight changes, heat or cold intolerance, polyuria, polydypsia, polyphagia,abnormal hair growth.  Cardiovascular: denies new onset of chest pain, palpitations, syncope, or dyspnea of exertion.  Pulmonary: denies shortness of breath, new onset of cough, hemoptysis, wheezing, chest pain or flu-like symptoms.   GI: denies nausea, vomiting, diarrhea, GI bleeding, change in appetite, abdominal pain, and change in bowel habits.  : denies dysuria, urinary incontinence, hematuria.  Heme/oncology: denies history of easy bruising or bleeding. No history of cancer, DVTor PE.  Allergy/immunology: denies hives/urticaria, or itching.   Dermatologic: denies new rash, or new skin lesions.  Musculoskeletal:denies joint swelling or pain, muscle pain, neck and back pain.   Neurologic: As noted in detail above.   Psychiatric: denies symptoms of depression, anxiety, hallucinations, mood swings or changes, suicidal or homicidal thoughts.       Physical examination:   Vitals:    05/23/22 1935 05/23/22 2353 05/24/22 0448 05/24/22 0800   BP: 132/68 127/64 (!) 98/46 110/75   Pulse: (!) 54 (!) 54 62 63   Resp: 18 18 18 20   Temp: 36.6 °C (97.9 °F) 36.5 °C (97.7 °F) 36.9 °C (98.5 °F) 36.6 °C (97.8 °F)   TempSrc: Temporal Temporal Temporal Temporal   SpO2: 96% 95% 91% 91%   Weight:       Height:         General: Patient in no acute distress, pleasant and cooperative.  HEENT: Normocephalic, no signs of acute trauma.   Neck: supple, no meningeal signs or carotid bruits. There is normal range of motion. No tenderness on exam.   Chest: clear to auscultation. No cough.   CV: RRR, no murmurs.   Skin: no signs of acute rashes or trauma.   Musculoskeletal: joints exhibit full range of motion, without any  pain to palpation. There are no signs of joint or muscle swelling. There is no tenderness to deep palpation of muscles.   Psychiatric: No hallucinatory behavior.       NEUROLOGICAL EXAM:   Mental status, orientation: Awake, alert and fully oriented.   Speech and language: speech is clear and fluent. The patient is able to name, repeat and comprehend.   Cranial nerve exam: Pupils are 3-4 mm bilaterally and equally reactive to light. Right homonymous hemianopsia present. There is no nystagmus on primary or secondary gaze. Intact full EOM in all directions of gaze. Face appears symmetric. Sensation in the face is intact to light touch. Uvula is midline. Palate elevates symmetrically. Tongue is midline and without any signs of tongue biting or fasciculations.Shoulder shrug is intact bilaterally.   Motor exam: Strength is 5/5 in all extremities. Tone is normal. No abnormal movements were seen on exam.   Sensory exam reveals normal sense of light touch and pinprick in all extremities.   Deep tendon reflexes:  Plantar responses are flexor. There is no clonus.   Coordination: shows a normal finger-nose-finger.  Gait: Not assessed at this time as patient is currently unable.     No significant changes to exam as was documented on 5/23/22.      NIH Stroke Scale    1a Level of Consciousness   1b Orientation Questions   1c Response to Commands   2 Gaze   3 Visual Fields 2  4 Facial Movement   5 Motor Function (arm)   a Left   b Right   6 Motor Function (leg)   a Left   b Right   7 Limb Ataxia   8 Sensory   9 Language   10 Articulation   11 Extinction/Inattention     Score: 2      ANCILLARY DATA REVIEWED:     Lab Data Review:  Recent Results (from the past 24 hour(s))   CBC without Differential    Collection Time: 05/24/22  1:51 AM   Result Value Ref Range    WBC 6.9 4.8 - 10.8 K/uL    RBC 5.19 4.70 - 6.10 M/uL    Hemoglobin 15.3 14.0 - 18.0 g/dL    Hematocrit 45.2 42.0 - 52.0 %    MCV 87.1 81.4 - 97.8 fL    MCH 29.5 27.0 - 33.0  pg    MCHC 33.8 33.7 - 35.3 g/dL    RDW 45.1 35.9 - 50.0 fL    Platelet Count 125 (L) 164 - 446 K/uL    MPV 9.1 9.0 - 12.9 fL   Basic Metabolic Panel (BMP)    Collection Time: 05/24/22  1:51 AM   Result Value Ref Range    Sodium 137 135 - 145 mmol/L    Potassium 3.9 3.6 - 5.5 mmol/L    Chloride 102 96 - 112 mmol/L    Co2 23 20 - 33 mmol/L    Glucose 87 65 - 99 mg/dL    Bun 14 8 - 22 mg/dL    Creatinine 1.13 0.50 - 1.40 mg/dL    Calcium 9.3 8.5 - 10.5 mg/dL    Anion Gap 12.0 7.0 - 16.0   Lipid Profile (Lipid Panel) Fasting    Collection Time: 05/24/22  1:51 AM   Result Value Ref Range    Cholesterol,Tot 135 100 - 199 mg/dL    Triglycerides 133 0 - 149 mg/dL    HDL 32 (A) >=40 mg/dL    LDL 76 <100 mg/dL   ESTIMATED GFR    Collection Time: 05/24/22  1:51 AM   Result Value Ref Range    GFR (CKD-EPI) 75 >60 mL/min/1.73 m 2       Labs reviewed by me.       Imaging reviewed by me:     CT-CEREBRAL PERFUSION ANALYSIS   Final Result         1.  Cerebral blood flow less than 30% likely representing completed infarct = 0 mL.      2.  T Max more than 6 seconds likely representing combination of completed infarct and ischemia = 58 mL.      3.  Mismatched volume likely representing ischemic brain/penumbra = 58 mL      4.  Please note that the cerebral perfusion was performed on the limited brain tissue around the basal ganglia region. Infarct/ischemia outside the CT perfusion sections can be missed in this study.      CT-CTA HEAD WITH & W/O-POST PROCESS   Final Result         1.  Left P2 occlusion with area of low-density compatible with evolving infarct.   2.  Occlusion of the left internal carotid artery with reconstitution of the middle cerebral artery in the [place]      These findings were discussed with the patient's clinician, CALEB LOWE, on 5/22/2022 11:39 PM.            CT-CTA NECK WITH & W/O-POST PROCESSING   Final Result         1.  Proximal left internal carotid artery occlusion extending intracranially    2.  Heterogeneous right thyroid nodule, recommend follow-up thyroid sonography for further characterization due to nodule size.   3.  Left apical pulmonary nodule, see nodule follow-up recommendations below.      Fleischner Society pulmonary nodule recommendations:      Low Risk: CT at 6-12 months, then consider CT at 18-24 months      High Risk: CT at 6-12 months, then CT at 18-24 months      Low Risk - Minimal or absent history of smoking and of other known risk factors.      High Risk - History of smoking or of other known risk factors.      Note: These recommendations do not apply to lung cancer screening, patients with immunosuppression, or patients with known primary cancer.      Fleischner Society 2017 Guidelines for Management of Incidentally Detected Pulmonary Nodules in Adults      These findings were discussed with the patient's clinician, CALEB LOWE, on 5/22/2022 11:40 PM.      DX-CHEST-PORTABLE (1 VIEW)   Final Result         1.  No acute cardiopulmonary disease.      OUTSIDE IMAGES-DX CHEST   Final Result      OUTSIDE IMAGES-CT HEAD   Final Result      CT-FOREIGN FILM CAT SCAN   Final Result      MR-BRAIN-WITH & W/O    (Results Pending)   EC-ECHOCARDIOGRAM COMPLETE W/ CONT    (Results Pending)         Presumed mechanism by TOAST:  __Large Artery Atherosclerosis  __Small Vessel (Lacunar)  __Cardioembolic  _X_Other (Sickle Cell, Vasculitis, Hypercoagulable)  __Unknown    Modified Artemio Scale (MRS): 1 = No significant disability, despite symptoms; able to perform all usual duties and activities      ASSESSMENT AND PLAN:  56-year old male with PMHx significant for DVT/PE (on Xarelto with documented non compliance at times), renal cell carcinoma, tobacco abuse, stroke (2021; Right hand weakness) and chronic LLE/Left foot wound with poor healing who [facility] for a chief complaint of Right visual field loss; CT head  negative, however was found to have Left PCA, Left ICA occlusion, neither amenable to thrombectomy. Suspect evolving Left PCA territory infarct; still awaiting MRI Brain wo contrast; NIHSS remains at 2.     Recommendations/Plan:    -q4h and PRN neuro assessment. VS per nursing/unit protocol. BP goal is normotension. Please notify neurology with changes in exam. Antihypertensives per primary team.   -Obtain MRI Brain wo contrast-- ordered and still pending.   -Telemetry; currently SR. Screen for Afib/arrhythmia.  Obtain TTE-- ordered and still pending.   -Hold Xarelto for now; will await MRI Brain results and make additional recs accordingly. Likely plan to switch to Eliquis.   -Atorvastatin 80 mg PO q HS. LDL is 76, goal < 70.   -Recommend aggressive BG management per primary team. Avoid IVF with Dextrose. BG goal 140-180. hemoglobin A1c is 5.5.    -PT/OT/SLP eval and treat.   -Counseled patient at length regarding life style and risk factor modification for secondary stroke prevention.   -All other medical management per primary team.   -DVT PPX: SCDs.      The plan of care above has been discussed with Dr. Bar. Please call with questions.     GENEVA Rowan.[email or web address].SONA.  [place] [facility]

## 2022-05-24 NOTE — DISCHARGE PLANNING
[facility] Transitional Care Coordination    Referral from:  Dr. Crooks  [facility] F[facility]  [facility]  Potential Rehab Diagnosis: Stroke    Chart review indicates patient has on going medical management, may have therapy needs to possibly meet inpatient rehab facility criteria with the goal of returning to community.    D/C support: TBD     Physiatry to consult.  CVA due to embolism of posterior cerebral artery. Would welcome PT/OT as clinically appropriate.      Last Covid test:       Thank you for the referral.

## 2022-05-24 NOTE — WOUND TEAM
[facility]  Inpatient Services  Initial Wound and Skin Care Evaluation    Admission Date: 5/22/2022     Last order of IP CONSULT TO WOUND CARE was found on 5/23/2022 from Hospital Encounter on 5/22/2022     HPI, PMH, SH: Reviewed    Past Surgical History:   Procedure Laterality Date   • IRRIGATION & DEBRIDEMENT GENERAL Left 10/22/2020    Procedure: IRRIGATION AND DEBRIDEMENT, WOUND- CALF AND WOUND VAC PLACEMENT;  Surgeon: Temo Mayer M.D.;  Location: SURGERY [facility];  Service: Plastics   • NERVE REPAIR  6/18/08    Performed by SKYE RAY at [facility]   • LACERATION REPAIR  6/18/08    Performed by SKYE RAY at [facility]   • NEPHRECTOMY PARTIAL      right   • NEPHRECTOMY PARTIAL Right    • OTHER      tonsillectomy     Social History     Tobacco Use   • Smoking status: Current Every Day Smoker     Packs/day: 0.50     Types: Cigarettes   • Smokeless tobacco: Never Used   Substance Use Topics   • Alcohol use: No     Chief Complaint   Patient presents with   • Possible Stroke     [facility] visual mcnamara of loss of Periferal vision to right eye no other deficits reported.     Diagnosis: Cerebrovascular accident (CVA) due to embolism of posterior cerebral artery, unspecified blood vessel laterality (HCC) [I63.439]    Unit where seen by Wound Team: S195/02     WOUND CONSULT/FOLLOW UP RELATED TO:  Left posterior LE     WOUND HISTORY:  Well known to wound care.  Patient has been seeing [facility] while at home for left posterior LE.  Previously was on wound VAC.      WOUND ASSESSMENT/LDA  Wound 05/23/22 Venous Ulcer Leg Posterior Left (Active)   Wound Image    05/24/22 1400   Site Assessment Red;Early/partial granulation 05/24/22 1400   Periwound Assessment Clean;Dry;Intact;Excoriated 05/24/22 1400   Margins Defined edges;Attached edges 05/24/22 1400   Closure Secondary intention 05/24/22 1400   Drainage Amount Small  05/24/22 1400   Drainage Description Serosanguineous 05/24/22 1400   Treatments CSWD - Conservative Sharp Wound Debridement;Cleansed;Irrigation;Site care;Compression 05/24/22 1400   Wound Cleansing Normal Saline Irrigation 05/24/22 1400   Periwound Protectant Skin Protectant Wipes to Periwound 05/24/22 1400   Dressing Cleansing/Solutions Not Applicable 05/24/22 1400   Dressing Options Hydrofiber Silver;Silicone Adhesive Foam;Tubigrip 05/24/22 1400   Dressing Changed New 05/24/22 1400   Dressing Status Clean;Dry;Intact 05/24/22 1400   Dressing Change/Treatment Frequency Every 48 hrs, and As Needed 05/24/22 1400   NEXT Dressing Change/Treatment Date 05/26/22 05/24/22 1400   NEXT Weekly Photo (Inpatient Only) 05/31/22 05/24/22 1400   Non-staged Wound Description Full thickness 05/24/22 1400   Wound Length (cm) 3.2 cm 05/24/22 1400   Wound Width (cm) 4 cm 05/24/22 1400   Wound Depth (cm) 0.1 cm 05/24/22 1400   Wound Surface Area (cm^2) 12.8 cm^2 05/24/22 1400   Wound Volume (cm^3) 1.28 cm^3 05/24/22 1400   Post-Procedure Length (cm) 3.2 cm 05/24/22 1400   Post-Procedure Width (cm) 4 cm 05/24/22 1400   Post-Procedure Depth (cm) 0.2 cm 05/24/22 1400   Post-Procedure Surface Area (cm^2) 12.8 cm^2 05/24/22 1400   Post-Procedure Volume (cm^3) 2.56 cm^3 05/24/22 1400   Shape irregular 05/24/22 1400   Wound Odor Mild 05/24/22 1400   Pulses Left;2+;DP;PT 05/24/22 1400   Exposed Structures None 05/24/22 1400   WOUND NURSE ONLY - Time Spent with Patient (mins) 45 05/24/22 1400   Number of days: 1        Vascular:    CARLA:   No results found.    Lab Values:    Lab Results   Component Value Date/Time    WBC 6.9 05/24/2022 01:51 AM    RBC 5.19 05/24/2022 01:51 AM    HEMOGLOBIN 15.3 05/24/2022 01:51 AM    HEMATOCRIT 45.2 05/24/2022 01:51 AM    CREACTPROT 3.80 (H) 12/30/2020 05:45 PM    SEDRATEWES 20 12/30/2020 05:45 PM    HBA1C 5.5 05/22/2022 10:00 PM        Culture Results show:  No results found for this or any previous visit (from  the past 720 hour(s)).    Pain Level/Medicated: 0/10, lidocaine gel to wound bed 10 minutes       INTERVENTIONS BY WOUND TEAM:  Chart and images reviewed. Discussed with bedside RN. All areas of concern (based on picture review, LDA review and discussion with bedside RN) have been thoroughly assessed. Documentation of areas based on significant findings. This RN in to assess patient. Performed standard wound care which includes appropriate positioning, dressing removal and non-selective debridement. Pictures and measurements obtained weekly if/when required.  Preparation for Dressing removal: Dressing soaked with NS  Non-selectively Debrided with:  NS and gauze.  Sharp debridement: CSWD with curette to remove <20cm2 of non-viable slough  Marcelino wound: Cleansed with NS and gauze, Prepped with no sting skin prep  Primary Dressing: Aquacel Ag hydrofiber  Secondary (Outer) Dressing: silicone adhesive foam and Tubigrib size E    Interdisciplinary consultation: Patient, Bedside RN    EVALUATION / RATIONALE FOR TREATMENT:  Most Recent Date:  5/24/22: CSWD to remove non-viable slough from wound bed to reveal nice moist red granulation tissue to the base of the left posterior wound base.  Aquacel Ag Hydrofiber applied to manage bioburden, absorb exudate, and maintain a moist wound environment without lateral wicking exudate therefore reducing marcelino-wound maceration. And tubigrib for slight compression.      Goals: Steady decrease in wound area and depth weekly.    WOUND TEAM PLAN OF CARE ([X] for frequency of wound follow up,):   Nursing to follow orders written for wound care. Contact wound team if area fails to progress, deteriorates or with any questions/concerns  Dressing changes by wound team:                   Follow up 3 times weekly:                NPWT change 3 times weekly:     Follow up 1-2 times weekly:      Follow up Bi-Monthly:                   Follow up as needed:  x   Other (explain):     NURSING PLAN OF CARE  ORDERS (X):  Dressing changes: See Dressing Care orders: x  Skin care: See Skin Care orders:   RN Prevention Protocol:   Rectal tube care: See Rectal Tube Care orders:   Other orders:    RSKIN:   CURRENTLY IN PLACE (X), APPLIED THIS VISIT (A), ORDERED (O):   Q shift John:  X  Q shift pressure point assessments:  X    Surface/Positioning   Pressure redistribution mattress  x          Low Airloss          Bariatric foam      Bariatric FEDERICO     Waffle cushion        Waffle Overlay   x       Reposition q 2 hours      TAPs Turning system     Z Sly Pillow     Offloading/Redistribution NA  Sacral Mepilex (Silicone dressing)     Heel Mepilex (Silicone dressing)         Heel float boots (Prevalon boot)             Float Heels off Bed with Pillows           Respiratory NA  Silicone O2 tubing         Gray Foam Ear protectors     Cannula fixation Device (Tender )          High flow offloading Clip    Elastic head band offloading device      Anchorfast                                                         Trach with Optifoam split foam             Containment/Moisture Prevention NA    Rectal tube or BMS    Purwick/Condom Cath        Flynn Catheter    Barrier wipes           Barrier paste       Antifungal tx      Interdry        Mobilization NA      Up to chair        Ambulate      PT/OT      Nutrition       Dietician        Diabetes Education      PO  x   TF     TPN     NPO   # days     Other        Anticipated discharge plans:   LTACH:        SNF/Rehab:                  Home Health Care:   x        Outpatient Wound Center:            Self/Family Care:        Other:                  Vac Discharge Needs:   Not Applicable Pt not on a wound vac:     x  Regular Vac while inpatient, alternative dressing at DC:        Regular Vac in use and continued at DC:            Reg. Vac w/ Skin Sub/Biologic in use. Will need to be changed 2x wkly:      Veraflo Vac while inpatient, ok to transition to Regular Vac on Discharge:            Veraflo Vac while inpatient, will need to remain on Veraflo Vac upon discharge:

## 2022-05-24 NOTE — THERAPY
"Physical Therapy   Initial Evaluation     Patient Name: Panchito Moura  Age:  56 y.o., Sex:  male  Medical Record #: [id number]  Today's Date: 5/24/2022     Precautions  Precautions: Fall Risk  Comments: R field cut    Assessment  Patient is 56 y.o. male [facility] peripheral field loss and L headache. 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 PMHx of DVT/PE, chronic LE wounds, cancer, multiple toe amputations, and smoker. Pt required SPV for all mobility including ambulating 150ft with no AD or LOB. Pt reports he is at baseline functional mobility, with R visual field cut. Pt demo'd scanning R to compensate for impaired vision. Pt lives at home with his son in a 1SH. Recommend home with no needs. Will d/c pt from PT.     Plan    Recommend Physical Therapy for Evaluation only     DC Equipment Recommendations: None  Discharge Recommendations: Anticipate that the patient will have no further physical therapy needs after discharge from the hospital       Subjective  \"Why do I get a feeling I already don't like you guys.\"      Objective       05/24/22 0952   Vitals   O2 (LPM) 4   O2 Delivery Device Nasal Cannula   Pain 0 - 10 Group   Therapist Pain Assessment Post Activity Pain Same as Prior to Activity;Nurse Notified;0  (no c/o pain)   Prior Living Situation   Prior Services Home-Independent   Housing / Facility 1 Story House   Steps Into Home   (threshold)   Bathroom Set up Bathtub / Shower Combination;Shower Chair   Equipment Owned Front-Wheel Walker;Wheelchair   Lives with - Patient's Self Care Capacity   (Son)   Prior Level of Functional Mobility   Bed Mobility Independent   Transfer Status Independent   Ambulation Independent   Distance Ambulation (Feet)   (community)   Assistive Devices Used None   Stairs Independent   History of Falls   History of Falls No   Cognition    Cognition / Consciousness WDL   Level of Consciousness Alert   Comments Pleasant and cooperative. Eager to return to sleep "   Active ROM Lower Body    Active ROM Lower Body  WDL   Strength Lower Body   Lower Body Strength  WDL   Sensation Lower Body   Lower Extremity Sensation   WDL   Lower Body Muscle Tone   Lower Body Muscle Tone  WDL   Strength Upper Body   Upper Body Strength  WDL   Upper Body Muscle Tone   Upper Body Muscle Tone  WDL   Neurological Concerns   Neurological Concerns Yes   Comments R visual field cut   Coordination Upper Body   Coordination WDL   Coordination Lower Body    Coordination Lower Body  WDL   Vision   Vision Comments Loss of vision R peripheral field   Balance Assessment   Sitting Balance (Static) Good   Sitting Balance (Dynamic) Fair +   Standing Balance (Static) Fair   Standing Balance (Dynamic) Fair   Weight Shift Sitting Fair   Weight Shift Standing Fair   Comments no AD or LOB   Gait Analysis   Gait Level Of Assist Supervised   Assistive Device None   Distance (Feet) 150   # of Times Distance was Traveled 1   Deviation Shuffled Gait;Increased Base Of Support   # of Stairs Climbed 0   Weight Bearing Status no restrictions   Vision Deficits Impacting Mobility R peripheral vision loss, required scanning to see R   Bed Mobility    Supine to Sit Supervised   Sit to Supine Supervised   Scooting Supervised   Rolling Supervised   Functional Mobility   Sit to Stand Supervised   Bed, Chair, Wheelchair Transfer Supervised   Transfer Method Stand Step   Mobility no AD in hallway, back to bed   How much difficulty does the patient currently have...   Turning over in bed (including adjusting bedclothes, sheets and blankets)? 4   Sitting down on and standing up from a chair with arms (e.g., wheelchair, bedside commode, etc.) 4   Moving from lying on back to sitting on the side of the bed? 4   How much help from another person does the patient currently need...   Moving to and from a bed to a chair (including a wheelchair)? 4   Need to walk in a hospital room? 4   Climbing 3-5 steps with a railing? 4   6 clicks  Mobility Score 24   Activity Tolerance   Comments no overt s/s fatigue   Education Group   Education Provided Role of Physical Therapist   Role of Physical Therapist Patient Response Patient;Acceptance;Explanation;Verbal Demonstration   Additional Comments Edu on scanning R, safety with mobility   Problem List    Problems Impaired Vision   Anticipated Discharge Equipment and Recommendations   DC Equipment Recommendations None   Discharge Recommendations Anticipate that the patient will have no further physical therapy needs after discharge from the hospital   Interdisciplinary Plan of Care Collaboration   IDT Collaboration with  Nursing;Occupational Therapist   Patient Position at End of Therapy In Bed;Bed Alarm On;Call Light within Reach;Tray Table within Reach;Phone within Reach   Collaboration Comments Rn updated   Session Information   Date / Session Number  5/24: Jennifer only. D/c PT

## 2022-05-24 NOTE — THERAPY
Occupational Therapy   Initial Evaluation     Patient Name: Panchito Moura  Age:  56 y.o., Sex:  male  Medical Record #: [id number]  Today's Date: 5/24/2022     Precautions  Precautions: Fall Risk    Assessment  Patient is 56 y.o. male admitted for right sided vision loss, pt with history of BLEL wounds, multiple amputations to left foot. Pt normally independent with all functional mobility and ADLs at baseline living in a SLH with son who is able to assist as needed. Pt able to complete all functional mobility and ADLs with supervision, no AD, pt only deficit was right sided vision loss from midline to right side but able to complete mobility without issues with vision. Patient will not be actively followed for occupational therapy services at this time, however may be seen if requested by physician for 1 more visit within 30 days to address any discharge or equipment needs.       Plan    Recommend Occupational Therapy for Evaluation only.    DC Equipment Recommendations: (P) None  Discharge Recommendations: (P) Anticipate that the patient will have no further occupational therapy needs after discharge from the hospital     Objective       05/24/22 1031   Prior Living Situation   Prior Services Home-Independent   Housing / Facility 1 Story House   Steps Into Home 1   Bathroom Set up Bathtub / Shower Combination;Shower Chair   Equipment Owned Front-Wheel Walker;Wheelchair;Tub / Shower Seat   Lives with - Patient's Self Care Capacity Adult Children   Prior Level of ADL Function   Self Feeding Independent   Grooming / Hygiene Independent   Bathing Independent   Dressing Independent   Toileting Independent   Prior Level of IADL Function   Medication Management Independent   Laundry Independent   Kitchen Mobility Independent   Finances Independent   Home Management Independent   Shopping Independent   Prior Level Of Mobility Independent Without Device in Community   History of Falls   History of Falls No   Pain 0 - 10  Group   Therapist Pain Assessment Nurse Notified   Cognition    Cognition / Consciousness WDL   Level of Consciousness Alert   Active ROM Upper Body   Active ROM Upper Body  WDL   Dominant Hand Right   Strength Upper Body   Upper Body Strength  WDL   Sensation Upper Body   Upper Extremity Sensation  WDL   Balance Assessment   Sitting Balance (Static) Fair   Sitting Balance (Dynamic) Fair   Standing Balance (Static) Fair   Standing Balance (Dynamic) Fair   Weight Shift Sitting Fair   Weight Shift Standing Fair   Comments no AD   Bed Mobility    Supine to Sit Supervised   Sit to Supine Supervised   Scooting Supervised   Rolling Supervised   ADL Assessment   Grooming Supervision   Upper Body Dressing Supervision   Lower Body Dressing Supervision   Toileting   (NT-refused)   How much help from another person does the patient currently need...   Putting on and taking off regular lower body clothing? 4   Bathing (including washing, rinsing, and drying)? 4   Toileting, which includes using a toilet, bedpan, or urinal? 4   Putting on and taking off regular upper body clothing? 4   Taking care of personal grooming such as brushing teeth? 4   Eating meals? 4   6 Clicks Daily Activity Score 24   Functional Mobility   Sit to Stand Supervised   Bed, Chair, Wheelchair Transfer Supervised   Toilet Transfers Supervised   Transfer Method Stand Step   Mobility bed mobility, hallway mobility, back to bed   Comments no AD   Activity Tolerance   Sitting Edge of Bed 10 min   Standing 10 min   Education Group   Education Provided Role of Occupational Therapist   Role of Occupational Therapist Patient Response Patient;Acceptance;Explanation   Problem List   Problem List None   Anticipated Discharge Equipment and Recommendations   DC Equipment Recommendations None   Discharge Recommendations Anticipate that the patient will have no further occupational therapy needs after discharge from the hospital   Interdisciplinary Plan of Care  Collaboration   IDT Collaboration with  Nursing;Physical Therapist   Patient Position at End of Therapy In Bed;Bed Alarm On;Call Light within Reach;Tray Table within Reach;Phone within Reach   Collaboration Comments RN updated

## 2022-05-25 VITALS
HEIGHT: 72 IN | WEIGHT: 233.69 LBS | BODY MASS INDEX: 31.65 KG/M2 | OXYGEN SATURATION: 94 % | TEMPERATURE: 97.4 F | SYSTOLIC BLOOD PRESSURE: 140 MMHG | DIASTOLIC BLOOD PRESSURE: 77 MMHG | RESPIRATION RATE: 18 BRPM | HEART RATE: 55 BPM

## 2022-05-25 PROCEDURE — 81241 F5 GENE: CPT

## 2022-05-25 PROCEDURE — 85613 RUSSELL VIPER VENOM DILUTED: CPT

## 2022-05-25 PROCEDURE — 99239 HOSP IP/OBS DSCHRG MGMT >30: CPT | Performed by: HOSPITALIST

## 2022-05-25 PROCEDURE — A9270 NON-COVERED ITEM OR SERVICE: HCPCS | Performed by: INTERNAL MEDICINE

## 2022-05-25 PROCEDURE — 85306 CLOT INHIBIT PROT S FREE: CPT

## 2022-05-25 PROCEDURE — 86147 CARDIOLIPIN ANTIBODY EA IG: CPT | Mod: 91

## 2022-05-25 PROCEDURE — 99232 SBSQ HOSP IP/OBS MODERATE 35: CPT | Performed by: NURSE PRACTITIONER

## 2022-05-25 PROCEDURE — 700102 HCHG RX REV CODE 250 W/ 637 OVERRIDE(OP): Performed by: INTERNAL MEDICINE

## 2022-05-25 PROCEDURE — 86038 ANTINUCLEAR ANTIBODIES: CPT

## 2022-05-25 PROCEDURE — 36415 COLL VENOUS BLD VENIPUNCTURE: CPT

## 2022-05-25 PROCEDURE — 85597 PHOSPHOLIPID PLTLT NEUTRALIZ: CPT

## 2022-05-25 PROCEDURE — 85670 THROMBIN TIME PLASMA: CPT

## 2022-05-25 PROCEDURE — 85610 PROTHROMBIN TIME: CPT

## 2022-05-25 PROCEDURE — 85303 CLOT INHIBIT PROT C ACTIVITY: CPT

## 2022-05-25 PROCEDURE — 85732 THROMBOPLASTIN TIME PARTIAL: CPT

## 2022-05-25 PROCEDURE — 700111 HCHG RX REV CODE 636 W/ 250 OVERRIDE (IP): Performed by: NURSE PRACTITIONER

## 2022-05-25 RX ORDER — MAGNESIUM SULFATE HEPTAHYDRATE 40 MG/ML
2 INJECTION, SOLUTION INTRAVENOUS ONCE
Status: COMPLETED | OUTPATIENT
Start: 2022-05-25 | End: 2022-05-25

## 2022-05-25 RX ORDER — KETOROLAC TROMETHAMINE 30 MG/ML
30 INJECTION, SOLUTION INTRAMUSCULAR; INTRAVENOUS ONCE
Status: COMPLETED | OUTPATIENT
Start: 2022-05-25 | End: 2022-05-25

## 2022-05-25 RX ORDER — METOCLOPRAMIDE HYDROCHLORIDE 5 MG/ML
10 INJECTION INTRAMUSCULAR; INTRAVENOUS ONCE
Status: COMPLETED | OUTPATIENT
Start: 2022-05-25 | End: 2022-05-25

## 2022-05-25 RX ADMIN — GABAPENTIN 600 MG: 300 CAPSULE ORAL at 13:18

## 2022-05-25 RX ADMIN — METOCLOPRAMIDE 10 MG: 5 INJECTION, SOLUTION INTRAMUSCULAR; INTRAVENOUS at 09:25

## 2022-05-25 RX ADMIN — MAGNESIUM SULFATE HEPTAHYDRATE 2 G: 40 INJECTION, SOLUTION INTRAVENOUS at 09:25

## 2022-05-25 RX ADMIN — HYDROCODONE BITARTRATE AND ACETAMINOPHEN 1 TABLET: 5; 325 TABLET ORAL at 06:07

## 2022-05-25 RX ADMIN — KETOROLAC TROMETHAMINE 30 MG: 30 INJECTION, SOLUTION INTRAMUSCULAR; INTRAVENOUS at 09:25

## 2022-05-25 RX ADMIN — ASPIRIN 81 MG 81 MG: 81 TABLET ORAL at 06:08

## 2022-05-25 RX ADMIN — OMEPRAZOLE 20 MG: 20 CAPSULE, DELAYED RELEASE ORAL at 06:08

## 2022-05-25 RX ADMIN — SENNOSIDES AND DOCUSATE SODIUM 2 TABLET: 50; 8.6 TABLET ORAL at 06:08

## 2022-05-25 RX ADMIN — GABAPENTIN 600 MG: 300 CAPSULE ORAL at 06:08

## 2022-05-25 ASSESSMENT — PAIN DESCRIPTION - PAIN TYPE
TYPE: ACUTE PAIN
TYPE: ACUTE PAIN

## 2022-05-25 NOTE — PROGRESS NOTES
Monitor Summary: SB 54-56, SD -0.25, QRS -0.08, QT -0.45, with rare PAC per strip from the monitor room.

## 2022-05-25 NOTE — DISCHARGE SUMMARY
Discharge Summary    CHIEF COMPLAINT ON ADMISSION  Chief Complaint   Patient presents with   • Possible Stroke     [facility] reported visual mcnamara of loss of Periferal vision to right eye no other deficits reported.       Reason for Admission  Possible Stroke     Admission Date  5/22/2022    CODE STATUS  Full Code    HPI & HOSPITAL COURSE  Patient is a 56 year old male with history of DVT and PE on xarelto, chronic lower extremity wounds with multiple amputations on the L foot. He presented to [facility] on 5/22/2022 with right-sided vision loss.  Patient apparently slept around 9 AM today in the morning and he woke up around 3 PM.  After waking up he noticed that he could not see the right side of his vision.  He also had a left-sided throbbing headache around his left eye. No weakness in arms or legs. [facility]   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 No candidate of thrombolytics due to being outside the window and also on Xarelto.  No IR intervention given the plain CT changes and completion of the infarct. He continues to have right vision loss but no other deficits.   His chronic LE wounds are stable and he will [facility] to [facility].   An MRI confirmed the presence of a moderate sized left medial temporal and occipital lobe infarcts with a small punctate infarct in the left middle parietal lobe. Per neurology recommendations his chronic xarelto was changed to eliquis. Hypercoagulable studies were also ordered and the results will need to [facility]. Patient expressed understanding, and also agreed that he would not drive until cleared by [facility] neurology, he is also aware of his mildly low plateltes  He was cleared by PT and OT and his ex wife will pick him up and [facility] to [facility] his [facility].   Smoking cessation was discussed and encouraged. He agrees to  return to the ER if needed    Therefore, he is discharged in fair and stable condition to home with close outpatient follow-up.    The patient met 2-midnight criteria for an inpatient stay at the time of discharge.    Discharge Date  5/25/22    FOLLOW UP ITEMS POST DISCHARGE  [facility]    DISCHARGE DIAGNOSES  Principal Problem:    Cerebrovascular accident (CVA) due to embolism of posterior cerebral artery, unspecified blood vessel laterality (HCC) POA: Yes  Active Problems:    History of DVT (deep vein thrombosis) POA: Yes    Smoker (Chronic) POA: Yes    History of pulmonary embolus (PE) (Chronic) POA: Yes    Leg wound, left POA: Yes    Pulmonary nodule POA: Unknown  Resolved Problems:    * No resolved hospital problems. *      FOLLOW UP  No future appointments.  [facility]  [address] [place] [address]  [phone]  Follow up today        MEDICATIONS ON DISCHARGE     Medication List      START taking these medications      Instructions   apixaban 5mg Tabs  Commonly known as: ELIQUIS   Take 1 Tablet by mouth 2 times a day.  Dose: 5 mg        CONTINUE taking these medications      Instructions   acetaminophen 325 MG Tabs  Commonly known as: Tylenol   Take 2 Tabs by mouth every 6 hours as needed for Mild Pain or Moderate Pain (Mild Pain; (Pain scale 1-3); Temp greater than 100.5 F).  Dose: 650 mg     alendronate 70 MG Tabs  Commonly known as: FOSAMAX   Take 70 mg by mouth every Friday.  Dose: 70 mg     atorvastatin 40 MG Tabs  Commonly known as: LIPITOR   Take 40 mg by mouth every evening.  Dose: 40 mg     DRY EYE RELIEF OP   Administer 1 Drop into affected eye(s) as needed (Dry eyes). Dextran Glycol  Dose: 1 Drop     ferrous gluconate 324 (38 Fe) MG Tabs  Commonly known as: FERGON   Take 324 mg by mouth see administration instructions. Tale by mouth on Sunday, Tuesday, Thursday and Saturday  Dose: 324 mg     gabapentin 600 MG tablet  Commonly known as: NEURONTIN   Take  600 mg by mouth 3 times a day.  Dose: 600 mg     HYDROcodone-acetaminophen 5-325 MG Tabs per tablet  Commonly known as: NORCO   Take 1 Tablet by mouth every four hours as needed. Indications: Pain  Dose: 1 Tablet     NIFEdipine SR 30 MG tablet  Commonly known as: PROCARDIA-XL   Take 30 mg by mouth every evening.  Dose: 30 mg     omeprazole 20 MG delayed-release capsule  Commonly known as: PRILOSEC   Take 1 Cap by mouth every day.  Dose: 20 mg        STOP taking these medications    rivaroxaban 20 MG Tabs tablet  Commonly known as: XARELTO            Allergies  No Known Allergies    DIET  Orders Placed This Encounter   Procedures   • Diet Order Diet: 2 Gram Sodium     Standing Status:   Standing     Number of Occurrences:   1     Order Specific Question:   Diet:     Answer:   2 Gram Sodium [7]       ACTIVITY  As tolerated.  Weight bearing as tolerated    CONSULTATIONS  Neurology    PROCEDURES  MR-BRAIN-WITH & W/O   Final Result      1.  Moderate sized acute infarct in the left medial temporal lobe, cannabis and left occipital lobe. Small punctate infarcts in the left medial parietal lobe. No evidence of hemorrhage transformation.   2.  Mild microangiopathic ischemic change.      EC-ECHOCARDIOGRAM COMPLETE W/ CONT   Final Result      CT-CEREBRAL PERFUSION ANALYSIS   Final Result         1.  Cerebral blood flow less than 30% likely representing completed infarct = 0 mL.      2.  T Max more than 6 seconds likely representing combination of completed infarct and ischemia = 58 mL.      3.  Mismatched volume likely representing ischemic brain/penumbra = 58 mL      4.  Please note that the cerebral perfusion was performed on the limited brain tissue around the basal ganglia region. Infarct/ischemia outside the CT perfusion sections can be missed in this study.      CT-CTA HEAD WITH & W/O-POST PROCESS   Final Result         1.  Left P2 occlusion with area of low-density compatible with evolving infarct.   2.  Occlusion of  the left internal carotid artery with reconstitution of the middle cerebral artery in the [place]      These findings were discussed with the patient's clinician, CALEB LOWE, on 5/22/2022 11:39 PM.            CT-CTA NECK WITH & W/O-POST PROCESSING   Final Result         1.  Proximal left internal carotid artery occlusion extending intracranially   2.  Heterogeneous right thyroid nodule, recommend follow-up thyroid sonography for further characterization due to nodule size.   3.  Left apical pulmonary nodule, see nodule follow-up recommendations below.      Fleischner Society pulmonary nodule recommendations:      Low Risk: CT at 6-12 months, then consider CT at 18-24 months      High Risk: CT at 6-12 months, then CT at 18-24 months      Low Risk - Minimal or absent history of smoking and of other known risk factors.      High Risk - History of smoking or of other known risk factors.      Note: These recommendations do not apply to lung cancer screening, patients with immunosuppression, or patients with known primary cancer.      Fleischner Society 2017 Guidelines for Management of Incidentally Detected Pulmonary Nodules in Adults      These findings were discussed with the patient's clinician, CALEB OLWE, on 5/22/2022 11:40 PM.      DX-CHEST-PORTABLE (1 VIEW)   Final Result         1.  No acute cardiopulmonary disease.      OUTSIDE IMAGES-DX CHEST   Final Result      OUTSIDE IMAGES-CT HEAD   Final Result      CT-FOREIGN FILM CAT SCAN   Final Result      EC-ECHOCARDIOGRAM COMPLETE W/O CONT    (Results Pending)         LABORATORY  Lab Results   Component Value Date    SODIUM 137 05/24/2022    POTASSIUM 3.9 05/24/2022    CHLORIDE 102 05/24/2022    CO2 23 05/24/2022    GLUCOSE 87 05/24/2022    BUN 14 05/24/2022    CREATININE 1.13 05/24/2022    CREATININE 1.5 (H) 06/10/2005        Lab Results   Component Value Date    WBC 6.9 05/24/2022    HEMOGLOBIN 15.3 05/24/2022    HEMATOCRIT 45.2 05/24/2022     PLATELETCT 125 (L) 05/24/2022        Total time of the discharge process exceeds 45 minutes.

## 2022-05-25 NOTE — PROGRESS NOTES
Monitor Summary: SB 53-57, AZ 0.20, QRS 0.07, QT 0.42, with rare PACs and PVCs per strip from monitor room.

## 2022-05-25 NOTE — DISCHARGE PLANNING
Per physiatry patient is not a candidate for IPR, [facility] will no longer follow. Please call with any questions [phone].

## 2022-05-25 NOTE — DISCHARGE PLANNING
Case Management Discharge Planning    Admission Date: 5/9/2022  GMLOS: 3.5  ALOS: 3    6-Clicks ADL Score: 24  6-Clicks Mobility Score: 22      Anticipated Discharge Dispo: Discharge Disposition: Discharged to home/self care (01)  Discharge Address:  (shelter)    DME Needed: No    Action(s) Taken: OTHER   Pt is pending MRI. If MRI is without acute findings, then pt is to be DC home later today post MRI. Pt will have no CM needs. PT/OT cleared the pt.     Escalations Completed: None    Medically Clear: pending MRI, if no acute finding, then will DC today post MRI    Next Steps: f/u with medical team re MRI results and if cleared for DC.     Barriers to Discharge: Medical clearance and Pending Procedures

## 2022-05-25 NOTE — PROGRESS NOTES
Chief Complaint   Patient presents with   • Possible Stroke     [facility] reported visual mcnamara of loss of Periferal vision to right eye no other deficits reported.       Problem List Items Addressed This Visit     * (Principal) Cerebrovascular accident (CVA) due to embolism of posterior cerebral artery, unspecified blood vessel laterality (HCC)     Patient was seen by neurologist Dr. Dave  No  thrombolytics or mechanical clot retrieval due to being outside the window and on OAC.   MRI is still  pending  Hold anticoagulation pending mri and neuro recommendations  Continue aspirin 81 mg  PT OT and speech  Continue supportive care           Relevant Medications    atorvastatin (LIPITOR) 40 MG Tab    NIFEdipine SR (PROCARDIA-XL) 30 MG tablet    hydrALAZINE (APRESOLINE) injection 10 mg    atorvastatin (LIPITOR) tablet 40 mg    NIFEdipine SR (PROCARDIA-XL) tablet 30 mg    apixaban (ELIQUIS) tablet 5 mg (Start on 5/25/2022  6:00 PM)    Other Relevant Orders    Referral to Physiatry (PMR)    Referral to Neurology      Other Visit Diagnoses     Acute CVA (cerebrovascular accident) (HCC)        Relevant Medications    atorvastatin (LIPITOR) 40 MG Tab    NIFEdipine SR (PROCARDIA-XL) 30 MG tablet    hydrALAZINE (APRESOLINE) injection 10 mg    atorvastatin (LIPITOR) tablet 40 mg    NIFEdipine SR (PROCARDIA-XL) tablet 30 mg    apixaban (ELIQUIS) tablet 5 mg (Start on 5/25/2022  6:00 PM)        Neurology Stroke Progress Note    Brief History of present illness:  56-year old male with PMHx significant for DVT/PE (on Xarelto with documented non compliance at times), renal cell carcinoma, tobacco abuse, stroke (2021; Right hand weakness) and chronic LLE/Left foot wound with poor healing who presented to [facility] on 5/22/22 for a chief complaint of Right visual field loss. Reportedly, the patient went to sleep at 0900 on 5/22; when he awoke at 1600, he noted difficulty seeing on the Right. Denies  new/focal weakness, numbness or paresthesia. [facility] there, determined not to be a candidate for IV thrombolytics secondary to concurrent use of Xarelto. Vascular studies also noted thrombus in the left PCA and also the left carotid artery was occluded.  Patient was transferred here for IR consideration; was determined not to be a candidate for IR intervention. Further work up is in progress.     Interval, 5/23/22:  Patient is sitting up in stretcher; awake and alert. Admits to persistent Right HH. Denies new/focal weakness. Awaiting MRI Brain wo contrast. No events overnight; SBP 120s.     Interval, 5/24/22  Patient is sitting up in bed; awake and alert. Working with PT/OT. Admits to persistent Right visual field deficits, unchanged today. Still awaiting MRI Brain. No events overnight.     Interval, 5/25/22  Patient is sitting up in bed; awake and alert. Admits to persistent Right sided visual loss/deficit. Unchanged this morning. Admits to Left sided headache with photophobia this morning. No other new complaints/deficits; however nursing reports question of mild expressive aphasia intermittently.     MRI Brain wo contrast obtained last night; my read, Left PCA and Left ICA/MCA territory infarct with no hemorrhagic conversion. Will plan to start Eliquis later today.     Of note, I asked the patient if he had ever had work up for multiple thromboembolic events (DVTs, PE) and he reports that he is unsure, however he was told to remain on anticoagulation indefinitely. Thought possibly related to malignancy, will obtain serum hypercoag studies today.     No changes to HPI as was previously documented.     Past medical history:   Past Medical History:   Diagnosis Date   • Cancer (HCC)     kidney   • Cancer (HCC)     renal   • DVT (deep venous thrombosis) (HCC)    • Pulmonary embolism (HCC)    • Smoker        Past surgical history:   Past Surgical History:   Procedure Laterality Date   •  IRRIGATION & DEBRIDEMENT GENERAL Left 10/22/2020    Procedure: IRRIGATION AND DEBRIDEMENT, WOUND- CALF AND WOUND VAC PLACEMENT;  Surgeon: Temo Mayer M.D.;  Location: SURGERY [facility];  Service: Plastics   • NERVE REPAIR  6/18/08    Performed by SKYE RAY at [facility] ORS   • LACERATION REPAIR  6/18/08    Performed by SKYE RAY at [facility] ORS   • NEPHRECTOMY PARTIAL      right   • NEPHRECTOMY PARTIAL Right    • OTHER      tonsillectomy       Family history:   Family History   Problem Relation Age of Onset   • Heart Attack Mother    • Cancer Father        Social history:   Social History     Socioeconomic History   • Marital status: Single     Spouse name: Not on file   • Number of children: Not on file   • Years of education: Not on file   • Highest education level: Not on file   Occupational History   • Not on file   Tobacco Use   • Smoking status: Current Every Day Smoker     Packs/day: 0.50     Types: Cigarettes   • Smokeless tobacco: Never Used   Vaping Use   • Vaping Use: Never used   Substance and Sexual Activity   • Alcohol use: No   • Drug use: Not Currently     Comment: edibles   • Sexual activity: Not on file   Other Topics Concern   • Not on file   Social History Narrative    ** Merged History Encounter **          Social Determinants of Health     Financial Resource Strain: Not on file   Food Insecurity: Not on file   Transportation Needs: Not on file   Physical Activity: Not on file   Stress: Not on file   Social Connections: Not on file   Intimate Partner Violence: Not on file   Housing Stability: Not on file       Current medications:   Current Facility-Administered Medications   Medication Dose   • magnesium sulfate IVPB premix 2 g  2 g   • apixaban (ELIQUIS) tablet 5 mg  5 mg   • lidocaine 2 % jelly 1 Application  1 Application   • senna-docusate (PERICOLACE or SENOKOT S) 8.6-50 MG per tablet 2 Tablet  2 Tablet    And   • polyethylene glycol/lytes  (MIRALAX) PACKET 1 Packet  1 Packet    And   • magnesium hydroxide (MILK OF MAGNESIA) suspension 30 mL  30 mL    And   • bisacodyl (DULCOLAX) suppository 10 mg  10 mg   • acetaminophen (Tylenol) tablet 650 mg  650 mg   • hydrALAZINE (APRESOLINE) injection 10 mg  10 mg   • [START ON 5/27/2022] alendronate (FOSAMAX) tablet 70 mg  70 mg   • atorvastatin (LIPITOR) tablet 40 mg  40 mg   • carboxymethylcellulose (REFRESH TEARS) 0.5 % ophthalmic drops 1 Drop  1 Drop   • ferrous gluconate (FERGON) tablet 324 mg  324 mg   • HYDROcodone-acetaminophen (NORCO) 5-325 MG per tablet 1 Tablet  1 Tablet   • NIFEdipine SR (PROCARDIA-XL) tablet 30 mg  30 mg   • omeprazole (PRILOSEC) capsule 20 mg  20 mg   • gabapentin (NEURONTIN) capsule 600 mg  600 mg       Medication Allergy:  No Known Allergies      Review of systems:   Constitutional: denies fever, night sweats, weight loss.   Eyes: denies acute vision change, eye pain or secretion.   Ears, Nose, Mouth, Throat: denies nasal secretion, nasal bleeding, difficulty swallowing, hearing loss, tinnitus, vertigo, ear pain, acute dental problems, oral ulcers or lesions.   Endocrine: denies recent weight changes, heat or cold intolerance, polyuria, polydypsia, polyphagia,abnormal hair growth.  Cardiovascular: denies new onset of chest pain, palpitations, syncope, or dyspnea of exertion.  Pulmonary: denies shortness of breath, new onset of cough, hemoptysis, wheezing, chest pain or flu-like symptoms.   GI: denies nausea, vomiting, diarrhea, GI bleeding, change in appetite, abdominal pain, and change in bowel habits.  : denies dysuria, urinary incontinence, hematuria.  Heme/oncology: denies history of easy bruising or bleeding. No history of cancer, DVTor PE.  Allergy/immunology: denies hives/urticaria, or itching.   Dermatologic: denies new rash, or new skin lesions.  Musculoskeletal:denies joint swelling or pain, muscle pain, neck and back pain.   Neurologic: As noted in detail above.    Psychiatric: denies symptoms of depression, anxiety, hallucinations, mood swings or changes, suicidal or homicidal thoughts.       Physical examination:   Vitals:    05/25/22 0400 05/25/22 0500 05/25/22 0600 05/25/22 0800   BP: 110/60   (!) 144/77   Pulse: (!) 53 (!) 54 63 60   Resp:    18   Temp:    36.3 °C (97.4 °F)   TempSrc:    Temporal   SpO2: 92%   91%   Weight:       Height:         General: Patient in no acute distress, pleasant and cooperative.  HEENT: Normocephalic, no signs of acute trauma.   Neck: supple, no meningeal signs or carotid bruits. There is normal range of motion. No tenderness on exam.   Chest: clear to auscultation. No cough.   CV: RRR, no murmurs.   Skin: no signs of acute rashes or trauma.   Musculoskeletal: joints exhibit full range of motion, without any pain to palpation. There are no signs of joint or muscle swelling. There is no tenderness to deep palpation of muscles.   Psychiatric: No hallucinatory behavior.       NEUROLOGICAL EXAM:   Mental status, orientation: Awake, alert and fully oriented.   Speech and language: speech is clear and fluent, however some intermittent impaired naming of objects.   Cranial nerve exam: Pupils are 3-4 mm bilaterally and equally reactive to light. Right homonymous hemianopsia present. There is no nystagmus on primary or secondary gaze. Intact full EOM in all directions of gaze. Face appears symmetric. Sensation in the face is intact to light touch. Uvula is midline. Palate elevates symmetrically. Tongue is midline and without any signs of tongue biting or fasciculations.Shoulder shrug is intact bilaterally.   Motor exam: Strength is 5/5 in all extremities. Tone is normal. No abnormal movements were seen on exam.   Sensory exam reveals normal sense of light touch and pinprick in all extremities.   Deep tendon reflexes:  Plantar responses are flexor. There is no clonus.   Coordination: shows a normal finger-nose-finger.  Gait: Not assessed at this  time as patient is currently unable.       NIH Stroke Scale    1a Level of Consciousness   1b Orientation Questions   1c Response to Commands   2 Gaze   3 Visual Fields 2  4 Facial Movement   5 Motor Function (arm)   a Left   b Right   6 Motor Function (leg)   a Left   b Right   7 Limb Ataxia   8 Sensory   9 Language 1  10 Articulation   11 Extinction/Inattention     Score: 3      ANCILLARY DATA REVIEWED:     Lab Data Review:  Recent Results (from the past 24 hour(s))   EC-ECHOCARDIOGRAM COMPLETE W/ CONT    Collection Time: 05/24/22  3:56 PM   Result Value Ref Range    Eject.Frac. MOD BP 64.73     Eject.Frac. MOD 4C 64.95     Eject.Frac. MOD 2C 63.88     Left Ventrical Ejection Fraction 65        Labs reviewed by me.       Imaging reviewed by me:     MR-BRAIN-WITH & W/O   Final Result      1.  Moderate sized acute infarct in the left medial temporal lobe, cannabis and left occipital lobe. Small punctate infarcts in the left medial parietal lobe. No evidence of hemorrhage transformation.   2.  Mild microangiopathic ischemic change.      EC-ECHOCARDIOGRAM COMPLETE W/ CONT   Final Result      CT-CEREBRAL PERFUSION ANALYSIS   Final Result         1.  Cerebral blood flow less than 30% likely representing completed infarct = 0 mL.      2.  T Max more than 6 seconds likely representing combination of completed infarct and ischemia = 58 mL.      3.  Mismatched volume likely representing ischemic brain/penumbra = 58 mL      4.  Please note that the cerebral perfusion was performed on the limited brain tissue around the basal ganglia region. Infarct/ischemia outside the CT perfusion sections can be missed in this study.      CT-CTA HEAD WITH & W/O-POST PROCESS   Final Result         1.  Left P2 occlusion with area of low-density compatible with evolving infarct.   2.  Occlusion of the left internal carotid artery with reconstitution of the middle cerebral artery in the [place]      These findings were discussed with  the patient's clinician, CALEB LOWE, on 5/22/2022 11:39 PM.            CT-CTA NECK WITH & W/O-POST PROCESSING   Final Result         1.  Proximal left internal carotid artery occlusion extending intracranially   2.  Heterogeneous right thyroid nodule, recommend follow-up thyroid sonography for further characterization due to nodule size.   3.  Left apical pulmonary nodule, see nodule follow-up recommendations below.      Fleischner Society pulmonary nodule recommendations:      Low Risk: CT at 6-12 months, then consider CT at 18-24 months      High Risk: CT at 6-12 months, then CT at 18-24 months      Low Risk - Minimal or absent history of smoking and of other known risk factors.      High Risk - History of smoking or of other known risk factors.      Note: These recommendations do not apply to lung cancer screening, patients with immunosuppression, or patients with known primary cancer.      Fleischner Society 2017 Guidelines for Management of Incidentally Detected Pulmonary Nodules in Adults      These findings were discussed with the patient's clinician, CALEB LOWE, on 5/22/2022 11:40 PM.      DX-CHEST-PORTABLE (1 VIEW)   Final Result         1.  No acute cardiopulmonary disease.      OUTSIDE IMAGES-DX CHEST   Final Result      OUTSIDE IMAGES-CT HEAD   Final Result      CT-FOREIGN FILM CAT SCAN   Final Result      EC-ECHOCARDIOGRAM COMPLETE W/O CONT    (Results Pending)         Presumed mechanism by TOAST:  __Large Artery Atherosclerosis  __Small Vessel (Lacunar)  __Cardioembolic  _X_Other (Sickle Cell, Vasculitis, Hypercoagulable)  __Unknown    Modified Artemio Scale (MRS): 1 = No significant disability, despite symptoms; able to perform all usual duties and activities      ASSESSMENT AND PLAN:  56-year old male with PMHx significant for DVT/PE (on Xarelto with ?documented non compliance at times), renal cell carcinoma, tobacco abuse, stroke (2021; Right hand weakness) and chronic LLE/Left foot wound  with poor healing who [facility] a chief complaint of Right visual field loss; CT head negative, however was found to have Left PCA, Left ICA occlusion, neither amenable to thrombectomy. MRI Brain has accordingly revealed Left ICA/MCA and Left PCA territory infarcts with no hemorrhagic conversion. NIHSS remains 2-3.   As was noted above, patient has history of PE/DVT, however specific etiology of presumed hypercoagulability remains unclear; patient has been off AC x 3 days, will obtained serum hypercoag studies this morning. May also consider additional malignancy/mets work up given patient's documented history of Renal Cell carcinoma.     Recommendations/Plan:    -q4h and PRN neuro assessment. VS per nursing/unit protocol. BP goal is normotension.  Antihypertensives per primary team.   -Telemetry; currently SR. Screen for Afib/arrhythmia.  Note TTE with EF 65%; no gross structural abnormalities. Will recommend 14-day Zio patch monitoring at time of discharge.   -Start Eliquis 5 mg PO BID this evening (Switched from Xarelto).   -Atorvastatin 80 mg PO q HS. LDL is 76, goal < 70.   -Recommend aggressive BG management per primary team. Avoid IVF with Dextrose. BG goal 140-180. hemoglobin A1c is 5.5.    -PT/OT/SLP eval and treat.   -Serum hypercoag studies ordered and pending.   -Give one time doses Mg 2g IV, Ketoralac 30 mg IV, Metoclopramide 10 mg IV for headache.   -Counseled patient at length regarding life style and risk factor modification for secondary stroke prevention.   -All other medical management per primary team. Consider malignancy/mets work up if hypercoag studies are unrevealing.   -DVT PPX: SCDs.      The plan of care above has been discussed with Dr. Ag Jiang. Other than the above, no further recommendations from a neurological perspective; Please call with questions.     YVETTE RowanRTIFFANIE.  [facility]

## 2022-05-25 NOTE — CARE PLAN
The patient is Stable - Low risk of patient condition declining or worsening    Shift Goals  Clinical Goals: Monitor neuro status  Patient Goals: Sleep  Family Goals: KRISTEN    Progress made toward(s) clinical / shift goals:    Problem: Knowledge Deficit - Standard  Goal: Patient and family/care givers will demonstrate understanding of plan of care, disease process/condition, diagnostic tests and medications  Outcome: Progressing  Note: Pt verbalizes understanding of plan of care.      Problem: Neuro Status  Goal: Neuro status will remain stable or improve  Outcome: Progressing  Note: Neuro status remains stable.      Problem: Dysphagia  Goal: Dysphagia will improve  Outcome: Progressing  Note: No signs of dysphagia, cleared by SLP

## 2022-05-25 NOTE — DISCHARGE INSTRUCTIONS
Discharge Instructions    Discharged to home by car with relative. Discharged via wheelchair, hospital escort: Yes.  Special equipment needed: Not Applicable    Be sure to schedule a follow-up appointment with your primary care doctor or any specialists as instructed.     Discharge Plan:   Diet Plan: Discussed  Activity Level: Discussed  Confirmed Follow up Appointment: Patient to Call and Schedule Appointment  Confirmed Symptoms Management: Discussed  Medication Reconciliation Updated: Yes    I understand that a diet low in cholesterol, fat, and sodium is recommended for good health. Unless I have been given specific instructions below for another diet, I accept this instruction as my diet prescription.   Other diet: 2g Sodium diet    Special Instructions:     Stroke/CVA/TIA/Hemorrhagic Ischemia Discharge Instructions  You have had a stroke. Your risk factors have been identified as follows:  Age - Over 55  Gender - Men are at a higher risk than women  Artery Disease / Peripheral Vascular Disease   Smoking  High Cholesterol and lipids  It is important that you reduce your risk factors to avoid another stroke in the future. Here are some general guidelines to follow:  Eat healthy - avoid food high in fat.  Get regular exercise.  Maintain a healthy weight.  Avoid smoking.  Avoid alcohol and illegal drug use.  Take your medications as directed.  For more information regarding risk factors, refer to pages 17-19 in your Stroke Patient Education Guide. Stroke Education Guide was given to patient.    Warning signs of a stroke include (which can also be found on page 3 of your Stroke Patient Education Guide):  Sudden numbness of weakness of the face, arm or leg (especially on one side of the body).  Sudden confusion, trouble speaking or understanding.  Sudden trouble seeing in one or both eyes.  Sudden trouble walking, dizziness, loss of balance or coordination.  Sudden severe headache with no known cause.  It is very  important to get treatment quickly when a stroke occurs. If you experience any of the above warning signs, call [phone] immediately.     Some patients who have had a stroke will be going home on a blood thinner medication called Warfarin (Coumadin).  This medication requires very close monitoring and follow up.  This follow up can be provided by either your Primary Care Physician or by [facility] Outpatient Anticoagulation Service.  The Outpatient Anticoagulation Service is located at the [place] [facility] at [facility] ([facility]).  If you do not know when your follow up appointment is scheduled, call [phone] to verify your appointment time.    Is patient discharged on Warfarin / Coumadin?   No     Depression / Suicide Risk    As you are discharged from this [facility], it is important to learn how to keep safe from harming yourself.    Recognize the warning signs:  Abrupt changes in personality, positive or negative- including increase in energy   Giving away possessions  Change in eating patterns- significant weight changes-  positive or negative  Change in sleeping patterns- unable to sleep or sleeping all the time   Unwillingness or inability to communicate  Depression  Unusual sadness, discouragement and loneliness  Talk of wanting to die  Neglect of personal appearance   Rebelliousness- reckless behavior  Withdrawal from people/activities they love  Confusion- inability to concentrate     If you or a loved one observes any of these behaviors or has concerns about self-harm, here's what you can do:  Talk about it- your feelings and reasons for harming yourself  Remove any means that you might use to hurt yourself (examples: pills, rope, extension cords, firearm)  Get professional help from the community (Mental Health, Substance Abuse, psychological counseling)  Do not be alone:Call your Safe Contact- someone whom you trust who will be there for  you.  Call your local CRISIS HOTLINE 823-3175 or 315-914-8464  Call your local Children's Mobile Crisis Response Team [place] (603) 814-7260 or www.Doodle Mobile  Call the toll free National Suicide Prevention Hotlines   National Suicide Prevention Lifeline 654-503-MGDY (9536)  [facility] 800-SUICIDE (772-5046)      Ischemic Stroke    An ischemic stroke is the sudden death of brain tissue. Blood carries oxygen to all areas of the body. This type of stroke happens when your blood does not flow to your brain like normal. Your brain cannot get the oxygen it needs. This is an emergency. It must be treated right away.  Symptoms of a stroke usually happen all of a sudden. You may notice them when you wake up. They can include:  Weakness or loss of feeling in your face, arm, or leg. This often happens on one side of the body.  Trouble walking.  Trouble moving your arms or legs.  Loss of balance or coordination.  Feeling confused.  Trouble talking or understanding what people are saying.  Slurred speech.  Trouble seeing.  Seeing two of one object (double vision).  Feeling dizzy.  Feeling sick to your stomach (nauseous) and throwing up (vomiting).  A very bad headache for no reason.  Get help as soon as any of these problems start. This is important. Some treatments work better if they are given right away. These include:  Aspirin.  Medicines to control blood pressure.  A shot (injection) of medicine to break up the blood clot.  Treatments given in the blood vessel (artery) to take out the clot or break it up.  Other treatments may include:  Oxygen.  Fluids given through an IV tube.  Medicines to thin out your blood.  Procedures to help your blood flow better.  What increases the risk?  Certain things may make you more likely to have a stroke. Some of these are things that you can change, such as:  Being very overweight (obesity).  Smoking.  Taking birth control pills.  Not being active.  Drinking  too much alcohol.  Using drugs.  Other risk factors include:  High blood pressure.  High cholesterol.  Diabetes.  Heart disease.  Being , , , or .  Being over age 60.  Family history of stroke.  Having had blood clots, stroke, or warning stroke (transient ischemic attack, TIA) in the past.  Sickle cell disease.  Being a woman with a history of high blood pressure in pregnancy (preeclampsia).  Migraine headache.  Sleep apnea.  Having an irregular heartbeat (atrial fibrillation).  Long-term (chronic) diseases that cause soreness and swelling (inflammation).  Disorders that affect how your blood clots.  Follow these instructions at home:  Medicines  Take over-the-counter and prescription medicines only as told by your doctor.  If you were told to take aspirin or another medicine to thin your blood, take it exactly as told by your doctor.  Taking too much of the medicine can cause bleeding.  If you do not take enough, it may not work as well.  Know the side effects of your medicines. If you are taking a blood thinner, make sure you:  Hold pressure over any cuts for longer than usual.  Tell your dentist and other doctors that you take this medicine.  Avoid activities that may cause damage or injury to your body.  Eating and drinking  Follow instructions from your doctor about what you cannot eat or drink.  Eat healthy foods.  If you have trouble with swallowing, do these things to avoid choking:  Take small bites when eating.  Eat foods that are soft or pureed.  Safety  Follow instructions from your health care team about physical activity.  Use a walker or cane as told by your doctor.  Keep your home safe so you do not fall. This may include:  Having experts look at your home to make sure it is safe.  Putting grab bars in the bedroom and bathroom.  Using raised toilets.  Putting a seat in the shower.  General instructions  Do not use any tobacco products.  Examples of  "these are cigarettes, chewing tobacco, and e-cigarettes.  If you need help quitting, ask your doctor.  Limit how much alcohol you drink. This means no more than 1 drink a day for nonpregnant women and 2 drinks a day for men. One drink equals 12 oz of beer, 5 oz of wine, or 1½ oz of hard liquor.  If you need help to stop using drugs or alcohol, ask your doctor to refer you to a program or specialist.  Stay active. Exercise as told by your doctor.  Keep all follow-up visits as told by your doctor. This is important.  Get help right away if:    You have any signs of a stroke. \"BE FAST\" is an easy way to remember the main warning signs:  B - Balance. Signs are dizziness, sudden trouble walking, or loss of balance.  E - Eyes. Signs are trouble seeing or a change in how you see.  F - Face. Signs are sudden weakness or loss of feeling of the face, or the face or eyelid drooping on one side.  A - Arms. Signs are weakness or loss of feeling in an arm. This happens suddenly and usually on one side of the body.  S - Speech. Signs are sudden trouble speaking, slurred speech, or trouble understanding what people say.  T - Time. Time to call emergency services. Write down what time symptoms started.  You have other signs of a stroke, such as:  A sudden, very bad headache with no known cause.  Feeling sick to your stomach (nausea).  Throwing up (vomiting).  Jerky movements you cannot control (seizure).  These symptoms may be an emergency. Do not wait to see if the symptoms will go away. Get medical help right away. Call your local emergency services (911 in the [place] Do not drive yourself to the hospital.  Summary  An ischemic stroke is the sudden death of brain tissue.  Symptoms of a stroke usually happen all of a sudden. You may notice them when you wake up.  Get help if you have any warning signs of a stroke. This is important. Some treatments work better if they are given right away.  This information is not intended to " replace advice given to you by your health care provider. Make sure you discuss any questions you have with your health care provider.  Document Released: 12/06/2012 Document Revised: 05/29/2019 Document Reviewed: 03/15/2017  Elsevier Patient Education © 2020 [employer]

## 2022-05-25 NOTE — CARE PLAN
Problem: Knowledge Deficit - Stroke Education  Goal: Patient's knowledge of stroke and risk factors will improve  Outcome: Progressing     Problem: Neuro Status  Goal: Neuro status will remain stable or improve  Outcome: Progressing   The patient is Stable - Low risk of patient condition declining or worsening    Shift Goals  Clinical Goals: maintain neuro status  Patient Goals: rest  Family Goals: KRISTEN    Progress made toward(s) clinical / shift goals:  MRI done and successful, NIH performed at beginning of shift, patient still displays expressive aphasia. Patient appreciates verbal encouragement.     Patient is not progressing towards the following goals:

## 2022-05-26 LAB — NUCLEAR IGG SER QL IA: NORMAL

## 2022-05-27 LAB
CARDIOLIPIN IGA SER IA-ACNC: <10 APL
CARDIOLIPIN IGG SER IA-ACNC: 11 GPL
CARDIOLIPIN IGM SER IA-ACNC: <10 MPL
PROT C ACT/NOR PPP: 161 % (ref 83–168)

## 2022-05-28 LAB — PROT S FREE AG ACT/NOR PPP IA: 111 % (ref 74–147)

## 2022-05-30 LAB — F5 P.R506Q BLD/T QL: NEGATIVE

## 2022-06-01 ENCOUNTER — APPOINTMENT (OUTPATIENT)
Dept: RADIOLOGY | Facility: MEDICAL CENTER | Age: 57
DRG: 872 | End: 2022-06-01
Attending: EMERGENCY MEDICINE
Payer: COMMERCIAL

## 2022-06-01 ENCOUNTER — HOSPITAL ENCOUNTER (INPATIENT)
Facility: MEDICAL CENTER | Age: 57
LOS: 2 days | DRG: 872 | End: 2022-06-03
Attending: EMERGENCY MEDICINE | Admitting: HOSPITALIST
Payer: COMMERCIAL

## 2022-06-01 DIAGNOSIS — I63.9 ACUTE CVA (CEREBROVASCULAR ACCIDENT) (HCC): ICD-10-CM

## 2022-06-01 DIAGNOSIS — L03.116 CELLULITIS OF LEFT LEG: ICD-10-CM

## 2022-06-01 DIAGNOSIS — I73.9 PAD (PERIPHERAL ARTERY DISEASE) (HCC): ICD-10-CM

## 2022-06-01 DIAGNOSIS — R91.1 PULMONARY NODULE: ICD-10-CM

## 2022-06-01 DIAGNOSIS — Z86.711 HISTORY OF PULMONARY EMBOLUS (PE): Chronic | ICD-10-CM

## 2022-06-01 DIAGNOSIS — F17.200 SMOKER: Chronic | ICD-10-CM

## 2022-06-01 DIAGNOSIS — E87.20 LACTIC ACIDOSIS: ICD-10-CM

## 2022-06-01 DIAGNOSIS — A41.9 SEPSIS, DUE TO UNSPECIFIED ORGANISM, UNSPECIFIED WHETHER ACUTE ORGAN DYSFUNCTION PRESENT (HCC): ICD-10-CM

## 2022-06-01 DIAGNOSIS — Z86.718 HISTORY OF DVT (DEEP VEIN THROMBOSIS): ICD-10-CM

## 2022-06-01 PROBLEM — F17.209 TOBACCO USE DISORDER, CONTINUOUS: Status: ACTIVE | Noted: 2020-10-21

## 2022-06-01 PROBLEM — I82.90 VENOUS THROMBOEMBOLISM (VTE): Status: ACTIVE | Noted: 2022-06-01

## 2022-06-01 LAB
ALBUMIN SERPL BCP-MCNC: 4.6 G/DL (ref 3.2–4.9)
ALBUMIN/GLOB SERPL: 1.2 G/DL
ALP SERPL-CCNC: 154 U/L (ref 30–99)
ALT SERPL-CCNC: 16 U/L (ref 2–50)
ANION GAP SERPL CALC-SCNC: 16 MMOL/L (ref 7–16)
APPEARANCE UR: CLEAR
APTT IMM NP PPP: 83 SEC (ref 32–48)
APTT PPP: 66 SEC (ref 24.7–36)
AST SERPL-CCNC: 22 U/L (ref 12–45)
BASOPHILS # BLD AUTO: 0.4 % (ref 0–1.8)
BASOPHILS # BLD: 0.06 K/UL (ref 0–0.12)
BILIRUB SERPL-MCNC: 0.5 MG/DL (ref 0.1–1.5)
BILIRUB UR QL STRIP.AUTO: NEGATIVE
BUN SERPL-MCNC: 23 MG/DL (ref 8–22)
CALCIUM SERPL-MCNC: 9.6 MG/DL (ref 8.5–10.5)
CHLORIDE SERPL-SCNC: 102 MMOL/L (ref 96–112)
CK SERPL-CCNC: 33 U/L (ref 0–154)
CO2 SERPL-SCNC: 19 MMOL/L (ref 20–33)
COLOR UR: YELLOW
CONFIRM APTT STACLOT: ABNORMAL
CREAT SERPL-MCNC: 1.4 MG/DL (ref 0.5–1.4)
CRP SERPL HS-MCNC: 1.4 MG/DL (ref 0–0.75)
DRVVT SCREEN TO CONFIRM RATIO: POSITIVE RATIO
EOSINOPHIL # BLD AUTO: 0.14 K/UL (ref 0–0.51)
EOSINOPHIL NFR BLD: 0.9 % (ref 0–6.9)
ERYTHROCYTE [DISTWIDTH] IN BLOOD BY AUTOMATED COUNT: 46.9 FL (ref 35.9–50)
ERYTHROCYTE [SEDIMENTATION RATE] IN BLOOD BY WESTERGREN METHOD: 5 MM/HOUR (ref 0–20)
FLUAV RNA SPEC QL NAA+PROBE: NEGATIVE
FLUBV RNA SPEC QL NAA+PROBE: NEGATIVE
GFR SERPLBLD CREATININE-BSD FMLA CKD-EPI: 58 ML/MIN/1.73 M 2
GLOBULIN SER CALC-MCNC: 3.7 G/DL (ref 1.9–3.5)
GLUCOSE SERPL-MCNC: 123 MG/DL (ref 65–99)
GLUCOSE UR STRIP.AUTO-MCNC: NEGATIVE MG/DL
GRAM STN SPEC: NORMAL
HCT VFR BLD AUTO: 50.1 % (ref 42–52)
HGB BLD-MCNC: 16.8 G/DL (ref 14–18)
IMM GRANULOCYTES # BLD AUTO: 0.08 K/UL (ref 0–0.11)
IMM GRANULOCYTES NFR BLD AUTO: 0.5 % (ref 0–0.9)
INR PPP: 1.11 (ref 0.87–1.13)
KETONES UR STRIP.AUTO-MCNC: NEGATIVE MG/DL
LA 3 SCREEN W REFLEX-IMP: ABNORMAL
LA NT PLATELET PPP: POSITIVE S
LACTATE BLD-SCNC: 1.8 MMOL/L (ref 0.5–2)
LACTATE BLD-SCNC: 3.3 MMOL/L (ref 0.5–2)
LEUKOCYTE ESTERASE UR QL STRIP.AUTO: NEGATIVE
LYMPHOCYTES # BLD AUTO: 0.73 K/UL (ref 1–4.8)
LYMPHOCYTES NFR BLD: 4.5 % (ref 22–41)
MCH RBC QN AUTO: 29.4 PG (ref 27–33)
MCHC RBC AUTO-ENTMCNC: 33.5 G/DL (ref 33.7–35.3)
MCV RBC AUTO: 87.6 FL (ref 81.4–97.8)
MICRO URNS: NORMAL
MIXING DRVVT: 60 SEC (ref 33–44)
MONOCYTES # BLD AUTO: 0.67 K/UL (ref 0–0.85)
MONOCYTES NFR BLD AUTO: 4.1 % (ref 0–13.4)
NEUTROPHILS # BLD AUTO: 14.64 K/UL (ref 1.82–7.42)
NEUTROPHILS NFR BLD: 89.6 % (ref 44–72)
NITRITE UR QL STRIP.AUTO: NEGATIVE
NRBC # BLD AUTO: 0 K/UL
NRBC BLD-RTO: 0 /100 WBC
PH UR STRIP.AUTO: 5 [PH] (ref 5–8)
PLATELET # BLD AUTO: 153 K/UL (ref 164–446)
PMV BLD AUTO: 9.6 FL (ref 9–12.9)
POTASSIUM SERPL-SCNC: 4.1 MMOL/L (ref 3.6–5.5)
PROCALCITONIN SERPL-MCNC: 0.21 NG/ML
PROT SERPL-MCNC: 8.3 G/DL (ref 6–8.2)
PROT UR QL STRIP: NEGATIVE MG/DL
PROTHROMBIN TIME: 13.4 SEC (ref 12–15.5)
PROTHROMBIN TIME: 14 SEC (ref 12–14.6)
PT P HEP NEUT PPP: ABNORMAL SEC (ref 32–48)
RBC # BLD AUTO: 5.72 M/UL (ref 4.7–6.1)
RBC UR QL AUTO: NEGATIVE
REPTILASE TIME: ABNORMAL SEC
RSV RNA SPEC QL NAA+PROBE: NEGATIVE
SARS-COV-2 RNA RESP QL NAA+PROBE: NOTDETECTED
SCCMEC + MECA PNL NOSE NAA+PROBE: NEGATIVE
SCREEN APTT: 119 SEC (ref 32–48)
SCREEN DRVVT: 74 SEC (ref 33–44)
SIGNIFICANT IND 70042: NORMAL
SITE SITE: NORMAL
SODIUM SERPL-SCNC: 137 MMOL/L (ref 135–145)
SOURCE SOURCE: NORMAL
SP GR UR STRIP.AUTO: 1.02
SPECIMEN SOURCE: NORMAL
THROMBIN TIME: 16.3 SEC (ref 14.7–19.5)
UROBILINOGEN UR STRIP.AUTO-MCNC: 0.2 MG/DL
WBC # BLD AUTO: 16.3 K/UL (ref 4.8–10.8)

## 2022-06-01 PROCEDURE — 93926 LOWER EXTREMITY STUDY: CPT | Mod: LT

## 2022-06-01 PROCEDURE — 85610 PROTHROMBIN TIME: CPT

## 2022-06-01 PROCEDURE — 99223 1ST HOSP IP/OBS HIGH 75: CPT | Mod: GC | Performed by: HOSPITALIST

## 2022-06-01 PROCEDURE — 87205 SMEAR GRAM STAIN: CPT

## 2022-06-01 PROCEDURE — C9803 HOPD COVID-19 SPEC COLLECT: HCPCS | Performed by: EMERGENCY MEDICINE

## 2022-06-01 PROCEDURE — 700105 HCHG RX REV CODE 258: Performed by: EMERGENCY MEDICINE

## 2022-06-01 PROCEDURE — 73590 X-RAY EXAM OF LOWER LEG: CPT | Mod: LT

## 2022-06-01 PROCEDURE — 87070 CULTURE OTHR SPECIMN AEROBIC: CPT

## 2022-06-01 PROCEDURE — 87186 SC STD MICRODIL/AGAR DIL: CPT

## 2022-06-01 PROCEDURE — 87641 MR-STAPH DNA AMP PROBE: CPT

## 2022-06-01 PROCEDURE — 87077 CULTURE AEROBIC IDENTIFY: CPT | Mod: 91

## 2022-06-01 PROCEDURE — A9270 NON-COVERED ITEM OR SERVICE: HCPCS | Performed by: STUDENT IN AN ORGANIZED HEALTH CARE EDUCATION/TRAINING PROGRAM

## 2022-06-01 PROCEDURE — 81003 URINALYSIS AUTO W/O SCOPE: CPT

## 2022-06-01 PROCEDURE — 99285 EMERGENCY DEPT VISIT HI MDM: CPT

## 2022-06-01 PROCEDURE — 71045 X-RAY EXAM CHEST 1 VIEW: CPT

## 2022-06-01 PROCEDURE — 36415 COLL VENOUS BLD VENIPUNCTURE: CPT

## 2022-06-01 PROCEDURE — 83605 ASSAY OF LACTIC ACID: CPT | Mod: 91

## 2022-06-01 PROCEDURE — 93971 EXTREMITY STUDY: CPT | Mod: LT

## 2022-06-01 PROCEDURE — 700111 HCHG RX REV CODE 636 W/ 250 OVERRIDE (IP): Performed by: EMERGENCY MEDICINE

## 2022-06-01 PROCEDURE — 96374 THER/PROPH/DIAG INJ IV PUSH: CPT

## 2022-06-01 PROCEDURE — 87086 URINE CULTURE/COLONY COUNT: CPT

## 2022-06-01 PROCEDURE — 80053 COMPREHEN METABOLIC PANEL: CPT

## 2022-06-01 PROCEDURE — 770001 HCHG ROOM/CARE - MED/SURG/GYN PRIV*

## 2022-06-01 PROCEDURE — 0241U HCHG SARS-COV-2 COVID-19 NFCT DS RESP RNA 4 TRGT MIC: CPT

## 2022-06-01 PROCEDURE — 84145 PROCALCITONIN (PCT): CPT

## 2022-06-01 PROCEDURE — 87147 CULTURE TYPE IMMUNOLOGIC: CPT

## 2022-06-01 PROCEDURE — 700102 HCHG RX REV CODE 250 W/ 637 OVERRIDE(OP): Performed by: EMERGENCY MEDICINE

## 2022-06-01 PROCEDURE — 85025 COMPLETE CBC W/AUTO DIFF WBC: CPT

## 2022-06-01 PROCEDURE — A9270 NON-COVERED ITEM OR SERVICE: HCPCS | Performed by: EMERGENCY MEDICINE

## 2022-06-01 PROCEDURE — 82550 ASSAY OF CK (CPK): CPT

## 2022-06-01 PROCEDURE — 700102 HCHG RX REV CODE 250 W/ 637 OVERRIDE(OP): Performed by: STUDENT IN AN ORGANIZED HEALTH CARE EDUCATION/TRAINING PROGRAM

## 2022-06-01 PROCEDURE — 85652 RBC SED RATE AUTOMATED: CPT

## 2022-06-01 PROCEDURE — 85730 THROMBOPLASTIN TIME PARTIAL: CPT

## 2022-06-01 PROCEDURE — 86140 C-REACTIVE PROTEIN: CPT

## 2022-06-01 PROCEDURE — 87040 BLOOD CULTURE FOR BACTERIA: CPT

## 2022-06-01 RX ORDER — ATORVASTATIN CALCIUM 40 MG/1
40 TABLET, FILM COATED ORAL NIGHTLY
Status: DISCONTINUED | OUTPATIENT
Start: 2022-06-01 | End: 2022-06-03 | Stop reason: HOSPADM

## 2022-06-01 RX ORDER — OMEPRAZOLE 20 MG/1
20 CAPSULE, DELAYED RELEASE ORAL DAILY
Status: DISCONTINUED | OUTPATIENT
Start: 2022-06-01 | End: 2022-06-03 | Stop reason: HOSPADM

## 2022-06-01 RX ORDER — HYDROCODONE BITARTRATE AND ACETAMINOPHEN 5; 325 MG/1; MG/1
1 TABLET ORAL EVERY 4 HOURS PRN
Status: DISCONTINUED | OUTPATIENT
Start: 2022-06-01 | End: 2022-06-02

## 2022-06-01 RX ORDER — ACETAMINOPHEN 500 MG
1000 TABLET ORAL ONCE
Status: COMPLETED | OUTPATIENT
Start: 2022-06-01 | End: 2022-06-01

## 2022-06-01 RX ORDER — GABAPENTIN 300 MG/1
600 CAPSULE ORAL 3 TIMES DAILY
Status: DISCONTINUED | OUTPATIENT
Start: 2022-06-01 | End: 2022-06-03 | Stop reason: HOSPADM

## 2022-06-01 RX ORDER — SODIUM CHLORIDE 9 MG/ML
30 INJECTION, SOLUTION INTRAVENOUS ONCE
Status: COMPLETED | OUTPATIENT
Start: 2022-06-01 | End: 2022-06-01

## 2022-06-01 RX ADMIN — HYDROCODONE BITARTRATE AND ACETAMINOPHEN 1 TABLET: 5; 325 TABLET ORAL at 17:34

## 2022-06-01 RX ADMIN — ACETAMINOPHEN 1000 MG: 500 TABLET ORAL at 10:49

## 2022-06-01 RX ADMIN — CEFTRIAXONE SODIUM 2 G: 10 INJECTION, POWDER, FOR SOLUTION INTRAVENOUS at 09:16

## 2022-06-01 RX ADMIN — GABAPENTIN 600 MG: 300 CAPSULE ORAL at 14:49

## 2022-06-01 RX ADMIN — APIXABAN 5 MG: 5 TABLET, FILM COATED ORAL at 17:32

## 2022-06-01 RX ADMIN — SODIUM CHLORIDE 2994 ML: 9 INJECTION, SOLUTION INTRAVENOUS at 09:06

## 2022-06-01 RX ADMIN — OMEPRAZOLE 20 MG: 20 CAPSULE, DELAYED RELEASE ORAL at 14:50

## 2022-06-01 RX ADMIN — HYDROCODONE BITARTRATE AND ACETAMINOPHEN 1 TABLET: 5; 325 TABLET ORAL at 21:32

## 2022-06-01 RX ADMIN — GABAPENTIN 600 MG: 300 CAPSULE ORAL at 17:32

## 2022-06-01 RX ADMIN — ATORVASTATIN CALCIUM 40 MG: 40 TABLET, FILM COATED ORAL at 21:32

## 2022-06-01 ASSESSMENT — FIBROSIS 4 INDEX
FIB4 SCORE: 2.05
FIB4 SCORE: 1.672

## 2022-06-01 ASSESSMENT — COGNITIVE AND FUNCTIONAL STATUS - GENERAL
SUGGESTED CMS G CODE MODIFIER DAILY ACTIVITY: CH
SUGGESTED CMS G CODE MODIFIER MOBILITY: CH
MOBILITY SCORE: 24
DAILY ACTIVITIY SCORE: 24

## 2022-06-01 ASSESSMENT — ENCOUNTER SYMPTOMS
WHEEZING: 0
BLURRED VISION: 1
PALPITATIONS: 0
CHILLS: 1
VOMITING: 0
NAUSEA: 0
SHORTNESS OF BREATH: 1
COUGH: 0
MYALGIAS: 1
FEVER: 1
ABDOMINAL PAIN: 0
HEADACHES: 1
WEAKNESS: 1

## 2022-06-01 ASSESSMENT — LIFESTYLE VARIABLES
TOTAL SCORE: 0
CONSUMPTION TOTAL: INCOMPLETE
TOTAL SCORE: 0
DOES PATIENT WANT TO STOP DRINKING: NO
ALCOHOL_USE: NO
EVER HAD A DRINK FIRST THING IN THE MORNING TO STEADY YOUR NERVES TO GET RID OF A HANGOVER: NO
HAVE YOU EVER FELT YOU SHOULD CUT DOWN ON YOUR DRINKING: NO
EVER FELT BAD OR GUILTY ABOUT YOUR DRINKING: NO
TOTAL SCORE: 0
HAVE PEOPLE ANNOYED YOU BY CRITICIZING YOUR DRINKING: NO

## 2022-06-01 ASSESSMENT — PAIN DESCRIPTION - PAIN TYPE
TYPE: ACUTE PAIN
TYPE: ACUTE PAIN

## 2022-06-01 ASSESSMENT — PAIN SCALES - WONG BAKER: WONGBAKER_NUMERICALRESPONSE: DOESN'T HURT AT ALL

## 2022-06-01 NOTE — PROGRESS NOTES
4 Eyes Skin Assessment Completed by HANNAH Carbajal and HANNAH Abdul.    Head WDL  Ears WDL  Nose WDL  Mouth WDL  Neck WDL  Breast/Chest WDL  Shoulder Blades WDL  Spine WDL  (R) Arm/Elbow/Hand WDL  (L) Arm/Elbow/Hand WDL  Abdomen WDL  Groin WDL  Scrotum/Coccyx/Buttocks WDL  (R) Leg Scab  (L) Leg Swelling  (R) Heel/Foot/Toe WDL  (L) Heel/Foot/Toe Discoloration          Devices In Places Pulse Ox      Interventions In Place Gray Ear Foams and Pillows    Possible Skin Injury Yes    Pictures Uploaded Into Epic Yes  Wound Consult Placed Yes  RN Wound Prevention Protocol Ordered Yes

## 2022-06-01 NOTE — ED PROVIDER NOTES
ED Provider Note    CHIEF COMPLAINT  Chief Complaint   Patient presents with   • Chills     Pt reports chills and fever since 0200 this morning without relief.     • N/V     X2 beginning 30 minutes ago.         HPI    Primary care provider: Gio Wang M.D.  Means of arrival: EMS  History obtained from: Patient  History limited by: Nothing    Panchito Moura is a 56 y.o. male who presents with fever and generally not feeling well.  Onset last night around 2 AM.  Has chronic wound to the left lower extremity, is getting more red and swollen and weeping more.  No falls or injuries or trauma.  No alleviating measures noted.  No aggravating factors.  Not sure if he is currently on antibiotics.  Denies prior episodes.  No known close sick contacts.  Symptoms constant and worsening since 2 AM, family called 911, no interventions taken prior to arrival.  [facility]  Smokes about a half a pack a day.    REVIEW OF SYSTEMS  Constitutional: Positive for fever and chills and fatigue.  HENT: Negative for rhinorrhea or sore throat.    Respiratory: Positive for dry cough negative for dyspnea.  Cardiovascular: Negative for chest pain or syncope.   Gastrointestinal: Positive for 2 episodes of nausea and vomiting in the last day, no black or bloody output, no abdominal pain.  Genitourinary: Negative for dysuria or flank pain.   Musculoskeletal: Negative for back pain or joint pain.   Skin: Positive for left lower extremity erythema and weeping wounds.  Neurological: Negative for sensory or motor changes.   See HPI for further details. All other systems are negative.     PAST MEDICAL HISTORY   has a past medical history of Cancer (HCC), Cancer (HCC), DVT (deep venous thrombosis) (HCC), Pulmonary embolism (HCC), and Smoker.    PAST FAMILY HISTORY  Family History   Problem Relation Age of Onset   • Heart Attack Mother    • Cancer Father        SOCIAL HISTORY  Social History     Tobacco Use   • Smoking  status: Current Every Day Smoker     Packs/day: 0.50     Types: Cigarettes   • Smokeless tobacco: Never Used   Vaping Use   • Vaping Use: Never used   Substance and Sexual Activity   • Alcohol use: No   • Drug use: Not Currently     Comment: edibles   • Sexual activity: Not on file       SURGICAL HISTORY   has a past surgical history that includes nephrectomy partial; nerve repair (6/18/08); laceration repair (6/18/08); other; nephrectomy partial (Right); and irrigation & debridement general (Left, 10/22/2020).    CURRENT MEDICATIONS  Home Medications     Reviewed by Sophie Yu R.N. (Registered Nurse) on 06/01/22 at 1130  Med List Status: Complete   Medication Last Dose Status   acetaminophen (TYLENOL) 325 MG Tab 6/1/2022 Active   alendronate (FOSAMAX) 70 MG Tab 5/27/2022 Active   apixaban (ELIQUIS) 5mg Tab 6/1/2022 Active   atorvastatin (LIPITOR) 40 MG Tab 5/31/2022 Active   Carboxymethylcellulose Sodium (DRY EYE RELIEF OP) prn Active   ferrous gluconate (FERGON) 324 (38 Fe) MG Tab 5/31/2022 Active   gabapentin (NEURONTIN) 600 MG tablet 6/1/2022 Active   HYDROcodone-acetaminophen (NORCO) 5-325 MG Tab per tablet 6/1/2022 Active   NIFEdipine SR (PROCARDIA-XL) 30 MG tablet 5/31/2022 Active   omeprazole (PRILOSEC) 20 MG delayed-release capsule 6/1/2022 Active                ALLERGIES  No Known Allergies    PHYSICAL EXAM  VITAL SIGNS: /69   Pulse 67   Temp (!) 38.9 °C (102 °F) (Temporal)   Resp 14   Ht 1.829 m (6')   Wt 99.8 kg (220 lb)   SpO2 94%   BMI 29.84 kg/m²    Pulse ox interpretation: On room air, I interpret this pulse ox as normal.  Constitutional: Chronically ill appearing for age lying on stretcher in mild distress.  HEENT: Normocephalic, atraumatic. Posterior pharynx clear, mucous membranes dry.  Eyes:  EOMI. Normal sclerae.  Neck: Supple, nontender.  Chest/Pulmonary: Diminished to ausculation bilaterally, no wheezes or rhonchi.  Cardiovascular: Tachycardic, regular rhythm, no obvious  murmur.  Diminished pulses in both feet.  Abdomen: Soft, nontender; no rebound, guarding, or masses.  Back: No CVA or midline tenderness.   Musculoskeletal: No deformity, significant swelling and erythema to left lower leg, multiple prior toe amputation sites clean dry and intact.  Neuro: Clear speech, normal coordination, cranial nerves II-XII grossly intact, no focal asymmetry or sensory deficits.  Alert, no focal weakness or asymmetry.    Psych: Flat affect but cooperative.  Skin: Significant warm beefy erythema to left lower leg, there are some areas that are open and weeping but no purulence.      DIAGNOSTIC STUDIES / PROCEDURES    LABS & EKG  Results for orders placed or performed during the hospital encounter of 06/01/22   Lactic acid (lactate)   Result Value Ref Range    Lactic Acid 3.3 (H) 0.5 - 2.0 mmol/L   Lactic acid (lactate): Repeat if initial lactic acid result is greater than 2   Result Value Ref Range    Lactic Acid 1.8 0.5 - 2.0 mmol/L   CBC WITH DIFFERENTIAL   Result Value Ref Range    WBC 16.3 (H) 4.8 - 10.8 K/uL    RBC 5.72 4.70 - 6.10 M/uL    Hemoglobin 16.8 14.0 - 18.0 g/dL    Hematocrit 50.1 42.0 - 52.0 %    MCV 87.6 81.4 - 97.8 fL    MCH 29.4 27.0 - 33.0 pg    MCHC 33.5 (L) 33.7 - 35.3 g/dL    RDW 46.9 35.9 - 50.0 fL    Platelet Count 153 (L) 164 - 446 K/uL    MPV 9.6 9.0 - 12.9 fL    Neutrophils-Polys 89.60 (H) 44.00 - 72.00 %    Lymphocytes 4.50 (L) 22.00 - 41.00 %    Monocytes 4.10 0.00 - 13.40 %    Eosinophils 0.90 0.00 - 6.90 %    Basophils 0.40 0.00 - 1.80 %    Immature Granulocytes 0.50 0.00 - 0.90 %    Nucleated RBC 0.00 /100 WBC    Neutrophils (Absolute) 14.64 (H) 1.82 - 7.42 K/uL    Lymphs (Absolute) 0.73 (L) 1.00 - 4.80 K/uL    Monos (Absolute) 0.67 0.00 - 0.85 K/uL    Eos (Absolute) 0.14 0.00 - 0.51 K/uL    Baso (Absolute) 0.06 0.00 - 0.12 K/uL    Immature Granulocytes (abs) 0.08 0.00 - 0.11 K/uL    NRBC (Absolute) 0.00 K/uL   COMP METABOLIC PANEL   Result Value Ref Range     Sodium 137 135 - 145 mmol/L    Potassium 4.1 3.6 - 5.5 mmol/L    Chloride 102 96 - 112 mmol/L    Co2 19 (L) 20 - 33 mmol/L    Anion Gap 16.0 7.0 - 16.0    Glucose 123 (H) 65 - 99 mg/dL    Bun 23 (H) 8 - 22 mg/dL    Creatinine 1.40 0.50 - 1.40 mg/dL    Calcium 9.6 8.5 - 10.5 mg/dL    AST(SGOT) 22 12 - 45 U/L    ALT(SGPT) 16 2 - 50 U/L    Alkaline Phosphatase 154 (H) 30 - 99 U/L    Total Bilirubin 0.5 0.1 - 1.5 mg/dL    Albumin 4.6 3.2 - 4.9 g/dL    Total Protein 8.3 (H) 6.0 - 8.2 g/dL    Globulin 3.7 (H) 1.9 - 3.5 g/dL    A-G Ratio 1.2 g/dL   URINALYSIS    Specimen: Urine   Result Value Ref Range    Color Yellow     Character Clear     Specific Gravity 1.019 <1.035    Ph 5.0 5.0 - 8.0    Glucose Negative Negative mg/dL    Ketones Negative Negative mg/dL    Protein Negative Negative mg/dL    Bilirubin Negative Negative    Urobilinogen, Urine 0.2 Negative    Nitrite Negative Negative    Leukocyte Esterase Negative Negative    Occult Blood Negative Negative    Micro Urine Req see below    Sed Rate   Result Value Ref Range    Sed Rate Westergren 5 0 - 20 mm/hour   CRP QUANTITIVE (NON-CARDIAC)   Result Value Ref Range    Stat C-Reactive Protein 1.40 (H) 0.00 - 0.75 mg/dL   CREATINE KINASE   Result Value Ref Range    CPK Total 33 0 - 154 U/L   Prothrombin Time   Result Value Ref Range    PT 14.0 12.0 - 14.6 sec    INR 1.11 0.87 - 1.13   APTT   Result Value Ref Range    APTT 66.0 (H) 24.7 - 36.0 sec   Culture Wound With Gram Stain    Specimen: Left Leg; Wound   Result Value Ref Range    Significant Indicator NEG     Source WND     Site LEFT LEG     Culture Result -     Gram Stain Result Moderate Gram positive cocci.    COV-2, FLU A/B, AND RSV BY PCR (2-4 HOURS CEPHEID): Collect NP swab in VTM    Specimen: Respirate   Result Value Ref Range    Influenza virus A RNA Negative Negative    Influenza virus B, PCR Negative Negative    RSV, PCR Negative Negative    SARS-CoV-2 by PCR NotDetected     SARS-CoV-2 Source NP Swab     ESTIMATED GFR   Result Value Ref Range    GFR (CKD-EPI) 58 (A) >60 mL/min/1.73 m 2   PROCALCITONIN   Result Value Ref Range    Procalcitonin 0.21 <0.25 ng/mL   GRAM STAIN    Specimen: Wound   Result Value Ref Range    Significant Indicator .     Source WND     Site LEFT LEG     Gram Stain Result Moderate Gram positive cocci.          RADIOLOGY  DX-TIBIA AND FIBULA LEFT   Final Result      No radiographic evidence of osteomyelitis      DX-CHEST-PORTABLE (1 VIEW)   Final Result      1.  Mild hypoinflation and pulmonary vascular congestion.   2.  No pneumonia or pneumothorax.   3.  Prominent hilar vessel versus adenopathy, unchanged.      US-EXTREMITY VENOUS LOWER UNILAT LEFT   Final Result      US-EXTREMITY ARTERY LOWER UNILAT LEFT   Final Result          COURSE & MEDICAL DECISION MAKING    This is a 56 y.o. male who presents with fever, obvious cellulitis to the left leg.    Differential Diagnosis includes but is not limited to:  Cellulitis, necrotizing fasciitis, venous stasis dermatitis, viral syndrome, sepsis    ED Course:  56-year-old male with above concerning presentation.  Tachycardic and febrile on arrival, sepsis protocol initiated, discussed with clinical pharmacist and reviewed prior cultures, not very suppurative so plan monotherapy with ceftriaxone as initial treatment for suspected skin/soft tissue infection.    N.p.o. until surgical process ruled out, 30 cc/kg crystalloid fluid bolus ordered.  I will also obtain venous and arterial ultrasounds.    Work-up today suggestive of sepsis.  There is a mild lactic acidosis plan to trend with IV fluids.  Thankfully nothing acute on ultrasounds, and thankfully lactic acidosis clearing with aggressive IV fluids.  Discussed with Dr. Huff, [facility] internal medical team, they will kindly admit the patient for further work-up and treatment.  Patient hemodynamically stable for admission in guarded condition.    Upon my evaluation, this patient had a high probability of  imminent or life-threatening deterioration due to sepsis with lactic acidosis.     I personally provided 34 minutes of total critical care time outside of time spent on separately billable/documented procedures. This required my direct attention, intervention, and management which included the following:  -review of laboratory data  -review of radiology studies  -discussion with consultants: Clinical pharmacist, admitting hospitalist  -monitoring for potential decompensation  -Aggressive IV fluid rehydration and targeted parenteral antibiotics      Medications   apixaban (ELIQUIS) tablet 5 mg (has no administration in time range)   atorvastatin (LIPITOR) tablet 40 mg (has no administration in time range)   HYDROcodone-acetaminophen (NORCO) 5-325 MG per tablet 1 Tablet (has no administration in time range)   omeprazole (PRILOSEC) capsule 20 mg (20 mg Oral Given 6/1/22 1450)   gabapentin (NEURONTIN) capsule 600 mg (600 mg Oral Given 6/1/22 1449)   ampicillin/sulbactam (UNASYN) 3 g in  mL IVPB (has no administration in time range)   cefTRIAXone (Rocephin) syringe 2 g (2 g Intravenous Given 6/1/22 0916)   NS (BOLUS) infusion 2,994 mL (0 mL Intravenous Stopped 6/1/22 1432)   acetaminophen (TYLENOL) tablet 1,000 mg (1,000 mg Oral Given 6/1/22 1049)       FINAL IMPRESSION  1. Sepsis, due to unspecified organism, unspecified whether acute organ dysfunction present (HCC)    2. Cellulitis of left leg    3. Lactic acidosis    4. History of DVT (deep vein thrombosis)    5. Smoker    6. PAD (peripheral artery disease) (HCC)    7. Pulmonary nodule    8. History of pulmonary embolus (PE)        -ADMIT-       Pertinent Labs & Imaging studies reviewed and verified by myself, as well as nursing notes and the patient's past medical, family, and social histories (See chart for details).    Portions of this record were made with voice recognition software.  Despite my review, spelling/grammar/context errors may still remain.   Interpretation of this chart should be taken in this context.    Electronically signed by Akil Suarez M.D. on 6/1/2022 at 3:30 PM.

## 2022-06-01 NOTE — ED NOTES
Break RN: Pt asleep, even chest rise & fall noted, VS monitoring in progress, side rails up x2.  Pt awaiting admission room assignment.

## 2022-06-01 NOTE — ED NOTES
Pharmacy Medication Reconciliation      ~Medication reconciliation updated and complete per patient at bedside & patient home pharmacy  ~Allergies have been verified  ~No oral ABX within the last 30 days  ~[facility]

## 2022-06-01 NOTE — H&P
"History & Physical Note    Date of Admission: 6/1/2022  Admission Status: Emergency  [facility] Team: [facility]  Attending: Salazar Linton MD  Senior Resident: Dr. Huff  Contact Number: [phone]    Chief Complaint: Generalized malaise     History of Present Illness (HPI):    Panchito BOYD is a 56 y.o. male with history of DVT and PE on xarelto, chronic lower extremity wounds with multiple amputations on the L foot, recent L medial temporal and occipital lobe infarct presented w/ c/o of \"feeling unwell,\" x 1 week. He reports chills, rigors, generalized weakness blurry vision. Patient reports waking up this morning feeling that \"something is off,\" called the nurse advice helpline and was told to go the ED.  On presentation to the ED, T-max 102, spO2 87% on RA. WBC elevated to 16.3. Lactic Acid 3.3. CXR with mild hypoinflation and pulmonary vascular congestion, without focal consolidation or pneumothorax. Physical exam reveals red, weeping wound on the LLE. XR LLE without signs of osteomyelitis. Arterial Doppler study with patient common femoral w/ monophasic flow suggestive of significant iliac or aorta diease, moderate setnosis of the mid femoral artery, patent popliteal artery with monophasic flow occlusion of distal peroneal artery with retrograde flow at the ankle via collateral. He received a 30cc/kg fluid bolus in the ED and 2G ceftriaxone  On my interview, patient in bed, appears uncomfortable, not in acute distress, spO2 95% on 4L RA, decrease to mid 80s when his N/C fell out during the conversation. He c/o of malaise and fatique, reports that his blurry vision has been chronic, improved from last admission. He denies any chest pain, denies cough, wheezing, known sick contact. Denies any recent trauma to his left leg, says that \"didn't know it was infected until he came [to the ED]\"      Review of Systems:Review of Systems   Constitutional: Positive for chills, fever and malaise/fatigue.   HENT: Negative for ear " discharge and ear pain.    Eyes: Positive for blurred vision.        Rx hx of CVA, improving from previous admission   Respiratory: Positive for shortness of breath. Negative for cough and wheezing.    Cardiovascular: Positive for leg swelling. Negative for chest pain and palpitations.   Gastrointestinal: Negative for abdominal pain, nausea and vomiting.   Genitourinary: Negative for dysuria and hematuria.   Musculoskeletal: Positive for myalgias.   Skin: Positive for rash.   Neurological: Positive for weakness and headaches.       Past Medical History:   Past Medical History was reviewed with patient.   has a past medical history of Cancer (HCC), Cancer (HCC), DVT (deep venous thrombosis) (HCC), Pulmonary embolism (HCC), and Smoker.    Past Surgical History: Past Surgical History was reviewed with patient.   has a past surgical history that includes nephrectomy partial; nerve repair (6/18/08); laceration repair (6/18/08); other; nephrectomy partial (Right); and irrigation & debridement general (Left, 10/22/2020).    Medications: Medications have been reviewed with patient.  Prior to Admission Medications   Prescriptions Last Dose Informant Patient Reported? Taking?   Carboxymethylcellulose Sodium (DRY EYE RELIEF OP) prn at prn Patient's Home Pharmacy Yes No   Sig: Administer 1 Drop into affected eye(s) as needed (Dry eyes). Dextran Glycol   HYDROcodone-acetaminophen (NORCO) 5-325 MG Tab per tablet 6/1/2022 at 0200 Patient's Home Pharmacy Yes No   Sig: Take 1 Tablet by mouth every four hours as needed. Indications: Pain   NIFEdipine SR (PROCARDIA-XL) 30 MG tablet 5/31/2022 at pm Patient's Home Pharmacy Yes No   Sig: Take 30 mg by mouth every evening.   acetaminophen (TYLENOL) 325 MG Tab 6/1/2022 at 0200 Patient's Home Pharmacy No No   Sig: Take 2 Tabs by mouth every 6 hours as needed for Mild Pain or Moderate Pain (Mild Pain; (Pain scale 1-3); Temp greater than 100.5 F).   alendronate (FOSAMAX) 70 MG Tab 5/27/2022  at K Patient's Home Pharmacy Yes No   Sig: Take 70 mg by mouth every Friday.   apixaban (ELIQUIS) 5mg Tab 6/1/2022 at 0200 Patient's Home Pharmacy No No   Sig: Take 1 Tablet by mouth 2 times a day.   atorvastatin (LIPITOR) 40 MG Tab 5/31/2022 at pm Patient's Home Pharmacy Yes No   Sig: Take 40 mg by mouth every evening.   ferrous gluconate (FERGON) 324 (38 Fe) MG Tab 5/31/2022 at [facility] Patient's Home Pharmacy Yes No   Sig: Take 324 mg by mouth see administration instructions. Tale by mouth on Sunday, Tuesday, Thursday and Saturday   gabapentin (NEURONTIN) 600 MG tablet 6/1/2022 at 0200 Patient's Home Pharmacy Yes No   Sig: Take 600 mg by mouth 3 times a day.   omeprazole (PRILOSEC) 20 MG delayed-release capsule 6/1/2022 at 0200 Patient's Home Pharmacy No No   Sig: Take 1 Cap by mouth every day.      Facility-Administered Medications: None        Allergies: Allergies have been reviewed with patient.  No Known Allergies    Family History:   family history includes Cancer in his father; Heart Attack in his mother.     Social History:   Tobacco: 1/2 ppd x 40 yrs  Alcohol: denies   Recreational drugs (illegal and prescription):  Denies   Employment: not currently   Activity Level: independent  Living situation:  Home with family  Recent travel:  none  Primary Care Provider: reviewed Gio Wang M.D.  Other (stressors, spirituality, exposures):  None  Physical Exam:     Vitals:  Temp:  [38.9 °C (102 °F)] 38.9 °C (102 °F)  Pulse:  [] 92  Resp:  [15-24] 15  BP: (109-126)/(66-80) 109/66  SpO2:  [87 %-91 %] 91 %    Physical Exam   Constitutional: Chronically ill appearing for age lying on stretcher in mild distress.  HEENT: Normocephalic, atraumatic. Posterior pharynx clear, mucous membranes dry.  Eyes:  EOMI. Normal sclerae.  Neck: Supple, nontender.  Chest/Pulmonary: Diminished to ausculation bilaterally, no wheezes or rhonchi.  Cardiovascular: Tachycardic, regular rhythm, no obvious murmur.  Diminished  pulses in both feet.  Abdomen: Soft, nontender; no rebound, guarding, or masses.  Back: No CVA or midline tenderness.   Musculoskeletal: No deformity, significant swelling and erythema to left lower leg, multiple prior toe amputation on left foot, sites clean dry and intact.   Neuro: Clear speech, normal coordination, cranial nerves II-XII grossly intact, no focal asymmetry or sensory deficits.  Alert, no focal weakness or asymmetry.    Psych: Flat affect but cooperative.  Skin: Significant warm beefy erythema to left lower leg, there are some areas that are open and weeping but no purulence.    Labs:    06/01/22 08:43   WBC 16.3 (H)   RBC 5.72   Hemoglobin 16.8   Hematocrit 50.1   MCV 87.6   MCH 29.4   MCHC 33.5 (L)   RDW 46.9   Platelet Count 153 (L)   MPV 9.6   Neutrophils-Polys 89.60 (H)   Neutrophils (Absolute) 14.64 (H) [1]   Lymphocytes 4.50 (L)   Lymphs (Absolute) 0.73 (L)   Monocytes 4.10   Monos (Absolute) 0.67   Eosinophils 0.90   Eos (Absolute) 0.14   Basophils 0.40   Baso (Absolute) 0.06   Immature Granulocytes 0.50   Immature Granulocytes (abs) 0.08   Nucleated RBC 0.00   NRBC (Absolute) 0.00      06/01/22 08:43   Sodium 137   Potassium 4.1   Chloride 102   Co2 19 (L)   Anion Gap 16.0   Glucose 123 (H)   Bun 23 (H)   Creatinine 1.40   GFR (CKD-EPI) 58 ! [1]   Calcium 9.6   AST(SGOT) 22   ALT(SGPT) 16   Alkaline Phosphatase 154 (H)   Total Bilirubin 0.5   Albumin 4.6   Total Protein 8.3 (H)   Globulin 3.7 (H)   A-G Ratio 1.2   CPK Total 33   Lactic Acid 3.3 (H)       Imaging:   DX-TIBIA AND FIBULA LEFT   Final Result      No radiographic evidence of osteomyelitis      DX-CHEST-PORTABLE (1 VIEW)   Final Result      1.  Mild hypoinflation and pulmonary vascular congestion.   2.  No pneumonia or pneumothorax.   3.  Prominent hilar vessel versus adenopathy, unchanged.      US-EXTREMITY VENOUS LOWER UNILAT LEFT   Final Result   No deep venous thrombosis in left lower extremity.     Enlarged left inguinal  lymph nodes   US-EXTREMITY ARTERY LOWER UNILAT LEFT   Final Result      Monophasic flow  in the common femoral suggests  more proximal significant    iliac or aorta disease.   50-75% stenosis in the mid femoral artery.    Occlusion of distal peroneal artery with retrograde flow reconstitution at    the ankle via collateral.   Two vessel runoff to the ankle with monophasic flow.         Previous Data Review: reviewed    Problem Representation:     * Sepsis (HCC)- (present on admission)  Assessment & Plan  This is Sepsis Present on admission  SIRS criteria identified on my evaluation include: Fever, with temperature greater than 101 deg F, Tachypnea, with respirations greater than 20 per minute and Leukocytosis, with WBC greater than 12,000   Source is left leg wound  Sepsis protocol initiated  Fluid resuscitation ordered per protocol  Crystalloid Fluid Administration: Fluid resuscitation ordered per standard protocol - 30 mL/kg per current or ideal body weight  IV antibiotics as appropriate for source of sepsis: MRSA nares negative, without significant fluctuance on exam, will start empiric Abx with Unasyn, monitor response. Follow BC            Leg wound, left- (present on admission)  Assessment & Plan  - chronic left leg wound, now with superimposed cellulitis  - Abx and fluid management per Sepsis A&P   - Wound team consulted     PAD (peripheral artery disease) ([facility])  Assessment & Plan  - Arterial doppler with occlusions of multiple vessels, with collaterals   - unknown [facility], will need records   - continue AC, statin, consider adding ASA 81mg at discharge     Venous thromboembolism (VTE)  Assessment & Plan  - hx of PE, recurrent DVT, Doppler study done today w/o acute clot  - continue with AC    Cerebrovascular accident (CVA) due to embolism of posterior cerebral artery, unspecified blood vessel laterality (HCC)- (present on admission)  Assessment & Plan  - hx of recent left medial  temporal and occipital lobe infarcts with a small punctate infarct in the left middle parietal lobe, without residual deficit   - continue AC, statin, hold antihypertensive considering sepsis, can restart when improved    Tobacco use disorder, continuous- (present on admission)  Assessment & Plan  - 20+ packs year history   - tobacco cessation counseling as appropriate

## 2022-06-01 NOTE — ED TRIAGE NOTES
Chief Complaint   Patient presents with   • Chills     Pt reports chills and fever since 0200 this morning without relief.     • N/V     X2 beginning 30 minutes ago.       Pt BIB [facility] from home with above complaint.  Pt has large open wound on LLE, lateral side, home wound care 2X a week, last seen on Friday for dressing change.      /80   Pulse 100   Temp (!) 38.9 °C (102 °F) (Temporal)   Resp (!) 24   Ht 1.829 m (6')   Wt 99.8 kg (220 lb)   SpO2 96%   BMI 29.84 kg/m²

## 2022-06-02 ENCOUNTER — APPOINTMENT (OUTPATIENT)
Dept: RADIOLOGY | Facility: MEDICAL CENTER | Age: 57
DRG: 872 | End: 2022-06-02
Attending: STUDENT IN AN ORGANIZED HEALTH CARE EDUCATION/TRAINING PROGRAM
Payer: COMMERCIAL

## 2022-06-02 PROBLEM — D69.6 THROMBOCYTOPENIA (HCC): Status: ACTIVE | Noted: 2022-06-02

## 2022-06-02 PROBLEM — R51.9 HEADACHE: Status: ACTIVE | Noted: 2022-06-02

## 2022-06-02 LAB
ANION GAP SERPL CALC-SCNC: 10 MMOL/L (ref 7–16)
BASOPHILS # BLD AUTO: 0.3 % (ref 0–1.8)
BASOPHILS # BLD: 0.04 K/UL (ref 0–0.12)
BUN SERPL-MCNC: 19 MG/DL (ref 8–22)
CALCIUM SERPL-MCNC: 8.6 MG/DL (ref 8.5–10.5)
CHLORIDE SERPL-SCNC: 107 MMOL/L (ref 96–112)
CO2 SERPL-SCNC: 19 MMOL/L (ref 20–33)
CREAT SERPL-MCNC: 1.26 MG/DL (ref 0.5–1.4)
EOSINOPHIL # BLD AUTO: 0.02 K/UL (ref 0–0.51)
EOSINOPHIL NFR BLD: 0.1 % (ref 0–6.9)
ERYTHROCYTE [DISTWIDTH] IN BLOOD BY AUTOMATED COUNT: 47.4 FL (ref 35.9–50)
EST. AVERAGE GLUCOSE BLD GHB EST-MCNC: 108 MG/DL
GFR SERPLBLD CREATININE-BSD FMLA CKD-EPI: 66 ML/MIN/1.73 M 2
GLUCOSE SERPL-MCNC: 104 MG/DL (ref 65–99)
HBA1C MFR BLD: 5.4 % (ref 4–5.6)
HCT VFR BLD AUTO: 40.9 % (ref 42–52)
HGB BLD-MCNC: 13.7 G/DL (ref 14–18)
IMM GRANULOCYTES # BLD AUTO: 0.09 K/UL (ref 0–0.11)
IMM GRANULOCYTES NFR BLD AUTO: 0.6 % (ref 0–0.9)
LYMPHOCYTES # BLD AUTO: 0.6 K/UL (ref 1–4.8)
LYMPHOCYTES NFR BLD: 4.2 % (ref 22–41)
MCH RBC QN AUTO: 29.5 PG (ref 27–33)
MCHC RBC AUTO-ENTMCNC: 33.5 G/DL (ref 33.7–35.3)
MCV RBC AUTO: 88.1 FL (ref 81.4–97.8)
MONOCYTES # BLD AUTO: 0.52 K/UL (ref 0–0.85)
MONOCYTES NFR BLD AUTO: 3.6 % (ref 0–13.4)
NEUTROPHILS # BLD AUTO: 13.02 K/UL (ref 1.82–7.42)
NEUTROPHILS NFR BLD: 91.2 % (ref 44–72)
NRBC # BLD AUTO: 0 K/UL
NRBC BLD-RTO: 0 /100 WBC
PLATELET # BLD AUTO: 90 K/UL (ref 164–446)
PMV BLD AUTO: 9.8 FL (ref 9–12.9)
POTASSIUM SERPL-SCNC: 3.9 MMOL/L (ref 3.6–5.5)
RBC # BLD AUTO: 4.64 M/UL (ref 4.7–6.1)
SODIUM SERPL-SCNC: 136 MMOL/L (ref 135–145)
WBC # BLD AUTO: 14.3 K/UL (ref 4.8–10.8)

## 2022-06-02 PROCEDURE — 700105 HCHG RX REV CODE 258: Performed by: STUDENT IN AN ORGANIZED HEALTH CARE EDUCATION/TRAINING PROGRAM

## 2022-06-02 PROCEDURE — 36415 COLL VENOUS BLD VENIPUNCTURE: CPT

## 2022-06-02 PROCEDURE — 700102 HCHG RX REV CODE 250 W/ 637 OVERRIDE(OP): Performed by: STUDENT IN AN ORGANIZED HEALTH CARE EDUCATION/TRAINING PROGRAM

## 2022-06-02 PROCEDURE — 83036 HEMOGLOBIN GLYCOSYLATED A1C: CPT

## 2022-06-02 PROCEDURE — 80048 BASIC METABOLIC PNL TOTAL CA: CPT

## 2022-06-02 PROCEDURE — 70450 CT HEAD/BRAIN W/O DYE: CPT

## 2022-06-02 PROCEDURE — 85025 COMPLETE CBC W/AUTO DIFF WBC: CPT

## 2022-06-02 PROCEDURE — 99233 SBSQ HOSP IP/OBS HIGH 50: CPT | Mod: GC | Performed by: HOSPITALIST

## 2022-06-02 PROCEDURE — A9270 NON-COVERED ITEM OR SERVICE: HCPCS | Performed by: STUDENT IN AN ORGANIZED HEALTH CARE EDUCATION/TRAINING PROGRAM

## 2022-06-02 PROCEDURE — 770001 HCHG ROOM/CARE - MED/SURG/GYN PRIV*

## 2022-06-02 PROCEDURE — 700111 HCHG RX REV CODE 636 W/ 250 OVERRIDE (IP): Performed by: STUDENT IN AN ORGANIZED HEALTH CARE EDUCATION/TRAINING PROGRAM

## 2022-06-02 RX ORDER — ACETAMINOPHEN 500 MG
1000 TABLET ORAL 3 TIMES DAILY
Status: DISCONTINUED | OUTPATIENT
Start: 2022-06-02 | End: 2022-06-03

## 2022-06-02 RX ORDER — AMOXICILLIN AND CLAVULANATE POTASSIUM 875; 125 MG/1; MG/1
1 TABLET, FILM COATED ORAL EVERY 12 HOURS
Status: DISCONTINUED | OUTPATIENT
Start: 2022-06-02 | End: 2022-06-03 | Stop reason: HOSPADM

## 2022-06-02 RX ORDER — KETOROLAC TROMETHAMINE 30 MG/ML
15 INJECTION, SOLUTION INTRAMUSCULAR; INTRAVENOUS ONCE
Status: DISCONTINUED | OUTPATIENT
Start: 2022-06-02 | End: 2022-06-02

## 2022-06-02 RX ORDER — CLOTRIMAZOLE 1 %
CREAM (GRAM) TOPICAL 2 TIMES DAILY
Status: DISCONTINUED | OUTPATIENT
Start: 2022-06-02 | End: 2022-06-03 | Stop reason: HOSPADM

## 2022-06-02 RX ORDER — CLOPIDOGREL BISULFATE 75 MG/1
75 TABLET ORAL DAILY
Status: DISCONTINUED | OUTPATIENT
Start: 2022-06-02 | End: 2022-06-03 | Stop reason: HOSPADM

## 2022-06-02 RX ORDER — KETOROLAC TROMETHAMINE 30 MG/ML
30 INJECTION, SOLUTION INTRAMUSCULAR; INTRAVENOUS ONCE
Status: DISCONTINUED | OUTPATIENT
Start: 2022-06-02 | End: 2022-06-02

## 2022-06-02 RX ORDER — OXYCODONE HYDROCHLORIDE 5 MG/1
5 TABLET ORAL
Status: DISCONTINUED | OUTPATIENT
Start: 2022-06-02 | End: 2022-06-03 | Stop reason: HOSPADM

## 2022-06-02 RX ADMIN — GABAPENTIN 600 MG: 300 CAPSULE ORAL at 17:16

## 2022-06-02 RX ADMIN — GABAPENTIN 600 MG: 300 CAPSULE ORAL at 12:35

## 2022-06-02 RX ADMIN — OMEPRAZOLE 20 MG: 20 CAPSULE, DELAYED RELEASE ORAL at 06:01

## 2022-06-02 RX ADMIN — GABAPENTIN 600 MG: 300 CAPSULE ORAL at 06:01

## 2022-06-02 RX ADMIN — CLOTRIMAZOLE: 10 CREAM TOPICAL at 17:16

## 2022-06-02 RX ADMIN — ATORVASTATIN CALCIUM 40 MG: 40 TABLET, FILM COATED ORAL at 20:31

## 2022-06-02 RX ADMIN — AMOXICILLIN AND CLAVULANATE POTASSIUM 1 TABLET: 875; 125 TABLET, FILM COATED ORAL at 17:16

## 2022-06-02 RX ADMIN — SODIUM CHLORIDE 3 G: 900 INJECTION INTRAVENOUS at 06:02

## 2022-06-02 RX ADMIN — ACETAMINOPHEN 1000 MG: 500 TABLET ORAL at 15:23

## 2022-06-02 RX ADMIN — CLOPIDOGREL BISULFATE 75 MG: 75 TABLET ORAL at 15:23

## 2022-06-02 RX ADMIN — ACETAMINOPHEN 1000 MG: 500 TABLET ORAL at 20:31

## 2022-06-02 RX ADMIN — HYDROCODONE BITARTRATE AND ACETAMINOPHEN 1 TABLET: 5; 325 TABLET ORAL at 02:24

## 2022-06-02 RX ADMIN — SODIUM CHLORIDE 3 G: 900 INJECTION INTRAVENOUS at 00:05

## 2022-06-02 RX ADMIN — APIXABAN 5 MG: 5 TABLET, FILM COATED ORAL at 06:01

## 2022-06-02 RX ADMIN — HYDROCODONE BITARTRATE AND ACETAMINOPHEN 1 TABLET: 5; 325 TABLET ORAL at 06:39

## 2022-06-02 RX ADMIN — APIXABAN 5 MG: 5 TABLET, FILM COATED ORAL at 17:16

## 2022-06-02 ASSESSMENT — ENCOUNTER SYMPTOMS
VOMITING: 0
HEADACHES: 1
PALPITATIONS: 0
SINUS PAIN: 0
SHORTNESS OF BREATH: 0
FEVER: 1
WEAKNESS: 1
BLURRED VISION: 0
CHILLS: 1
FOCAL WEAKNESS: 0
COUGH: 0
TINGLING: 0
ABDOMINAL PAIN: 0
NAUSEA: 0
DOUBLE VISION: 0
DIARRHEA: 0
ORTHOPNEA: 0

## 2022-06-02 ASSESSMENT — PAIN SCALES - WONG BAKER: WONGBAKER_NUMERICALRESPONSE: HURTS A LITTLE MORE

## 2022-06-02 ASSESSMENT — PAIN DESCRIPTION - PAIN TYPE
TYPE: ACUTE PAIN
TYPE: ACUTE PAIN

## 2022-06-02 NOTE — ASSESSMENT & PLAN NOTE
This is Sepsis Present on admission  SIRS criteria identified on my evaluation include: Fever, with temperature greater than 101 deg F, Tachypnea, with respirations greater than 20 per minute and Leukocytosis, with WBC greater than 12,000   Source is left leg wound  Sepsis protocol initiated  Fluid resuscitation ordered per protocol  Crystalloid Fluid Administration: Fluid resuscitation ordered per standard protocol - 30 mL/kg per current or ideal body weight  IV antibiotics as appropriate for source of sepsis: MRSA nares negative, without significant fluctuance on exam, will start empiric Abx with Unasyn, monitor response. Follow BC

## 2022-06-02 NOTE — PROGRESS NOTES
Wound care team recommending cleaning wound with normal saline, placing Medihoney dressing over wound site and dressing with rolled gauze prior to formal wound evaluation. Medihoney ordered.

## 2022-06-02 NOTE — CARE PLAN
The patient is Stable - Low risk of patient condition declining or worsening    Shift Goals  Clinical Goals: Control pain, wound consult  Patient Goals: rest    Progress made toward(s) clinical / shift goals:  Pt. Pain controlled with PRN medication, wound consult placed.     Patient is not progressing towards the following goals:

## 2022-06-02 NOTE — DISCHARGE PLANNING
Case Management Discharge Planning    Admission Date: 6/1/2022  GMLOS: 3.5  ALOS: 1    6-Clicks ADL Score: 24  6-Clicks Mobility Score: 24      Anticipated Discharge Dispo:      DME Needed: No    Action(s) Taken: [facility] requesting records [facility]. Release of information completed and signed by patient. CALVIN faxed to medical records.     Escalations Completed: None    Medically Clear: No    Next Steps: Follow for discharge planning needs.    Barriers to Discharge: Medical clearance    Is the patient up for discharge tomorrow: No

## 2022-06-02 NOTE — PROGRESS NOTES
Daily Progress Note:     Date of Service: 6/2/2022  Primary Team: [facility] Purple Team   Attending: Shashank Cook M.D.   Senior Resident: Dr. Altman  Intern: Dr. Parra  Contact:  [phone]    Chief Complaint and pt ID:   1 day of chills, headache, generalized weakness.  Panchito Moura is a 57 y/o man w/ PMHx of DVT (on xarelto), peripheral vascular disease (with chronic LLE wounds, Left great toe and 4th toe amputation about a year ago), left medial temporal and occipital lobe CVA (5/22/2022) who was admitted 6/1/2022 with 1 day of chills, rigors, generalized weakness who was found to have cellulitis associated with chronic leg wounds.      Interval Update:  NAOMI.  Pt reports that he is feeling a little better today, but continues to have severe headache that he describes as a constant pain behind his left eye that started yesterday.  He reports no vision changes (has chronic right visual field loss following previous stroke), no focal neuro deficits.  Reports continued LLE pain and warmth.      Events by date:  6/1:  Pt admitted, mild improvement in cellulitis symptoms.      Consultants/Specialty:  N/a    Review of Systems:    Review of Systems   Constitutional: Positive for chills and fever.   HENT: Negative for congestion and sinus pain.    Eyes: Negative for blurred vision and double vision.        Loss of visual field on lateral right eye.   Respiratory: Negative for cough and shortness of breath.    Cardiovascular: Positive for leg swelling. Negative for chest pain, palpitations and orthopnea.   Gastrointestinal: Negative for abdominal pain, diarrhea, nausea and vomiting.   Genitourinary: Negative for dysuria.   Neurological: Positive for weakness (generalized) and headaches. Negative for tingling and focal weakness.       Objective Data:   Physical Exam:   Vitals:   Temp:  [36.6 °C (97.8 °F)-38.4 °C (101.1 °F)] 36.6 °C (97.8 °F)  Pulse:  [64-82] 68  Resp:  [16-18] 16  BP: (102-114)/(55-64) 109/64  SpO2:  [93  %-95 %] 93 %    Physical Exam  Vitals and nursing note reviewed.   Constitutional:       General: He is in acute distress (uncomfortable appearing due to headache).      Appearance: He is ill-appearing. He is not toxic-appearing.   HENT:      Head: Normocephalic and atraumatic.      Mouth/Throat:      Mouth: Mucous membranes are moist.      Pharynx: Oropharynx is clear.   Eyes:      General: No scleral icterus.     Extraocular Movements: Extraocular movements intact.   Cardiovascular:      Rate and Rhythm: Normal rate and regular rhythm.      Pulses: Normal pulses.      Heart sounds: Normal heart sounds. No murmur heard.    No friction rub. No gallop.   Pulmonary:      Effort: Pulmonary effort is normal. No respiratory distress.      Breath sounds: Normal breath sounds. No wheezing, rhonchi or rales.   Abdominal:      General: Abdomen is flat. Bowel sounds are normal. There is no distension.      Tenderness: There is no abdominal tenderness.   Musculoskeletal:      Right lower leg: No edema.      Left lower leg: Edema (Non-pitting edema up to left knee) present.      Comments: LLE:  Erythema extending from ankle to knee.  Large wound present on ankle near lateral malleolus, non-draining.  TTP in erythematous area.     Skin:     General: Skin is warm and dry.   Neurological:      Mental Status: He is alert.      Comments: Decreased right eye visual fields lateral of midline.  Otherwise CN II-XII intact.  Sensation equal and intact bilaterally, LLE normal senstaion.  Strength 5/5 in b/l UE and LE in proximal and distal muscle groups.           Labs:   HEMATOLOGY/ ONCOLOGY/ID:            Recent Labs     06/01/22  0843 06/02/22  0301   WBC 16.3* 14.3*   RBC 5.72 4.64*   HEMOGLOBIN 16.8 13.7*   HEMATOCRIT 50.1 40.9*   MCV 87.6 88.1   MCH 29.4 29.5   RDW 46.9 47.4   PLATELETCT 153* 90*   MPV 9.6 9.8   NEUTSPOLYS 89.60* 91.20*   LYMPHOCYTES 4.50* 4.20*   MONOCYTES 4.10 3.60   EOSINOPHILS 0.90 0.10   BASOPHILS 0.40 0.30      Lab Results   Component Value Date    [id number] 380 12/30/2020    FOLATE 9.8 12/30/2020    FERRITIN 313.0 12/30/2020    IRON 33 (L) 12/30/2020    TOTIRONBC 193 (L) 12/30/2020       RENAL:        Estimated GFR/CRCL = Estimated Creatinine Clearance: 78.1 mL/min (by C-G formula based on SCr of 1.26 mg/dL).  Recent Labs     06/01/22  0843 06/02/22  0301   SODIUM 137 136   POTASSIUM 4.1 3.9   CHLORIDE 102 107   CO2 19* 19*   GLUCOSE 123* 104*   BUN 23* 19   CREATININE 1.40 1.26   CALCIUM 9.6 8.6   ALBUMIN 4.6  --        GASTROINTESTINAL/ HEPATIC:          Recent Labs     06/01/22  0843   ALTSGPT 16   ASTSGOT 22   ALKPHOSPHAT 154*   TBILIRUBIN 0.5   ALBUMIN 4.6   GLOBULIN 3.7*   INR 1.11     No results found for: AMMONIA    ENDOCRINE:              Recent Labs     06/01/22  0843 06/02/22  0301   GLUCOSE 123* 104*     Lab Results   Component Value Date    HBA1C 5.4 06/02/2022    HBA1C 5.5 05/22/2022       Imaging:   HEMATOLOGY/ ONCOLOGY/ID:            Recent Labs     06/01/22  0843 06/02/22  0301   WBC 16.3* 14.3*   RBC 5.72 4.64*   HEMOGLOBIN 16.8 13.7*   HEMATOCRIT 50.1 40.9*   MCV 87.6 88.1   MCH 29.4 29.5   RDW 46.9 47.4   PLATELETCT 153* 90*   MPV 9.6 9.8   NEUTSPOLYS 89.60* 91.20*   LYMPHOCYTES 4.50* 4.20*   MONOCYTES 4.10 3.60   EOSINOPHILS 0.90 0.10   BASOPHILS 0.40 0.30     Lab Results   Component Value Date    [id number] 380 12/30/2020    FOLATE 9.8 12/30/2020    FERRITIN 313.0 12/30/2020    IRON 33 (L) 12/30/2020    TOTIRONBC 193 (L) 12/30/2020       RENAL:        Estimated GFR/CRCL = Estimated Creatinine Clearance: 78.1 mL/min (by C-G formula based on SCr of 1.26 mg/dL).  Recent Labs     06/01/22  0843 06/02/22  0301   SODIUM 137 136   POTASSIUM 4.1 3.9   CHLORIDE 102 107   CO2 19* 19*   GLUCOSE 123* 104*   BUN 23* 19   CREATININE 1.40 1.26   CALCIUM 9.6 8.6   ALBUMIN 4.6  --        GASTROINTESTINAL/ HEPATIC:          Recent Labs     06/01/22  0843   ALTSGPT 16   ASTSGOT 22   ALKPHOSPHAT 154*   TBILIRUBIN  0.5   ALBUMIN 4.6   GLOBULIN 3.7*   INR 1.11     No results found for: AMMONIA    ENDOCRINE:              Recent Labs     06/01/22  0843 06/02/22  0301   GLUCOSE 123* 104*     Lab Results   Component Value Date    HBA1C 5.4 06/02/2022    HBA1C 5.5 05/22/2022       Problem Representation:  Panchito Moura is a 57 y/o man w/ PMHx of DVT (on xarelto), peripheral vascular disease (with chronic LLE wounds, Left great toe and 4th toe amputation about a year ago), left medial temporal and occipital lobe CVA (5/22/2022) who was admitted 6/1/2022 with 1 day of chills, rigors, generalized weakness who was found to have cellulitis associated with chronic leg wounds.    * Sepsis ([facility])- (present on admission)  Assessment & Plan  This is Sepsis Present on admission  SIRS criteria identified on my evaluation include: Fever, with temperature greater than 101 deg F, Tachypnea, with respirations greater than 20 per minute and Leukocytosis, with WBC greater than 12,000   Source is left leg wound cellulitis due to chronic wounds from peripheral vascular disease.  Sepsis protocol initiated  Fluid resuscitation ordered per protocol  Crystalloid Fluid Administration: Fluid resuscitation ordered per standard protocol - 30 mL/kg per current or ideal body weight  IV antibiotics as appropriate for source of sepsis: MRSA nares negative.  Blood cultures growing Group A strep  -Unasyn transitioned to Augmentin  -blood cultures NGTD.    -clotrimazole for b/l athlete's foot  -Pain control:  Tylenol scheduled, oxycodone PRN    Headache  Assessment & Plan  Pt w/ recent CVA reports severe localized headache on side of CVA, no new focal neuro deficits.  Concerns would be for brain hemorrhage.  -CT head performed, shows no acute hemorrhage but shows large evolution of CVA.    -tylenol for headache pain    PAD (peripheral artery disease) ([facility])  Assessment & Plan  Pt w/ history of PVD, multiple toe amputations on left foot about a year ago.  Arterial  doppler shows occulsion of peroneal artery w/ retrograde flow at the ankle.  Pt reports not being followed by a vascular surgeon at the moment.  -obtain records  -continue apixaban, atorvastatin  -starting clopidogrel    Leg wound, left- (present on admission)  Assessment & Plan  Chronic left leg wound that has been present for over a year.  Now with concurrent cellulitis.  Chronic wound most likely due to PAD.  - Abx and fluid management per Sepsis A&P   - Wound team consulted   -will need outpatient vascular    Thrombocytopenia (HCC)  Assessment & Plan  Pt has chronic history of thrombocytopenia, acute drop overnight on 6/2, although platelets remain within normal range for patient.  -continue to monitor    Venous thromboembolism (VTE)  Assessment & Plan  - hx of PE, recurrent DVT, Doppler study done today w/o acute clot  - continue with AC    Cerebrovascular accident (CVA) due to embolism of posterior cerebral artery, unspecified blood vessel laterality (HCC)- (present on admission)  Assessment & Plan  Hx of recent left medial temporal and occipital lobe infarcts with a small punctate infarct in the left middle parietal lobe on 5/22/2022, with residual visual field deficit in right eye lateral visual fields.  CT head on 6/2 shows evolution of CVA in same territory.  -continue apixaban, atorvastatin and clopidogrel  -holding home antihypertensives in setting of sepsis, will restart when appropriate    Tobacco use disorder, continuous- (present on admission)  Assessment & Plan  Pt reports significant smoking history, has been smoking over 40 years, now cut down to half a pack after toe amputations.  - tobacco cessation counseling as appropriate      Code Status: Full code  DVT ppx: apixaban  Diet: regular diet  GI: bowel regimen  T/L/D: pIV  Disposition: pending improvement of cellulitis      Ayush Parra DO  PGY-1, [facility]

## 2022-06-02 NOTE — CARE PLAN
The patient is Stable - Low risk of patient condition declining or worsening    Shift Goals  Clinical Goals: Monitor fever, control pain  Patient Goals: rest    Progress made toward(s) clinical / shift goals:  Pt. Afebrile during shift. Pain management discussed with MD.     Patient is not progressing towards the following goals:

## 2022-06-02 NOTE — ASSESSMENT & PLAN NOTE
- chronic left leg wound, now with superimposed cellulitis  - Abx and fluid management per Sepsis A&P   - Wound team consulted

## 2022-06-02 NOTE — ASSESSMENT & PLAN NOTE
- hx of recent left medial temporal and occipital lobe infarcts with a small punctate infarct in the left middle parietal lobe, without residual deficit   - continue AC, statin, hold antihypertensive considering sepsis, can restart when improved

## 2022-06-02 NOTE — CARE PLAN
Problem: Hemodynamics  Goal: Patient's hemodynamics, fluid balance and neurologic status will be stable or improve  Outcome: Not Progressing  Note: Noted febrile at night with temp of     Problem: Pain - Standard  Goal: Alleviation of pain or a reduction in pain to the patient’s comfort goal  Outcome: Progressing  Note: Patient c/o pain at left leg with some relief from prn pain med.   The patient is Stable - Low risk of patient condition declining or worsening    Shift Goals  Clinical Goals: monitor fever, comfort  Patient Goals: rest    Progress made toward(s) clinical / shift goals:      Patient is not progressing towards the following goals:      Problem: Hemodynamics  Goal: Patient's hemodynamics, fluid balance and neurologic status will be stable or improve  Outcome: Not Progressing  Note: Noted febrile at night with temp of 101.1 F, prn norco given for fever and pain, temp rechecked at midnight and was 98.4 F, antibiotic given as ordered, other vs normal. Patient alert and orientedx4, no signs of respiratory distress with oxygen at 4 liters/min via nasal cannula, denies chest pain.    Platelet 90 this am lab, covering unr made aware as well as the fever

## 2022-06-02 NOTE — ASSESSMENT & PLAN NOTE
- Arterial doppler with occlusions of multiple vessels, with collaterals   - unknown if addressed with care team at [facility], will need records   - continue AC, statin, consider adding ASA 81mg at discharge

## 2022-06-03 ENCOUNTER — PHARMACY VISIT (OUTPATIENT)
Dept: PHARMACY | Facility: MEDICAL CENTER | Age: 57
End: 2022-06-03
Payer: COMMERCIAL

## 2022-06-03 VITALS
BODY MASS INDEX: 29.02 KG/M2 | HEIGHT: 72 IN | OXYGEN SATURATION: 95 % | HEART RATE: 78 BPM | WEIGHT: 214.29 LBS | DIASTOLIC BLOOD PRESSURE: 65 MMHG | SYSTOLIC BLOOD PRESSURE: 113 MMHG | TEMPERATURE: 98.2 F | RESPIRATION RATE: 16 BRPM

## 2022-06-03 LAB
BACTERIA UR CULT: NORMAL
ERYTHROCYTE [DISTWIDTH] IN BLOOD BY AUTOMATED COUNT: 47.1 FL (ref 35.9–50)
HCT VFR BLD AUTO: 38.6 % (ref 42–52)
HGB BLD-MCNC: 13 G/DL (ref 14–18)
MCH RBC QN AUTO: 29.3 PG (ref 27–33)
MCHC RBC AUTO-ENTMCNC: 33.7 G/DL (ref 33.7–35.3)
MCV RBC AUTO: 86.9 FL (ref 81.4–97.8)
PLATELET # BLD AUTO: 91 K/UL (ref 164–446)
PMV BLD AUTO: 10.3 FL (ref 9–12.9)
RBC # BLD AUTO: 4.44 M/UL (ref 4.7–6.1)
SIGNIFICANT IND 70042: NORMAL
SITE SITE: NORMAL
SOURCE SOURCE: NORMAL
WBC # BLD AUTO: 11 K/UL (ref 4.8–10.8)

## 2022-06-03 PROCEDURE — 700102 HCHG RX REV CODE 250 W/ 637 OVERRIDE(OP): Performed by: STUDENT IN AN ORGANIZED HEALTH CARE EDUCATION/TRAINING PROGRAM

## 2022-06-03 PROCEDURE — 99239 HOSP IP/OBS DSCHRG MGMT >30: CPT | Mod: GC | Performed by: HOSPITALIST

## 2022-06-03 PROCEDURE — 85027 COMPLETE CBC AUTOMATED: CPT

## 2022-06-03 PROCEDURE — A9270 NON-COVERED ITEM OR SERVICE: HCPCS | Performed by: STUDENT IN AN ORGANIZED HEALTH CARE EDUCATION/TRAINING PROGRAM

## 2022-06-03 PROCEDURE — 36415 COLL VENOUS BLD VENIPUNCTURE: CPT

## 2022-06-03 PROCEDURE — RXMED WILLOW AMBULATORY MEDICATION CHARGE: Performed by: STUDENT IN AN ORGANIZED HEALTH CARE EDUCATION/TRAINING PROGRAM

## 2022-06-03 RX ORDER — CLOTRIMAZOLE 1 %
CREAM (GRAM) TOPICAL
Qty: 12 G | Refills: 0 | Status: SHIPPED | OUTPATIENT
Start: 2022-06-03 | End: 2022-06-03 | Stop reason: SDUPTHER

## 2022-06-03 RX ORDER — CLOPIDOGREL BISULFATE 75 MG/1
75 TABLET ORAL DAILY
Qty: 30 TABLET | Refills: 0 | Status: SHIPPED | OUTPATIENT
Start: 2022-06-04 | End: 2022-06-20 | Stop reason: CLARIF

## 2022-06-03 RX ORDER — ACETAMINOPHEN 325 MG/1
650 TABLET ORAL 3 TIMES DAILY
Status: DISCONTINUED | OUTPATIENT
Start: 2022-06-03 | End: 2022-06-03

## 2022-06-03 RX ORDER — BUTALBITAL, ACETAMINOPHEN AND CAFFEINE 50; 325; 40 MG/1; MG/1; MG/1
1 TABLET ORAL EVERY 6 HOURS PRN
Status: DISCONTINUED | OUTPATIENT
Start: 2022-06-03 | End: 2022-06-03

## 2022-06-03 RX ORDER — DIPHENHYDRAMINE HYDROCHLORIDE 50 MG/ML
25 INJECTION INTRAMUSCULAR; INTRAVENOUS ONCE
Status: DISCONTINUED | OUTPATIENT
Start: 2022-06-03 | End: 2022-06-03

## 2022-06-03 RX ORDER — METOCLOPRAMIDE HYDROCHLORIDE 5 MG/ML
10 INJECTION INTRAMUSCULAR; INTRAVENOUS ONCE
Status: DISCONTINUED | OUTPATIENT
Start: 2022-06-03 | End: 2022-06-03

## 2022-06-03 RX ORDER — NICOTINE 21 MG/24HR
1 PATCH, TRANSDERMAL 24 HOURS TRANSDERMAL EVERY 24 HOURS
Qty: 14 PATCH | Refills: 2 | Status: SHIPPED | OUTPATIENT
Start: 2022-06-03 | End: 2022-06-03 | Stop reason: SDUPTHER

## 2022-06-03 RX ORDER — AMOXICILLIN AND CLAVULANATE POTASSIUM 875; 125 MG/1; MG/1
1 TABLET, FILM COATED ORAL EVERY 12 HOURS
Qty: 10 TABLET | Refills: 0 | Status: SHIPPED | OUTPATIENT
Start: 2022-06-03

## 2022-06-03 RX ORDER — MAGNESIUM SULFATE HEPTAHYDRATE 40 MG/ML
2 INJECTION, SOLUTION INTRAVENOUS ONCE
Status: DISCONTINUED | OUTPATIENT
Start: 2022-06-03 | End: 2022-06-03

## 2022-06-03 RX ORDER — ACETAMINOPHEN 500 MG
1000 TABLET ORAL 3 TIMES DAILY
Status: DISCONTINUED | OUTPATIENT
Start: 2022-06-03 | End: 2022-06-03 | Stop reason: HOSPADM

## 2022-06-03 RX ORDER — ACETAMINOPHEN 500 MG
1000 TABLET ORAL 3 TIMES DAILY
Qty: 30 TABLET | Refills: 0 | Status: SHIPPED | OUTPATIENT
Start: 2022-06-03 | End: 2022-06-03 | Stop reason: SDUPTHER

## 2022-06-03 RX ORDER — AMOXICILLIN AND CLAVULANATE POTASSIUM 875; 125 MG/1; MG/1
1 TABLET, FILM COATED ORAL EVERY 12 HOURS
Qty: 10 TABLET | Refills: 0 | Status: SHIPPED | OUTPATIENT
Start: 2022-06-03 | End: 2022-06-03 | Stop reason: SDUPTHER

## 2022-06-03 RX ORDER — CLOPIDOGREL BISULFATE 75 MG/1
75 TABLET ORAL DAILY
Qty: 30 TABLET | Refills: 0 | Status: SHIPPED | OUTPATIENT
Start: 2022-06-04 | End: 2022-06-03 | Stop reason: SDUPTHER

## 2022-06-03 RX ORDER — CLOTRIMAZOLE 1 %
CREAM (GRAM) TOPICAL
Qty: 15 G | Refills: 0 | Status: SHIPPED | OUTPATIENT
Start: 2022-06-03 | End: 2022-06-20

## 2022-06-03 RX ORDER — CLOPIDOGREL BISULFATE 75 MG/1
75 TABLET ORAL DAILY
Qty: 90 TABLET | OUTPATIENT
Start: 2022-06-03

## 2022-06-03 RX ORDER — ACETAMINOPHEN 500 MG
1000 TABLET ORAL 3 TIMES DAILY
Qty: 90 TABLET | Refills: 1 | Status: SHIPPED | OUTPATIENT
Start: 2022-06-03 | End: 2022-06-20

## 2022-06-03 RX ORDER — NICOTINE 21 MG/24HR
1 PATCH, TRANSDERMAL 24 HOURS TRANSDERMAL EVERY 24 HOURS
Qty: 14 PATCH | Refills: 2 | Status: SHIPPED | OUTPATIENT
Start: 2022-06-03

## 2022-06-03 RX ADMIN — ACETAMINOPHEN 650 MG: 325 TABLET ORAL at 09:14

## 2022-06-03 RX ADMIN — OMEPRAZOLE 20 MG: 20 CAPSULE, DELAYED RELEASE ORAL at 05:24

## 2022-06-03 RX ADMIN — CLOTRIMAZOLE: 10 CREAM TOPICAL at 06:00

## 2022-06-03 RX ADMIN — AMOXICILLIN AND CLAVULANATE POTASSIUM 1 TABLET: 875; 125 TABLET, FILM COATED ORAL at 05:23

## 2022-06-03 RX ADMIN — OXYCODONE 5 MG: 5 TABLET ORAL at 11:55

## 2022-06-03 RX ADMIN — APIXABAN 5 MG: 5 TABLET, FILM COATED ORAL at 05:24

## 2022-06-03 RX ADMIN — GABAPENTIN 600 MG: 300 CAPSULE ORAL at 05:24

## 2022-06-03 RX ADMIN — CLOPIDOGREL BISULFATE 75 MG: 75 TABLET ORAL at 05:24

## 2022-06-03 RX ADMIN — ACETAMINOPHEN 1000 MG: 500 TABLET ORAL at 14:39

## 2022-06-03 RX ADMIN — BUTALBITAL, ACETAMINOPHEN, AND CAFFEINE 1 TABLET: 50; 325; 40 TABLET ORAL at 07:57

## 2022-06-03 RX ADMIN — OXYCODONE 5 MG: 5 TABLET ORAL at 01:48

## 2022-06-03 RX ADMIN — GABAPENTIN 600 MG: 300 CAPSULE ORAL at 11:55

## 2022-06-03 ASSESSMENT — PAIN SCALES - PAIN ASSESSMENT IN ADVANCED DEMENTIA (PAINAD)
BODYLANGUAGE: RELAXED
BREATHING: NORMAL

## 2022-06-03 ASSESSMENT — PAIN SCALES - WONG BAKER: WONGBAKER_NUMERICALRESPONSE: HURTS A LITTLE MORE

## 2022-06-03 NOTE — CARE PLAN
The patient is Stable - Low risk of patient condition declining or worsening    Shift Goals  Clinical Goals: pain management  Patient Goals: sleep    Progress made toward(s) clinical / shift goals:  yes,     Patient is not progressing towards the following goals:

## 2022-06-03 NOTE — ASSESSMENT & PLAN NOTE
Pt has chronic history of thrombocytopenia, acute drop overnight on 6/2, although platelets remain within normal range for patient.  -continue to monitor

## 2022-06-03 NOTE — DISCHARGE PLANNING
Meds-to-Beds: Discharge prescription orders listed below delivered to patient's bedside. RN Solomon notified. Patient counseled.      Patient elected to have co-payment billed to patient account.     Current Outpatient Medications   Medication Sig Dispense Refill   • acetaminophen (TYLENOL) 500 MG Tab Take 2 Tablets by mouth in the morning, at noon, and at bedtime. 90 Tablet 1   • amoxicillin-clavulanate (AUGMENTIN) 875-125 MG Tab Take 1 Tablet by mouth every 12 hours. 10 Tablet 0   • [START ON 6/4/2022] clopidogrel (PLAVIX) 75 MG Tab Take 1 Tablet by mouth every day. 30 Tablet 0   • clotrimazole (LOTRIMIN) 1 % Cream Apply to affected area twice a day 15 g 0   • nicotine (NICODERM) 14 MG/24HR PATCH 24 HR Place 1 Patch on the skin every 24 hours. 14 Patch 2      Kareem Acosta PhT

## 2022-06-03 NOTE — ASSESSMENT & PLAN NOTE
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  CT head on 6/2 shows evolution of CVA in same territory.  -continue apixaban, atorvastatin and clopidogrel  -holding home antihypertensives in setting of sepsis, will restart when appropriate

## 2022-06-03 NOTE — ASSESSMENT & PLAN NOTE
This is Sepsis Present on admission  SIRS criteria identified on my evaluation include: Fever, with temperature greater than 101 deg F, Tachypnea, with respirations greater than 20 per minute and Leukocytosis, with WBC greater than 12,000   Source is left leg wound cellulitis due to chronic wounds from peripheral vascular disease.  Sepsis protocol initiated  Fluid resuscitation ordered per protocol  Crystalloid Fluid Administration: Fluid resuscitation ordered per standard protocol - 30 mL/kg per current or ideal body weight  IV antibiotics as appropriate for source of sepsis: MRSA nares negative.  Wound cultures positive for Group A Strep, MSSA, and Corynebacterium  - Continue Augmentin  - BCx x 2 (6/1) show NGTD  - Continue Clotrimazole for b/l athlete's foot  - Pain control:  Tylenol scheduled, oxycodone PRN

## 2022-06-03 NOTE — DISCHARGE INSTRUCTIONS
Steps to Quit Smoking  Smoking tobacco is the leading cause of preventable death. It can affect almost every organ in the body. Smoking puts you and people around you at risk for many serious, long-lasting (chronic) diseases. Quitting smoking can be hard, but it is one of the best things that you can do for your health. It is never too late to quit.  How do I get ready to quit?  When you decide to quit smoking, make a plan to help you succeed. Before you quit:  Pick a date to quit. Set a date within the next 2 weeks to give you time to prepare.  Write down the reasons why you are quitting. Keep this list in places where you will see it often.  Tell your family, friends, and co-workers that you are quitting. Their support is important.  Talk with your doctor about the choices that may help you quit.  Find out if your health insurance will pay for these treatments.  Know the people, places, things, and activities that make you want to smoke (triggers). Avoid them.  What first steps can I take to quit smoking?  Throw away all cigarettes at home, at work, and in your car.  Throw away the things that you use when you smoke, such as ashtrays and lighters.  Clean your car. Make sure to empty the ashtray.  Clean your home, including curtains and carpets.  What can I do to help me quit smoking?  Talk with your doctor about taking medicines and seeing a counselor at the same time. You are more likely to succeed when you do both.  If you are pregnant or breastfeeding, talk with your doctor about counseling or other ways to quit smoking. Do not take medicine to help you quit smoking unless your doctor tells you to do so.  To quit smoking:  Quit right away  Quit smoking totally, instead of slowly cutting back on how much you smoke over a period of time.  Go to counseling. You are more likely to quit if you go to counseling sessions regularly.  Take medicine  You may take medicines to help you quit. Some medicines need a  prescription, and some you can buy over-the-counter. Some medicines may contain a drug called nicotine to replace the nicotine in cigarettes. Medicines may:  Help you to stop having the desire to smoke (cravings).  Help to stop the problems that come when you stop smoking (withdrawal symptoms).  Your doctor may ask you to use:  Nicotine patches, gum, or lozenges.  Nicotine inhalers or sprays.  Non-nicotine medicine that is taken by mouth.  Find resources  Find resources and other ways to help you quit smoking and remain smoke-free after you quit. These resources are most helpful when you use them often. They include:  Online chats with a counselor.  Phone quitlines.  Printed self-help materials.  Support groups or group counseling.  Text messaging programs.  Mobile phone apps. Use apps on your mobile phone or tablet that can help you stick to your quit plan. There are many free apps for mobile phones and tablets as well as websites. Examples include Quit Guide from the CDC and smokefree.gov    What things can I do to make it easier to quit?    Talk to your family and friends. Ask them to support and encourage you.  Call a phone quitline (8-935-QUITNOW), reach out to support groups, or work with a counselor.  Ask people who smoke to not smoke around you.  Avoid places that make you want to smoke, such as:  Bars.  Parties.  Smoke-break areas at work.  Spend time with people who do not smoke.  Lower the stress in your life. Stress can make you want to smoke. Try these things to help your stress:  Getting regular exercise.  Doing deep-breathing exercises.  Doing yoga.  Meditating.  Doing a body scan. To do this, close your eyes, focus on one area of your body at a time from head to toe. Notice which parts of your body are tense. Try to relax the muscles in those areas.  How will I feel when I quit smoking?  Day 1 to 3 weeks  Within the first 24 hours, you may start to have some problems that come from quitting tobacco.  These problems are very bad 2-3 days after you quit, but they do not often last for more than 2-3 weeks. You may get these symptoms:  Mood swings.  Feeling restless, nervous, angry, or annoyed.  Trouble concentrating.  Dizziness.  Strong desire for high-sugar foods and nicotine.  Weight gain.  Trouble pooping (constipation).  Feeling like you may vomit (nausea).  Coughing or a sore throat.  Changes in how the medicines that you take for other issues work in your body.  Depression.  Trouble sleeping (insomnia).  Week 3 and afterward  After the first 2-3 weeks of quitting, you may start to notice more positive results, such as:  Better sense of smell and taste.  Less coughing and sore throat.  Slower heart rate.  Lower blood pressure.  Clearer skin.  Better breathing.  Fewer sick days.  Quitting smoking can be hard. Do not give up if you fail the first time. Some people need to try a few times before they succeed. Do your best to stick to your quit plan, and talk with your doctor if you have any questions or concerns.  Summary  Smoking tobacco is the leading cause of preventable death. Quitting smoking can be hard, but it is one of the best things that you can do for your health.  When you decide to quit smoking, make a plan to help you succeed.  Quit smoking right away, not slowly over a period of time.  When you start quitting, seek help from your doctor, family, or friends.  This information is not intended to replace advice given to you by your health care provider. Make sure you discuss any questions you have with your health care provider.  Document Released: 10/14/2010 Document Revised: 03/06/2020 Document Reviewed: 03/07/2020  Elsevier Patient Education © 2020 [employer]    Cellulitis, Adult    Cellulitis is a skin infection. The infected area is usually warm, red, swollen, and tender. This condition occurs most often in the arms and lower legs. The infection can travel to the muscles, blood, and underlying  tissue and become serious. It is very important to get treated for this condition.  What are the causes?  Cellulitis is caused by bacteria. The bacteria enter through a break in the skin, such as a cut, burn, insect bite, open sore, or crack.  What increases the risk?  This condition is more likely to occur in people who:  Have a weak body defense system (immune system).  Have open wounds on the skin, such as cuts, burns, bites, and scrapes. Bacteria can enter the body through these open wounds.  Are older than 60 years of age.  Have diabetes.  Have a type of long-lasting (chronic) liver disease (cirrhosis) or kidney disease.  Are obese.  Have a skin condition such as:  Itchy rash (eczema).  Slow movement of blood in the veins (venous stasis).  Fluid buildup below the skin (edema).  Have had radiation therapy.  Use IV drugs.  What are the signs or symptoms?  Symptoms of this condition include:  Redness, streaking, or spotting on the skin.  Swollen area of the skin.  Tenderness or pain when an area of the skin is touched.  Warm skin.  A fever.  Chills.  Blisters.  How is this diagnosed?  This condition is diagnosed based on a medical history and physical exam. You may also have tests, including:  Blood tests.  Imaging tests.  How is this treated?  Treatment for this condition may include:  Medicines, such as antibiotic medicines or medicines to treat allergies (antihistamines).  Supportive care, such as rest and application of cold or warm cloths (compresses) to the skin.  Hospital care, if the condition is severe.  The infection usually starts to get better within 1-2 days of treatment.  Follow these instructions at home:    Medicines  Take over-the-counter and prescription medicines only as told by your health care provider.  If you were prescribed an antibiotic medicine, take it as told by your health care provider. Do not stop taking the antibiotic even if you start to feel better.  General instructions  Drink  enough fluid to keep your urine pale yellow.  Do not touch or rub the infected area.  Raise (elevate) the infected area above the level of your heart while you are sitting or lying down.  Apply warm or cold compresses to the affected area as told by your health care provider.  Keep all follow-up visits as told by your health care provider. This is important. These visits let your health care provider make sure a more serious infection is not developing.  Contact a health care provider if:  You have a fever.  Your symptoms do not begin to improve within 1-2 days of starting treatment.  Your bone or joint underneath the infected area becomes painful after the skin has healed.  Your infection returns in the same area or another area.  You notice a swollen bump in the infected area.  You develop new symptoms.  You have a general ill feeling (malaise) with muscle aches and pains.  Get help right away if:  Your symptoms get worse.  You feel very sleepy.  You develop vomiting or diarrhea that persists.  You notice red streaks coming from the infected area.  Your red area gets larger or turns dark in color.  These symptoms may represent a serious problem that is an emergency. Do not wait to see if the symptoms will go away. Get medical help right away. Call your local emergency services (911 in the [place] Do not drive yourself to the hospital.  Summary  Cellulitis is a skin infection. This condition occurs most often in the arms and lower legs.  Treatment for this condition may include medicines, such as antibiotic medicines or antihistamines.  Take over-the-counter and prescription medicines only as told by your health care provider. If you were prescribed an antibiotic medicine, do not stop taking the antibiotic even if you start to feel better.  Contact a health care provider if your symptoms do not begin to improve within 1-2 days of starting treatment or your symptoms get worse.  Keep all follow-up visits as told by  your health care provider. This is important. These visits let your health care provider make sure that a more serious infection is not developing.  This information is not intended to replace advice given to you by your health care provider. Make sure you discuss any questions you have with your health care provider.  Document Released: 09/27/2006 Document Revised: 05/09/2019 Document Reviewed: 05/09/2019  [employer] Patient Education © 2020 [employer]  Discharge Instructions    Discharged to home by car with friend. Discharged via wheelchair, hospital escort: Yes.  Special equipment needed: Not Applicable    Be sure to schedule a follow-up appointment with your primary care doctor or any specialists as instructed.     Discharge Plan:   Diet Plan: Discussed  Activity Level: Discussed  Confirmed Follow up Appointment: Patient to Call and Schedule Appointment  Confirmed Symptoms Management: Discussed  Medication Reconciliation Updated: Yes    I understand that a diet low in cholesterol, fat, and sodium is recommended for good health. Unless I have been given specific instructions below for another diet, I accept this instruction as my diet prescription.   Other diet: Regular    Special Instructions: Sepsis, Diagnosis, Adult  Sepsis is a serious bodily reaction to an infection. The infection that triggers sepsis may be from a bacteria, virus, or fungus. Sepsis can result from an infection in any part of your body. Infections that commonly lead to sepsis include skin, lung, and urinary tract infections.  Sepsis is a medical emergency that must be treated right away in a hospital. In severe cases, it can lead to septic shock. Septic shock can weaken your heart and cause your blood pressure to drop. This can cause your central nervous system and your body's organs to stop working.  What are the causes?  This condition is caused by a severe reaction to infections from bacteria, viruses, or fungus. The germs that most  often lead to sepsis include:  Escherichia coli (E. coli) bacteria.  Staphylococcus aureus (staph) bacteria.  Some types of Streptococcus bacteria.  The most common infections affect these organs:  The lung (pneumonia).  The kidneys or bladder (urinary tract infection).  The skin (cellulitis).  The bowel, gallbladder, or pancreas.  What increases the risk?  You are more likely to develop this condition if:  Your body's disease-fighting system (immune system) is weakened.  You are age 65 or older.  You are male.  You had surgery or you have been hospitalized.  You have these devices inserted into your body:  A small, thin tube (catheter).  IV line.  Breathing tube.  Drainage tube.  You are not getting enough nutrients from food (malnourished).  You have a long-term (chronic) disease, such as cancer, lung disease, kidney disease, or diabetes.  You are .  What are the signs or symptoms?  Symptoms of this condition may include:  Fever.  Chills or feeling very cold.  Confusion or anxiety.  Fatigue.  Muscle aches.  Shortness of breath.  Nausea and vomiting.  Urinating much less than usual.  Fast heart rate (tachycardia).  Rapid breathing (hyperventilation).  Changes in skin color. Your skin may look blotchy, pale, or blue.  Cool, clammy, or sweaty skin.  Skin rash.  Other symptoms depend on the source of your infection.  How is this diagnosed?  This condition is diagnosed based on:  Your symptoms.  Your medical history.  A physical exam.  Other tests may also be done to find out the cause of the infection and how severe the sepsis is. These tests may include:  Blood tests.  Urine tests.  Swabs from other areas of your body that may have an infection. These samples may be tested (cultured) to find out what type of bacteria is causing the infection.  Chest X-ray to check for pneumonia. Other imaging tests, such as a CT scan, may also be done.  Lumbar puncture. This removes a small amount of the fluid that  surrounds your brain and spinal cord. The fluid is then examined for infection.  How is this treated?  This condition must be treated in a hospital. Based on the cause of your infection, you may be given an antibiotic, antiviral, or antifungal medicine.  You may also receive:  Fluids through an IV.  Oxygen and breathing assistance.  Medicines to increase your blood pressure.  Kidney dialysis. This process cleans your blood if your kidneys have failed.  Surgery to remove infected tissue.  Blood transfusion if needed.  Medicine to prevent blood clots.  Nutrients to correct imbalances in basic body function (metabolism). You may:  Receive important salts and minerals (electrolytes) through an IV.  Have your blood sugar level adjusted.  Follow these instructions at home:  Medicines    Take over-the-counter and prescription medicines only as told by your health care provider.  If you were prescribed an antibiotic, antiviral, or antifungal medicine, take it as told by your health care provider. Do not stop taking the medicine even if you start to feel better.  General instructions  If you have a catheter or other indwelling device, ask to have it removed as soon as possible.  Keep all follow-up visits as told by your health care provider. This is important.  Contact a health care provider if:  You do not feel like you are getting better or regaining strength.  You are having trouble coping with your recovery.  You frequently feel tired.  You feel worse or do not seem to get better after surgery.  You think you may have an infection after surgery.  Get help right away if:  You have any symptoms of sepsis.  You have difficulty breathing.  You have a rapid or skipping heartbeat.  You become confused or disoriented.  You have a high fever.  Your skin becomes blotchy, pale, or blue.  You have an infection that is getting worse or not getting better.  These symptoms may represent a serious problem that is an emergency. Do not  wait to see if the symptoms will go away. Get medical help right away. Call your local emergency services (911 in the [place] Do not drive yourself to the hospital.  Summary  Sepsis is a medical emergency that requires immediate treatment in a hospital.  This condition is caused by a severe reaction to infections from bacteria, viruses, or fungus.  Based on the cause of your infection, you may be given an antibiotic, antiviral, or antifungal medicine.  Treatment may also include IV fluids, breathing assistance, and kidney dialysis.  This information is not intended to replace advice given to you by your health care provider. Make sure you discuss any questions you have with your health care provider.  Document Released: 09/15/2004 Document Revised: 07/26/2019 Document Reviewed: 07/26/2019  [employer] Patient Education © 2020 [employer]    Is patient discharged on Warfarin / Coumadin?   No     Depression / Suicide Risk    As you are discharged from this [facility] facility, it is important to learn how to keep safe from harming yourself.    Recognize the warning signs:  Abrupt changes in personality, positive or negative- including increase in energy   Giving away possessions  Change in eating patterns- significant weight changes-  positive or negative  Change in sleeping patterns- unable to sleep or sleeping all the time   Unwillingness or inability to communicate  Depression  Unusual sadness, discouragement and loneliness  Talk of wanting to die  Neglect of personal appearance   Rebelliousness- reckless behavior  Withdrawal from people/activities they love  Confusion- inability to concentrate     If you or a loved one observes any of these behaviors or has concerns about self-harm, here's what you can do:  Talk about it- your feelings and reasons for harming yourself  Remove any means that you might use to hurt yourself (examples: pills, rope, extension cords, firearm)  Get professional help from the community  (Mental Health, Substance Abuse, psychological counseling)  Do not be alone:Call your Safe Contact- someone whom you trust who will be there for you.  Call your local CRISIS HOTLINE 712-0196 or 015-977-5076  Call your local Children's Mobile Crisis Response Team [place] (042) 103-7419 or www.[place]  Call the toll free National Suicide Prevention Hotlines   National Suicide Prevention Lifeline 483-177-TCNT (6947)  [place] Line [facility] 800-SUICIDE (823-2019)

## 2022-06-03 NOTE — ASSESSMENT & PLAN NOTE
Pt reports significant smoking history, has been smoking over 40 years, now cut down to half a pack after toe amputations.  - tobacco cessation counseling as appropriate

## 2022-06-03 NOTE — ASSESSMENT & PLAN NOTE
Pt w/ history of PVD, multiple toe amputations on left foot about a year ago.  Arterial doppler shows occulsion of peroneal artery w/ retrograde flow at the ankle.  Pt reports not being followed by a vascular surgeon at the moment.  -obtain records  -continue apixaban, atorvastatin  -starting clopidogrel

## 2022-06-03 NOTE — ASSESSMENT & PLAN NOTE
"Pt w/ recent CVA reports severe localized headache on side of CVA, no new focal neuro deficits.  Concerns would be for brain hemorrhage.  - CT head performed, shows no acute hemorrhage but shows large evolution of CVA.    - Labeled as \"worst HA\" but well-controlled with Tylenol  "

## 2022-06-03 NOTE — ASSESSMENT & PLAN NOTE
Chronic left leg wound that has been present for over a year.  Now with concurrent cellulitis.  Chronic wound most likely due to PAD.  - Abx and fluid management per Sepsis A&P   - Wound team consulted   -will need outpatient vascular

## 2022-06-04 LAB
BACTERIA WND AEROBE CULT: ABNORMAL
GRAM STN SPEC: ABNORMAL
SIGNIFICANT IND 70042: ABNORMAL
SITE SITE: ABNORMAL
SOURCE SOURCE: ABNORMAL

## 2022-06-20 ENCOUNTER — OFFICE VISIT (OUTPATIENT)
Dept: NEUROLOGY | Facility: MEDICAL CENTER | Age: 57
End: 2022-06-20
Attending: NURSE PRACTITIONER
Payer: COMMERCIAL

## 2022-06-20 VITALS
HEART RATE: 71 BPM | OXYGEN SATURATION: 97 % | TEMPERATURE: 98.9 F | WEIGHT: 218.92 LBS | RESPIRATION RATE: 14 BRPM | DIASTOLIC BLOOD PRESSURE: 74 MMHG | BODY MASS INDEX: 29.65 KG/M2 | HEIGHT: 72 IN | SYSTOLIC BLOOD PRESSURE: 122 MMHG

## 2022-06-20 DIAGNOSIS — H53.461 RIGHT HOMONYMOUS HEMIANOPSIA: ICD-10-CM

## 2022-06-20 DIAGNOSIS — R76.0 LUPUS ANTICOAGULANT POSITIVE: ICD-10-CM

## 2022-06-20 DIAGNOSIS — Z72.0 TOBACCO ABUSE: ICD-10-CM

## 2022-06-20 DIAGNOSIS — I69.30 LATE EFFECT OF STROKE: ICD-10-CM

## 2022-06-20 PROCEDURE — 99215 OFFICE O/P EST HI 40 MIN: CPT | Mod: 25 | Performed by: NURSE PRACTITIONER

## 2022-06-20 PROCEDURE — 99406 BEHAV CHNG SMOKING 3-10 MIN: CPT | Performed by: NURSE PRACTITIONER

## 2022-06-20 PROCEDURE — 99212 OFFICE O/P EST SF 10 MIN: CPT | Performed by: NURSE PRACTITIONER

## 2022-06-20 PROCEDURE — 99417 PROLNG OP E/M EACH 15 MIN: CPT | Mod: 25 | Performed by: NURSE PRACTITIONER

## 2022-06-20 RX ORDER — CLOPIDOGREL BISULFATE 75 MG/1
75 TABLET ORAL DAILY
COMMUNITY

## 2022-06-20 ASSESSMENT — ENCOUNTER SYMPTOMS
DIZZINESS: 0
FEVER: 0
TINGLING: 0
BLOOD IN STOOL: 0
BLURRED VISION: 1
BRUISES/BLEEDS EASILY: 0
SPEECH CHANGE: 0
HEADACHES: 1
DEPRESSION: 0
COUGH: 1
NERVOUS/ANXIOUS: 0
FOCAL WEAKNESS: 1
SENSORY CHANGE: 0

## 2022-06-20 ASSESSMENT — FIBROSIS 4 INDEX: FIB4 SCORE: 3.45

## 2022-06-20 NOTE — PROGRESS NOTES
Subjective     HPI  Panchito Moura  is a 56 y.o. right handed male who  presents to [facility] for evaluation of  multiple left sided infarcts.    He presented to  [facility] on  5/22/2022 with complaints of  right sided vision loss upon awakening.  He had been on Xarelto for chronic DVT.  NIHSS on admission 2 for complete hemianopia.  He was not taking Xarelto with food and had missed some doses (about once per week)     He had a second admission on 6/1/2022 for a leg wound and clopidogrel was added.     PMH:  PAD, pulmonary nodule, thrombocytopenia, DVT, history of PE, multiple amputations secondary to blood clots, stroke 2021 with residual right handed weakness.    Social History: Smokes ½ ppd, denies alcohol, no current drug use. He stopped working last year after a stroke.    He  was discharged on Eliquis 5mg BID    He is here today alone, he is really worried about his right sided vision.      Review of Systems   Constitutional: Negative for fever.   HENT: Negative for nosebleeds.    Eyes: Positive for blurred vision.        RHH   Respiratory: Positive for cough.    Cardiovascular: Negative for chest pain.   Gastrointestinal: Negative for blood in stool.   Genitourinary: Negative for hematuria.   Neurological: Positive for focal weakness and headaches. Negative for dizziness, tingling, sensory change and speech change.   Endo/Heme/Allergies: Does not bruise/bleed easily.   Psychiatric/Behavioral: Negative for depression. The patient is not nervous/anxious.             Current Outpatient Medications on File Prior to Visit   Medication Sig Dispense Refill   • clopidogrel (PLAVIX) 75 MG Tab Take 75 mg by mouth every day.     • amoxicillin-clavulanate (AUGMENTIN) 875-125 MG Tab Take 1 Tablet by mouth every 12 hours. 10 Tablet 0   • nicotine (NICODERM) 14 MG/24HR PATCH 24 HR Place 1 Patch on the skin every 24 hours. 14 Patch 2   • apixaban (ELIQUIS) 5mg Tab Take 1 Tablet by mouth  2 times a day. 60 Tablet 0   • atorvastatin (LIPITOR) 40 MG Tab Take 40 mg by mouth every evening.     • gabapentin (NEURONTIN) 600 MG tablet Take 600 mg by mouth 3 times a day.       No current facility-administered medications on file prior to visit.         Objective      I personally reviewed imaging below and agree with the findings  MRI brain 5/25/2022    1.  Moderate sized acute infarct in the left medial temporal lobe, hippocampus, and left occipital lobe. Small punctate infarcts in the left medial parietal lobe. No evidence of hemorrhage transformation.  2. Remote infarcts in the left frontoparietal and right cerebellar regions.   3.  Mild microangiopathic ischemic change.    CTA head 5/22/2022     1.Left P2 occlusion with area of low-density compatible with evolving infarct.  2.  Occlusion of the left internal carotid artery with reconstitution of the middle cerebral artery in the [place]       CTA neck 5/22/2022  1.Proximal left internal carotid artery occlusion extending intracranially  2.  Heterogeneous right thyroid nodule, recommend follow-up thyroid sonography for further characterization due to nodule size.  3.  Left apical pulmonary nodule, see nodule follow-up recommendations below.    TTE 5/23/2022:  LVEF 65%, grade 1 diastolic dysfunction, LA size WNL, LEISA 22     Stroke Labs:   Creat. 1.26, LDL 76, A1C 5.4  Coag Labs: Factor V Leiden negative, drVVT confirm positive, ACL negative,     /74 (BP Location: Right arm, Patient Position: Sitting, BP Cuff Size: Adult)   Pulse 71   Temp 37.2 °C (98.9 °F) (Temporal)   Resp 14   Ht 1.829 m (6')   Wt 99.3 kg (218 lb 14.7 oz)   SpO2 97%   BMI 29.69 kg/m²      PHYSICAL ASSESSMENT  Constitutional:  Alert, no apparent distress,  Psych:   mood and affect WNL  Muskuloskeletal:  Moves all extremities equally, strength 5/5  BUE/BLE,  flexors/extensors, no drift, there is some abnormality of the right hand.   NEUROLOGICAL ASSESSMENT  Oriented  X 4, speech fluent, naming and memory intact  CN II: Inferior/superior RHH Fundoscopic exam is normal with sharp discs and no vascular changes. Pupils are 3 mm and briskly reactive to light.   CN III: IV, VI  EOMs intact, no ptosis  CN V: Facial sensation is intact to pinprick in all 3 divisions bilaterally. Corneal responses are intact.  CN VII: Face is symmetric with normal eye closure and smile.  CN VIII Hearing is normal to rubbing fingers  CN IX, X: Palate elevates symmetrically. Phonation is normal.  CN XI: Head turning and shoulder shrug are intact  CN XII: Tongue is midline with normal movements and no atrophy.                           Sensation to PP equal bilaterally                 No limb ataxia with finger to nose and heel to shin                 Ambulates with steady gait.                 Rhomberg negative                Biceps,brachioradialis, tricep, and patellar reflexes all 2+     Cardiovascular:    S1S2, no abnormal rhythm auscultated, no peripheral edema  Neck:                     No carotid bruits noted   Pulmonary:            Respirations easy, lungs clear to auscultation all fields.     Skin:                     No obvious rashes.    Iniital NIHSS  2      Current NIHSS    1a. LOC: 0  1b. LOC Questions: 0  1c. LOC Commands: 0  2. Best Gaze:0  3. Visual Fields: 2  4. Facial Paresis: 0  5a. Motor arm left: 0  5b. Motor arm right: 0  6a. Motor leg left: 0  6b. Motor leg right: 0  7. Sensory: 0  8. Best Language: 0  9. Limb Ataxia: 0  10. Dysarthria: 0  11. Extinction/Inattention: 0    Total Score Current  2        Assessment & Plan     1. 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  Residual RHH.  Unfortunately he had not been taking Xarelto with food and missed doses which likely decreased effectiveness.       Presumed Mechanism by Toast:    __  Large Artery Atherosclerosis  __  Small Vessel (lacunar)  __   Cardioembolic  __XX   Other (Sickle  cell, vasculitis, hypercoagulable)  __   Unknown    Stroke risk factors include:  Smoking, lupus anti-coagulation disorder.      Blood pressure goal less than 130/80, current 122/74.    LDL goal < 70, current 76 continue Atorvastatin 40mg,  discussed medication side effects, will need follow up with primary care evaluate liver function at intervals and refill  Exercise at least 30 minutes daily, avoid red meat, fried foods, butter, cheese.   Eat 5-6 servings of vegetables and fruits daily, choose lean white meat without skin (chicken, turkey, white fish)--baked, broiled or grilled.    Continue plavix 75mg PO daily (per vascular).       2. Lupus anticoagulant positive    Continue Eliquis 5mg BID         Do not take ibuprofen, aleve, motrin, advil---may take only acetaminophen (tylenol) for pain.    [facility]       3. Tobacco abuse  Discussed use of nicoderm patches (he is only using when he is not smoking).    Discussed barriers to quitting.    I spent 4 minutes outside of billed visit time,  counseling on tobacco cessation, discussed barriers to quitting, techniques for quitting.      4. Right homonymous hemianopsia  Referral to ophthalmology,   No driving until cleared by ophthalmology      If any new signs of stroke:  sudden weakness, numbness, speech difficulty (slurring or difficulty finding words), imbalance, incoordination, worse headache of life or vision loss occur, call 911.      Take all medications as prescribed unless instructed by your provider.      I spent a total of 59 minutes (not including 4 minutes for smoking cessation counseling).  caring for patient,  my time includes counseling, review of systems, HPI and assessment, review of images, labs and testing as above.  I reviewed the hospital records, PMH, social and family history.   I have counseled patient on stroke prevention strategies, stroke symptoms and mimics.  Diet and exercise modifications.  We  discussed medication side effects and instructions.       I have provided patient a written personalized stroke prevention plan, it is filed under the media tab under ‘[facility]”.      Follow up  PRN.

## 2022-06-20 NOTE — PATIENT INSTRUCTIONS
If any new signs of stroke:  sudden weakness, numbness, speech difficulty (slurring or difficulty finding words), imbalance, incoordination, worse headache of life or vision loss occur, call 911.      Take all medications as prescribed unless instructed by your provider.    Do not take ibuprofen, aleve, motrin, advil---may take only acetaminophen (tylenol) for pain.       Continue Apixiban (Eliquis) 5mg twice daily for life, this is to prevent clots from forming, we found a blood clotting disorder called Lupus anticoagulation syndrome.  Apixiban or another anticoagulant is the appropriate treatment.     I referred you to an ophthalmologist to look at your visual fields,  you will need formal visual fields testing to determine ability to drive.
